# Patient Record
Sex: MALE | Race: WHITE | NOT HISPANIC OR LATINO | Employment: OTHER | URBAN - METROPOLITAN AREA
[De-identification: names, ages, dates, MRNs, and addresses within clinical notes are randomized per-mention and may not be internally consistent; named-entity substitution may affect disease eponyms.]

---

## 2018-10-23 ENCOUNTER — HISTORICAL (OUTPATIENT)
Dept: RESPIRATORY THERAPY | Facility: HOSPITAL | Age: 58
End: 2018-10-23

## 2018-12-18 ENCOUNTER — HISTORICAL (OUTPATIENT)
Dept: LAB | Facility: HOSPITAL | Age: 58
End: 2018-12-18

## 2019-12-10 ENCOUNTER — HISTORICAL (OUTPATIENT)
Dept: RADIOLOGY | Facility: HOSPITAL | Age: 59
End: 2019-12-10

## 2021-06-02 ENCOUNTER — HISTORICAL (OUTPATIENT)
Dept: RADIOLOGY | Facility: HOSPITAL | Age: 61
End: 2021-06-02

## 2022-04-09 ENCOUNTER — HISTORICAL (OUTPATIENT)
Dept: ADMINISTRATIVE | Facility: HOSPITAL | Age: 62
End: 2022-04-09
Payer: COMMERCIAL

## 2022-04-25 VITALS
OXYGEN SATURATION: 94 % | BODY MASS INDEX: 21.19 KG/M2 | SYSTOLIC BLOOD PRESSURE: 116 MMHG | WEIGHT: 148 LBS | HEIGHT: 70 IN | DIASTOLIC BLOOD PRESSURE: 70 MMHG

## 2022-06-13 DIAGNOSIS — Z87.891 PERSONAL HISTORY OF TOBACCO USE, PRESENTING HAZARDS TO HEALTH: Primary | ICD-10-CM

## 2022-07-01 ENCOUNTER — TELEPHONE (OUTPATIENT)
Dept: NEUROLOGY | Facility: CLINIC | Age: 62
End: 2022-07-01
Payer: COMMERCIAL

## 2022-07-01 NOTE — TELEPHONE ENCOUNTER
Last office visit was on 12/15/2020. Requesting refill for Lorazepam 1 mg bid prn. Does he have to come in first. If not I can propose medication to you. Please advise.

## 2022-07-05 NOTE — TELEPHONE ENCOUNTER
We can send in one-time Rx with no refills.  He has appt in a couple of weeks and we can provide refills at that time

## 2022-07-06 DIAGNOSIS — R25.1 TREMOR: Primary | ICD-10-CM

## 2022-07-06 RX ORDER — LORAZEPAM 1 MG/1
1 TABLET ORAL 2 TIMES DAILY
Refills: 0 | OUTPATIENT
Start: 2022-07-06

## 2022-07-06 RX ORDER — LORAZEPAM 1 MG/1
1 TABLET ORAL 2 TIMES DAILY
COMMUNITY
End: 2022-07-06

## 2022-07-06 RX ORDER — LORAZEPAM 1 MG/1
1 TABLET ORAL EVERY 12 HOURS PRN
Qty: 15 TABLET | Refills: 0 | Status: CANCELLED | OUTPATIENT
Start: 2022-07-06

## 2022-07-06 RX ORDER — LORAZEPAM 1 MG/1
1 TABLET ORAL EVERY 12 HOURS PRN
Qty: 15 TABLET | Refills: 0 | Status: SHIPPED | OUTPATIENT
Start: 2022-07-06 | End: 2022-07-27 | Stop reason: SDUPTHER

## 2022-07-06 RX ORDER — LORAZEPAM 1 MG/1
1 TABLET ORAL EVERY 12 HOURS PRN
Qty: 15 TABLET | Refills: 0 | Status: SHIPPED | OUTPATIENT
Start: 2022-07-06 | End: 2022-07-06

## 2022-07-26 ENCOUNTER — HOSPITAL ENCOUNTER (EMERGENCY)
Facility: HOSPITAL | Age: 62
Discharge: HOME | End: 2022-07-26
Attending: EMERGENCY MEDICINE
Payer: COMMERCIAL

## 2022-07-26 VITALS
HEART RATE: 75 BPM | DIASTOLIC BLOOD PRESSURE: 77 MMHG | SYSTOLIC BLOOD PRESSURE: 124 MMHG | BODY MASS INDEX: 19.6 KG/M2 | HEIGHT: 71 IN | TEMPERATURE: 98 F | WEIGHT: 140 LBS | RESPIRATION RATE: 18 BRPM | OXYGEN SATURATION: 91 %

## 2022-07-26 DIAGNOSIS — M94.0 COSTOCHONDRITIS, ACUTE: Primary | ICD-10-CM

## 2022-07-26 DIAGNOSIS — R52 PAIN: ICD-10-CM

## 2022-07-26 DIAGNOSIS — J44.1 COPD WITH ACUTE EXACERBATION: ICD-10-CM

## 2022-07-26 PROCEDURE — 99284 EMERGENCY DEPT VISIT MOD MDM: CPT | Mod: 25

## 2022-07-26 PROCEDURE — 96372 THER/PROPH/DIAG INJ SC/IM: CPT | Performed by: NURSE PRACTITIONER

## 2022-07-26 PROCEDURE — 63600175 PHARM REV CODE 636 W HCPCS: Performed by: NURSE PRACTITIONER

## 2022-07-26 RX ORDER — DOXYCYCLINE 100 MG/1
100 CAPSULE ORAL 2 TIMES DAILY
Qty: 20 CAPSULE | Refills: 0 | Status: SHIPPED | OUTPATIENT
Start: 2022-07-26 | End: 2022-08-05

## 2022-07-26 RX ORDER — KETOROLAC TROMETHAMINE 30 MG/ML
30 INJECTION, SOLUTION INTRAMUSCULAR; INTRAVENOUS
Status: COMPLETED | OUTPATIENT
Start: 2022-07-26 | End: 2022-07-26

## 2022-07-26 RX ORDER — DEXAMETHASONE SODIUM PHOSPHATE 4 MG/ML
8 INJECTION, SOLUTION INTRA-ARTICULAR; INTRALESIONAL; INTRAMUSCULAR; INTRAVENOUS; SOFT TISSUE
Status: COMPLETED | OUTPATIENT
Start: 2022-07-26 | End: 2022-07-26

## 2022-07-26 RX ORDER — CODEINE PHOSPHATE AND GUAIFENESIN 10; 100 MG/5ML; MG/5ML
10 SOLUTION ORAL EVERY 8 HOURS PRN
Qty: 180 ML | Refills: 0 | Status: SHIPPED | OUTPATIENT
Start: 2022-07-26 | End: 2022-08-05

## 2022-07-26 RX ADMIN — KETOROLAC TROMETHAMINE 30 MG: 30 INJECTION, SOLUTION INTRAMUSCULAR at 02:07

## 2022-07-26 RX ADMIN — DEXAMETHASONE SODIUM PHOSPHATE 8 MG: 4 INJECTION INTRA-ARTICULAR; INTRALESIONAL; INTRAMUSCULAR; INTRAVENOUS; SOFT TISSUE at 02:07

## 2022-07-26 NOTE — ED PROVIDER NOTES
Encounter Date: 7/26/2022       History     Chief Complaint   Patient presents with    Rib Injury     1 weeks ago, per patient, he bent over and felt pain in his left rib area     62-year-old male presents with left rib pain for about a week.  He states he bent over and felt like he tore something in his ribs.  He also reports having coughing congestion.  He does have a history of COPD.  He denies any fever, chills, nausea, vomiting or diarrhea.    The history is provided by the patient. No  was used.     Review of patient's allergies indicates:   Allergen Reactions    Penicillin Rash     Past Medical History:   Diagnosis Date    Collapse, lung     COPD with hypoxia     Degenerative cervical spinal stenosis     Depression     Disuse osteoporosis     Emphysema/COPD     Hepatitis C     Leukocytosis      Past Surgical History:   Procedure Laterality Date    CERVICAL LAMINECTOMY WITH SPINAL FUSION      CHOLECYSTECTOMY      COLONOSCOPY      ESOPHAGOGASTRODUODENOSCOPY      gastrointestinal biopsy      graft to nose      inguinal herniotomy      mandible operation      POLYPECTOMY       No family history on file.  Social History     Tobacco Use    Smoking status: Former Smoker    Smokeless tobacco: Never Used   Substance Use Topics    Alcohol use: Never    Drug use: Never     Review of Systems   Constitutional: Negative for chills and fever.   HENT: Negative for postnasal drip and rhinorrhea.    Respiratory: Positive for cough. Negative for shortness of breath.    Cardiovascular: Positive for chest pain.   Gastrointestinal: Negative for nausea and vomiting.   Musculoskeletal: Negative for back pain.   Neurological: Negative for dizziness and light-headedness.       Physical Exam     Initial Vitals [07/26/22 1251]   BP Pulse Resp Temp SpO2   124/77 75 18 97.5 °F (36.4 °C) (!) 91 %      MAP       --         Physical Exam    Constitutional: He appears well-developed and  well-nourished.   HENT:   Head: Normocephalic and atraumatic.   Eyes: EOM are normal.   Neck: Neck supple.   Cardiovascular: Normal rate, regular rhythm and normal heart sounds.   Pulmonary/Chest: He has rales in the right lower field and the left lower field. He exhibits tenderness. He exhibits no crepitus and no swelling.     Abdominal: Abdomen is soft. There is no abdominal tenderness.   Musculoskeletal:         General: Normal range of motion.      Cervical back: Neck supple.     Neurological: He is alert and oriented to person, place, and time.   Skin: Skin is warm and dry. No rash noted. No erythema.   Psychiatric: He has a normal mood and affect.         ED Course   Procedures  Labs Reviewed - No data to display       Imaging Results          X-Ray Ribs 2 View Left (Final result)  Result time 07/26/22 13:27:36    Final result by Brennan Ayala MD (07/26/22 13:27:36)                 Impression:        1. Chronic lung parenchymal changes, with no evidence of new or worsening intrathoracic abnormality.  2. No convincing displaced rib fracture.      Electronically signed by: Brennan Ayala  Date:    07/26/2022  Time:    13:27             Narrative:    EXAMINATION:  XR RIBS 2 VIEW LEFT    CLINICAL HISTORY:  Pain, unspecified;    TECHNIQUE:  Total of 4 images of the left ribs.    COMPARISON:  14 December 2021 chest radiograph.    FINDINGS:  The visualized cardiac silhouette, mediastinal structures, and lung fields are without evidence of acute or suspicious focal abnormality.  Chronic lung interstitial changes are similar to the prior, with redemonstrated and grossly stable appearance of irregular left apical nodular opacity.  There is no significant pleural fluid or appreciable pneumothorax.    No convincing displaced rib fracture is appreciated. The remaining osseous structures are grossly intact.                                   Medications   dexamethasone injection 8 mg (8 mg Intramuscular Given 7/26/22 1402)    ketorolac injection 30 mg (30 mg Intramuscular Given 7/26/22 1402)     Medical Decision Making:   Differential Diagnosis:   Rib fracture, COPD exacerbation, costochondritis, pneumonia, bronchitis  Clinical Tests:   Radiological Study: Ordered and Reviewed  ED Management:  X-rays negative for pneumonia or rib fracture.  Patient with mild crackles in bases.  Given his COPD history will cover him with antibiotics.  Patient given Decadron and Toradol in ER.  Will discharge home with cough medication and inhaler.                      Clinical Impression:   Final diagnoses:  [R52] Pain  [M94.0] Costochondritis, acute (Primary)  [J44.1] COPD with acute exacerbation          ED Disposition Condition    Discharge Stable        ED Prescriptions     Medication Sig Dispense Start Date End Date Auth. Provider    doxycycline (VIBRAMYCIN) 100 MG Cap Take 1 capsule (100 mg total) by mouth 2 (two) times daily. for 10 days 20 capsule 7/26/2022 8/5/2022 KARIN Durham    guaiFENesin-codeine 100-10 mg/5 ml (TUSSI-ORGANIDIN NR)  mg/5 mL syrup Take 10 mLs by mouth every 8 (eight) hours as needed for Cough. 180 mL 7/26/2022 8/5/2022 KARIN Durham        Follow-up Information    None          KARIN Durham  07/26/22 4194

## 2022-07-26 NOTE — DISCHARGE INSTRUCTIONS
Guaifenesin with codeine as needed for cough and pain, do not take and drive it, may cause drowsiness  Continue your nebulizer treatments as needed for wheezing  Doxycycline twice a day

## 2022-07-27 ENCOUNTER — OFFICE VISIT (OUTPATIENT)
Dept: NEUROLOGY | Facility: CLINIC | Age: 62
End: 2022-07-27
Payer: COMMERCIAL

## 2022-07-27 VITALS
DIASTOLIC BLOOD PRESSURE: 72 MMHG | HEIGHT: 71 IN | SYSTOLIC BLOOD PRESSURE: 130 MMHG | WEIGHT: 140 LBS | BODY MASS INDEX: 19.6 KG/M2

## 2022-07-27 DIAGNOSIS — G25.3 MYOCLONUS: Primary | ICD-10-CM

## 2022-07-27 PROCEDURE — 3078F DIAST BP <80 MM HG: CPT | Mod: CPTII,S$GLB,, | Performed by: PSYCHIATRY & NEUROLOGY

## 2022-07-27 PROCEDURE — 99213 PR OFFICE/OUTPT VISIT, EST, LEVL III, 20-29 MIN: ICD-10-PCS | Mod: S$GLB,,, | Performed by: PSYCHIATRY & NEUROLOGY

## 2022-07-27 PROCEDURE — 1160F PR REVIEW ALL MEDS BY PRESCRIBER/CLIN PHARMACIST DOCUMENTED: ICD-10-PCS | Mod: CPTII,S$GLB,, | Performed by: PSYCHIATRY & NEUROLOGY

## 2022-07-27 PROCEDURE — 99999 PR PBB SHADOW E&M-EST. PATIENT-LVL III: ICD-10-PCS | Mod: PBBFAC,,, | Performed by: PSYCHIATRY & NEUROLOGY

## 2022-07-27 PROCEDURE — 3008F PR BODY MASS INDEX (BMI) DOCUMENTED: ICD-10-PCS | Mod: CPTII,S$GLB,, | Performed by: PSYCHIATRY & NEUROLOGY

## 2022-07-27 PROCEDURE — 1159F MED LIST DOCD IN RCRD: CPT | Mod: CPTII,S$GLB,, | Performed by: PSYCHIATRY & NEUROLOGY

## 2022-07-27 PROCEDURE — 3008F BODY MASS INDEX DOCD: CPT | Mod: CPTII,S$GLB,, | Performed by: PSYCHIATRY & NEUROLOGY

## 2022-07-27 PROCEDURE — 3075F PR MOST RECENT SYSTOLIC BLOOD PRESS GE 130-139MM HG: ICD-10-PCS | Mod: CPTII,S$GLB,, | Performed by: PSYCHIATRY & NEUROLOGY

## 2022-07-27 PROCEDURE — 99213 OFFICE O/P EST LOW 20 MIN: CPT | Mod: S$GLB,,, | Performed by: PSYCHIATRY & NEUROLOGY

## 2022-07-27 PROCEDURE — 1159F PR MEDICATION LIST DOCUMENTED IN MEDICAL RECORD: ICD-10-PCS | Mod: CPTII,S$GLB,, | Performed by: PSYCHIATRY & NEUROLOGY

## 2022-07-27 PROCEDURE — 1160F RVW MEDS BY RX/DR IN RCRD: CPT | Mod: CPTII,S$GLB,, | Performed by: PSYCHIATRY & NEUROLOGY

## 2022-07-27 PROCEDURE — 3078F PR MOST RECENT DIASTOLIC BLOOD PRESSURE < 80 MM HG: ICD-10-PCS | Mod: CPTII,S$GLB,, | Performed by: PSYCHIATRY & NEUROLOGY

## 2022-07-27 PROCEDURE — 99999 PR PBB SHADOW E&M-EST. PATIENT-LVL III: CPT | Mod: PBBFAC,,, | Performed by: PSYCHIATRY & NEUROLOGY

## 2022-07-27 PROCEDURE — 3075F SYST BP GE 130 - 139MM HG: CPT | Mod: CPTII,S$GLB,, | Performed by: PSYCHIATRY & NEUROLOGY

## 2022-07-27 RX ORDER — LORAZEPAM 1 MG/1
1 TABLET ORAL EVERY 12 HOURS PRN
Qty: 15 TABLET | Refills: 5 | Status: ON HOLD | OUTPATIENT
Start: 2022-07-27 | End: 2023-01-12 | Stop reason: HOSPADM

## 2022-07-27 NOTE — PROGRESS NOTES
Chief Complaint   Patient presents with    myoclonus     F/u for myoclonus, anxiety; patient states tremors have improved with medication. Patient reports myoclonus is still the same well tolerated with medication. Patient does not have list of medication with him but reports meds have not changed since last office visit 12/2020        This is a 62 y.o. male here for follow up for F/u for myoclonus, anxiety; patient states tremors have improved with medication. Patient reports myoclonus is still the same well tolerated with medication. Patient does not have list of medication with him but reports meds have not changed since last office visit 12/2020. Takes ativan about once a week when myoclonus is worse. It works and it the only thing that has owrked    Medication List with Changes/Refills   Current Medications    ALBUTEROL-IPRATROPIUM (DUO-NEB) 2.5 MG-0.5 MG/3 ML NEBULIZER SOLUTION    Take by nebulization 4 (four) times daily.    BUPRENORPHINE (BUTRANS) 20 MCG/HOUR PTWK    Apply 1 patch topically every 7 days.    DOXYCYCLINE (VIBRAMYCIN) 100 MG CAP    Take 1 capsule (100 mg total) by mouth 2 (two) times daily. for 10 days    GABAPENTIN (NEURONTIN) 300 MG CAPSULE    Take 300 mg by mouth 3 (three) times daily as needed.    GUAIFENESIN-CODEINE 100-10 MG/5 ML (TUSSI-ORGANIDIN NR)  MG/5 ML SYRUP    Take 10 mLs by mouth every 8 (eight) hours as needed for Cough.    LORAZEPAM (ATIVAN) 1 MG TABLET    Take 1 tablet (1 mg total) by mouth every 12 (twelve) hours as needed for Anxiety (seizure activity).    SERTRALINE (ZOLOFT) 100 MG TABLET    Take 100 mg by mouth once daily.    TEMAZEPAM (RESTORIL) 30 MG CAPSULE    Take 30 mg by mouth nightly.        Vitals:    07/27/22 1504   BP: 130/72        General: alert and oriented, no acute distress, no audible wheezes, pulse intact, no edema    Cognition and Comprehension  Speech and language intact  Follows commands  Speech fluent  Attention intact  Memory for recent events  intact from history taking  Affect pleasant  Fund of knowledge adequate    Cranial nerves  II. Optic: Visual fields full to confrontation both eyes  III, IV, VI. Oculomotor: Intact, Pupils equal, round and reactive to light, no nystagmus  V. Trigeminal: sensation to light touch normal  VII. No facial asymmetry or facial weakness  VIII. Hearing intact to spoken voice  IX/X. Glossopharyngeal/Vagus: Voice normal, palate rises symmetrically  XI. Axillary: Shoulder shrug normal  XII. Hypoglossal: Intact    Muscle Strength and Tone  Normal upper extremity tone  Normal lower extremity tone  Normal upper extremity strength  Normal lower extremity strength    Sensation  Intact to light touch and temperature    Reflexes  Normal and symmetric    Coordination and Gait  Finger to nose normal  Gait normal     1. Myoclonus       Ativan should be used only when needed no more than once a week

## 2022-11-11 ENCOUNTER — HOSPITAL ENCOUNTER (EMERGENCY)
Facility: HOSPITAL | Age: 62
Discharge: LEFT BEFORE DISCHARGE | End: 2022-11-11
Attending: FAMILY MEDICINE
Payer: COMMERCIAL

## 2022-11-11 VITALS
TEMPERATURE: 99 F | OXYGEN SATURATION: 91 % | SYSTOLIC BLOOD PRESSURE: 106 MMHG | DIASTOLIC BLOOD PRESSURE: 64 MMHG | RESPIRATION RATE: 20 BRPM | HEART RATE: 101 BPM

## 2022-11-11 DIAGNOSIS — J18.9 PNEUMONIA OF LEFT UPPER LOBE DUE TO INFECTIOUS ORGANISM: Primary | ICD-10-CM

## 2022-11-11 DIAGNOSIS — R07.9 CHEST PAIN: ICD-10-CM

## 2022-11-11 LAB
ALBUMIN SERPL-MCNC: 2.7 GM/DL (ref 3.4–4.8)
ALBUMIN/GLOB SERPL: 0.7 RATIO (ref 1.1–2)
ALP SERPL-CCNC: 113 UNIT/L (ref 40–150)
ALT SERPL-CCNC: 7 UNIT/L (ref 0–55)
APPEARANCE UR: CLEAR
AST SERPL-CCNC: 10 UNIT/L (ref 5–34)
BACTERIA #/AREA URNS AUTO: NORMAL /HPF
BASOPHILS # BLD AUTO: 0.01 X10(3)/MCL (ref 0–0.2)
BASOPHILS NFR BLD AUTO: 0 %
BILIRUB UR QL STRIP.AUTO: ABNORMAL MG/DL
BILIRUBIN DIRECT+TOT PNL SERPL-MCNC: 1 MG/DL
BUN SERPL-MCNC: 18.7 MG/DL (ref 8.4–25.7)
CALCIUM SERPL-MCNC: 9.8 MG/DL (ref 8.8–10)
CHLORIDE SERPL-SCNC: 95 MMOL/L (ref 98–107)
CO2 SERPL-SCNC: 28 MMOL/L (ref 23–31)
COLOR UR AUTO: ABNORMAL
CREAT SERPL-MCNC: 0.72 MG/DL (ref 0.73–1.18)
D DIMER PPP IA.FEU-MCNC: 3.48 UG/ML FEU (ref 0–0.5)
EOSINOPHIL # BLD AUTO: 0 X10(3)/MCL (ref 0–0.9)
EOSINOPHIL NFR BLD AUTO: 0 %
ERYTHROCYTE [DISTWIDTH] IN BLOOD BY AUTOMATED COUNT: 13.2 % (ref 11.5–17)
FLUAV AG UPPER RESP QL IA.RAPID: NOT DETECTED
FLUBV AG UPPER RESP QL IA.RAPID: NOT DETECTED
GFR SERPLBLD CREATININE-BSD FMLA CKD-EPI: >60 MLS/MIN/1.73/M2
GLOBULIN SER-MCNC: 3.9 GM/DL (ref 2.4–3.5)
GLUCOSE SERPL-MCNC: 99 MG/DL (ref 82–115)
GLUCOSE UR QL STRIP.AUTO: NEGATIVE MG/DL
HCT VFR BLD AUTO: 42.1 % (ref 42–52)
HGB BLD-MCNC: 13.1 GM/DL (ref 14–18)
IMM GRANULOCYTES # BLD AUTO: 0.08 X10(3)/MCL (ref 0–0.04)
IMM GRANULOCYTES NFR BLD AUTO: 0.3 %
KETONES UR QL STRIP.AUTO: ABNORMAL MG/DL
LEUKOCYTE ESTERASE UR QL STRIP.AUTO: NEGATIVE UNIT/L
LYMPHOCYTES # BLD AUTO: 0.56 X10(3)/MCL (ref 0.6–4.6)
LYMPHOCYTES NFR BLD AUTO: 2.3 %
MCH RBC QN AUTO: 30.8 PG (ref 27–31)
MCHC RBC AUTO-ENTMCNC: 31.1 MG/DL (ref 33–36)
MCV RBC AUTO: 99.1 FL (ref 80–94)
MONOCYTES # BLD AUTO: 1.67 X10(3)/MCL (ref 0.1–1.3)
MONOCYTES NFR BLD AUTO: 6.8 %
NEUTROPHILS # BLD AUTO: 22.4 X10(3)/MCL (ref 2.1–9.2)
NEUTROPHILS NFR BLD AUTO: 90.6 %
NITRITE UR QL STRIP.AUTO: NEGATIVE
PH UR STRIP.AUTO: 6 [PH]
PLATELET # BLD AUTO: 205 X10(3)/MCL (ref 130–400)
PMV BLD AUTO: 10 FL (ref 7.4–10.4)
POTASSIUM SERPL-SCNC: 4.1 MMOL/L (ref 3.5–5.1)
PROT SERPL-MCNC: 6.6 GM/DL (ref 5.8–7.6)
PROT UR QL STRIP.AUTO: 30 MG/DL
RBC # BLD AUTO: 4.25 X10(6)/MCL (ref 4.7–6.1)
RBC #/AREA URNS AUTO: NORMAL /HPF
RBC UR QL AUTO: ABNORMAL UNIT/L
SARS-COV-2 RNA RESP QL NAA+PROBE: NOT DETECTED
SODIUM SERPL-SCNC: 135 MMOL/L (ref 136–145)
SP GR UR STRIP.AUTO: 1.01
SQUAMOUS #/AREA URNS AUTO: NORMAL /HPF
TROPONIN ISTAT: 0 NG/ML
UROBILINOGEN UR STRIP-ACNC: >=8 MG/DL
WBC # SPEC AUTO: 24.7 X10(3)/MCL (ref 4.5–11.5)
WBC #/AREA URNS AUTO: NORMAL /HPF

## 2022-11-11 PROCEDURE — 87040 BLOOD CULTURE FOR BACTERIA: CPT | Performed by: FAMILY MEDICINE

## 2022-11-11 PROCEDURE — 81001 URINALYSIS AUTO W/SCOPE: CPT | Performed by: FAMILY MEDICINE

## 2022-11-11 PROCEDURE — 80053 COMPREHEN METABOLIC PANEL: CPT | Performed by: FAMILY MEDICINE

## 2022-11-11 PROCEDURE — 93010 EKG 12-LEAD: ICD-10-PCS | Mod: ,,, | Performed by: INTERNAL MEDICINE

## 2022-11-11 PROCEDURE — 25500020 PHARM REV CODE 255: Performed by: FAMILY MEDICINE

## 2022-11-11 PROCEDURE — 96375 TX/PRO/DX INJ NEW DRUG ADDON: CPT | Mod: 59

## 2022-11-11 PROCEDURE — 85025 COMPLETE CBC W/AUTO DIFF WBC: CPT | Performed by: FAMILY MEDICINE

## 2022-11-11 PROCEDURE — 85379 FIBRIN DEGRADATION QUANT: CPT | Performed by: FAMILY MEDICINE

## 2022-11-11 PROCEDURE — 63600175 PHARM REV CODE 636 W HCPCS: Performed by: FAMILY MEDICINE

## 2022-11-11 PROCEDURE — 0240U COVID/FLU A&B PCR: CPT | Performed by: FAMILY MEDICINE

## 2022-11-11 PROCEDURE — 25000242 PHARM REV CODE 250 ALT 637 W/ HCPCS: Performed by: FAMILY MEDICINE

## 2022-11-11 PROCEDURE — 93010 ELECTROCARDIOGRAM REPORT: CPT | Mod: ,,, | Performed by: INTERNAL MEDICINE

## 2022-11-11 PROCEDURE — 96374 THER/PROPH/DIAG INJ IV PUSH: CPT | Mod: 59

## 2022-11-11 PROCEDURE — 99285 EMERGENCY DEPT VISIT HI MDM: CPT | Mod: 25

## 2022-11-11 PROCEDURE — 93005 ELECTROCARDIOGRAM TRACING: CPT

## 2022-11-11 RX ORDER — IPRATROPIUM BROMIDE AND ALBUTEROL SULFATE 2.5; .5 MG/3ML; MG/3ML
3 SOLUTION RESPIRATORY (INHALATION)
Status: COMPLETED | OUTPATIENT
Start: 2022-11-11 | End: 2022-11-11

## 2022-11-11 RX ORDER — LEVOFLOXACIN 500 MG/1
500 TABLET, FILM COATED ORAL DAILY
Qty: 7 TABLET | Refills: 0 | Status: SHIPPED | OUTPATIENT
Start: 2022-11-11 | End: 2022-11-18

## 2022-11-11 RX ORDER — FUROSEMIDE 10 MG/ML
20 INJECTION INTRAMUSCULAR; INTRAVENOUS
Status: COMPLETED | OUTPATIENT
Start: 2022-11-11 | End: 2022-11-11

## 2022-11-11 RX ORDER — METHYLPREDNISOLONE SOD SUCC 125 MG
125 VIAL (EA) INJECTION
Status: COMPLETED | OUTPATIENT
Start: 2022-11-11 | End: 2022-11-11

## 2022-11-11 RX ORDER — PREDNISONE 50 MG/1
50 TABLET ORAL DAILY
Qty: 5 TABLET | Refills: 0 | Status: ON HOLD | OUTPATIENT
Start: 2022-11-11 | End: 2023-01-12 | Stop reason: HOSPADM

## 2022-11-11 RX ADMIN — METHYLPREDNISOLONE SODIUM SUCCINATE 125 MG: 125 INJECTION, POWDER, FOR SOLUTION INTRAMUSCULAR; INTRAVENOUS at 03:11

## 2022-11-11 RX ADMIN — IOPAMIDOL 100 ML: 755 INJECTION, SOLUTION INTRAVENOUS at 04:11

## 2022-11-11 RX ADMIN — IPRATROPIUM BROMIDE AND ALBUTEROL SULFATE 3 ML: .5; 3 SOLUTION RESPIRATORY (INHALATION) at 04:11

## 2022-11-11 RX ADMIN — FUROSEMIDE 20 MG: 10 INJECTION, SOLUTION INTRAVENOUS at 04:11

## 2022-11-11 NOTE — ED NOTES
Pt verbally instructed per Dr. Terry and myself of need for admission for pneumonia/further treatment. Pt reports that he does not want to continue with treatment or admission even though advised of possible outcomes: worsening of condition or death. Pt verbalizes understanding of risks. Pt reports that he would still like to leave AMA.

## 2022-11-11 NOTE — ED PROVIDER NOTES
Encounter Date: 2022       History     Chief Complaint   Patient presents with    Shortness of Breath     Hx copd- more sob - 5L 02 nc at home/palpitations/bodyaches/cough     62-year-old male patient comes in by private motor vehicle with history of COPD requiring oxygen at home patient is here with oxygen has an on 5 L and patient is satting at 93 94% with that patient has an extensive history of atelectasis COPD with hypoxia and emphysema patient otherwise has O2 sat at home in the 40s according to a female friend otherwise patient states that he had increase in his respiratory rate increase in his cough for the last 2 weeks that has become more productive over the last 3 days.  Patient has no significant chest pain no neck pain or orthopnea no other complaints with this.      Review of patient's allergies indicates:   Allergen Reactions    Penicillin Rash     Past Medical History:   Diagnosis Date    Collapse, lung     COPD with hypoxia     Degenerative cervical spinal stenosis     Depression     Disuse osteoporosis     Emphysema/COPD     Hepatitis C     Leukocytosis      Past Surgical History:   Procedure Laterality Date    CERVICAL LAMINECTOMY WITH SPINAL FUSION      CHOLECYSTECTOMY      COLONOSCOPY      ESOPHAGOGASTRODUODENOSCOPY      gastrointestinal biopsy      graft to nose      inguinal herniotomy      mandible operation      POLYPECTOMY       No family history on file.  Social History     Tobacco Use    Smoking status: Former     Packs/day: 2.00     Years: 40.00     Pack years: 80.00     Types: Cigarettes     Quit date: 2017     Years since quittin.2    Smokeless tobacco: Never   Substance Use Topics    Alcohol use: Never    Drug use: Never     Review of Systems   Constitutional:  Negative for fever.   HENT:  Negative for sore throat.    Respiratory:  Positive for cough, shortness of breath and wheezing.    Cardiovascular:  Negative for chest pain.   Gastrointestinal:  Negative for nausea.    Genitourinary:  Negative for dysuria.   Musculoskeletal:  Negative for back pain.   Skin:  Negative for rash.   Neurological:  Negative for weakness.   Hematological:  Does not bruise/bleed easily.   All other systems reviewed and are negative.    Physical Exam     Initial Vitals [11/11/22 1456]   BP Pulse Resp Temp SpO2   132/89 (!) 115 (!) 28 98.5 °F (36.9 °C) (!) 94 %      MAP       --         Physical Exam    Nursing note and vitals reviewed.  Constitutional: He appears well-developed and well-nourished. He is not diaphoretic. No distress.   HENT:   Head: Normocephalic and atraumatic.   Right Ear: External ear normal.   Left Ear: External ear normal.   Nose: Nose normal.   Mouth/Throat: Oropharynx is clear and moist. No oropharyngeal exudate.   Eyes: Conjunctivae and EOM are normal. Pupils are equal, round, and reactive to light. Right eye exhibits no discharge. Left eye exhibits no discharge.   Neck: Neck supple. No thyromegaly present. No tracheal deviation present. No JVD present.   Normal range of motion.  Cardiovascular:  Normal rate, regular rhythm, normal heart sounds and intact distal pulses.     Exam reveals no gallop and no friction rub.       No murmur heard.  Pulmonary/Chest: No stridor. He is in respiratory distress. He has wheezes. He has no rhonchi. He has no rales. He exhibits no tenderness.   Abdominal: Abdomen is soft. Bowel sounds are normal. He exhibits no distension. There is no abdominal tenderness. There is no rebound and no guarding.   Musculoskeletal:         General: No tenderness or edema. Normal range of motion.      Cervical back: Normal range of motion and neck supple.     Lymphadenopathy:     He has no cervical adenopathy.   Neurological: He is alert and oriented to person, place, and time. He has normal strength and normal reflexes. No cranial nerve deficit or sensory deficit. GCS score is 15. GCS eye subscore is 4. GCS verbal subscore is 5. GCS motor subscore is 6.   Skin: Skin  is warm and dry. No rash and no abscess noted. No erythema.   Psychiatric: He has a normal mood and affect. His behavior is normal. Judgment and thought content normal.       ED Course   Procedures  Labs Reviewed   COMPREHENSIVE METABOLIC PANEL - Abnormal; Notable for the following components:       Result Value    Sodium Level 135 (*)     Chloride 95 (*)     Creatinine 0.72 (*)     Albumin Level 2.7 (*)     Globulin 3.9 (*)     Albumin/Globulin Ratio 0.7 (*)     All other components within normal limits   CBC WITH DIFFERENTIAL - Abnormal; Notable for the following components:    WBC 24.7 (*)     RBC 4.25 (*)     Hgb 13.1 (*)     MCV 99.1 (*)     MCHC 31.1 (*)     Lymph # 0.56 (*)     Neut # 22.4 (*)     Mono # 1.67 (*)     IG# 0.08 (*)     All other components within normal limits   COVID/FLU A&B PCR - Normal    Narrative:     The Xpert Xpress SARS-CoV-2/FLU/RSV plus is a rapid, multiplexed real-time PCR test intended for the simultaneous qualitative detection and differentiation of SARS-CoV-2, Influenza A, Influenza B, and respiratory syncytial virus (RSV) viral RNA in either nasopharyngeal swab or nasal swab specimens.         POCT TROPONIN - Normal   BLOOD CULTURE [facility]   BLOOD CULTURE [facility]   CBC W/ AUTO DIFFERENTIAL    Narrative:     The following orders were created for panel order CBC auto differential.  Procedure                               Abnormality         Status                     ---------                               -----------         ------                     CBC with Differential[[id number]]        Abnormal            Final result                 Please view results for these tests on the individual orders.   D DIMER, QUANTITATIVE   URINALYSIS, REFLEX TO URINE CULTURE     EKG Readings: (Independently Interpreted)   Initial Reading: No STEMI. Rhythm: Sinus Tachycardia. Heart Rate: 114. Ectopy: No Ectopy. Conduction: Normal. ST Segments: Normal ST Segments. T Waves: Normal. Axis: Normal.      Imaging Results              CT Chest With Contrast (Final result)  Result time 11/11/22 16:21:11      Final result by India Story MD (11/11/22 16:21:11)                   Impression:      Findings suggestive of a left upper lobe pneumonia.  Follow-up imaging can be obtained to ensure resolution.      Electronically signed by: India Story  Date:    11/11/2022  Time:    16:21               Narrative:    EXAMINATION:  CT CHEST WITH CONTRAST    CLINICAL HISTORY:  Abnormal xray - lung nodule, >= 1 cm;    TECHNIQUE:  Contiguous CT images of the chest after IV contrast administration. Axial, coronal and sagittal reformatted images are reviewed. Dose length product is 577 mGycm. Automatic exposure control was utilized to limit radiation dose.    COMPARISON:  X-ray dated 08/08/2021    FINDINGS:  Lungs and large airways: There is ground-glass opacity in the left upper lobe with a large consolidation and air bronchograms.  There are moderate emphysematous changes with scarring in the lung apices.    Pleura: There is no pleural effusion or visible pneumothorax.    Heart and vasculature: The heart is normal in size.  There is no pericardial effusion.    Mediastinum and ervin: No definite abnormally enlarged lymph nodes.    Chest wall/axilla and lower neck: No significant findings.    Upper abdomen: No significant findings.    Bones: No acute osseous findings.  There are degenerative changes of the spine.                                       X-Ray Chest AP Portable (Final result)  Result time 11/11/22 15:27:45      Final result by Vincent Yoder MD (11/11/22 15:27:45)                   Impression:      Large mass/area of consolidation in the left hilum with confluent opacities in the perihilar region and left upper lobe hard to ascertain whether these might represent a mass which changes of postobstructive pneumonitis CT scan examination might prove helpful for further assessment.    Increase interstitial  markings and some opacities in the left infrahilar region and left base this, however, similar to the exam of December 14, 2021      Electronically signed by: Vincent Yoder  Date:    11/11/2022  Time:    15:27               Narrative:    EXAMINATION:  XR CHEST AP PORTABLE    CLINICAL HISTORY:  Chest Pain;    TECHNIQUE:  Single frontal view of the chest was performed.    COMPARISON:  None    FINDINGS:  Examination reveals mediastinal silhouette to be within normal limits cardiac silhouette is not enlarged there is a large density in the left hilar region with evidence of increased airspace opacities in the perihilar region and upper lobe hard to ascertain whether these might represent a mass with postobstructive pneumonitis other imaging modalities might prove helpful for further assessment.    Some increased interstitial markings and confluent opacities also identified at the left base these, however, was also seen on a previous exam of December 14, 2021.    No other focal consolidative changes                                       Medications   albuterol-ipratropium 2.5 mg-0.5 mg/3 mL nebulizer solution 3 mL (has no administration in time range)   methylPREDNISolone sodium succinate injection 125 mg (125 mg Intravenous Given 11/11/22 1551)   furosemide injection 20 mg (20 mg Intravenous Given 11/11/22 1611)   iopamidoL (ISOVUE-370) injection 100 mL (100 mLs Intravenous Given 11/11/22 1600)     Medical Decision Making:   ED Management:  I discussed with the patient the extent of his pneumonia but patient is refusing to be admitted stating that he has a son at home that will take care of him and even though we went over the risk of him leaving including death patient refused to be admitted                        Clinical Impression:   Final diagnoses:  [R07.9] Chest pain  [J18.9] Pneumonia of left upper lobe due to infectious organism (Primary)        ED Disposition Condition    AMA Gian MORFIN  MD Mara  11/11/22 5432

## 2022-11-14 LAB
POC CARDIAC TROPONIN I: 0 NG/ML (ref 0–0.08)
SAMPLE: NORMAL

## 2022-11-17 LAB
BACTERIA BLD CULT: NORMAL
BACTERIA BLD CULT: NORMAL

## 2022-11-23 ENCOUNTER — LAB VISIT (OUTPATIENT)
Dept: LAB | Facility: HOSPITAL | Age: 62
End: 2022-11-23
Attending: FAMILY MEDICINE
Payer: COMMERCIAL

## 2022-11-23 DIAGNOSIS — J18.9 PNEUMONIA: Primary | ICD-10-CM

## 2022-11-23 LAB
ALBUMIN SERPL-MCNC: 2.7 GM/DL (ref 3.4–4.8)
ALBUMIN/GLOB SERPL: 0.6 RATIO (ref 1.1–2)
ALP SERPL-CCNC: 102 UNIT/L (ref 40–150)
ALT SERPL-CCNC: 6 UNIT/L (ref 0–55)
AST SERPL-CCNC: 11 UNIT/L (ref 5–34)
BASOPHILS # BLD AUTO: 0.03 X10(3)/MCL (ref 0–0.2)
BASOPHILS NFR BLD AUTO: 0.2 %
BILIRUBIN DIRECT+TOT PNL SERPL-MCNC: 0.3 MG/DL
BUN SERPL-MCNC: 16.8 MG/DL (ref 8.4–25.7)
CALCIUM SERPL-MCNC: 9.3 MG/DL (ref 8.8–10)
CHLORIDE SERPL-SCNC: 102 MMOL/L (ref 98–107)
CO2 SERPL-SCNC: 25 MMOL/L (ref 23–31)
CREAT SERPL-MCNC: 0.82 MG/DL (ref 0.73–1.18)
EOSINOPHIL # BLD AUTO: 0.15 X10(3)/MCL (ref 0–0.9)
EOSINOPHIL NFR BLD AUTO: 0.8 %
ERYTHROCYTE [DISTWIDTH] IN BLOOD BY AUTOMATED COUNT: 13.9 % (ref 11.5–17)
GFR SERPLBLD CREATININE-BSD FMLA CKD-EPI: >60 MLS/MIN/1.73/M2
GLOBULIN SER-MCNC: 4.8 GM/DL (ref 2.4–3.5)
GLUCOSE SERPL-MCNC: 125 MG/DL (ref 82–115)
HCT VFR BLD AUTO: 44 % (ref 42–52)
HGB BLD-MCNC: 13.8 GM/DL (ref 14–18)
IMM GRANULOCYTES # BLD AUTO: 0.06 X10(3)/MCL (ref 0–0.04)
IMM GRANULOCYTES NFR BLD AUTO: 0.3 %
LYMPHOCYTES # BLD AUTO: 2.23 X10(3)/MCL (ref 0.6–4.6)
LYMPHOCYTES NFR BLD AUTO: 11.7 %
MCH RBC QN AUTO: 30.3 PG (ref 27–31)
MCHC RBC AUTO-ENTMCNC: 31.4 MG/DL (ref 33–36)
MCV RBC AUTO: 96.7 FL (ref 80–94)
MONOCYTES # BLD AUTO: 1.35 X10(3)/MCL (ref 0.1–1.3)
MONOCYTES NFR BLD AUTO: 7.1 %
NEUTROPHILS # BLD AUTO: 15.2 X10(3)/MCL (ref 2.1–9.2)
NEUTROPHILS NFR BLD AUTO: 79.9 %
PLATELET # BLD AUTO: 691 X10(3)/MCL (ref 130–400)
PMV BLD AUTO: 8.6 FL (ref 7.4–10.4)
POTASSIUM SERPL-SCNC: 4.5 MMOL/L (ref 3.5–5.1)
PROT SERPL-MCNC: 7.5 GM/DL (ref 5.8–7.6)
RBC # BLD AUTO: 4.55 X10(6)/MCL (ref 4.7–6.1)
SODIUM SERPL-SCNC: 137 MMOL/L (ref 136–145)
WBC # SPEC AUTO: 19.1 X10(3)/MCL (ref 4.5–11.5)

## 2022-11-23 PROCEDURE — 80053 COMPREHEN METABOLIC PANEL: CPT

## 2022-11-23 PROCEDURE — 36415 COLL VENOUS BLD VENIPUNCTURE: CPT

## 2022-11-23 PROCEDURE — 85025 COMPLETE CBC W/AUTO DIFF WBC: CPT

## 2023-01-01 ENCOUNTER — HOSPITAL ENCOUNTER (INPATIENT)
Facility: HOSPITAL | Age: 63
LOS: 4 days | Discharge: NURSING, REHAB OR LONG-TERM CARE | DRG: 177 | End: 2023-01-07
Attending: SPECIALIST | Admitting: STUDENT IN AN ORGANIZED HEALTH CARE EDUCATION/TRAINING PROGRAM
Payer: COMMERCIAL

## 2023-01-01 DIAGNOSIS — J44.1 COPD EXACERBATION: Primary | ICD-10-CM

## 2023-01-01 DIAGNOSIS — R78.81 BACTEREMIA: ICD-10-CM

## 2023-01-01 DIAGNOSIS — J18.9 COMMUNITY ACQUIRED BILATERAL LOWER LOBE PNEUMONIA: ICD-10-CM

## 2023-01-01 DIAGNOSIS — E87.6 LOW SERUM POTASSIUM: ICD-10-CM

## 2023-01-01 DIAGNOSIS — I42.9 CARDIOMYOPATHY: ICD-10-CM

## 2023-01-01 DIAGNOSIS — R07.9 CHEST PAIN: ICD-10-CM

## 2023-01-01 DIAGNOSIS — J96.02 ACUTE HYPERCAPNIC RESPIRATORY FAILURE: ICD-10-CM

## 2023-01-01 DIAGNOSIS — J18.9 PNEUMONIA OF LEFT UPPER LOBE DUE TO INFECTIOUS ORGANISM: ICD-10-CM

## 2023-01-01 PROBLEM — R64 PULMONARY CACHEXIA DUE TO COPD: Status: ACTIVE | Noted: 2023-01-01

## 2023-01-01 PROBLEM — J69.0 ASPIRATION PNEUMONIA: Status: ACTIVE | Noted: 2023-01-01

## 2023-01-01 PROBLEM — J44.9 PULMONARY CACHEXIA DUE TO COPD: Status: ACTIVE | Noted: 2023-01-01

## 2023-01-01 LAB
ALBUMIN SERPL-MCNC: 2.8 G/DL (ref 3.4–4.8)
ALBUMIN/GLOB SERPL: 0.6 RATIO (ref 1.1–2)
ALP SERPL-CCNC: 123 UNIT/L (ref 40–150)
ALT SERPL-CCNC: 7 UNIT/L (ref 0–55)
APPEARANCE UR: ABNORMAL
AST SERPL-CCNC: 12 UNIT/L (ref 5–34)
BACTERIA #/AREA URNS AUTO: ABNORMAL /HPF
BASOPHILS # BLD AUTO: 0.03 X10(3)/MCL (ref 0–0.2)
BASOPHILS NFR BLD AUTO: 0.2 %
BILIRUB UR QL STRIP.AUTO: ABNORMAL MG/DL
BILIRUBIN DIRECT+TOT PNL SERPL-MCNC: 0.5 MG/DL
BNP BLD-MCNC: 145.6 PG/ML
BUN SERPL-MCNC: 9.5 MG/DL (ref 8.4–25.7)
CALCIUM SERPL-MCNC: 9.5 MG/DL (ref 8.8–10)
CHLORIDE SERPL-SCNC: 95 MMOL/L (ref 98–107)
CO2 SERPL-SCNC: 32 MMOL/L (ref 23–31)
COLOR UR AUTO: ABNORMAL
CREAT SERPL-MCNC: 0.64 MG/DL (ref 0.73–1.18)
D DIMER PPP IA.FEU-MCNC: 3.64 UG/ML FEU (ref 0–0.5)
EOSINOPHIL # BLD AUTO: 0.01 X10(3)/MCL (ref 0–0.9)
EOSINOPHIL NFR BLD AUTO: 0.1 %
ERYTHROCYTE [DISTWIDTH] IN BLOOD BY AUTOMATED COUNT: 13.4 % (ref 11.6–14.4)
FLUAV AG UPPER RESP QL IA.RAPID: NOT DETECTED
FLUBV AG UPPER RESP QL IA.RAPID: NOT DETECTED
GFR SERPLBLD CREATININE-BSD FMLA CKD-EPI: >60 MLS/MIN/1.73/M2
GLOBULIN SER-MCNC: 4.4 GM/DL (ref 2.4–3.5)
GLUCOSE SERPL-MCNC: 145 MG/DL (ref 82–115)
GLUCOSE UR QL STRIP.AUTO: NEGATIVE MG/DL
HCT VFR BLD AUTO: 42.5 % (ref 42–52)
HGB BLD-MCNC: 13.1 GM/DL (ref 14–18)
IMM GRANULOCYTES # BLD AUTO: 0.06 X10(3)/MCL (ref 0–0.04)
IMM GRANULOCYTES NFR BLD AUTO: 0.3 %
KETONES UR QL STRIP.AUTO: ABNORMAL MG/DL
LACTATE SERPL-SCNC: 1.1 MMOL/L (ref 0.5–2.2)
LACTATE SERPL-SCNC: 3.7 MMOL/L (ref 0.5–2.2)
LEUKOCYTE ESTERASE UR QL STRIP.AUTO: NEGATIVE UNIT/L
LYMPHOCYTES # BLD AUTO: 0.42 X10(3)/MCL (ref 0.6–4.6)
LYMPHOCYTES NFR BLD AUTO: 2.1 %
MAGNESIUM SERPL-MCNC: 2 MG/DL (ref 1.6–2.6)
MCH RBC QN AUTO: 30.3 PG
MCHC RBC AUTO-ENTMCNC: 30.8 MG/DL (ref 33–36)
MCV RBC AUTO: 98.2 FL (ref 80–94)
MONOCYTES # BLD AUTO: 1.54 X10(3)/MCL (ref 0.1–1.3)
MONOCYTES NFR BLD AUTO: 7.8 %
MUCOUS THREADS URNS QL MICRO: ABNORMAL /LPF
NEUTROPHILS # BLD AUTO: 17.79 X10(3)/MCL (ref 2.1–9.2)
NEUTROPHILS NFR BLD AUTO: 89.5 %
NITRITE UR QL STRIP.AUTO: NEGATIVE
PH UR STRIP.AUTO: 6 [PH]
PLATELET # BLD AUTO: 314 X10(3)/MCL (ref 140–371)
PMV BLD AUTO: 10.1 FL (ref 9.4–12.4)
POTASSIUM SERPL-SCNC: 3.6 MMOL/L (ref 3.5–5.1)
PROT SERPL-MCNC: 7.2 GM/DL (ref 5.8–7.6)
PROT UR QL STRIP.AUTO: 30 MG/DL
RBC # BLD AUTO: 4.33 X10(6)/MCL (ref 4.7–6.1)
RBC #/AREA URNS AUTO: ABNORMAL /HPF
RBC UR QL AUTO: ABNORMAL UNIT/L
SARS-COV-2 RNA RESP QL NAA+PROBE: NOT DETECTED
SODIUM SERPL-SCNC: 138 MMOL/L (ref 136–145)
SP GR UR STRIP.AUTO: 1.02
SQUAMOUS #/AREA URNS AUTO: ABNORMAL /HPF
UROBILINOGEN UR STRIP-ACNC: 1 MG/DL
WBC # SPEC AUTO: 19.9 X10(3)/MCL (ref 4.5–11.5)
WBC #/AREA URNS AUTO: ABNORMAL /HPF

## 2023-01-01 PROCEDURE — 96375 TX/PRO/DX INJ NEW DRUG ADDON: CPT | Mod: 59

## 2023-01-01 PROCEDURE — 99900031 HC PATIENT EDUCATION (STAT)

## 2023-01-01 PROCEDURE — 80053 COMPREHEN METABOLIC PANEL: CPT | Performed by: SPECIALIST

## 2023-01-01 PROCEDURE — 25000003 PHARM REV CODE 250: Performed by: STUDENT IN AN ORGANIZED HEALTH CARE EDUCATION/TRAINING PROGRAM

## 2023-01-01 PROCEDURE — 99900035 HC TECH TIME PER 15 MIN (STAT)

## 2023-01-01 PROCEDURE — 25000003 PHARM REV CODE 250: Performed by: SPECIALIST

## 2023-01-01 PROCEDURE — 94640 AIRWAY INHALATION TREATMENT: CPT | Mod: XB

## 2023-01-01 PROCEDURE — 25500020 PHARM REV CODE 255: Performed by: SPECIALIST

## 2023-01-01 PROCEDURE — 83735 ASSAY OF MAGNESIUM: CPT | Performed by: SPECIALIST

## 2023-01-01 PROCEDURE — 96361 HYDRATE IV INFUSION ADD-ON: CPT

## 2023-01-01 PROCEDURE — 85379 FIBRIN DEGRADATION QUANT: CPT | Performed by: SPECIALIST

## 2023-01-01 PROCEDURE — 36600 WITHDRAWAL OF ARTERIAL BLOOD: CPT

## 2023-01-01 PROCEDURE — 25000242 PHARM REV CODE 250 ALT 637 W/ HCPCS: Performed by: STUDENT IN AN ORGANIZED HEALTH CARE EDUCATION/TRAINING PROGRAM

## 2023-01-01 PROCEDURE — G0378 HOSPITAL OBSERVATION PER HR: HCPCS

## 2023-01-01 PROCEDURE — 85025 COMPLETE CBC W/AUTO DIFF WBC: CPT | Performed by: SPECIALIST

## 2023-01-01 PROCEDURE — 94640 AIRWAY INHALATION TREATMENT: CPT

## 2023-01-01 PROCEDURE — 99292 CRITICAL CARE ADDL 30 MIN: CPT

## 2023-01-01 PROCEDURE — 83605 ASSAY OF LACTIC ACID: CPT | Performed by: SPECIALIST

## 2023-01-01 PROCEDURE — 96365 THER/PROPH/DIAG IV INF INIT: CPT | Mod: 59

## 2023-01-01 PROCEDURE — 94760 N-INVAS EAR/PLS OXIMETRY 1: CPT

## 2023-01-01 PROCEDURE — 63600175 PHARM REV CODE 636 W HCPCS: Performed by: STUDENT IN AN ORGANIZED HEALTH CARE EDUCATION/TRAINING PROGRAM

## 2023-01-01 PROCEDURE — 51798 US URINE CAPACITY MEASURE: CPT

## 2023-01-01 PROCEDURE — 96372 THER/PROPH/DIAG INJ SC/IM: CPT | Performed by: STUDENT IN AN ORGANIZED HEALTH CARE EDUCATION/TRAINING PROGRAM

## 2023-01-01 PROCEDURE — 25000242 PHARM REV CODE 250 ALT 637 W/ HCPCS: Performed by: SPECIALIST

## 2023-01-01 PROCEDURE — 99291 CRITICAL CARE FIRST HOUR: CPT | Mod: 25

## 2023-01-01 PROCEDURE — 83880 ASSAY OF NATRIURETIC PEPTIDE: CPT | Performed by: SPECIALIST

## 2023-01-01 PROCEDURE — 82803 BLOOD GASES ANY COMBINATION: CPT

## 2023-01-01 PROCEDURE — 0240U COVID/FLU A&B PCR: CPT | Performed by: SPECIALIST

## 2023-01-01 PROCEDURE — 87077 CULTURE AEROBIC IDENTIFY: CPT | Performed by: SPECIALIST

## 2023-01-01 PROCEDURE — 81003 URINALYSIS AUTO W/O SCOPE: CPT | Performed by: SPECIALIST

## 2023-01-01 PROCEDURE — 27000221 HC OXYGEN, UP TO 24 HOURS

## 2023-01-01 PROCEDURE — 27000190 HC CPAP FULL FACE MASK W/VALVE

## 2023-01-01 PROCEDURE — 0202U NFCT DS 22 TRGT SARS-COV-2: CPT | Performed by: STUDENT IN AN ORGANIZED HEALTH CARE EDUCATION/TRAINING PROGRAM

## 2023-01-01 RX ORDER — SODIUM CHLORIDE 9 MG/ML
1000 INJECTION, SOLUTION INTRAVENOUS
Status: COMPLETED | OUTPATIENT
Start: 2023-01-01 | End: 2023-01-01

## 2023-01-01 RX ORDER — NALOXONE HCL 0.4 MG/ML
0.02 VIAL (ML) INJECTION
Status: DISCONTINUED | OUTPATIENT
Start: 2023-01-01 | End: 2023-01-07 | Stop reason: HOSPADM

## 2023-01-01 RX ORDER — IBUPROFEN 200 MG
16 TABLET ORAL
Status: DISCONTINUED | OUTPATIENT
Start: 2023-01-01 | End: 2023-01-01

## 2023-01-01 RX ORDER — TALC
9 POWDER (GRAM) TOPICAL NIGHTLY PRN
Status: DISCONTINUED | OUTPATIENT
Start: 2023-01-01 | End: 2023-01-07 | Stop reason: HOSPADM

## 2023-01-01 RX ORDER — GLUCAGON 1 MG
1 KIT INJECTION
Status: DISCONTINUED | OUTPATIENT
Start: 2023-01-01 | End: 2023-01-01

## 2023-01-01 RX ORDER — SIMETHICONE 80 MG
1 TABLET,CHEWABLE ORAL 4 TIMES DAILY PRN
Status: DISCONTINUED | OUTPATIENT
Start: 2023-01-01 | End: 2023-01-07 | Stop reason: HOSPADM

## 2023-01-01 RX ORDER — ONDANSETRON 2 MG/ML
4 INJECTION INTRAMUSCULAR; INTRAVENOUS EVERY 8 HOURS PRN
Status: DISCONTINUED | OUTPATIENT
Start: 2023-01-01 | End: 2023-01-07 | Stop reason: HOSPADM

## 2023-01-01 RX ORDER — BISACODYL 10 MG
10 SUPPOSITORY, RECTAL RECTAL DAILY PRN
Status: DISCONTINUED | OUTPATIENT
Start: 2023-01-01 | End: 2023-01-07 | Stop reason: HOSPADM

## 2023-01-01 RX ORDER — NALOXONE HCL 0.4 MG/ML
1 VIAL (ML) INJECTION
Status: COMPLETED | OUTPATIENT
Start: 2023-01-01 | End: 2023-01-01

## 2023-01-01 RX ORDER — METHYLPREDNISOLONE SOD SUCC 125 MG
125 VIAL (EA) INJECTION
Status: DISCONTINUED | OUTPATIENT
Start: 2023-01-01 | End: 2023-01-01

## 2023-01-01 RX ORDER — ENOXAPARIN SODIUM 100 MG/ML
40 INJECTION SUBCUTANEOUS EVERY 24 HOURS
Status: DISCONTINUED | OUTPATIENT
Start: 2023-01-01 | End: 2023-01-01

## 2023-01-01 RX ORDER — POLYETHYLENE GLYCOL 3350 17 G/17G
17 POWDER, FOR SOLUTION ORAL 3 TIMES DAILY PRN
Status: DISCONTINUED | OUTPATIENT
Start: 2023-01-01 | End: 2023-01-07 | Stop reason: HOSPADM

## 2023-01-01 RX ORDER — IPRATROPIUM BROMIDE AND ALBUTEROL SULFATE 2.5; .5 MG/3ML; MG/3ML
3 SOLUTION RESPIRATORY (INHALATION)
Status: DISCONTINUED | OUTPATIENT
Start: 2023-01-01 | End: 2023-01-01

## 2023-01-01 RX ORDER — IPRATROPIUM BROMIDE AND ALBUTEROL SULFATE 2.5; .5 MG/3ML; MG/3ML
3 SOLUTION RESPIRATORY (INHALATION)
Status: COMPLETED | OUTPATIENT
Start: 2023-01-01 | End: 2023-01-01

## 2023-01-01 RX ORDER — GLUCAGON 1 MG
1 KIT INJECTION
Status: DISCONTINUED | OUTPATIENT
Start: 2023-01-01 | End: 2023-01-07 | Stop reason: HOSPADM

## 2023-01-01 RX ORDER — IBUPROFEN 200 MG
16 TABLET ORAL
Status: DISCONTINUED | OUTPATIENT
Start: 2023-01-01 | End: 2023-01-07 | Stop reason: HOSPADM

## 2023-01-01 RX ORDER — METHYLPREDNISOLONE SOD SUCC 125 MG
125 VIAL (EA) INJECTION
Status: COMPLETED | OUTPATIENT
Start: 2023-01-01 | End: 2023-01-01

## 2023-01-01 RX ORDER — OXYCODONE HYDROCHLORIDE 30 MG/1
30 TABLET ORAL EVERY 6 HOURS PRN
Status: ON HOLD | COMMUNITY
Start: 2022-12-28 | End: 2023-01-12 | Stop reason: SDUPTHER

## 2023-01-01 RX ORDER — ENOXAPARIN SODIUM 100 MG/ML
40 INJECTION SUBCUTANEOUS EVERY 24 HOURS
Status: DISCONTINUED | OUTPATIENT
Start: 2023-01-01 | End: 2023-01-07 | Stop reason: HOSPADM

## 2023-01-01 RX ORDER — SODIUM CHLORIDE 0.9 % (FLUSH) 0.9 %
10 SYRINGE (ML) INJECTION
Status: DISCONTINUED | OUTPATIENT
Start: 2023-01-01 | End: 2023-01-01

## 2023-01-01 RX ORDER — NALOXONE HCL 0.4 MG/ML
0.4 VIAL (ML) INJECTION
Status: DISCONTINUED | OUTPATIENT
Start: 2023-01-01 | End: 2023-01-01

## 2023-01-01 RX ORDER — ONDANSETRON 4 MG/1
8 TABLET, ORALLY DISINTEGRATING ORAL EVERY 8 HOURS PRN
Status: DISCONTINUED | OUTPATIENT
Start: 2023-01-01 | End: 2023-01-07 | Stop reason: HOSPADM

## 2023-01-01 RX ORDER — MAG HYDROX/ALUMINUM HYD/SIMETH 200-200-20
30 SUSPENSION, ORAL (FINAL DOSE FORM) ORAL 4 TIMES DAILY PRN
Status: DISCONTINUED | OUTPATIENT
Start: 2023-01-01 | End: 2023-01-07 | Stop reason: HOSPADM

## 2023-01-01 RX ORDER — IBUPROFEN 200 MG
24 TABLET ORAL
Status: DISCONTINUED | OUTPATIENT
Start: 2023-01-01 | End: 2023-01-07 | Stop reason: HOSPADM

## 2023-01-01 RX ORDER — CIPROFLOXACIN 2 MG/ML
400 INJECTION, SOLUTION INTRAVENOUS
Status: DISCONTINUED | OUTPATIENT
Start: 2023-01-01 | End: 2023-01-07 | Stop reason: HOSPADM

## 2023-01-01 RX ORDER — IBUPROFEN 200 MG
24 TABLET ORAL
Status: DISCONTINUED | OUTPATIENT
Start: 2023-01-01 | End: 2023-01-01

## 2023-01-01 RX ORDER — SODIUM CHLORIDE 0.9 % (FLUSH) 0.9 %
10 SYRINGE (ML) INJECTION EVERY 12 HOURS PRN
Status: DISCONTINUED | OUTPATIENT
Start: 2023-01-01 | End: 2023-01-07 | Stop reason: HOSPADM

## 2023-01-01 RX ORDER — IPRATROPIUM BROMIDE AND ALBUTEROL SULFATE 2.5; .5 MG/3ML; MG/3ML
3 SOLUTION RESPIRATORY (INHALATION) EVERY 6 HOURS PRN
Status: DISCONTINUED | OUTPATIENT
Start: 2023-01-01 | End: 2023-01-02

## 2023-01-01 RX ORDER — ACETAMINOPHEN 325 MG/1
650 TABLET ORAL EVERY 4 HOURS PRN
Status: DISCONTINUED | OUTPATIENT
Start: 2023-01-01 | End: 2023-01-07 | Stop reason: HOSPADM

## 2023-01-01 RX ORDER — BUDESONIDE 0.5 MG/2ML
0.5 INHALANT ORAL ONCE
Status: COMPLETED | OUTPATIENT
Start: 2023-01-01 | End: 2023-01-01

## 2023-01-01 RX ADMIN — CEFEPIME 1 G: 1 INJECTION, POWDER, FOR SOLUTION INTRAMUSCULAR; INTRAVENOUS at 09:01

## 2023-01-01 RX ADMIN — SODIUM CHLORIDE 1000 ML: 9 INJECTION, SOLUTION INTRAVENOUS at 04:01

## 2023-01-01 RX ADMIN — IPRATROPIUM BROMIDE AND ALBUTEROL SULFATE 3 ML: .5; 3 SOLUTION RESPIRATORY (INHALATION) at 08:01

## 2023-01-01 RX ADMIN — ENOXAPARIN SODIUM 40 MG: 40 INJECTION SUBCUTANEOUS at 05:01

## 2023-01-01 RX ADMIN — BUDESONIDE INHALATION 0.5 MG: 0.5 SUSPENSION RESPIRATORY (INHALATION) at 09:01

## 2023-01-01 RX ADMIN — SODIUM CHLORIDE 1000 ML: 9 INJECTION, SOLUTION INTRAVENOUS at 03:01

## 2023-01-01 RX ADMIN — IOPAMIDOL 100 ML: 755 INJECTION, SOLUTION INTRAVENOUS at 06:01

## 2023-01-01 RX ADMIN — CEFEPIME 2 G: 2 INJECTION, POWDER, FOR SOLUTION INTRAVENOUS at 05:01

## 2023-01-01 RX ADMIN — SODIUM CHLORIDE 1000 ML: 9 INJECTION, SOLUTION INTRAVENOUS at 10:01

## 2023-01-01 RX ADMIN — SODIUM CHLORIDE 1000 ML: 9 INJECTION, SOLUTION INTRAVENOUS at 06:01

## 2023-01-01 RX ADMIN — NALOXONE HYDROCHLORIDE 1 MG: 0.4 INJECTION, SOLUTION INTRAMUSCULAR; INTRAVENOUS; SUBCUTANEOUS at 12:01

## 2023-01-01 RX ADMIN — METHYLPREDNISOLONE SODIUM SUCCINATE 125 MG: 125 INJECTION, POWDER, FOR SOLUTION INTRAMUSCULAR; INTRAVENOUS at 07:01

## 2023-01-01 RX ADMIN — SODIUM CHLORIDE 1000 ML: 9 INJECTION, SOLUTION INTRAVENOUS at 07:01

## 2023-01-01 RX ADMIN — CIPROFLOXACIN 400 MG: 2 INJECTION, SOLUTION INTRAVENOUS at 05:01

## 2023-01-01 RX ADMIN — IPRATROPIUM BROMIDE AND ALBUTEROL SULFATE 3 ML: .5; 3 SOLUTION RESPIRATORY (INHALATION) at 03:01

## 2023-01-01 NOTE — HPI
61 yo male with PMH of emphysema/COPD (on 5L home O2 and also with at home NIPPV), anxiety, chronic opioid dependence, medical marijuana use who presents with complaint of hypoxemia at home. Pt somnolent, poorly arousable during evaluation thus history obtained from wife at bedside. She reports pt has been ill since Thanksgiving when he was treated for PNA and subsequent thrush. She affirms pt has had a broken NIPPV at home and has had difficulty obtaining a new one secondary to insurance issues. She also says pt has not used supplemental O2 for 2 weeks and reportedly was saturating >90% without home O2. Pt with increasing lethargy at home however and found by wife with an O2 sat of 44% thus she brought him in. She reports she administers his pain medications (oxycodone 30mg qid +buprenorphine patch) and there is no way he could have overdosed.     In the ED pt with acute on chronic hypoxemic hypercapnic respiratory failure. Serial ABGs as follows: (7.19/85.4/97 on 12/6, rr 16, 40%; 7.29/69.6/65 on bipap at 20/6, rr16, 36%; 7.32/59/82 on 20/6, rr23, 35%). Pt also received narcan and reportedly had marked improvement in mental status. Wife at bedside reports DNR/DNI status.    At baseline, pt lives with his wife and is essential bed bound 2/2 pain and deconditioning. PCP Dr. Emery; Pulmonologist Dr. Vela.

## 2023-01-01 NOTE — SUBJECTIVE & OBJECTIVE
Past Medical History:   Diagnosis Date    Collapse, lung     COPD with hypoxia     Degenerative cervical spinal stenosis     Depression     Disuse osteoporosis     Emphysema/COPD     Hepatitis C     Leukocytosis        Past Surgical History:   Procedure Laterality Date    CERVICAL LAMINECTOMY WITH SPINAL FUSION      CHOLECYSTECTOMY      COLONOSCOPY      ESOPHAGOGASTRODUODENOSCOPY      gastrointestinal biopsy      graft to nose      inguinal herniotomy      mandible operation      POLYPECTOMY         Review of patient's allergies indicates:   Allergen Reactions    Penicillin Rash       No current facility-administered medications on file prior to encounter.     Current Outpatient Medications on File Prior to Encounter   Medication Sig    albuterol-ipratropium (DUO-NEB) 2.5 mg-0.5 mg/3 mL nebulizer solution Take by nebulization 4 (four) times daily.    buprenorphine (BUTRANS) 20 mcg/hour PTWK Apply 1 patch topically every 7 days.    fluticasone-umeclidin-vilanter (TRELEGY ELLIPTA) 100-62.5-25 mcg DsDv Inhale 1 puff into the lungs once daily.    gabapentin (NEURONTIN) 300 MG capsule Take 300 mg by mouth 3 (three) times daily as needed.    LORazepam (ATIVAN) 1 MG tablet Take 1 tablet (1 mg total) by mouth every 12 (twelve) hours as needed for Anxiety (seizure activity).    oxyCODONE (ROXICODONE) 30 MG Tab Take 30 mg by mouth every 6 (six) hours as needed.    sertraline (ZOLOFT) 100 MG tablet Take 100 mg by mouth once daily.    temazepam (RESTORIL) 30 mg capsule Take 30 mg by mouth nightly.    predniSONE (DELTASONE) 50 MG Tab Take 1 tablet (50 mg total) by mouth once daily.     Family History    None       Tobacco Use    Smoking status: Former     Packs/day: 2.00     Years: 40.00     Pack years: 80.00     Types: Cigarettes     Quit date: 2017     Years since quittin.4    Smokeless tobacco: Never   Substance and Sexual Activity    Alcohol use: Never    Drug use: Never    Sexual activity: Not on file     Review of  Systems   Reason unable to perform ROS: pt leethargic.   Objective:     Vital Signs (Most Recent):  Temp: 98.2 °F (36.8 °C) (01/01/23 1622)  Pulse: 96 (01/01/23 1650)  Resp: (!) 21 (01/01/23 1650)  BP: (!) 137/37 (01/01/23 1622)  SpO2: 96 % (01/01/23 1650)   Vital Signs (24h Range):  Temp:  [98.2 °F (36.8 °C)-99.2 °F (37.3 °C)] 98.2 °F (36.8 °C)  Pulse:  [] 96  Resp:  [18-34] 21  SpO2:  [78 %-98 %] 96 %  BP: ()/(37-78) 137/37     Weight: 60.3 kg (132 lb 15 oz)  Body mass index is 19.07 kg/m².    Physical Exam  Constitutional:       General: He is not in acute distress.     Appearance: Normal appearance. He is cachectic.   HENT:      Mouth/Throat:      Mouth: Mucous membranes are moist.      Pharynx: Oropharynx is clear. No oropharyngeal exudate or posterior oropharyngeal erythema.   Eyes:      Extraocular Movements: Extraocular movements intact.      Conjunctiva/sclera: Conjunctivae normal.      Pupils: Pupils are equal, round, and reactive to light.   Neck:      Thyroid: No thyromegaly.   Cardiovascular:      Rate and Rhythm: Normal rate and regular rhythm.      Heart sounds: No murmur heard.    No friction rub. No gallop.   Pulmonary:      Breath sounds: No wheezing.      Comments: Minimal air movement auscultated  Abdominal:      General: Bowel sounds are normal. There is no distension.      Palpations: Abdomen is soft.      Tenderness: There is no abdominal tenderness.   Lymphadenopathy:      Cervical: No cervical adenopathy.   Skin:     General: Skin is warm.      Findings: No rash.   Neurological:      General: No focal deficit present.      Mental Status: He is oriented to person, place, and time.      Cranial Nerves: No cranial nerve deficit.   Psychiatric:         Mood and Affect: Mood is not anxious or depressed. Affect is not inappropriate.         CRANIAL NERVES     CN III, IV, VI   Pupils are equal, round, and reactive to light.     Significant Labs: All pertinent labs within the past 24  hours have been reviewed.    Significant Imaging: I have reviewed all pertinent imaging results/findings within the past 24 hours.

## 2023-01-01 NOTE — ASSESSMENT & PLAN NOTE
-hypoxemic, hypercapnic  -2/2 suspected aspiration pna and concurrent opiate+benzo use   -continuous bipap  -ABGs show improved respiratory status

## 2023-01-01 NOTE — ASSESSMENT & PLAN NOTE
-narcan administered in the ED, improved mental status  -pt follows with pain management, would strongly benefit from reduction of pain medication  -lorazepam recently prescribed for myoclonus which places pt at high risk with concurrent opiate use

## 2023-01-01 NOTE — ASSESSMENT & PLAN NOTE
-low suspicion for COPD exacerbation, no wheezing  -more likely respiratory depression 2/2 opiates and benzos

## 2023-01-01 NOTE — ASSESSMENT & PLAN NOTE
-reportedly pt has been experiencing difficulty swallowing since being diagnosed with thrush around [facility]

## 2023-01-01 NOTE — ED PROVIDER NOTES
Encounter Date: 2023       History     Chief Complaint   Patient presents with    Shortness of Breath     Arrived on home O2 Sat 78% on 5 L wife states Sat 44% at home for days      Patient is a 62-year-old male with COPD and home oxygen presents with low oxygen saturation levels for several days per his wife, no fever; patient presents very short of breath with oxygen saturation 78%; patient has had a cough with thick sputum; was diagnosed with a left upper lobe pneumonia on 2022 and refused admission to the hospital and left AMA; patient also had low oxygen saturation levels at that time; chronic tobacco abuse but this has decreased; patient has also had weight loss    The history is provided by the patient.   Review of patient's allergies indicates:   Allergen Reactions    Penicillin Rash     Past Medical History:   Diagnosis Date    Collapse, lung     COPD with hypoxia     Degenerative cervical spinal stenosis     Depression     Disuse osteoporosis     Emphysema/COPD     Hepatitis C     Leukocytosis      Past Surgical History:   Procedure Laterality Date    CERVICAL LAMINECTOMY WITH SPINAL FUSION      CHOLECYSTECTOMY      COLONOSCOPY      ESOPHAGOGASTRODUODENOSCOPY      gastrointestinal biopsy      graft to nose      inguinal herniotomy      mandible operation      POLYPECTOMY       No family history on file.  Social History     Tobacco Use    Smoking status: Former     Packs/day: 2.00     Years: 40.00     Pack years: 80.00     Types: Cigarettes     Quit date: 2017     Years since quittin.4    Smokeless tobacco: Never   Substance Use Topics    Alcohol use: Never    Drug use: Never     Review of Systems   Constitutional:  Positive for unexpected weight change.   HENT: Negative.     Respiratory:  Positive for cough, shortness of breath and wheezing.    Cardiovascular: Negative.    Gastrointestinal: Negative.    Genitourinary: Negative.    Musculoskeletal: Negative.    Neurological: Negative.       Physical Exam     Initial Vitals   BP Pulse Resp Temp SpO2   01/01/23 0341 01/01/23 0341 01/01/23 0354 01/01/23 0406 01/01/23 0341   112/68 104 18 98.3 °F (36.8 °C) (!) 78 %      MAP       --                Physical Exam    Nursing note and vitals reviewed.  Constitutional: He appears well-developed and well-nourished.   HENT:   Head: Normocephalic and atraumatic.   Thick yellow sputum in mouth, dry mucous membranes   Eyes: EOM are normal. Pupils are equal, round, and reactive to light.   Neck: Neck supple. No JVD present.   Normal range of motion.  Cardiovascular:  Regular rhythm and normal heart sounds.   Tachycardia present.         Pulmonary/Chest: He has wheezes.   Increased wob, Diminished breath sounds bilaterally; barrel-chested   Abdominal: Abdomen is soft. There is no abdominal tenderness.   Musculoskeletal:         General: Normal range of motion.      Cervical back: Normal range of motion and neck supple.     Neurological: He is alert and oriented to person, place, and time.   Skin: Skin is warm and dry.       ED Course   Critical Care    Date/Time: 1/1/2023 8:26 AM  Performed by: Natasha Ding MD  Authorized by: Natasha Ding MD   Direct patient critical care time: 30 minutes  Additional history critical care time: 15 minutes  Ordering / reviewing critical care time: 25 minutes  Documentation critical care time: 15 minutes  Consulting other physicians critical care time: 15 minutes  Consult with family critical care time: 15 minutes  Total critical care time (exclusive of procedural time) : 115 minutes  Critical care time was exclusive of separately billable procedures and treating other patients and teaching time.  Critical care was necessary to treat or prevent imminent or life-threatening deterioration of the following conditions: circulatory failure, sepsis, endocrine crisis, CNS failure or compromise, respiratory failure, metabolic crisis, dehydration, shock and cardiac  failure.  Critical care was time spent personally by me on the following activities: development of treatment plan with patient or surrogate, discussions with consultants, interpretation of cardiac output measurements, evaluation of patient's response to treatment, examination of patient, obtaining history from patient or surrogate, ordering and performing treatments and interventions, ordering and review of laboratory studies, ordering and review of radiographic studies, pulse oximetry, re-evaluation of patient's condition and review of old charts.      Labs Reviewed   B-TYPE NATRIURETIC PEPTIDE - Abnormal; Notable for the following components:       Result Value    Natriuretic Peptide 145.6 (*)     All other components within normal limits   COMPREHENSIVE METABOLIC PANEL - Abnormal; Notable for the following components:    Chloride 95 (*)     Carbon Dioxide 32 (*)     Glucose Level 145 (*)     Creatinine 0.64 (*)     Albumin Level 2.8 (*)     Globulin 4.4 (*)     Albumin/Globulin Ratio 0.6 (*)     All other components within normal limits   LACTIC ACID, PLASMA - Abnormal; Notable for the following components:    Lactic Acid Level 3.7 (*)     All other components within normal limits   URINALYSIS, REFLEX TO URINE CULTURE - Abnormal; Notable for the following components:    Appearance, UA Slightly Cloudy (*)     Protein, UA 30 (*)     Ketones, UA Trace (*)     Blood, UA Small (*)     Bilirubin, UA Small (*)     All other components within normal limits   D DIMER, QUANTITATIVE - Abnormal; Notable for the following components:    D-Dimer 3.64 (*)     All other components within normal limits   CBC WITH DIFFERENTIAL - Abnormal; Notable for the following components:    WBC 19.9 (*)     RBC 4.33 (*)     Hgb 13.1 (*)     MCV 98.2 (*)     MCHC 30.8 (*)     Lymph # 0.42 (*)     Neut # 17.79 (*)     Mono # 1.54 (*)     IG# 0.06 (*)     All other components within normal limits   URINALYSIS, MICROSCOPIC - Abnormal; Notable  for the following components:    Mucous, UA Trace (*)     All other components within normal limits   MAGNESIUM - Normal   COVID/FLU A&B PCR - Normal    Narrative:     The Xpert Xpress SARS-CoV-2/FLU/RSV plus is a rapid, multiplexed real-time PCR test intended for the simultaneous qualitative detection and differentiation of SARS-CoV-2, Influenza A, Influenza B, and respiratory syncytial virus (RSV) viral RNA in either nasopharyngeal swab or nasal swab specimens.         LACTIC ACID, PLASMA - Normal   BLOOD CULTURE [facility]   BLOOD CULTURE [facility]   CBC W/ AUTO DIFFERENTIAL    Narrative:     The following orders were created for panel order CBC auto differential.  Procedure                               Abnormality         Status                     ---------                               -----------         ------                     CBC with Differential[[id number]]        Abnormal            Final result                 Please view results for these tests on the individual orders.          Imaging Results              CTA Chest Non-Coronary (PE Studies) (Final result)  Result time 01/01/23 07:48:00      Final result by Raf Robertson MD (01/01/23 07:48:00)                   Impression:    Impression:    1. Multiple similar enlarged mediastinal lymph nodes are seen in the APwindow/subaortic, paratracheal and left hilar spaces    2. No filling defects are seen in the pulmonary arteries to suggest pulmonary embolus to the sensitivity of the study.    3. A small area of consolidation is seen in the lingula (image 34 series 4).  This shows interval considerable improvement.  Minimal reticular densities are also seen in the superior segments of both lower lobes. These may also be part of an infectious process. Centrilobular and paraseptal emphysematous changes are seen in both lungs, predominantly in both upper lobes. Extensive fibrotic densities are also seen in both upper lobes. Follow-up as clinically indicated.    4.  Details and other findings as discussed above.    No significant discrepancy with overnight report.      Electronically signed by: Raf Robertson  Date:    01/01/2023  Time:    07:48               Narrative:      Technique:CT Scan of the chest was performed with intravenous contrast with direct axial images as well as sagittal and coronal reconstruction images pulmonary embolus protocol.    Dosage Information:Automated Exposure Control was utilized.      Comparison: November 11, 2022.    Clinical History:Arrived on home O2 Sat 78% on 5 L wife states Sat 44% at home for days ) Elevated d dimer.    Findings:    Contrast:Imaging was performed somewhat late with respect to the contrast bolus which decreases sensitivity and specificity for small distal peripheral pulmonary emboli.    Soft Tissues:Unremarkable.    Lines and Tubes:None.    Neck:The visualized soft tissues of the neck appear unremarkable.    Mediastinum:Multiple similar enlarged mediastinal lymph nodes are seen in the APwindow/subaortic, paratracheal and left hilar spaces . The largest is in left hilar space and measures 11.3 mm in least dimension. This suggests reactive lymphadenopathy.    Pulmonary Arteries:No filling defects are seen in the pulmonary arteries to suggest pulmonary embolus to the sensitivity of the study.    Lungs:A small area of consolidation is seen in the lingula (image 34 series 4). This shows interval considerable improvement. Minimal reticular densities are also seen in the superior segments of both lower lobes. These may also be part of an infectious process. Centrilobular and paraseptal emphysematous changes are seen in both lungs, predominantly in both upper lobes. Extensive fibrotic densities are also seen in both upper lobes.    Pleura:There is a small left sided pleural effusion.    Bony Structures:    Spine:Severe spondylolytic changes are seen in the thoracic spine.    Ribs:No rib fractures are identified.    Abdomen:A  small hepatic cyst is noted in the left hepatic lobe measuring 4.1 mm (image 140 series 3).                        Preliminary result by Raf Robertson MD (01/01/23 07:09:21)                   Narrative:    START OF REPORT:  Technique: CT Scan of the chest was performed with intravenous contrast with direct axial images as well as sagittal and coronal reconstruction images pulmonary embolus protocol.    Dosage Information: Automated Exposure Control was utilized.    Comparison: None.    Clinical History: Arrived on home O2 Sat 78% on 5 L wife states Sat 44% at home for days ) Elevated d dimer.    Findings:  Contrast: Imaging was performed somewhat late with respect to the contrast bolus which decreases sensitivity and specificity for small distal peripheral pulmonary emboli.  Soft Tissues: Unremarkable.  Lines and Tubes: None.  Neck: The visualized soft tissues of the neck appear unremarkable.  Mediastinum: Multiple enlarged mediastinal lymph nodes are seen in the APwindow/subaortic, paratracheal and left hilar spaces . The largest is in left hilar space and measures â11.3 mmâ in least dimension. This suggests reactive lymphadenopathy.  Heart: The heart appears unremarkable.  Aorta: Unremarkable appearing aorta.  Pulmonary Arteries: No filling defects are seen in the pulmonary arteries to suggest pulmonary embolus to the sensitivity of the study.  Lungs: A small area of consolidation is seen in the lingula (image 34 series 4). This probably represents an infectious process. Minimal reticular densities are also seen in the superior segments of both lower lobes. These may also be part of an infectious process. Centrilobular and paraseptal emphysematous changes are seen in both lungs, predominantly in both upper lobes. Extensive fibrotic densities are also seen in both upper lobes.  Pleura: There is a small left sided pleural effusion.  Bony Structures:  Spine: Severe spondylolytic changes are seen in the thoracic  spine.  Ribs: No rib fractures are identified.  Abdomen: A small hepatic cyst is noted in the left hepatic lobe measuring 4.1 mm (image 140 series 3). The rest of the visualized upper abdomen appear unremarkable.      Impression:  1. Multiple enlarged mediastinal lymph nodes are seen in the APwindow/subaortic, paratracheal and left hilar spaces . The largest is in left hilar space and measures â11.3 mmâ in least dimension. This suggests reactive lymphadenopathy. Correlate with clinical and laboratory findings as regards further evaluation and follow up.  2. No filling defects are seen in the pulmonary arteries to suggest pulmonary embolus to the sensitivity of the study.  3. A small area of consolidation is seen in the lingula (image 34 series 4). This probably represents an infectious process. Correlation with clinical and laboratory findings is suggested for further evaluation. Minimal reticular densities are also seen in the superior segments of both lower lobes. These may also be part of an infectious process. Centrilobular and paraseptal emphysematous changes are seen in both lungs, predominantly in both upper lobes. Extensive fibrotic densities are also seen in both upper lobes. Follow-up as clinically indicated.  4. Details and other findings as discussed above.                          Preliminary result by Rigo Dickey MD (01/01/23 07:09:21)                   Narrative:    START OF REPORT:  Technique: CT Scan of the chest was performed with intravenous contrast with direct axial images as well as sagittal and coronal reconstruction images pulmonary embolus protocol.    Dosage Information: Automated Exposure Control was utilized.    Comparison: None.    Clinical History: Arrived on home O2 Sat 78% on 5 L wife states Sat 44% at home for days ) Elevated d dimer.    Findings:  Contrast: Imaging was performed somewhat late with respect to the contrast bolus which decreases sensitivity and specificity for  small distal peripheral pulmonary emboli.  Soft Tissues: Unremarkable.  Lines and Tubes: None.  Neck: The visualized soft tissues of the neck appear unremarkable.  Mediastinum: Multiple enlarged mediastinal lymph nodes are seen in the APwindow/subaortic, paratracheal and left hilar spaces . The largest is in left hilar space and measures â11.3 mmâ in least dimension. This suggests reactive lymphadenopathy.  Heart: The heart appears unremarkable.  Aorta: Unremarkable appearing aorta.  Pulmonary Arteries: No filling defects are seen in the pulmonary arteries to suggest pulmonary embolus to the sensitivity of the study.  Lungs: A small area of consolidation is seen in the lingula (image 34 series 4). This probably represents an infectious process. Minimal reticular densities are also seen in the superior segments of both lower lobes. These may also be part of an infectious process. Centrilobular and paraseptal emphysematous changes are seen in both lungs, predominantly in both upper lobes. Extensive fibrotic densities are also seen in both upper lobes.  Pleura: There is a small left sided pleural effusion.  Bony Structures:  Spine: Severe spondylolytic changes are seen in the thoracic spine.  Ribs: No rib fractures are identified.  Abdomen: A small hepatic cyst is noted in the left hepatic lobe measuring 4.1 mm (image 140 series 3). The rest of the visualized upper abdomen appear unremarkable.      Impression:  1. Multiple enlarged mediastinal lymph nodes are seen in the APwindow/subaortic, paratracheal and left hilar spaces . The largest is in left hilar space and measures â11.3 mmâ in least dimension. This suggests reactive lymphadenopathy. Correlate with clinical and laboratory findings as regards further evaluation and follow up.  2. No filling defects are seen in the pulmonary arteries to suggest pulmonary embolus to the sensitivity of the study.  3. A small area of consolidation is seen in the lingula  (image 34 series 4). This probably represents an infectious process. Correlation with clinical and laboratory findings is suggested for further evaluation. Minimal reticular densities are also seen in the superior segments of both lower lobes. These may also be part of an infectious process. Centrilobular and paraseptal emphysematous changes are seen in both lungs, predominantly in both upper lobes. Extensive fibrotic densities are also seen in both upper lobes. Follow-up as clinically indicated.  4. Details and other findings as discussed above.                                         X-Ray Chest AP Portable (Final result)  Result time 01/01/23 09:32:24      Final result by Vincent Yoder MD (01/01/23 09:32:24)                   Impression:      Significant improvement of a masslike density in confluent opacities identified in the left upper lobe in the examination of November 11 2022.    Increase interstitial markings throughout    No focal consolidative changes      Electronically signed by: Vincent Yoder  Date:    01/01/2023  Time:    09:32               Narrative:    EXAMINATION:  XR CHEST AP PORTABLE    CLINICAL HISTORY:  Shortness of breath;    TECHNIQUE:  Single frontal view of the chest was performed.    COMPARISON:  November 11, 2022    FINDINGS:  Examination reveals mediastinal silhouette to be within normal limits there is significant decrease in size of a mass density in the left suprahilar region and left upper lobe that was associated with some confluent airspace opacities and seen on the examination of November 11, 2022.    There is some prominence of the left hilum.    Increase interstitial markings are identified throughout with no definite focal consolidative changes                        ED Interpretation by Yehuda Robledo MD (01/01/23 04:30:12, [facility] - [facility] Dept, Emergency Medicine)    Left lobe with mass versus infiltrate                                      Medications   ceFEPIme (MAXIPIME) 1 g in dextrose 5 % in water (D5W) 5 % 50 mL IVPB (MB+) (0 g Intravenous Stopped 1/1/23 0937)   0.9%  NaCl infusion (has no administration in time range)   albuterol-ipratropium 2.5 mg-0.5 mg/3 mL nebulizer solution 3 mL (3 mLs Nebulization Given 1/1/23 0354)   sodium chloride 0.9% bolus 1,000 mL 1,000 mL (0 mLs Intravenous Stopped 1/1/23 0536)   sodium chloride 0.9% bolus 1,000 mL 1,000 mL (0 mLs Intravenous Stopped 1/1/23 0733)   iopamidoL (ISOVUE-370) injection 100 mL (100 mLs Intravenous Given 1/1/23 0615)   sodium chloride 0.9% bolus 1,000 mL 1,000 mL (0 mLs Intravenous Stopped 1/1/23 0837)   methylPREDNISolone sodium succinate injection 125 mg (125 mg Intravenous Given 1/1/23 0737)   albuterol-ipratropium 2.5 mg-0.5 mg/3 mL nebulizer solution 3 mL (3 mLs Nebulization Given 1/1/23 0832)   budesonide nebulizer solution 0.5 mg (0.5 mg Nebulization Given 1/1/23 0907)   0.9%  NaCl infusion (1,000 mLs Intravenous New Bag 1/1/23 1019)   naloxone 0.4 mg/mL injection 1 mg (1 mg Intravenous Given 1/1/23 1230)     Medical Decision Making:   Differential Diagnosis:   COPD exacerbation, pneumonia, lung mass/cancer, tobacco abuse, hypoxemic respiratory failure  Clinical Tests:   Lab Tests: Ordered and Reviewed  The following lab test(s) were unremarkable: CBC, CMP, D-Dimer, Lactate and Urinalysis  Radiological Study: Ordered and Reviewed  Medical Tests: Ordered and Reviewed  ED Management:  Patient placed on oxygen upon arrival with a good oxygen saturation at 5 liters/minute OxyMask, given a DuoNeb, normal saline 1 L started for low blood pressure; patient has a elevated white count, elevated D-dimer which was elevated 2 months ago but he has not had CTA of chest    Assumed care of pt at 0600- Dr Ding  Repeat lactate 1.1, initial lactate 3.7  Pt has L received total of 2 L normal saline bolus ((30mg/kg = approximately 1800L)will give additional 1 L due to hypotension, will add IV  solumedrol and abx  CT prelim read with LLL PNA  O2 lowered to 2 L, satting 91%    0825 pt became acutely SOB, decreasing Sats, O2 increased to 10 L, sats improved to 90%  Giving additional duoneb    Pt with increased wob, AMS, started on bipap  0930 ABG obtained : 7.190/85.4/97 Pco2 95    1159 repeat ABG improved -ph 7.296/69.6/65 Po2 89%, will recheck in 1 hour    1215- pt evaluated by hospital ist at bedside  Additional history obtained including that Pt also with buprenorphine patch and on chronic po opioids for chronic back pain, removed patch to right upper chest, will give narcan 1 mg to see if improved mental status although pt reportedly walked into ED and di not having any altered mental status at home    1300 pt with significantly improved mental status, requesting to remove bipap, sats >90%, repeat ABG ordered to assess for improved CO2, placed on venti mask at 10L until ABG results received    ABG improved :ph 7.327/59/82 95% so2    Pt being admitted to floor- Dr Reyes notified and updated on pt status           ED Course as of 01/01/23 1528   Sun Jan 01, 2023   0633 Assumed care of pt from Dr Robledo  at 0600 [MG]   0727 Repeat lactate 1.1 [MG]      ED Course User Index  [MG] Natasha Ding MD                 Clinical Impression:   Final diagnoses:  [J44.1] COPD exacerbation (Primary)  [J18.9] Pneumonia of left upper lobe due to infectious organism  [J96.02] Acute hypercapnic respiratory failure  [J18.9] Community acquired bilateral lower lobe pneumonia        ED Disposition Condition    Observation Stable                Natasha Ding MD  01/01/23 1529       Natasha Ding MD  01/01/23 9113

## 2023-01-01 NOTE — H&P
[facility] Medicine  History & Physical    Patient Name: Balbir Rogers  MRN: [id number]  Patient Class: OP- Observation  Admission Date: 1/1/2023  Attending Physician: Thairy G Reyes, DO   Primary Care Provider: Jose Johnson Jr, MD         Patient information was obtained from patient and ER records.     Subjective:     Principal Problem:Acute hypercapnic respiratory failure    Chief Complaint:   Chief Complaint   Patient presents with    Shortness of Breath     Arrived on home O2 Sat 78% on 5 L wife states Sat 44% at home for days         HPI: 63 yo male with PMH of emphysema/COPD (on 5L home O2 and also with at home NIPPV), anxiety, chronic opioid dependence, medical marijuana use who presents with complaint of hypoxemia at home. Pt somnolent, poorly arousable during evaluation thus history obtained from wife at bedside. She reports pt has been ill since Thanksgiving when he was treated for PNA and subsequent thrush. She affirms pt has had a broken NIPPV at home and has had difficulty obtaining a new one secondary to insurance issues. She also says pt has not used supplemental O2 for 2 weeks and reportedly was saturating >90% without home O2. Pt with increasing lethargy at home however and found by wife with an O2 sat of 44% thus she brought him in. She reports she administers his pain medications (oxycodone 30mg qid +buprenorphine patch) and there is no way he could have overdosed.     In the ED pt with acute on chronic hypoxemic hypercapnic respiratory failure. Serial ABGs as follows: (7.19/85.4/97 on 12/6, rr 16, 40%; 7.29/69.6/65 on bipap at 20/6, rr16, 36%; 7.32/59/82 on 20/6, rr23, 35%). Pt also received narcan and reportedly had marked improvement in mental status. Wife at bedside reports DNR/DNI status.    At baseline, pt lives with his wife and is essential bed bound 2/2 pain and deconditioning. PCP Dr. Emery; Pulmonologist Dr. Vela.      Past Medical  History:   Diagnosis Date    Collapse, lung     COPD with hypoxia     Degenerative cervical spinal stenosis     Depression     Disuse osteoporosis     Emphysema/COPD     Hepatitis C     Leukocytosis        Past Surgical History:   Procedure Laterality Date    CERVICAL LAMINECTOMY WITH SPINAL FUSION      CHOLECYSTECTOMY      COLONOSCOPY      ESOPHAGOGASTRODUODENOSCOPY      gastrointestinal biopsy      graft to nose      inguinal herniotomy      mandible operation      POLYPECTOMY         Review of patient's allergies indicates:   Allergen Reactions    Penicillin Rash       No current facility-administered medications on file prior to encounter.     Current Outpatient Medications on File Prior to Encounter   Medication Sig    albuterol-ipratropium (DUO-NEB) 2.5 mg-0.5 mg/3 mL nebulizer solution Take by nebulization 4 (four) times daily.    buprenorphine (BUTRANS) 20 mcg/hour PTWK Apply 1 patch topically every 7 days.    fluticasone-umeclidin-vilanter (TRELEGY ELLIPTA) 100-62.5-25 mcg DsDv Inhale 1 puff into the lungs once daily.    gabapentin (NEURONTIN) 300 MG capsule Take 300 mg by mouth 3 (three) times daily as needed.    LORazepam (ATIVAN) 1 MG tablet Take 1 tablet (1 mg total) by mouth every 12 (twelve) hours as needed for Anxiety (seizure activity).    oxyCODONE (ROXICODONE) 30 MG Tab Take 30 mg by mouth every 6 (six) hours as needed.    sertraline (ZOLOFT) 100 MG tablet Take 100 mg by mouth once daily.    temazepam (RESTORIL) 30 mg capsule Take 30 mg by mouth nightly.    predniSONE (DELTASONE) 50 MG Tab Take 1 tablet (50 mg total) by mouth once daily.     Family History    None       Tobacco Use    Smoking status: Former     Packs/day: 2.00     Years: 40.00     Pack years: 80.00     Types: Cigarettes     Quit date: 2017     Years since quittin.4    Smokeless tobacco: Never   Substance and Sexual Activity    Alcohol use: Never    Drug use: Never    Sexual activity: Not on  file     Review of Systems   Reason unable to perform ROS: pt leethargic.   Objective:     Vital Signs (Most Recent):  Temp: 98.2 °F (36.8 °C) (01/01/23 1622)  Pulse: 96 (01/01/23 1650)  Resp: (!) 21 (01/01/23 1650)  BP: (!) 137/37 (01/01/23 1622)  SpO2: 96 % (01/01/23 1650)   Vital Signs (24h Range):  Temp:  [98.2 °F (36.8 °C)-99.2 °F (37.3 °C)] 98.2 °F (36.8 °C)  Pulse:  [] 96  Resp:  [18-34] 21  SpO2:  [78 %-98 %] 96 %  BP: ()/(37-78) 137/37     Weight: 60.3 kg (132 lb 15 oz)  Body mass index is 19.07 kg/m².    Physical Exam  Constitutional:       General: He is not in acute distress.     Appearance: Normal appearance. He is cachectic.   HENT:      Mouth/Throat:      Mouth: Mucous membranes are moist.      Pharynx: Oropharynx is clear. No oropharyngeal exudate or posterior oropharyngeal erythema.   Eyes:      Extraocular Movements: Extraocular movements intact.      Conjunctiva/sclera: Conjunctivae normal.      Pupils: Pupils are equal, round, and reactive to light.   Neck:      Thyroid: No thyromegaly.   Cardiovascular:      Rate and Rhythm: Normal rate and regular rhythm.      Heart sounds: No murmur heard.    No friction rub. No gallop.   Pulmonary:      Breath sounds: No wheezing.      Comments: Minimal air movement auscultated  Abdominal:      General: Bowel sounds are normal. There is no distension.      Palpations: Abdomen is soft.      Tenderness: There is no abdominal tenderness.   Lymphadenopathy:      Cervical: No cervical adenopathy.   Skin:     General: Skin is warm.      Findings: No rash.   Neurological:      General: No focal deficit present.      Mental Status: He is oriented to person, place, and time.      Cranial Nerves: No cranial nerve deficit.   Psychiatric:         Mood and Affect: Mood is not anxious or depressed. Affect is not inappropriate.         CRANIAL NERVES     CN III, IV, VI   Pupils are equal, round, and reactive to light.     Significant Labs: All pertinent labs  "within the past 24 hours have been reviewed.    Significant Imaging: I have reviewed all pertinent imaging results/findings within the past 24 hours.    Assessment/Plan:     * Acute hypercapnic respiratory failure  -hypoxemic, hypercapnic  -2/2 suspected aspiration pna and concurrent opiate+benzo use   -continuous bipap  -ABGs show improved respiratory status     Pneumonia of left upper lobe due to infectious organism  -wife affirms brown sputum production--> pseudomonal infection?  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  -wife affirms significantly weight loss lately  -smita count     Aspiration pneumonia  -reportedly pt has been experiencing difficulty swallowing since being diagnosed with thrush around [facility]  -ST      Myoclonus  -wife reports pt saw neurology 2/2 "tremor"  -was prescribed lorazepam for this       Admit to observation status under my care  Critical care time 50 min  VTE Risk Mitigation (From admission, onward)         Ordered     IP VTE HIGH RISK PATIENT  Once         01/01/23 1640     Place sequential compression device  Until discontinued         01/01/23 1640                   Thairy G Reyes, DO  Department of Hospital Medicine   [facility]  "

## 2023-01-02 LAB
ALBUMIN SERPL-MCNC: 2.6 G/DL (ref 3.4–4.8)
ALBUMIN/GLOB SERPL: 0.8 RATIO (ref 1.1–2)
ALP SERPL-CCNC: 143 UNIT/L (ref 40–150)
ALT SERPL-CCNC: 15 UNIT/L (ref 0–55)
AST SERPL-CCNC: 14 UNIT/L (ref 5–34)
B PERT.PT PRMT NPH QL NAA+NON-PROBE: NOT DETECTED
BASOPHILS # BLD AUTO: 0.01 X10(3)/MCL (ref 0–0.2)
BASOPHILS NFR BLD AUTO: 0.1 %
BILIRUBIN DIRECT+TOT PNL SERPL-MCNC: 0.6 MG/DL
BUN SERPL-MCNC: 13.6 MG/DL (ref 8.4–25.7)
C PNEUM DNA NPH QL NAA+NON-PROBE: NOT DETECTED
CALCIUM SERPL-MCNC: 9.1 MG/DL (ref 8.8–10)
CHLORIDE SERPL-SCNC: 101 MMOL/L (ref 98–107)
CO2 SERPL-SCNC: 27 MMOL/L (ref 23–31)
CREAT SERPL-MCNC: 0.52 MG/DL (ref 0.73–1.18)
EOSINOPHIL # BLD AUTO: 0 X10(3)/MCL (ref 0–0.9)
EOSINOPHIL NFR BLD AUTO: 0 %
ERYTHROCYTE [DISTWIDTH] IN BLOOD BY AUTOMATED COUNT: 13.3 % (ref 11.6–14.4)
FIO2: 35
FLUAV AG UPPER RESP QL IA.RAPID: NOT DETECTED
FLUBV AG UPPER RESP QL IA.RAPID: NOT DETECTED
GFR SERPLBLD CREATININE-BSD FMLA CKD-EPI: >60 MLS/MIN/1.73/M2
GLOBULIN SER-MCNC: 3.2 GM/DL (ref 2.4–3.5)
GLUCOSE SERPL-MCNC: 108 MG/DL (ref 82–115)
HADV DNA NPH QL NAA+NON-PROBE: NOT DETECTED
HCO3 UR-SCNC: 28.9 MMOL/L (ref 24–28)
HCOV 229E RNA NPH QL NAA+NON-PROBE: NOT DETECTED
HCOV HKU1 RNA NPH QL NAA+NON-PROBE: NOT DETECTED
HCOV NL63 RNA NPH QL NAA+NON-PROBE: NOT DETECTED
HCOV OC43 RNA NPH QL NAA+NON-PROBE: NOT DETECTED
HCT VFR BLD AUTO: 36.1 % (ref 42–52)
HGB BLD-MCNC: 11.5 GM/DL (ref 14–18)
HMPV RNA NPH QL NAA+NON-PROBE: NOT DETECTED
HPIV1 RNA NPH QL NAA+NON-PROBE: NOT DETECTED
HPIV2 RNA NPH QL NAA+NON-PROBE: NOT DETECTED
HPIV3 RNA NPH QL NAA+NON-PROBE: NOT DETECTED
HPIV4 RNA NPH QL NAA+NON-PROBE: NOT DETECTED
IMM GRANULOCYTES # BLD AUTO: 0.04 X10(3)/MCL (ref 0–0.04)
IMM GRANULOCYTES NFR BLD AUTO: 0.2 %
LYMPHOCYTES # BLD AUTO: 0.68 X10(3)/MCL (ref 0.6–4.6)
LYMPHOCYTES NFR BLD AUTO: 4.1 %
M PNEUMO DNA NPH QL NAA+NON-PROBE: NOT DETECTED
MAGNESIUM SERPL-MCNC: 2 MG/DL (ref 1.6–2.6)
MCH RBC QN AUTO: 31.2 PG
MCHC RBC AUTO-ENTMCNC: 31.9 MG/DL (ref 33–36)
MCV RBC AUTO: 97.8 FL (ref 80–94)
MONOCYTES # BLD AUTO: 1.1 X10(3)/MCL (ref 0.1–1.3)
MONOCYTES NFR BLD AUTO: 6.7 %
NEUTROPHILS # BLD AUTO: 14.61 X10(3)/MCL (ref 2.1–9.2)
NEUTROPHILS NFR BLD AUTO: 88.9 %
PCO2 BLDA: 43.6 MMHG (ref 35–45)
PH SMN: 7.43 [PH] (ref 7.35–7.45)
PLATELET # BLD AUTO: 272 X10(3)/MCL (ref 140–371)
PMV BLD AUTO: 10.3 FL (ref 9.4–12.4)
PO2 BLDA: 76 MMHG (ref 80–100)
POC BE: 5 MMOL/L
POC PCO2 TEMP: 42.9 MMHG
POC PH TEMP: 7.43
POC PO2 TEMP: 74 MMHG
POC SATURATED O2: 95 % (ref 95–100)
POC TCO2: 30 MMOL/L (ref 23–27)
POC TEMPERATURE: ABNORMAL
POTASSIUM SERPL-SCNC: 3.4 MMOL/L (ref 3.5–5.1)
PROT SERPL-MCNC: 5.8 GM/DL (ref 5.8–7.6)
RBC # BLD AUTO: 3.69 X10(6)/MCL (ref 4.7–6.1)
RSV RNA NPH QL NAA+NON-PROBE: NOT DETECTED
RV+EV RNA NPH QL NAA+NON-PROBE: NOT DETECTED
SAMPLE: ABNORMAL
SARS-COV-2 RNA RESP QL NAA+PROBE: NOT DETECTED
SODIUM SERPL-SCNC: 141 MMOL/L (ref 136–145)
WBC # SPEC AUTO: 16.4 X10(3)/MCL (ref 4.5–11.5)

## 2023-01-02 PROCEDURE — 99900035 HC TECH TIME PER 15 MIN (STAT)

## 2023-01-02 PROCEDURE — 0240U COVID/FLU A&B PCR: CPT | Performed by: STUDENT IN AN ORGANIZED HEALTH CARE EDUCATION/TRAINING PROGRAM

## 2023-01-02 PROCEDURE — 96372 THER/PROPH/DIAG INJ SC/IM: CPT | Performed by: STUDENT IN AN ORGANIZED HEALTH CARE EDUCATION/TRAINING PROGRAM

## 2023-01-02 PROCEDURE — 85025 COMPLETE CBC W/AUTO DIFF WBC: CPT | Performed by: STUDENT IN AN ORGANIZED HEALTH CARE EDUCATION/TRAINING PROGRAM

## 2023-01-02 PROCEDURE — 51701 INSERT BLADDER CATHETER: CPT

## 2023-01-02 PROCEDURE — 36415 COLL VENOUS BLD VENIPUNCTURE: CPT | Performed by: STUDENT IN AN ORGANIZED HEALTH CARE EDUCATION/TRAINING PROGRAM

## 2023-01-02 PROCEDURE — G0378 HOSPITAL OBSERVATION PER HR: HCPCS

## 2023-01-02 PROCEDURE — 94660 CPAP INITIATION&MGMT: CPT

## 2023-01-02 PROCEDURE — 63600175 PHARM REV CODE 636 W HCPCS: Performed by: STUDENT IN AN ORGANIZED HEALTH CARE EDUCATION/TRAINING PROGRAM

## 2023-01-02 PROCEDURE — 51798 US URINE CAPACITY MEASURE: CPT

## 2023-01-02 PROCEDURE — 94760 N-INVAS EAR/PLS OXIMETRY 1: CPT

## 2023-01-02 PROCEDURE — 82803 BLOOD GASES ANY COMBINATION: CPT

## 2023-01-02 PROCEDURE — 80053 COMPREHEN METABOLIC PANEL: CPT | Performed by: STUDENT IN AN ORGANIZED HEALTH CARE EDUCATION/TRAINING PROGRAM

## 2023-01-02 PROCEDURE — 83735 ASSAY OF MAGNESIUM: CPT | Performed by: STUDENT IN AN ORGANIZED HEALTH CARE EDUCATION/TRAINING PROGRAM

## 2023-01-02 PROCEDURE — 25000242 PHARM REV CODE 250 ALT 637 W/ HCPCS: Performed by: STUDENT IN AN ORGANIZED HEALTH CARE EDUCATION/TRAINING PROGRAM

## 2023-01-02 PROCEDURE — 94640 AIRWAY INHALATION TREATMENT: CPT

## 2023-01-02 PROCEDURE — 25000003 PHARM REV CODE 250: Performed by: STUDENT IN AN ORGANIZED HEALTH CARE EDUCATION/TRAINING PROGRAM

## 2023-01-02 PROCEDURE — 36600 WITHDRAWAL OF ARTERIAL BLOOD: CPT

## 2023-01-02 RX ORDER — SERTRALINE HYDROCHLORIDE 50 MG/1
50 TABLET, FILM COATED ORAL DAILY
Status: DISCONTINUED | OUTPATIENT
Start: 2023-01-02 | End: 2023-01-07 | Stop reason: HOSPADM

## 2023-01-02 RX ORDER — POTASSIUM CHLORIDE 20 MEQ/1
20 TABLET, EXTENDED RELEASE ORAL 2 TIMES DAILY
Status: COMPLETED | OUTPATIENT
Start: 2023-01-02 | End: 2023-01-02

## 2023-01-02 RX ORDER — IPRATROPIUM BROMIDE AND ALBUTEROL SULFATE 2.5; .5 MG/3ML; MG/3ML
3 SOLUTION RESPIRATORY (INHALATION)
Status: DISCONTINUED | OUTPATIENT
Start: 2023-01-02 | End: 2023-01-07 | Stop reason: HOSPADM

## 2023-01-02 RX ORDER — OXYCODONE HYDROCHLORIDE 5 MG/1
30 TABLET ORAL EVERY 6 HOURS PRN
Status: DISCONTINUED | OUTPATIENT
Start: 2023-01-02 | End: 2023-01-02

## 2023-01-02 RX ORDER — OXYCODONE HYDROCHLORIDE 5 MG/1
5 TABLET ORAL EVERY 6 HOURS PRN
Status: DISCONTINUED | OUTPATIENT
Start: 2023-01-02 | End: 2023-01-03

## 2023-01-02 RX ORDER — SODIUM CHLORIDE, SODIUM LACTATE, POTASSIUM CHLORIDE, CALCIUM CHLORIDE 600; 310; 30; 20 MG/100ML; MG/100ML; MG/100ML; MG/100ML
INJECTION, SOLUTION INTRAVENOUS CONTINUOUS
Status: DISCONTINUED | OUTPATIENT
Start: 2023-01-02 | End: 2023-01-04

## 2023-01-02 RX ADMIN — IPRATROPIUM BROMIDE AND ALBUTEROL SULFATE 3 ML: .5; 3 SOLUTION RESPIRATORY (INHALATION) at 07:01

## 2023-01-02 RX ADMIN — CEFEPIME 2 G: 2 INJECTION, POWDER, FOR SOLUTION INTRAVENOUS at 01:01

## 2023-01-02 RX ADMIN — OXYCODONE HYDROCHLORIDE 5 MG: 5 TABLET ORAL at 10:01

## 2023-01-02 RX ADMIN — CIPROFLOXACIN 400 MG: 2 INJECTION, SOLUTION INTRAVENOUS at 06:01

## 2023-01-02 RX ADMIN — OXYCODONE HYDROCHLORIDE 5 MG: 5 TABLET ORAL at 04:01

## 2023-01-02 RX ADMIN — CEFEPIME 2 G: 2 INJECTION, POWDER, FOR SOLUTION INTRAVENOUS at 10:01

## 2023-01-02 RX ADMIN — MELATONIN TAB 3 MG 9 MG: 3 TAB at 08:01

## 2023-01-02 RX ADMIN — CEFEPIME 2 G: 2 INJECTION, POWDER, FOR SOLUTION INTRAVENOUS at 05:01

## 2023-01-02 RX ADMIN — ENOXAPARIN SODIUM 40 MG: 40 INJECTION SUBCUTANEOUS at 04:01

## 2023-01-02 RX ADMIN — FLUTICASONE FUROATE 1 PUFF: 100 POWDER RESPIRATORY (INHALATION) at 10:01

## 2023-01-02 RX ADMIN — POTASSIUM CHLORIDE 20 MEQ: 1500 TABLET, EXTENDED RELEASE ORAL at 08:01

## 2023-01-02 RX ADMIN — CIPROFLOXACIN 400 MG: 2 INJECTION, SOLUTION INTRAVENOUS at 10:01

## 2023-01-02 RX ADMIN — SODIUM CHLORIDE, POTASSIUM CHLORIDE, SODIUM LACTATE AND CALCIUM CHLORIDE: 600; 310; 30; 20 INJECTION, SOLUTION INTRAVENOUS at 06:01

## 2023-01-02 RX ADMIN — SODIUM CHLORIDE, POTASSIUM CHLORIDE, SODIUM LACTATE AND CALCIUM CHLORIDE 1000 ML: 600; 310; 30; 20 INJECTION, SOLUTION INTRAVENOUS at 04:01

## 2023-01-02 RX ADMIN — OXYCODONE HYDROCHLORIDE 30 MG: 5 TABLET ORAL at 10:01

## 2023-01-02 RX ADMIN — VANCOMYCIN HYDROCHLORIDE 1250 MG: 1.25 INJECTION, POWDER, LYOPHILIZED, FOR SOLUTION INTRAVENOUS at 10:01

## 2023-01-02 RX ADMIN — CIPROFLOXACIN 400 MG: 2 INJECTION, SOLUTION INTRAVENOUS at 01:01

## 2023-01-02 RX ADMIN — SERTRALINE HYDROCHLORIDE 50 MG: 50 TABLET, FILM COATED ORAL at 12:01

## 2023-01-02 RX ADMIN — POTASSIUM CHLORIDE 20 MEQ: 1500 TABLET, EXTENDED RELEASE ORAL at 10:01

## 2023-01-02 RX ADMIN — IPRATROPIUM BROMIDE AND ALBUTEROL SULFATE 3 ML: .5; 3 SOLUTION RESPIRATORY (INHALATION) at 01:01

## 2023-01-02 NOTE — PLAN OF CARE
Problem: Adult Inpatient Plan of Care  Goal: Plan of Care Review  Outcome: Ongoing, Progressing  Flowsheets (Taken 1/1/2023 1829)  Plan of Care Reviewed With:   patient   spouse   daughter  Goal: Absence of Hospital-Acquired Illness or Injury  Outcome: Ongoing, Progressing  Intervention: Identify and Manage Fall Risk  Flowsheets (Taken 1/1/2023 1829)  Safety Promotion/Fall Prevention:   assistive device/personal item within reach   bed alarm set   Fall Risk signage in place   Fall Risk reviewed with patient/family   medications reviewed  Intervention: Prevent Skin Injury  Flowsheets (Taken 1/1/2023 1829)  Body Position: position changed independently  Skin Protection: adhesive use limited  Intervention: Prevent and Manage VTE (Venous Thromboembolism) Risk  Flowsheets (Taken 1/1/2023 1829)  VTE Prevention/Management:   bleeding risk assessed   ROM (active) performed  Range of Motion: ROM (range of motion) performed  Intervention: Prevent Infection  Flowsheets (Taken 1/1/2023 1829)  Infection Prevention:   environmental surveillance performed   equipment surfaces disinfected   hand hygiene promoted   personal protective equipment utilized   rest/sleep promoted   single patient room provided  Goal: Optimal Comfort and Wellbeing  Outcome: Ongoing, Progressing  Intervention: Monitor Pain and Promote Comfort  Flowsheets (Taken 1/1/2023 1829)  Pain Management Interventions: care clustered  Intervention: Provide Person-Centered Care  Flowsheets (Taken 1/1/2023 1829)  Trust Relationship/Rapport:   care explained   choices provided   emotional support provided   empathic listening provided   thoughts/feelings acknowledged   reassurance provided   questions encouraged   questions answered  Goal: Readiness for Transition of Care  Outcome: Ongoing, Progressing     Problem: Respiratory Compromise (Pneumonia)  Goal: Effective Oxygenation and Ventilation  Outcome: Ongoing, Progressing  Intervention: Promote Airway Secretion  Clearance  Flowsheets (Taken 1/1/2023 1829)  Cough And Deep Breathing: done independently per patient  Intervention: Optimize Oxygenation and Ventilation  Flowsheets (Taken 1/1/2023 1829)  Airway/Ventilation Management:   airway patency maintained   calming measures promoted  Head of Bed (HOB) Positioning: HOB at 30 degrees     Problem: Infection  Goal: Absence of Infection Signs and Symptoms  Outcome: Ongoing, Progressing  Intervention: Prevent or Manage Infection  Flowsheets (Taken 1/1/2023 1829)  Infection Management: cultures obtained and sent to lab  Isolation Precautions: protective

## 2023-01-02 NOTE — ASSESSMENT & PLAN NOTE
-reportedly pt has been experiencing difficulty swallowing since being diagnosed with thrush around Thanksgiving  -no evidence of thrush on exam 1/2  -ST

## 2023-01-02 NOTE — ASSESSMENT & PLAN NOTE
-hypoxemic, hypercapnic  -2/2 suspected aspiration pna and concurrent opiate+benzo use   -qhs bipap and supplemental O2 as required

## 2023-01-02 NOTE — HOSPITAL COURSE
Patient admitted for acute on chronic hypoxemic, hypercapnic respiratory failure secondary to concurrent opiate, benzo use and aspiration pneumonia. Evaluated by ST on 1/3, ok to resume soft/bite sized diet. Patient's pain currently being addressed with oxycontin 10mg BID, dose reduced from his baseline 2/2 hypotension. Cannot discontinue as pain becomes so poorly controlled pt becomes tachypneic. Hold buprenorphine and benzos at this time. ABG showed significant improvement after BiPAP was in place for 24 hours. Pt strongly advised to utilized qhs bipap, he has refused it here. Maintain on supplemental O2. On 1/2 blood cultures resulted Staph, vancomycin was started 1/2, suspect this is reason for continued elevated WBC. Pt also remains on cefepime and cipro for CAP. TTE to be done today, if negative pt would benefit from discussion regarding transfer for [facility], his poor respiratory status may prevent eligibility for [facility].

## 2023-01-02 NOTE — PLAN OF CARE
Problem: Fluid Imbalance (Pneumonia)  Goal: Fluid Balance  Outcome: Ongoing, Progressing  Intervention: Monitor and Manage Fluid Balance  Flowsheets (Taken 1/2/2023 0647)  Fluid/Electrolyte Management: fluids provided     Problem: Infection (Pneumonia)  Goal: Resolution of Infection Signs and Symptoms  Outcome: Ongoing, Progressing  Intervention: Prevent Infection Progression  Flowsheets (Taken 1/2/2023 0647)  Isolation Precautions: precautions initiated     Problem: Infection  Goal: Absence of Infection Signs and Symptoms  Outcome: Ongoing, Progressing  Intervention: Prevent or Manage Infection  Flowsheets (Taken 1/2/2023 0647)  Isolation Precautions: precautions initiated

## 2023-01-02 NOTE — PLAN OF CARE
Problem: Adult Inpatient Plan of Care  Goal: Plan of Care Review  Outcome: Ongoing, Progressing  Flowsheets (Taken 1/2/2023 1334)  Plan of Care Reviewed With:   patient   spouse  Goal: Patient-Specific Goal (Individualized)  Outcome: Ongoing, Progressing  Flowsheets (Taken 1/2/2023 1334)  Anxieties, Fears or Concerns: not being able to breathe  Individualized Care Needs: improve gas exchange  Patient-Specific Goals (Include Timeframe): patient will remain free of falls on this admission  Goal: Absence of Hospital-Acquired Illness or Injury  Outcome: Ongoing, Progressing  Intervention: Identify and Manage Fall Risk  Flowsheets (Taken 1/2/2023 1334)  Safety Promotion/Fall Prevention:   assistive device/personal item within reach   bed alarm set   nonskid shoes/socks when out of bed   family to remain at bedside   instructed to call staff for mobility  Intervention: Prevent Skin Injury  Flowsheets (Taken 1/2/2023 1334)  Body Position: position changed independently  Skin Protection:   adhesive use limited   incontinence pads utilized   transparent dressing maintained  Intervention: Prevent and Manage VTE (Venous Thromboembolism) Risk  Flowsheets (Taken 1/2/2023 1334)  Activity Management: Rolling - L1  VTE Prevention/Management:   bleeding risk assessed   bleeding precations maintained  Range of Motion: active ROM (range of motion) encouraged  Intervention: Prevent Infection  Flowsheets (Taken 1/2/2023 1334)  Infection Prevention:   hand hygiene promoted   equipment surfaces disinfected   personal protective equipment utilized  Goal: Optimal Comfort and Wellbeing  Outcome: Ongoing, Progressing  Intervention: Monitor Pain and Promote Comfort  Flowsheets (Taken 1/2/2023 1334)  Pain Management Interventions:   quiet environment facilitated   care clustered  Intervention: Provide Person-Centered Care  Flowsheets (Taken 1/2/2023 1334)  Trust Relationship/Rapport:   care explained   choices provided   questions answered    thoughts/feelings acknowledged  Goal: Readiness for Transition of Care  Outcome: Ongoing, Progressing  Intervention: Mutually Develop Transition Plan  Flowsheets (Taken 1/2/2023 1334)  Communicated JACY with patient/caregiver: Date not available/Unable to determine  Do you expect to return to your current living situation?: Yes  Do you have help at home or someone to help you manage your care at home?: Yes  Readmission within 30 days?: No  Do you currently have service(s) that help you manage your care at home?: No     Problem: Fluid Imbalance (Pneumonia)  Goal: Fluid Balance  Outcome: Ongoing, Progressing  Intervention: Monitor and Manage Fluid Balance  Flowsheets (Taken 1/2/2023 1334)  Fluid/Electrolyte Management: fluids restricted     Problem: Infection (Pneumonia)  Goal: Resolution of Infection Signs and Symptoms  Outcome: Ongoing, Progressing  Intervention: Prevent Infection Progression  Flowsheets (Taken 1/2/2023 1334)  Fever Reduction/Comfort Measures:   lightweight bedding   lightweight clothing  Infection Management: aseptic technique maintained  Isolation Precautions:   precautions initiated   airborne   contact   droplet     Problem: Respiratory Compromise (Pneumonia)  Goal: Effective Oxygenation and Ventilation  Outcome: Ongoing, Progressing  Intervention: Promote Airway Secretion Clearance  Flowsheets (Taken 1/2/2023 1334)  Cough And Deep Breathing: done with encouragement  Intervention: Optimize Oxygenation and Ventilation  Flowsheets (Taken 1/2/2023 1334)  Airway/Ventilation Management:   airway patency maintained   calming measures promoted  Head of Bed (HOB) Positioning: HOB at 30 degrees     Problem: Infection  Goal: Absence of Infection Signs and Symptoms  Outcome: Ongoing, Progressing  Intervention: Prevent or Manage Infection  Flowsheets (Taken 1/2/2023 1334)  Fever Reduction/Comfort Measures:   lightweight bedding   lightweight clothing  Infection Management: aseptic technique  maintained  Isolation Precautions:   precautions initiated   airborne   contact   droplet

## 2023-01-02 NOTE — SUBJECTIVE & OBJECTIVE
Interval History: No acute events overnight. Improved mental status     Review of Systems   Constitutional:  Negative for activity change, appetite change, fatigue and fever.   HENT:  Negative for congestion, sore throat and trouble swallowing.    Eyes:  Negative for pain and redness.   Respiratory:  Positive for shortness of breath. Negative for cough, chest tightness and wheezing.    Cardiovascular:  Negative for chest pain and leg swelling.   Gastrointestinal:  Negative for abdominal distention, abdominal pain, diarrhea, nausea and vomiting.   Genitourinary:  Negative for dysuria and frequency.   Musculoskeletal:  Positive for arthralgias and myalgias. Negative for back pain and neck pain.   Neurological:  Negative for weakness and headaches.   Psychiatric/Behavioral:  Positive for agitation. Negative for confusion.    Objective:     Vital Signs (Most Recent):  Temp: 98.2 °F (36.8 °C) (01/02/23 0700)  Pulse: 110 (01/02/23 1009)  Resp: (!) 24 (01/02/23 1023)  BP: 110/75 (01/02/23 0700)  SpO2: (!) 91 % (01/02/23 0700)   Vital Signs (24h Range):  Temp:  [97.3 °F (36.3 °C)-98.2 °F (36.8 °C)] 98.2 °F (36.8 °C)  Pulse:  [] 110  Resp:  [21-34] 24  SpO2:  [91 %-98 %] 91 %  BP: (100-155)/(37-99) 110/75     Weight: 60.3 kg (132 lb 15 oz)  Body mass index is 19.07 kg/m².    Intake/Output Summary (Last 24 hours) at 1/2/2023 1133  Last data filed at 1/2/2023 0535  Gross per 24 hour   Intake 2674.17 ml   Output 1200 ml   Net 1474.17 ml      Physical Exam  Constitutional:       General: He is not in acute distress.     Appearance: Normal appearance. He is cachectic.   HENT:      Head: Normocephalic and atraumatic.      Nose: No congestion or rhinorrhea.      Mouth/Throat:      Mouth: Mucous membranes are moist.   Eyes:      Conjunctiva/sclera: Conjunctivae normal.      Pupils: Pupils are equal, round, and reactive to light.   Cardiovascular:      Rate and Rhythm: Regular rhythm. Tachycardia present.      Heart sounds: No  murmur heard.  Pulmonary:      Effort: Pulmonary effort is normal.      Breath sounds: Normal breath sounds. No wheezing.   Abdominal:      General: Bowel sounds are normal. There is no distension.      Palpations: Abdomen is soft.      Tenderness: There is no abdominal tenderness.   Musculoskeletal:         General: No swelling. Normal range of motion.      Cervical back: Normal range of motion and neck supple.      Right lower leg: No edema.      Left lower leg: No edema.   Skin:     General: Skin is warm and dry.      Findings: No rash.   Neurological:      General: No focal deficit present.      Mental Status: He is alert and oriented to person, place, and time.   Psychiatric:         Mood and Affect: Mood normal.         Behavior: Behavior normal.       Significant Labs: All pertinent labs within the past 24 hours have been reviewed.    Significant Imaging: I have reviewed all pertinent imaging results/findings within the past 24 hours.

## 2023-01-02 NOTE — PROGRESS NOTES
[facility] Medicine  Progress Note    Patient Name: Balbir Rogers  MRN: [id number]  Patient Class: OP- Observation   Admission Date: 1/1/2023  Length of Stay: 0 days  Attending Physician: Thairy G Reyes, DO  Primary Care Provider: Jose Johnson Jr, MD        Subjective:     Principal Problem:Acute hypercapnic respiratory failure        HPI:  63 yo male with PMH of emphysema/COPD (on 5L home O2 and also with at home NIPPV), anxiety, chronic opioid dependence, medical marijuana use who presents with complaint of hypoxemia at home. Pt somnolent, poorly arousable during evaluation thus history obtained from wife at bedside. She reports pt has been ill since Thanksgiving when he was treated for PNA and subsequent thrush. She affirms pt has had a broken NIPPV at home and has had difficulty obtaining a new one secondary to insurance issues. She also says pt has not used supplemental O2 for 2 weeks and reportedly was saturating >90% without home O2. Pt with increasing lethargy at home however and found by wife with an O2 sat of 44% thus she brought him in. She reports she administers his pain medications (oxycodone 30mg qid +buprenorphine patch) and there is no way he could have overdosed.     In the ED pt with acute on chronic hypoxemic hypercapnic respiratory failure. Serial ABGs as follows: (7.19/85.4/97 on 12/6, rr 16, 40%; 7.29/69.6/65 on bipap at 20/6, rr16, 36%; 7.32/59/82 on 20/6, rr23, 35%). Pt also received narcan and reportedly had marked improvement in mental status. Wife at bedside reports DNR/DNI status.    At baseline, pt lives with his wife and is essential bed bound 2/2 pain and deconditioning. PCP Dr. Emery; Pulmonologist Dr. Vela.      Overview/Hospital Course:  Patient admitted for acute on chronic hypoxemic, hypercapnic respiratory failure secondary to concurrent opiate, benzo use and aspiration pneumonia.  Patient to remain NPO except for medications until  able to be evaluated by speech therapy tomorrow. Patient's pain poorly controlled this morning causing tachypnea therefore have resumed his oxycodone, will hold buprenorphine and benzos at this time. ABG showed significant improvement after BiPAP all day yesterday. Maintain on supplemental O2 and qhs bipap.      Interval History: No acute events overnight. Improved mental status     Review of Systems   Constitutional:  Negative for activity change, appetite change, fatigue and fever.   HENT:  Negative for congestion, sore throat and trouble swallowing.    Eyes:  Negative for pain and redness.   Respiratory:  Positive for shortness of breath. Negative for cough, chest tightness and wheezing.    Cardiovascular:  Negative for chest pain and leg swelling.   Gastrointestinal:  Negative for abdominal distention, abdominal pain, diarrhea, nausea and vomiting.   Genitourinary:  Negative for dysuria and frequency.   Musculoskeletal:  Positive for arthralgias and myalgias. Negative for back pain and neck pain.   Neurological:  Negative for weakness and headaches.   Psychiatric/Behavioral:  Positive for agitation. Negative for confusion.    Objective:     Vital Signs (Most Recent):  Temp: 98.2 °F (36.8 °C) (01/02/23 0700)  Pulse: 110 (01/02/23 1009)  Resp: (!) 24 (01/02/23 1023)  BP: 110/75 (01/02/23 0700)  SpO2: (!) 91 % (01/02/23 0700)   Vital Signs (24h Range):  Temp:  [97.3 °F (36.3 °C)-98.2 °F (36.8 °C)] 98.2 °F (36.8 °C)  Pulse:  [] 110  Resp:  [21-34] 24  SpO2:  [91 %-98 %] 91 %  BP: (100-155)/(37-99) 110/75     Weight: 60.3 kg (132 lb 15 oz)  Body mass index is 19.07 kg/m².    Intake/Output Summary (Last 24 hours) at 1/2/2023 1133  Last data filed at 1/2/2023 0535  Gross per 24 hour   Intake 2674.17 ml   Output 1200 ml   Net 1474.17 ml      Physical Exam  Constitutional:       General: He is not in acute distress.     Appearance: Normal appearance. He is cachectic.   HENT:      Head: Normocephalic and atraumatic.       Nose: No congestion or rhinorrhea.      Mouth/Throat:      Mouth: Mucous membranes are moist.   Eyes:      Conjunctiva/sclera: Conjunctivae normal.      Pupils: Pupils are equal, round, and reactive to light.   Cardiovascular:      Rate and Rhythm: Regular rhythm. Tachycardia present.      Heart sounds: No murmur heard.  Pulmonary:      Effort: Pulmonary effort is normal.      Breath sounds: Normal breath sounds. No wheezing.   Abdominal:      General: Bowel sounds are normal. There is no distension.      Palpations: Abdomen is soft.      Tenderness: There is no abdominal tenderness.   Musculoskeletal:         General: No swelling. Normal range of motion.      Cervical back: Normal range of motion and neck supple.      Right lower leg: No edema.      Left lower leg: No edema.   Skin:     General: Skin is warm and dry.      Findings: No rash.   Neurological:      General: No focal deficit present.      Mental Status: He is alert and oriented to person, place, and time.   Psychiatric:         Mood and Affect: Mood normal.         Behavior: Behavior normal.       Significant Labs: All pertinent labs within the past 24 hours have been reviewed.    Significant Imaging: I have reviewed all pertinent imaging results/findings within the past 24 hours.      Assessment/Plan:      * Acute hypercapnic respiratory failure  -hypoxemic, hypercapnic  -2/2 suspected aspiration pna and concurrent opiate+benzo use   -qhs bipap and supplemental O2 as required       Pneumonia of left upper lobe due to infectious organism  -wife affirms brown sputum production--> pseudomonal infection?  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  -wife affirms significantly weight loss lately  -smita count     Aspiration pneumonia  -reportedly pt has been experiencing difficulty swallowing since being diagnosed with thrush around Thanksgiving  -no evidence of thrush on exam 1/2  -ST      Myoclonus  -wife reports pt saw neurology 2/2 "tremor"  -was prescribed lorazepam for this         VTE Risk Mitigation (From admission, onward)         Ordered     enoxaparin injection 40 mg  Daily         01/01/23 1721     IP VTE HIGH RISK PATIENT  Once         01/01/23 1640     Place sequential compression device  Until discontinued         01/01/23 1640                Discharge Planning   JACY:      Code Status: DNR   Is the patient medically ready for discharge?:     Reason for patient still in hospital (select all that apply): Treatment  Discharge Plan A: Home with family, Home Health                  Thairy G Reyes, DO  Department of Hospital Medicine   [facility]    "

## 2023-01-02 NOTE — PROGRESS NOTES
"   01/02/23 1242   Missed Time Reason   OT Attempted Eval Date 01/02/23   OT Attempted Eval Time 1200   Missed Treatment Reason Increased agitation;Patient fatigue;Therapist assessment;Patient unwilling to participate     OT attempted eval at 8:47, at this time pt in distress, with rapid, labored breathing and significant c/o pain, pt's wife reported "they took all this pain meds away, can we please have something for pain and for his anxiety?", nurse aware, O2 sat 94% on 4L oxymask.  At second attempt at 12:00, pt resting more comfortably, however O2 sats at 88% on 4L oxymask and pt's wife reported, "he just fell asleep, he was up all night, can we please let him rest"", pt arousable but very fatigued and stated try eval again tmrw.  Nurse notified.  "

## 2023-01-02 NOTE — PLAN OF CARE
[facility]  Initial Discharge Assessment       Primary Care Provider: Jose Johnson Jr, MD    Admission Diagnosis: COPD exacerbation [J44.1]  Pneumonia of left upper lobe due to infectious organism [J18.9]  Acute hypercapnic respiratory failure [J96.02]  Community acquired bilateral lower lobe pneumonia [J18.9]    Admission Date: 1/1/2023  Expected Discharge Date:     Discharge Barriers Identified: None    Payor: BLUE CROSS BLUE SHIELD / Plan: BCBS ALL OUT OF STATE / Product Type: PPO /     Extended Emergency Contact Information  Primary Emergency Contact: lupe webb  Mobile Phone: [phone]  Relation: Spouse  Preferred language: English   needed? No    Discharge Plan A: Home with family, Home Health         [facility] - [place], [place] - [address]  [address] [facility] [address]  Phone: [phone] Fax: [phone]    [facility] - [place], [place] - [address] [facility]  [address] [facility] [address]  Phone: [phone] Fax: [phone]      Initial Assessment (most recent)       Adult Discharge Assessment - 01/02/23 0836          Discharge Assessment    Assessment Type Discharge Planning Assessment     Confirmed/corrected address, phone number and insurance Yes     Confirmed Demographics Correct on Facesheet     Source of Information patient     If unable to respond/provide information was family/caregiver contacted? Yes     Contact Name/Number Lupe ramos [phone]     Does patient/caregiver understand observation status Yes     Communicated JACY with patient/caregiver Date not available/Unable to determine     Reason For Admission COPD     People in Home spouse     Facility Arrived From: home     Do you expect to return to your current living situation? Yes     Do you have help at home or someone to help you manage your care at home? Yes     Who are your caregiver(s) and their phone number(s)? Lupe spouse  [phone]     Prior to hospitilization cognitive status: Alert/Oriented     Current cognitive status: Alert/Oriented     Walking or Climbing Stairs ambulation difficulty, requires equipment     Mobility Management walker     Home Accessibility wheelchair accessible     Equipment Currently Used at Home walker, standard     Readmission within 30 days? No     Patient currently being followed by outpatient case management? No     Do you currently have service(s) that help you manage your care at home? No     Do you take prescription medications? Yes     Do you have prescription coverage? Yes     Coverage BCBS     Do you have any problems affording any of your prescribed medications? TBD     Is the patient taking medications as prescribed? yes     Who is going to help you get home at discharge? Lupe spouse [phone]     How do you get to doctors appointments? family or friend will provide     Are you on dialysis? No     Do you take coumadin? No     Discharge Plan A Home with family;Home Health     DME Needed Upon Discharge  bedside commode     Discharge Plan discussed with: Spouse/sig other     Name(s) and Number(s) Lupe spouse [phone]     Discharge Barriers Identified None        Physical Activity    On average, how many days per week do you engage in moderate to strenuous exercise (like a brisk walk)? 0 days     On average, how many minutes do you engage in exercise at this level? 0 min        Financial Resource Strain    How hard is it for you to pay for the very basics like food, housing, medical care, and heating? Not hard at all        Housing Stability    In the last 12 months, was there a time when you were not able to pay the mortgage or rent on time? No     In the last 12 months, how many places have you lived? 1     In the last 12 months, was there a time when you did not have a steady place to sleep or slept in a shelter (including now)? No        Transportation Needs    In the past 12 months, has  lack of transportation kept you from medical appointments or from getting medications? No     In the past 12 months, has lack of transportation kept you from meetings, work, or from getting things needed for daily living? No        Food Insecurity    Within the past 12 months, you worried that your food would run out before you got the money to buy more. Never true     Within the past 12 months, the food you bought just didn't last and you didn't have money to get more. Never true        Stress    Do you feel stress - tense, restless, nervous, or anxious, or unable to sleep at night because your mind is troubled all the time - these days? Only a little        Social Connections    In a typical week, how many times do you talk on the phone with family, friends, or neighbors? More than three times a week     How often do you get together with friends or relatives? More than three times a week     How often do you attend Catholic or Gnosticism services? More than 4 times per year     Do you belong to any clubs or organizations such as Catholic groups, unions, fraternal or athletic groups, or school groups? No     How often do you attend meetings of the clubs or organizations you belong to? 1 to 4 times per year     Are you , , , , never , or living with a partner?         Alcohol Use    Q1: How often do you have a drink containing alcohol? Never     Q2: How many drinks containing alcohol do you have on a typical day when you are drinking? Patient does not drink     Q3: How often do you have six or more drinks on one occasion? Never        OTHER    Name(s) of People in Home Lupe ramos [phone]

## 2023-01-03 PROBLEM — R78.81 BACTEREMIA: Status: ACTIVE | Noted: 2023-01-03

## 2023-01-03 LAB
ANION GAP SERPL CALC-SCNC: 12 MEQ/L
BASOPHILS # BLD AUTO: 0 X10(3)/MCL (ref 0–0.2)
BASOPHILS NFR BLD AUTO: 0 %
BSA FOR ECHO PROCEDURE: 1.73 M2
BUN SERPL-MCNC: 24.2 MG/DL (ref 8.4–25.7)
CALCIUM SERPL-MCNC: 8.9 MG/DL (ref 8.8–10)
CHLORIDE SERPL-SCNC: 100 MMOL/L (ref 98–107)
CO2 SERPL-SCNC: 25 MMOL/L (ref 23–31)
CREAT SERPL-MCNC: 0.55 MG/DL (ref 0.73–1.18)
CREAT/UREA NIT SERPL: 44
EJECTION FRACTION: 30 %
EOSINOPHIL # BLD AUTO: 0 X10(3)/MCL (ref 0–0.9)
EOSINOPHIL NFR BLD AUTO: 0 %
ERYTHROCYTE [DISTWIDTH] IN BLOOD BY AUTOMATED COUNT: 13.8 % (ref 11.6–14.4)
GFR SERPLBLD CREATININE-BSD FMLA CKD-EPI: >90 MLS/MIN/1.73/M2
GLUCOSE SERPL-MCNC: 101 MG/DL (ref 82–115)
HCO3 UR-SCNC: 30.9 MMOL/L (ref 24–28)
HCO3 UR-SCNC: 32.6 MMOL/L (ref 24–28)
HCO3 UR-SCNC: 33.9 MMOL/L (ref 24–28)
HCT VFR BLD AUTO: 34.2 % (ref 42–52)
HGB BLD-MCNC: 10.7 GM/DL (ref 14–18)
IMM GRANULOCYTES # BLD AUTO: 0.08 X10(3)/MCL (ref 0–0.04)
IMM GRANULOCYTES NFR BLD AUTO: 0.4 %
LYMPHOCYTES # BLD AUTO: 1.44 X10(3)/MCL (ref 0.6–4.6)
LYMPHOCYTES NFR BLD AUTO: 7.4 %
MAGNESIUM SERPL-MCNC: 1.8 MG/DL (ref 1.6–2.6)
MCH RBC QN AUTO: 30.2 PG
MCHC RBC AUTO-ENTMCNC: 31.3 MG/DL (ref 33–36)
MCV RBC AUTO: 96.6 FL (ref 80–94)
MONOCYTES # BLD AUTO: 1.52 X10(3)/MCL (ref 0.1–1.3)
MONOCYTES NFR BLD AUTO: 7.8 %
NEUTROPHILS # BLD AUTO: 16.44 X10(3)/MCL (ref 2.1–9.2)
NEUTROPHILS NFR BLD AUTO: 84.4 %
PCO2 BLDA: 59 MMHG (ref 35–45)
PCO2 BLDA: 69.6 MMHG (ref 35–45)
PCO2 BLDA: 85.4 MMHG (ref 35–45)
PH SMN: 7.19 [PH] (ref 7.35–7.45)
PH SMN: 7.3 [PH] (ref 7.35–7.45)
PH SMN: 7.33 [PH] (ref 7.35–7.45)
PLATELET # BLD AUTO: 295 X10(3)/MCL (ref 140–371)
PMV BLD AUTO: 10.4 FL (ref 9.4–12.4)
PO2 BLDA: 65 MMHG (ref 80–100)
PO2 BLDA: 82 MMHG (ref 80–100)
PO2 BLDA: 97 MMHG (ref 80–100)
POC BE: 4 MMOL/L
POC BE: 5 MMOL/L
POC BE: 7 MMOL/L
POC SATURATED O2: 89 % (ref 95–100)
POC SATURATED O2: 95 % (ref 95–100)
POC SATURATED O2: 95 % (ref 95–100)
POC TCO2: 33 MMOL/L (ref 23–27)
POC TCO2: 35 MMOL/L (ref 23–27)
POC TCO2: 36 MMOL/L (ref 23–27)
POCT GLUCOSE: 148 MG/DL (ref 70–110)
POTASSIUM SERPL-SCNC: 3.7 MMOL/L (ref 3.5–5.1)
RA PRESSURE: 8 MMHG
RBC # BLD AUTO: 3.54 X10(6)/MCL (ref 4.7–6.1)
SAMPLE: ABNORMAL
SODIUM SERPL-SCNC: 137 MMOL/L (ref 136–145)
VANCOMYCIN TROUGH SERPL-MCNC: 7.7 UG/ML (ref 15–20)
WBC # SPEC AUTO: 19.5 X10(3)/MCL (ref 4.5–11.5)

## 2023-01-03 PROCEDURE — 80048 BASIC METABOLIC PNL TOTAL CA: CPT | Performed by: STUDENT IN AN ORGANIZED HEALTH CARE EDUCATION/TRAINING PROGRAM

## 2023-01-03 PROCEDURE — 83735 ASSAY OF MAGNESIUM: CPT | Performed by: STUDENT IN AN ORGANIZED HEALTH CARE EDUCATION/TRAINING PROGRAM

## 2023-01-03 PROCEDURE — 94640 AIRWAY INHALATION TREATMENT: CPT

## 2023-01-03 PROCEDURE — 92610 EVALUATE SWALLOWING FUNCTION: CPT

## 2023-01-03 PROCEDURE — 25000003 PHARM REV CODE 250: Performed by: STUDENT IN AN ORGANIZED HEALTH CARE EDUCATION/TRAINING PROGRAM

## 2023-01-03 PROCEDURE — 11000001 HC ACUTE MED/SURG PRIVATE ROOM

## 2023-01-03 PROCEDURE — 87040 BLOOD CULTURE FOR BACTERIA: CPT | Performed by: STUDENT IN AN ORGANIZED HEALTH CARE EDUCATION/TRAINING PROGRAM

## 2023-01-03 PROCEDURE — 80202 ASSAY OF VANCOMYCIN: CPT | Performed by: STUDENT IN AN ORGANIZED HEALTH CARE EDUCATION/TRAINING PROGRAM

## 2023-01-03 PROCEDURE — 97161 PT EVAL LOW COMPLEX 20 MIN: CPT

## 2023-01-03 PROCEDURE — 97165 OT EVAL LOW COMPLEX 30 MIN: CPT

## 2023-01-03 PROCEDURE — 85025 COMPLETE CBC W/AUTO DIFF WBC: CPT | Performed by: STUDENT IN AN ORGANIZED HEALTH CARE EDUCATION/TRAINING PROGRAM

## 2023-01-03 PROCEDURE — 94760 N-INVAS EAR/PLS OXIMETRY 1: CPT

## 2023-01-03 PROCEDURE — 27000221 HC OXYGEN, UP TO 24 HOURS

## 2023-01-03 PROCEDURE — 36415 COLL VENOUS BLD VENIPUNCTURE: CPT | Performed by: STUDENT IN AN ORGANIZED HEALTH CARE EDUCATION/TRAINING PROGRAM

## 2023-01-03 PROCEDURE — 63600175 PHARM REV CODE 636 W HCPCS: Performed by: STUDENT IN AN ORGANIZED HEALTH CARE EDUCATION/TRAINING PROGRAM

## 2023-01-03 PROCEDURE — 25000242 PHARM REV CODE 250 ALT 637 W/ HCPCS: Performed by: STUDENT IN AN ORGANIZED HEALTH CARE EDUCATION/TRAINING PROGRAM

## 2023-01-03 RX ORDER — MAGNESIUM SULFATE HEPTAHYDRATE 40 MG/ML
2 INJECTION, SOLUTION INTRAVENOUS ONCE
Status: COMPLETED | OUTPATIENT
Start: 2023-01-03 | End: 2023-01-03

## 2023-01-03 RX ORDER — OXYCODONE HCL 10 MG/1
10 TABLET, FILM COATED, EXTENDED RELEASE ORAL EVERY 12 HOURS
Status: DISCONTINUED | OUTPATIENT
Start: 2023-01-03 | End: 2023-01-05

## 2023-01-03 RX ADMIN — MELATONIN TAB 3 MG 9 MG: 3 TAB at 11:01

## 2023-01-03 RX ADMIN — OXYCODONE HYDROCHLORIDE 5 MG: 5 TABLET ORAL at 04:01

## 2023-01-03 RX ADMIN — SERTRALINE HYDROCHLORIDE 50 MG: 50 TABLET, FILM COATED ORAL at 09:01

## 2023-01-03 RX ADMIN — OXYCODONE HYDROCHLORIDE 10 MG: 10 TABLET, FILM COATED, EXTENDED RELEASE ORAL at 08:01

## 2023-01-03 RX ADMIN — CEFEPIME 2 G: 2 INJECTION, POWDER, FOR SOLUTION INTRAVENOUS at 12:01

## 2023-01-03 RX ADMIN — FLUTICASONE FUROATE 1 PUFF: 100 POWDER RESPIRATORY (INHALATION) at 09:01

## 2023-01-03 RX ADMIN — SODIUM CHLORIDE, POTASSIUM CHLORIDE, SODIUM LACTATE AND CALCIUM CHLORIDE: 600; 310; 30; 20 INJECTION, SOLUTION INTRAVENOUS at 04:01

## 2023-01-03 RX ADMIN — CIPROFLOXACIN 400 MG: 2 INJECTION, SOLUTION INTRAVENOUS at 01:01

## 2023-01-03 RX ADMIN — VANCOMYCIN HYDROCHLORIDE 1250 MG: 1.25 INJECTION, POWDER, LYOPHILIZED, FOR SOLUTION INTRAVENOUS at 10:01

## 2023-01-03 RX ADMIN — IPRATROPIUM BROMIDE AND ALBUTEROL SULFATE 3 ML: .5; 3 SOLUTION RESPIRATORY (INHALATION) at 07:01

## 2023-01-03 RX ADMIN — CEFEPIME 2 G: 2 INJECTION, POWDER, FOR SOLUTION INTRAVENOUS at 06:01

## 2023-01-03 RX ADMIN — IPRATROPIUM BROMIDE AND ALBUTEROL SULFATE 3 ML: .5; 3 SOLUTION RESPIRATORY (INHALATION) at 01:01

## 2023-01-03 RX ADMIN — CIPROFLOXACIN 400 MG: 2 INJECTION, SOLUTION INTRAVENOUS at 05:01

## 2023-01-03 RX ADMIN — ENOXAPARIN SODIUM 40 MG: 40 INJECTION SUBCUTANEOUS at 05:01

## 2023-01-03 RX ADMIN — VANCOMYCIN HYDROCHLORIDE 1250 MG: 1.25 INJECTION, POWDER, LYOPHILIZED, FOR SOLUTION INTRAVENOUS at 11:01

## 2023-01-03 RX ADMIN — MAGNESIUM SULFATE HEPTAHYDRATE 2 G: 40 INJECTION, SOLUTION INTRAVENOUS at 12:01

## 2023-01-03 RX ADMIN — OXYCODONE HYDROCHLORIDE 5 MG: 5 TABLET ORAL at 10:01

## 2023-01-03 RX ADMIN — OXYCODONE HYDROCHLORIDE 10 MG: 10 TABLET, FILM COATED, EXTENDED RELEASE ORAL at 12:01

## 2023-01-03 RX ADMIN — CIPROFLOXACIN 400 MG: 2 INJECTION, SOLUTION INTRAVENOUS at 09:01

## 2023-01-03 RX ADMIN — CEFEPIME 2 G: 2 INJECTION, POWDER, FOR SOLUTION INTRAVENOUS at 09:01

## 2023-01-03 NOTE — PROGRESS NOTES
[facility] Medicine  Progress Note    Patient Name: Balbir Rogers  MRN: [id number]  Patient Class: OP- Observation   Admission Date: 1/1/2023  Length of Stay: 0 days  Attending Physician: Thairy G Reyes, DO  Primary Care Provider: Jose Johnson Jr, MD        Subjective:     Principal Problem:Acute hypercapnic respiratory failure        HPI:  61 yo male with PMH of emphysema/COPD (on 5L home O2 and also with at home NIPPV), anxiety, chronic opioid dependence, medical marijuana use who presents with complaint of hypoxemia at home. Pt somnolent, poorly arousable during evaluation thus history obtained from wife at bedside. She reports pt has been ill since Thanksgiving when he was treated for PNA and subsequent thrush. She affirms pt has had a broken NIPPV at home and has had difficulty obtaining a new one secondary to insurance issues. She also says pt has not used supplemental O2 for 2 weeks and reportedly was saturating >90% without home O2. Pt with increasing lethargy at home however and found by wife with an O2 sat of 44% thus she brought him in. She reports she administers his pain medications (oxycodone 30mg qid +buprenorphine patch) and there is no way he could have overdosed.     In the ED pt with acute on chronic hypoxemic hypercapnic respiratory failure. Serial ABGs as follows: (7.19/85.4/97 on 12/6, rr 16, 40%; 7.29/69.6/65 on bipap at 20/6, rr16, 36%; 7.32/59/82 on 20/6, rr23, 35%). Pt also received narcan and reportedly had marked improvement in mental status. Wife at bedside reports DNR/DNI status.    At baseline, pt lives with his wife and is essential bed bound 2/2 pain and deconditioning. PCP Dr. Emery; Pulmonologist Dr. Vela.      Overview/Hospital Course:  Patient admitted for acute on chronic hypoxemic, hypercapnic respiratory failure secondary to concurrent opiate, benzo use and aspiration pneumonia. Evaluated by ST on 1/3, ok to resume soft/bite sized  diet. Patient's pain currently being addressed with oxycontin 10mg BID, dose reduced from his baseline 2/2 hypotension. Cannot discontinue as pain becomes so poorly controlled pt becomes tachypneic. Hold buprenorphine and benzos at this time. ABG showed significant improvement after BiPAP was in place for 24 hours. Pt strongly advised to utilized qhs bipap, he has refused it here. Maintain on supplemental O2. On 1/2 blood cultures resulted Staph, vancomycin was started 1/2, suspect this is reason for continued elevated WBC. Pt also remains on cefepime and cipro for CAP. TTE to be done today, if negative pt would benefit from discussion regarding transfer for [facility], his poor respiratory status may prevent eligibility for [facility].       Interval History:  Essentially bed-bound at baseline.  Physical therapy was unable to evaluate him given desaturation to 88 percent on 4 liters OxyMask.    Review of Systems   Constitutional:  Negative for activity change, appetite change, fatigue and fever.   HENT:  Negative for congestion, sore throat and trouble swallowing.    Eyes:  Negative for pain and redness.   Respiratory:  Negative for cough, chest tightness, shortness of breath and wheezing.    Cardiovascular:  Negative for chest pain and leg swelling.   Gastrointestinal:  Negative for abdominal distention, abdominal pain, diarrhea, nausea and vomiting.   Genitourinary:  Negative for dysuria and frequency.   Musculoskeletal:  Positive for arthralgias and myalgias. Negative for back pain and neck pain.   Neurological:  Negative for weakness and headaches.   Psychiatric/Behavioral:  Negative for agitation and confusion.    Objective:     Vital Signs (Most Recent):  Temp: 97.9 °F (36.6 °C) (01/03/23 1105)  Pulse: 101 (01/03/23 1105)  Resp: 18 (01/03/23 1023)  BP: 98/62 (01/03/23 1105)  SpO2: 98 % (01/03/23 1105)   Vital Signs (24h Range):  Temp:  [97.4 °F (36.3 °C)-98.4 °F (36.9 °C)] 97.9 °F (36.6 °C)  Pulse:  [] 101  Resp:   [18-22] 18  SpO2:  [90 %-98 %] 98 %  BP: ()/(58-81) 98/62     Weight: 60.3 kg (132 lb 15 oz)  Body mass index is 19.07 kg/m².  No intake or output data in the 24 hours ending 01/03/23 1150     Physical Exam  Constitutional:       General: He is not in acute distress.     Appearance: Normal appearance. He is cachectic.   HENT:      Head: Normocephalic and atraumatic.      Nose: No congestion or rhinorrhea.      Mouth/Throat:      Mouth: Mucous membranes are moist.   Eyes:      Conjunctiva/sclera: Conjunctivae normal.      Pupils: Pupils are equal, round, and reactive to light.   Cardiovascular:      Rate and Rhythm: Regular rhythm. Tachycardia present.      Heart sounds: No murmur heard.  Pulmonary:      Effort: Pulmonary effort is normal.      Breath sounds: Normal breath sounds. No wheezing.   Abdominal:      General: Bowel sounds are normal. There is no distension.      Palpations: Abdomen is soft.      Tenderness: There is no abdominal tenderness.   Musculoskeletal:         General: No swelling. Normal range of motion.      Cervical back: Normal range of motion and neck supple.      Right lower leg: No edema.      Left lower leg: No edema.   Skin:     General: Skin is warm and dry.      Findings: No rash.   Neurological:      General: No focal deficit present.      Mental Status: He is alert and oriented to person, place, and time.   Psychiatric:         Mood and Affect: Mood normal.         Behavior: Behavior normal.       Significant Labs: All pertinent labs within the past 24 hours have been reviewed.    Significant Imaging: I have reviewed all pertinent imaging results/findings within the past 24 hours.      Assessment/Plan:      * Acute hypercapnic respiratory failure  -hypoxemic, hypercapnic  -2/2 suspected aspiration pna and concurrent opiate+benzo use   -qhs bipap refused by patient  -supplemental O2 as required   -pt at baseline O2 requirement at this time  -repeat chest x-ray from 01/03 showed no  "significant change      Bacteremia  -vancomycin started 1/2  -repeat blood cultures pending  -TTE pending   -[facility]?      Pneumonia of left upper lobe due to infectious organism  -wife affirms brown sputum production--> pseudomonal infection?  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  -wife affirms significant weight loss lately  -smita count      Aspiration pneumonia  -reportedly pt has been experiencing difficulty swallowing since being diagnosed with thrush around Thanksgiving  -no evidence of thrush on exam 1/2  -ST cleared for diet       Myoclonus  -wife reports pt saw neurology 2/2 "tremor"  -was prescribed lorazepam for this       Critical care time 35 min  VTE Risk Mitigation (From admission, onward)         Ordered     enoxaparin injection 40 mg  Daily         01/01/23 1721     IP VTE HIGH RISK PATIENT  Once         01/01/23 1640     Place sequential compression device  Until discontinued         01/01/23 1640                Discharge Planning   JACY:      Code Status: DNR   Is the patient medically ready for discharge?:     Reason for patient still in hospital (select all that apply): Patient unstable, Laboratory test, Treatment and PT / OT recommendations  Discharge Plan A: Home with family, Home Health                  Thairy G Reyes, DO  Department of Hospital Medicine   [facility]    "

## 2023-01-03 NOTE — CONSULTS
Inpatient Nutrition Assessment    Admit Date: 1/1/2023   Total duration of encounter: 2 days     Nutrition Recommendation/Prescription     Continue soft and bite sized, per SLP diet modifications. 2. Add boost plus BID or per patient's preference. 3. Continue calorie count as ordered.     Communication of Recommendations: reviewed with physician and family    Nutrition Assessment     Malnutrition Assessment/Nutrition-Focused Physical Exam    Malnutrition in the context of chronic illness  Degree of Malnutrition: non-severe (moderate) malnutrition  Energy Intake: decrease intake progressively over 1 yr.   Interpretation of Weight Loss: 14% in 1 yr  Body Fat: severe depletion  Area of Body Fat Loss: upper arm region - triceps / biceps  Muscle Mass Loss: severe depletion  Area of Muscle Mass Loss: clavicle and acromion bone region - deltoid muscle  Fluid Accumulation: unable to obtain  Edema: unable to obtain  Reduced  Strength: unable to obtain  A minimum of two characteristics is recommended for diagnosis of either severe or non-severe malnutrition.    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      COPD exacerbation 1/1/2023      Pneumonia of left upper lobe due to infectious organism 1/1/2023      Degenerative cervical spinal stenosis Unknown      Chronic, continuous use of opioids Unknown      Pulmonary cachexia due to COPD 1/1/2023      Aspiration pneumonia 1/1/2023      Bacteremia          Relevant Medical History: Collapse, lung  Date Unknown  COPD with hypoxia  Date Unknown  Degenerative cervical spinal stenosis  Date Unknown  Depression  Date Unknown  Disuse osteoporosis  Date Unknown  Emphysema/COPD  Date  "Unknown  Hepatitis C  Date Unknown  Leukocytosis    Nutrition-Related Medications: cefepime,ciprofloxacin, magnesium sulfate, vancomycin,  Calorie Containing IV Medications: no significant kcals from medications at this time    Nutrition-Related Labs:   Latest Reference Range & Units 01/03/23 04:49   Sodium 136 - 145 mmol/L 137   Potassium 3.5 - 5.1 mmol/L 3.7   Chloride 98 - 107 mmol/L 100   CO2 23 - 31 mmol/L 25   Anion Gap mEq/L 12.0   BUN 8.4 - 25.7 mg/dL 24.2   Creatinine 0.73 - 1.18 mg/dL 0.55 (L)   BUN/CREAT RATIO  44   eGFR mls/min/1.73/m2 >90   Glucose 82 - 115 mg/dL 101   Calcium 8.8 - 10.0 mg/dL 8.9   Magnesium 1.60 - 2.60 mg/dL 1.80   (L): Data is abnormally low      Diet/PN Order: Diet Soft & Bite Sized  Oral Supplement Order: none  Tube Feeding Order: none  Appetite/Oral Intake: poor/0-25% of meals  Factors Affecting Nutritional Intake: chewing difficulty, decreased appetite, and unintentional wt loss.   Food/Judaism/Cultural Preferences:  drinks boost/ensure at home, family reports last month.  Food Allergies: none reported       Wound(s):       Comments    Pt has decrease intake progressively over 1 yr with unintentional wt loss. Discussed food preferences. Marked menus. Pt drinks nutritional supplements at home.     Anthropometrics    Height: 5' 10" (177.8 cm) Height Method: Stated  Last Weight: 60.3 kg (132 lb 15 oz) (01/02/23 0500) Weight Method: Bed Scale  BMI (Calculated): 19.1  BMI Classification: normal (BMI 18.5-24.9)        Ideal Body Weight (IBW), Male: 166 lb     % Ideal Body Weight, Male (lb): 80.08 %      UBW: 70 kg                    Usual Weight Provided By: patient and family/caregiver    Wt Readings from Last 5 Encounters:   01/02/23 60.3 kg (132 lb 15 oz)   08/04/22 61.7 kg (136 lb)   07/27/22 63.5 kg (140 lb)   07/26/22 63.5 kg (140 lb)   07/20/21 67.1 kg (147 lb 15.9 oz)     Weight Change(s) Since Admission:  Admit Weight: 57.2 kg (126 lb 1.7 oz) (01/01/23 1910)      Estimated " Needs     Kcal Needs: 1809 kcal (30 kcal/kg/CBW)  Protein Needs: 72 gm (1.2 gm/kg/CBW)  Fluid Needs: 1809 mL (1 mL/kcal)                 Temp: 97.9 °F (36.6 °C)       Enteral Nutrition    Patient not receiving enteral nutrition at this time.    Parenteral Nutrition    Patient not receiving parenteral nutrition support at this time.    Evaluation of Received Nutrient Intake    Calories: not meeting estimated needs  Protein: not meeting estimated needs    Patient Education    Not applicable.    Nutrition Diagnosis     PES: Inadequate oral intake related to appetite loss as evidenced by 14% wt  change in 1 yr. Pt's and family reports 22# (10kg). (new)    Interventions/Goals     Intervention(s): general/healthful diet, commercial beverage, and collaboration with other providers  Goal: Consume % of meals/snacks by follow-up. (new)    Monitoring & Evaluation     Dietitian will monitor food and beverage intake, weight, weight change, glucose/endocrine profile, and gastrointestinal profile.  Nutrition Risk/Follow-Up: low (follow-up in 5-7 days)   Please consult if re-assessment needed sooner.

## 2023-01-03 NOTE — ASSESSMENT & PLAN NOTE
-reportedly pt has been experiencing difficulty swallowing since being diagnosed with thrush around Thanksgiving  -no evidence of thrush on exam 1/2  -ST cleared for diet      no

## 2023-01-03 NOTE — PLAN OF CARE
Problem: Adult Inpatient Plan of Care  Goal: Plan of Care Review  Outcome: Ongoing, Progressing  Flowsheets (Taken 1/3/2023 1456)  Plan of Care Reviewed With:   patient   spouse  Goal: Patient-Specific Goal (Individualized)  Outcome: Ongoing, Progressing  Flowsheets (Taken 1/3/2023 1456)  Anxieties, Fears or Concerns: not being able to breathe  Individualized Care Needs: improve gas exchange  Goal: Absence of Hospital-Acquired Illness or Injury  Outcome: Ongoing, Progressing  Intervention: Identify and Manage Fall Risk  Flowsheets (Taken 1/3/2023 1456)  Safety Promotion/Fall Prevention:   assistive device/personal item within reach   bed alarm set   family to remain at bedside   nonskid shoes/socks when out of bed   side rails raised x 3   instructed to call staff for mobility  Intervention: Prevent Skin Injury  Flowsheets (Taken 1/3/2023 1456)  Body Position: position changed independently  Skin Protection:   adhesive use limited   incontinence pads utilized   transparent dressing maintained  Intervention: Prevent and Manage VTE (Venous Thromboembolism) Risk  Flowsheets (Taken 1/3/2023 1456)  Activity Management: Rolling - L1  VTE Prevention/Management:   bleeding risk assessed   bleeding precations maintained   fluids promoted   intravenous hydration  Range of Motion: active ROM (range of motion) encouraged  Intervention: Prevent Infection  Flowsheets (Taken 1/3/2023 1456)  Infection Prevention:   hand hygiene promoted   equipment surfaces disinfected  Goal: Optimal Comfort and Wellbeing  Outcome: Ongoing, Progressing  Intervention: Monitor Pain and Promote Comfort  Flowsheets (Taken 1/3/2023 1456)  Pain Management Interventions:   care clustered   pillow support provided   position adjusted  Intervention: Provide Person-Centered Care  Flowsheets (Taken 1/3/2023 1456)  Trust Relationship/Rapport:   care explained   choices provided   questions encouraged   questions answered  Goal: Readiness for Transition of  Care  Outcome: Ongoing, Progressing  Intervention: Mutually Develop Transition Plan  Flowsheets (Taken 1/3/2023 1456)  Communicated JACY with patient/caregiver: Date not available/Unable to determine  Do you expect to return to your current living situation?: Yes  Do you have help at home or someone to help you manage your care at home?: Yes  Readmission within 30 days?: No  Do you currently have service(s) that help you manage your care at home?: No     Problem: Fluid Imbalance (Pneumonia)  Goal: Fluid Balance  Outcome: Ongoing, Progressing  Intervention: Monitor and Manage Fluid Balance  Flowsheets (Taken 1/3/2023 1456)  Fluid/Electrolyte Management: fluids provided     Problem: Infection (Pneumonia)  Goal: Resolution of Infection Signs and Symptoms  Outcome: Ongoing, Progressing  Intervention: Prevent Infection Progression  Flowsheets (Taken 1/3/2023 1456)  Fever Reduction/Comfort Measures:   lightweight bedding   lightweight clothing  Infection Management: aseptic technique maintained  Isolation Precautions: precautions discontinued     Problem: Respiratory Compromise (Pneumonia)  Goal: Effective Oxygenation and Ventilation  Outcome: Ongoing, Progressing  Intervention: Promote Airway Secretion Clearance  Flowsheets (Taken 1/3/2023 1456)  Cough And Deep Breathing: done with encouragement  Intervention: Optimize Oxygenation and Ventilation  Flowsheets (Taken 1/3/2023 1456)  Airway/Ventilation Management:   airway patency maintained   calming measures promoted  Head of Bed (HOB) Positioning: HOB at 30-45 degrees     Problem: Infection  Goal: Absence of Infection Signs and Symptoms  Outcome: Ongoing, Progressing  Intervention: Prevent or Manage Infection  Flowsheets (Taken 1/3/2023 1456)  Fever Reduction/Comfort Measures:   lightweight bedding   lightweight clothing  Infection Management: aseptic technique maintained  Isolation Precautions: precautions discontinued

## 2023-01-03 NOTE — PLAN OF CARE
Problem: Occupational Therapy  Goal: Occupational Therapy Goal  Description: Goals to be met by: 1/10/23     Patient will increase functional independence with ADLs by performing:    UE Dressing with Modified [place].  LE Dressing with Supervision.  Toileting from toilet with Supervision for hygiene and clothing management.   Toilet transfer to toilet with Stand-by Assistance.    Outcome: Ongoing, Progressing

## 2023-01-03 NOTE — SUBJECTIVE & OBJECTIVE
Interval History:  Essentially bed-bound at baseline.  Physical therapy was unable to evaluate him given desaturation to 88 percent on 4 liters OxyMask.    Review of Systems   Constitutional:  Negative for activity change, appetite change, fatigue and fever.   HENT:  Negative for congestion, sore throat and trouble swallowing.    Eyes:  Negative for pain and redness.   Respiratory:  Negative for cough, chest tightness, shortness of breath and wheezing.    Cardiovascular:  Negative for chest pain and leg swelling.   Gastrointestinal:  Negative for abdominal distention, abdominal pain, diarrhea, nausea and vomiting.   Genitourinary:  Negative for dysuria and frequency.   Musculoskeletal:  Positive for arthralgias and myalgias. Negative for back pain and neck pain.   Neurological:  Negative for weakness and headaches.   Psychiatric/Behavioral:  Negative for agitation and confusion.    Objective:     Vital Signs (Most Recent):  Temp: 97.9 °F (36.6 °C) (01/03/23 1105)  Pulse: 101 (01/03/23 1105)  Resp: 18 (01/03/23 1023)  BP: 98/62 (01/03/23 1105)  SpO2: 98 % (01/03/23 1105)   Vital Signs (24h Range):  Temp:  [97.4 °F (36.3 °C)-98.4 °F (36.9 °C)] 97.9 °F (36.6 °C)  Pulse:  [] 101  Resp:  [18-22] 18  SpO2:  [90 %-98 %] 98 %  BP: ()/(58-81) 98/62     Weight: 60.3 kg (132 lb 15 oz)  Body mass index is 19.07 kg/m².  No intake or output data in the 24 hours ending 01/03/23 1150     Physical Exam  Constitutional:       General: He is not in acute distress.     Appearance: Normal appearance. He is cachectic.   HENT:      Head: Normocephalic and atraumatic.      Nose: No congestion or rhinorrhea.      Mouth/Throat:      Mouth: Mucous membranes are moist.   Eyes:      Conjunctiva/sclera: Conjunctivae normal.      Pupils: Pupils are equal, round, and reactive to light.   Cardiovascular:      Rate and Rhythm: Regular rhythm. Tachycardia present.      Heart sounds: No murmur heard.  Pulmonary:      Effort: Pulmonary  effort is normal.      Breath sounds: Normal breath sounds. No wheezing.   Abdominal:      General: Bowel sounds are normal. There is no distension.      Palpations: Abdomen is soft.      Tenderness: There is no abdominal tenderness.   Musculoskeletal:         General: No swelling. Normal range of motion.      Cervical back: Normal range of motion and neck supple.      Right lower leg: No edema.      Left lower leg: No edema.   Skin:     General: Skin is warm and dry.      Findings: No rash.   Neurological:      General: No focal deficit present.      Mental Status: He is alert and oriented to person, place, and time.   Psychiatric:         Mood and Affect: Mood normal.         Behavior: Behavior normal.       Significant Labs: All pertinent labs within the past 24 hours have been reviewed.    Significant Imaging: I have reviewed all pertinent imaging results/findings within the past 24 hours.

## 2023-01-03 NOTE — PT/OT/SLP EVAL
Physical Therapy Acute Care Evaluation    Patient Name:  Balbir Rogers   MRN:  [id number]    History:   Pt is a 63 y/o M who reports with acute respiratory failure and chronic pain.   Past Medical History:   Diagnosis Date    Collapse, lung     COPD with hypoxia     Degenerative cervical spinal stenosis     Depression     Disuse osteoporosis     Emphysema/COPD     Hepatitis C     Leukocytosis        Past Surgical History:   Procedure Laterality Date    CERVICAL LAMINECTOMY WITH SPINAL FUSION      CHOLECYSTECTOMY      COLONOSCOPY      ESOPHAGOGASTRODUODENOSCOPY      gastrointestinal biopsy      graft to nose      inguinal herniotomy      mandible operation      POLYPECTOMY           Subjective     Chief Complaint: Pain impacting function.   Patient/Family Comments/goals: Improve function and return to PLOF  Pain/Comfort:  Pain Rating 1: 10/10  Location - Side 1: Bilateral  Location - Orientation 1: lower  Location 1: back  Pain Addressed 1: Pre-medicate for activity, Nurse notified, Cessation of Activity  Pain Rating Post-Intervention 1: 10/10      Living Environment:  Lives with: Home with wife.   [facility] Environment: [facility]  Previous level of function: MOD I with all functional mobility. Would use RW as needed, but did not use often.   Equipment used at home: None .  DME owned (not currently used): rolling walker.        Objective:     Communicated with RN, Wife and OT prior to session.  Patient found HOB elevated with oxygen, peripheral IV  upon PT entry to room.    General Precautions: Standard,     Orthopedic Precautions:    Braces:    Respiratory Status: Nasal cannula, flow 5.5 L/min     Vitals Value    Room air      Oxygen (L)     Blood pressure     Heart rate         Exams:  Cognitive Exam:  Patient is oriented to Person, Place, Time, and Situation  RLE ROM: WNL  RLE Strength: Deficits: 3+/5 at hips  LLE ROM: WNL  LLE Strength: Deficits: 3+/5 at hip    Functional Mobility:  Bed Mobility:     Rolling Left:  stand by  assistance  Rolling Right: stand by assistance  Supine to Sit: stand by assistance  Sit to Supine: stand by assistance  Transfers:     Sit to Stand:  contact guard assistance with no AD  Bed to Chair: contact guard assistance with  hand-held assist  using  Stand Pivot  Gait: x 5 feet forward and backward with B HHA  Balance: Normal sitting EOB, Fair+ in standing.       Additional information: Wife in room about to assist in bed bath    Patient left HOB elevated with all lines intact, call button in reach, and wife present.      Assessment:     Balbir Rogers is a 62 y.o. male admitted with a medical diagnosis of Acute hypercapnic respiratory failure.  He presents with the following impairments/functional limitations:  weakness, impaired endurance, impaired functional mobility warranting skilled PT to return to [facility].    Rehab Prognosis: Good; patient would benefit from acute skilled PT services to address these deficits and reach maximum level of function.    Recent Surgery: * No surgery found *        Recommendations:     Discharge Recommendations:  home, home health PT, home health OT   Discharge Equipment Recommendations: none       Plan:     During this hospitalization, patient to be seen 6 x/week to address the identified rehab impairments via gait training, therapeutic activities, therapeutic exercises and progress toward the following goals:    GOALS:   Multidisciplinary Problems       Physical Therapy Goals          Problem: Physical Therapy    Goal Priority Disciplines Outcome Goal Variances Interventions   Physical Therapy Goal     PT, PT/OT Ongoing, Progressing     Description: Goals to be met by: Discharge     Patient will increase functional independence with mobility by performin. Sit to stand transfer with Modified [place]  2. Bed to chair transfer with Modified [place] using No Assistive Device  3. Gait  x 325 feet with Supervision using Rolling Walker.   4. Increased functional  strength to 5/5 for B Hips. .                         Time Tracking:       PT Start Time: 0952     PT Stop Time: 1004  PT Total Time (min): 12 min     Billable Minutes: Evaluation Low complexity       01/03/2023

## 2023-01-03 NOTE — PT/OT/SLP EVAL
Speech Language Pathology Evaluation  Bedside Swallow    Patient Name:  Balbir Rogers   MRN:  [id number]  Admitting Diagnosis: Acute hypercapnic respiratory failure    Recommendations:                 General Recommendations:  Dysphagia therapy  Diet recommendations:  Soft & Bite Sized Diet - IDDSI Level 6, Thin liquids - IDDSI Level 0   Aspiration Precautions: Standard aspiration precautions and Wear oxygen during intake   General Precautions: Standard, aspiration  Communication strategies:  none    History:     Past Medical History:   Diagnosis Date    Collapse, lung     COPD with hypoxia     Degenerative cervical spinal stenosis     Depression     Disuse osteoporosis     Emphysema/COPD     Hepatitis C     Leukocytosis        Past Surgical History:   Procedure Laterality Date    CERVICAL LAMINECTOMY WITH SPINAL FUSION      CHOLECYSTECTOMY      COLONOSCOPY      ESOPHAGOGASTRODUODENOSCOPY      gastrointestinal biopsy      graft to nose      inguinal herniotomy      mandible operation      POLYPECTOMY         Social History: Patient lives with wife.    Prior Intubation HX:      Modified Barium Swallow:     Chest X-Rays:     Prior diet: NPO    Occupation/hobbies/homemaking:     Subjective     Pt found supine in bed watching TV upon SLP arrival. Pt's wife present across eval.    Patient goals:      Pain/Comfort:       Respiratory Status:  5 1/2 L O2 via oxymask    Objective:     Oral Musculature Evaluation  Oral Musculature: WNL  Dentition: edentulous (Pt reports having dentures at home though rarely wears.)  Voice Prior to PO Intake: WNL    Bedside Swallow Eval:   Consistencies Assessed:  Thin liquids water via cup  Puree pudding  Soft solids via soft lemon cookie    Ice chips    Pt tolerated 3oz thin liquid via sequential sips with no overt signs or symptoms of penetration/aspiration or changes in vocal quality. This constitutes a PASS of the [place] Swallow Protocol.      Pt refusing to trial gill carter at this time  due to being full.    Oral Phase:   WNL    Pharyngeal Phase:   no overt clinical signs/symptoms of pharyngeal dysphagia    Compensatory Strategies  None    Treatment: f/u recommended to assess diet tolerance and determine when/if regular consistency would be an appropriate upgrade.     Assessment:     Balbir Rogers is a 62 y.o. male with an SLP diagnosis of oral phase Dysphagia.  He presents with edentulous status which hinders ability to masticate solids properly. Pt's wife reports pt occ has difficulty w/ solids and whole pills at home, though she was never worried to do infrequent occurrences. Pt reports neck sx on L side-unable to provide full details or year.    Goals:   Multidisciplinary Problems       SLP Goals          Problem: SLP    Goal Priority Disciplines Outcome   SLP Goal     SLP Ongoing, Progressing   Description: LTG: Pt will tolerate LRD w/o overt s/s of aspiration.    STG:  Pt will tolerate soft and bite sized diet w/ good oral clearance and w/o overt s/s of aspiration.  Pt will participate in trials of regular consistency w/ good oral clearance and w/o overt s/s of aspiration.                       Plan:     Patient to be seen:   (PRN)   Plan of Care expires:  01/13/23  Plan of Care reviewed with:  patient, spouse   SLP Follow-Up:  Yes       Discharge recommendations:  home   Barriers to Discharge:  Level of Skilled Assistance Needed see PT/OT notes    Time Tracking:     SLP Treatment Date:      Speech Start Time:  0855  Speech Stop Time:  0930     Speech Total Time (min):  35 min    Billable Minutes: Eval Swallow and Oral Function 35    Basilia Connolly M.S., CCC-SLP   01/03/2023

## 2023-01-03 NOTE — PLAN OF CARE
Problem: SLP  Goal: SLP Goal  Description: LTG: Pt will tolerate LRD w/o overt s/s of aspiration.    STG:  Pt will tolerate soft and bite sized diet w/ good oral clearance and w/o overt s/s of aspiration.  Pt will participate in trials of regular consistency w/ good oral clearance and w/o overt s/s of aspiration.  1/3/2023 1207 by Basilia Connolly CCC-SLP  Outcome: Ongoing, Progressing

## 2023-01-03 NOTE — PROGRESS NOTES
Pharmacokinetic Initial Assessment: IV Vancomycin    Assessment/Plan:    Initiate intravenous vancomycin with loading dose of 1250 mg once followed by a maintenance dose of vancomycin 1250mg IV every 12 hours  Desired empiric serum trough concentration is 15 to 20 mcg/mL  Draw vancomycin trough level 60 min prior to fourth dose on 01/04 at approximately 1000  Pharmacy will continue to follow and monitor vancomycin.      Please contact pharmacy at extension [phone] with any questions regarding this assessment.     Thank you for the consult,   Alex Loredolly       Patient brief summary:  Balbir Rogers is a 62 y.o. male initiated on antimicrobial therapy with IV Vancomycin for treatment of suspected bacteremia    Drug Allergies:   Review of patient's allergies indicates:   Allergen Reactions    Penicillin Rash       Actual Body Weight:   60.3kg    Renal Function:   Estimated Creatinine Clearance: 122.4 mL/min (A) (based on SCr of 0.52 mg/dL (L)).,     Dialysis Method (if applicable):  N/A    CBC (last 72 hours):  Recent Labs   Lab Result Units 01/01/23  0359 01/02/23  0548   WBC x10(3)/mcL 19.9* 16.4*   Hgb gm/dL 13.1* 11.5*   Hct % 42.5 36.1*   Platelet x10(3)/mcL 314 272   Mono % % 7.8 6.7   Eos % % 0.1 0.0   Basophil % % 0.2 0.1       Metabolic Panel (last 72 hours):  Recent Labs   Lab Result Units 01/01/23  0359 01/01/23  0624 01/02/23  0547   Sodium Level mmol/L 138  --  141   Potassium Level mmol/L 3.6  --  3.4*   Chloride mmol/L 95*  --  101   Carbon Dioxide mmol/L 32*  --  27   Glucose Level mg/dL 145*  --  108   Glucose, UA mg/dL  --  Negative  --    Blood Urea Nitrogen mg/dL 9.5  --  13.6   Creatinine mg/dL 0.64*  --  0.52*   Albumin Level g/dL 2.8*  --  2.6*   Bilirubin Total mg/dL 0.5  --  0.6   Alkaline Phosphatase unit/L 123  --  143   Aspartate Aminotransferase unit/L 12  --  14   Alanine Aminotransferase unit/L 7  --  15   Magnesium Level mg/dL 2.00  --  2.00       Drug levels (last 3 results):  No results  for input(s): VANCOMYCINRA, VANCORANDOM, VANCOMYCINPE, VANCOPEAK, VANCOMYCINTR, VANCOTROUGH in the last 72 hours.    Microbiologic Results:  Microbiology Results (last 7 days)       Procedure Component Value Units Date/Time    Blood culture #1 **CANNOT BE ORDERED STAT** [[id number]]  (Abnormal) Collected: 01/01/23 0439    Order Status: Completed Specimen: Blood Updated: 01/02/23 2147     CULTURE, BLOOD ([facility]) No Growth At 24 Hours     GRAM STAIN Gram Positive Cocci, probable Staphylococcus      Seen in gram stain of broth only      1 of 2 Aerobic bottles positive    Blood culture #2 **CANNOT BE ORDERED STAT** [[id number]]  (Normal) Collected: 01/01/23 0439    Order Status: Completed Specimen: Blood Updated: 01/02/23 1501     CULTURE, BLOOD ([facility]) No Growth At 24 Hours    Respiratory Culture [[id number]]     Order Status: Sent Specimen: Sputum

## 2023-01-03 NOTE — ASSESSMENT & PLAN NOTE
-hypoxemic, hypercapnic  -2/2 suspected aspiration pna and concurrent opiate+benzo use   -qhs bipap refused by patient  -supplemental O2 as required   -pt at baseline O2 requirement at this time  -repeat chest x-ray from 01/03 showed no significant change

## 2023-01-03 NOTE — PT/OT/SLP EVAL
Occupational Therapy   Acute Care Evaluation    Name: Balbir Rogers  MRN: [id number]  Admitting Diagnosis:  Acute hypercapnic respiratory failure      History:   Balbir Rogers is a 62 y.o. male with a medical diagnosis of Acute hypercapnic respiratory failure.    Past Medical History:   Diagnosis Date    Collapse, lung     COPD with hypoxia     Degenerative cervical spinal stenosis     Depression     Disuse osteoporosis     Emphysema/COPD     Hepatitis C     Leukocytosis          Past Surgical History:   Procedure Laterality Date    CERVICAL LAMINECTOMY WITH SPINAL FUSION      CHOLECYSTECTOMY      COLONOSCOPY      ESOPHAGOGASTRODUODENOSCOPY      gastrointestinal biopsy      graft to nose      inguinal herniotomy      mandible operation      POLYPECTOMY         Subjective     Chief Complaint: Pain in back/neck; reports they are not giving him enough pain meds  Patient/Family Comments/goals: Return home    Occupational Profile:  Lives with: wife  Home Environment: SS home   Previous level of function: Independent in ADLs/IADLs; still driving  Equipment Used at Home:  none      Pain/Comfort:  Pain Rating 1: 10/10  Location 1: back  Pain Addressed 1: Pre-medicate for activity  Pain Rating 2: 10/10  Location 2: neck  Pain Addressed 2: Pre-medicate for activity    Blood Pressure:     Objective:     Communicated with: RN prior to session.  Patient found supine with oxygen upon OT entry to room.    General Precautions: Standard, fall   Orthopedic Precautions:N/A   Braces: N/A  Respiratory Status: Nasal cannula, flow 5 L/min; wife reports he is on O2 at home as needed ranging between 3-5L    Occupational Performance:    Mobility  Assist level Comments    Bed mobility supervision    Transfer contact guard    Functional mobility contact guard    Sit to stand transitions supervision    Other:          Activities of Daily Living Assist level Comments    Feeding independent    Grooming/hygiene independent    Bathing contact guard     Upper body dressing independent    Lower body dressing contact guard    Toileting contact guard    Toilet transfer contact guard    Adaptive equipment training     Other:       Physical Exam:  Upper Extremity Range of Motion:     -       Right Upper Extremity: WFL  -       Left Upper Extremity: WFL  Upper Extremity Strength:    -       Right Upper Extremity: WFL  -       Left Upper Extremity: WFL    Cognitive/Visual Perceptual:  Cognitive/Psychosocial Skills:     -       Oriented to: Person, Place, Time, and Situation       Treatment & Education:  Educated to call staff before getting up for bathroom    Patient left supine with call button in reach, bed alarm on, and wife present    Assessment:     He presents with decreased strength and endurance affecting his ADL performance. Chronic pain in neck/back also affect his functional mobility and performance. Performance deficits affecting function: weakness, impaired endurance, decreased upper extremity function, decreased lower extremity function, gait instability, impaired balance, pain.      Rehab Prognosis: Good; patient would benefit from acute skilled OT services to address these deficits and reach maximum level of function.       Plan:     Patient to be seen 5 x/week, 6 x/week to address the above listed problems via self-care/home management, therapeutic activities, therapeutic exercises  Plan of Care Reviewed with: patient, spouse      GOALS:   Multidisciplinary Problems       Occupational Therapy Goals          Problem: Occupational Therapy    Goal Priority Disciplines Outcome Interventions   Occupational Therapy Goal     OT, PT/OT Ongoing, Progressing    Description: Goals to be met by: 1/10/23     Patient will increase functional independence with ADLs by performing:    UE Dressing with Modified [place].  LE Dressing with Supervision.  Toileting from toilet with Supervision for hygiene and clothing management.   Toilet transfer to toilet with  Stand-by Assistance.                           Recommendations:     Discharge Recommendations: home with home health  Discharge Equipment Recommendations:  none      Time Tracking:     OT Date of Treatment: 01/03/23  OT Start Time: 0945  OT Stop Time: 1004  OT Total Time (min): 19 min    Billable Minutes:Evaluation 19 mion    1/3/2023

## 2023-01-04 LAB
ALBUMIN SERPL-MCNC: 2.5 G/DL (ref 3.4–4.8)
ALBUMIN/GLOB SERPL: 0.8 RATIO (ref 1.1–2)
ALP SERPL-CCNC: 162 UNIT/L (ref 40–150)
ALT SERPL-CCNC: 134 UNIT/L (ref 0–55)
AST SERPL-CCNC: 104 UNIT/L (ref 5–34)
BACTERIA BLD CULT: ABNORMAL
BASOPHILS # BLD AUTO: 0.01 X10(3)/MCL (ref 0–0.2)
BASOPHILS NFR BLD AUTO: 0.1 %
BILIRUBIN DIRECT+TOT PNL SERPL-MCNC: 0.7 MG/DL
BUN SERPL-MCNC: 14.2 MG/DL (ref 8.4–25.7)
CALCIUM SERPL-MCNC: 9.2 MG/DL (ref 8.8–10)
CHLORIDE SERPL-SCNC: 97 MMOL/L (ref 98–107)
CO2 SERPL-SCNC: 29 MMOL/L (ref 23–31)
CREAT SERPL-MCNC: 0.64 MG/DL (ref 0.73–1.18)
EOSINOPHIL # BLD AUTO: 0.01 X10(3)/MCL (ref 0–0.9)
EOSINOPHIL NFR BLD AUTO: 0.1 %
ERYTHROCYTE [DISTWIDTH] IN BLOOD BY AUTOMATED COUNT: 14 % (ref 11.6–14.4)
GFR SERPLBLD CREATININE-BSD FMLA CKD-EPI: >90 MLS/MIN/1.73/M2
GLOBULIN SER-MCNC: 3.2 GM/DL (ref 2.4–3.5)
GLUCOSE SERPL-MCNC: 133 MG/DL (ref 82–115)
GRAM STN SPEC: ABNORMAL
HCT VFR BLD AUTO: 37.6 % (ref 42–52)
HGB BLD-MCNC: 11.9 GM/DL (ref 14–18)
LYMPHOCYTES # BLD AUTO: 1.87 X10(3)/MCL (ref 0.6–4.6)
LYMPHOCYTES NFR BLD AUTO: 11.3 %
MAGNESIUM SERPL-MCNC: 2.3 MG/DL (ref 1.6–2.6)
MCH RBC QN AUTO: 31.2 PG
MCHC RBC AUTO-ENTMCNC: 31.6 MG/DL (ref 33–36)
MCV RBC AUTO: 98.4 FL (ref 80–94)
MONOCYTES # BLD AUTO: 1.44 X10(3)/MCL (ref 0.1–1.3)
MONOCYTES NFR BLD AUTO: 8.7 %
NEUTROPHILS # BLD AUTO: 13.12 X10(3)/MCL (ref 2.1–9.2)
NEUTROPHILS NFR BLD AUTO: 78.8 %
PLATELET # BLD AUTO: 337 X10(3)/MCL (ref 140–371)
PMV BLD AUTO: 10.3 FL (ref 9.4–12.4)
POTASSIUM SERPL-SCNC: 4.3 MMOL/L (ref 3.5–5.1)
PROT SERPL-MCNC: 5.7 GM/DL (ref 5.8–7.6)
RBC # BLD AUTO: 3.82 X10(6)/MCL (ref 4.7–6.1)
SODIUM SERPL-SCNC: 136 MMOL/L (ref 136–145)
VANCOMYCIN TROUGH SERPL-MCNC: 13 UG/ML (ref 15–20)
WBC # SPEC AUTO: 16.6 X10(3)/MCL (ref 4.5–11.5)

## 2023-01-04 PROCEDURE — 63600175 PHARM REV CODE 636 W HCPCS: Performed by: STUDENT IN AN ORGANIZED HEALTH CARE EDUCATION/TRAINING PROGRAM

## 2023-01-04 PROCEDURE — 80202 ASSAY OF VANCOMYCIN: CPT | Performed by: FAMILY MEDICINE

## 2023-01-04 PROCEDURE — 25000003 PHARM REV CODE 250

## 2023-01-04 PROCEDURE — C1751 CATH, INF, PER/CENT/MIDLINE: HCPCS

## 2023-01-04 PROCEDURE — 83735 ASSAY OF MAGNESIUM: CPT | Performed by: STUDENT IN AN ORGANIZED HEALTH CARE EDUCATION/TRAINING PROGRAM

## 2023-01-04 PROCEDURE — 36415 COLL VENOUS BLD VENIPUNCTURE: CPT | Performed by: FAMILY MEDICINE

## 2023-01-04 PROCEDURE — 97535 SELF CARE MNGMENT TRAINING: CPT

## 2023-01-04 PROCEDURE — 27000221 HC OXYGEN, UP TO 24 HOURS

## 2023-01-04 PROCEDURE — 94760 N-INVAS EAR/PLS OXIMETRY 1: CPT

## 2023-01-04 PROCEDURE — 94640 AIRWAY INHALATION TREATMENT: CPT

## 2023-01-04 PROCEDURE — 80053 COMPREHEN METABOLIC PANEL: CPT | Performed by: STUDENT IN AN ORGANIZED HEALTH CARE EDUCATION/TRAINING PROGRAM

## 2023-01-04 PROCEDURE — A4216 STERILE WATER/SALINE, 10 ML: HCPCS

## 2023-01-04 PROCEDURE — 85025 COMPLETE CBC W/AUTO DIFF WBC: CPT | Performed by: STUDENT IN AN ORGANIZED HEALTH CARE EDUCATION/TRAINING PROGRAM

## 2023-01-04 PROCEDURE — 25000003 PHARM REV CODE 250: Performed by: STUDENT IN AN ORGANIZED HEALTH CARE EDUCATION/TRAINING PROGRAM

## 2023-01-04 PROCEDURE — 25000003 PHARM REV CODE 250: Performed by: FAMILY MEDICINE

## 2023-01-04 PROCEDURE — 11000001 HC ACUTE MED/SURG PRIVATE ROOM

## 2023-01-04 PROCEDURE — 36415 COLL VENOUS BLD VENIPUNCTURE: CPT | Performed by: STUDENT IN AN ORGANIZED HEALTH CARE EDUCATION/TRAINING PROGRAM

## 2023-01-04 PROCEDURE — 25000242 PHARM REV CODE 250 ALT 637 W/ HCPCS: Performed by: STUDENT IN AN ORGANIZED HEALTH CARE EDUCATION/TRAINING PROGRAM

## 2023-01-04 PROCEDURE — 36410 VNPNXR 3YR/> PHY/QHP DX/THER: CPT

## 2023-01-04 PROCEDURE — 27000190 HC CPAP FULL FACE MASK W/VALVE

## 2023-01-04 PROCEDURE — 94660 CPAP INITIATION&MGMT: CPT

## 2023-01-04 PROCEDURE — 97110 THERAPEUTIC EXERCISES: CPT

## 2023-01-04 RX ORDER — ALPRAZOLAM 0.25 MG/1
0.25 TABLET ORAL ONCE
Status: COMPLETED | OUTPATIENT
Start: 2023-01-04 | End: 2023-01-04

## 2023-01-04 RX ORDER — SODIUM CHLORIDE 0.9 % (FLUSH) 0.9 %
10 SYRINGE (ML) INJECTION EVERY 6 HOURS
Status: DISCONTINUED | OUTPATIENT
Start: 2023-01-04 | End: 2023-01-07 | Stop reason: HOSPADM

## 2023-01-04 RX ORDER — ALPRAZOLAM 0.25 MG/1
0.25 TABLET ORAL 3 TIMES DAILY PRN
Status: DISCONTINUED | OUTPATIENT
Start: 2023-01-04 | End: 2023-01-07 | Stop reason: HOSPADM

## 2023-01-04 RX ORDER — SODIUM CHLORIDE 0.9 % (FLUSH) 0.9 %
10 SYRINGE (ML) INJECTION
Status: DISCONTINUED | OUTPATIENT
Start: 2023-01-04 | End: 2023-01-07 | Stop reason: HOSPADM

## 2023-01-04 RX ADMIN — IPRATROPIUM BROMIDE AND ALBUTEROL SULFATE 3 ML: .5; 3 SOLUTION RESPIRATORY (INHALATION) at 01:01

## 2023-01-04 RX ADMIN — CEFEPIME 2 G: 2 INJECTION, POWDER, FOR SOLUTION INTRAVENOUS at 01:01

## 2023-01-04 RX ADMIN — FLUTICASONE FUROATE 1 PUFF: 100 POWDER RESPIRATORY (INHALATION) at 08:01

## 2023-01-04 RX ADMIN — CEFEPIME 2 G: 2 INJECTION, POWDER, FOR SOLUTION INTRAVENOUS at 08:01

## 2023-01-04 RX ADMIN — VANCOMYCIN HYDROCHLORIDE 1250 MG: 1.25 INJECTION, POWDER, LYOPHILIZED, FOR SOLUTION INTRAVENOUS at 05:01

## 2023-01-04 RX ADMIN — SODIUM CHLORIDE, PRESERVATIVE FREE 10 ML: 5 INJECTION INTRAVENOUS at 05:01

## 2023-01-04 RX ADMIN — CIPROFLOXACIN 400 MG: 2 INJECTION, SOLUTION INTRAVENOUS at 01:01

## 2023-01-04 RX ADMIN — IPRATROPIUM BROMIDE AND ALBUTEROL SULFATE 3 ML: .5; 3 SOLUTION RESPIRATORY (INHALATION) at 08:01

## 2023-01-04 RX ADMIN — ENOXAPARIN SODIUM 40 MG: 40 INJECTION SUBCUTANEOUS at 04:01

## 2023-01-04 RX ADMIN — OXYCODONE HYDROCHLORIDE 10 MG: 10 TABLET, FILM COATED, EXTENDED RELEASE ORAL at 08:01

## 2023-01-04 RX ADMIN — CEFEPIME 2 G: 2 INJECTION, POWDER, FOR SOLUTION INTRAVENOUS at 04:01

## 2023-01-04 RX ADMIN — SERTRALINE HYDROCHLORIDE 50 MG: 50 TABLET, FILM COATED ORAL at 08:01

## 2023-01-04 RX ADMIN — CIPROFLOXACIN 400 MG: 2 INJECTION, SOLUTION INTRAVENOUS at 09:01

## 2023-01-04 RX ADMIN — MELATONIN TAB 3 MG 9 MG: 3 TAB at 10:01

## 2023-01-04 RX ADMIN — ALPRAZOLAM 0.25 MG: 0.25 TABLET ORAL at 10:01

## 2023-01-04 RX ADMIN — CIPROFLOXACIN 400 MG: 2 INJECTION, SOLUTION INTRAVENOUS at 06:01

## 2023-01-04 RX ADMIN — ALPRAZOLAM 0.25 MG: 0.25 TABLET ORAL at 03:01

## 2023-01-04 RX ADMIN — IPRATROPIUM BROMIDE AND ALBUTEROL SULFATE 3 ML: .5; 3 SOLUTION RESPIRATORY (INHALATION) at 07:01

## 2023-01-04 NOTE — PLAN OF CARE
Problem: Occupational Therapy  Goal: Occupational Therapy Goal  Description: Goals to be met by: 1/10/23     Patient will increase functional independence with ADLs by performing:    UE Dressing with Modified [place].  LE Dressing with Supervision.  Toileting from toilet with Supervision for hygiene and clothing management.   Toilet transfer to toilet with Stand-by Assistance.    Outcome: Ongoing, Progressing - Pt will be seen QD starting 1/5/23 per Pt request.

## 2023-01-04 NOTE — PLAN OF CARE
Problem: Adult Inpatient Plan of Care  Goal: Plan of Care Review  Outcome: Ongoing, Progressing  Flowsheets (Taken 1/4/2023 1336)  Plan of Care Reviewed With:   patient   spouse  Goal: Patient-Specific Goal (Individualized)  Outcome: Ongoing, Progressing  Flowsheets (Taken 1/4/2023 1336)  Anxieties, Fears or Concerns: not being able to breathe  Individualized Care Needs: improve gas exchange  Goal: Absence of Hospital-Acquired Illness or Injury  Outcome: Ongoing, Progressing  Intervention: Identify and Manage Fall Risk  Flowsheets (Taken 1/4/2023 1336)  Safety Promotion/Fall Prevention:   assistive device/personal item within reach   bed alarm set   family to remain at bedside   nonskid shoes/socks when out of bed   instructed to call staff for mobility  Intervention: Prevent Skin Injury  Flowsheets (Taken 1/4/2023 1336)  Body Position:   sitting up in bed   position changed independently  Skin Protection:   adhesive use limited   incontinence pads utilized   transparent dressing maintained  Intervention: Prevent and Manage VTE (Venous Thromboembolism) Risk  Flowsheets (Taken 1/4/2023 1336)  Activity Management: Rolling - L1  VTE Prevention/Management:   bleeding risk assessed   bleeding precations maintained   ambulation promoted   fluids promoted  Range of Motion: active ROM (range of motion) encouraged  Intervention: Prevent Infection  Flowsheets (Taken 1/4/2023 1336)  Infection Prevention:   equipment surfaces disinfected   hand hygiene promoted  Goal: Optimal Comfort and Wellbeing  Outcome: Ongoing, Progressing  Intervention: Monitor Pain and Promote Comfort  Flowsheets (Taken 1/4/2023 1336)  Pain Management Interventions: care clustered  Intervention: Provide Person-Centered Care  Flowsheets (Taken 1/4/2023 1336)  Trust Relationship/Rapport:   care explained   choices provided   reassurance provided   questions answered  Goal: Readiness for Transition of Care  Outcome: Ongoing, Progressing  Intervention:  Mutually Develop Transition Plan  Flowsheets (Taken 1/4/2023 1336)  Communicated JACY with patient/caregiver: Date not available/Unable to determine  Do you expect to return to your current living situation?: Yes  Do you have help at home or someone to help you manage your care at home?: Yes  Readmission within 30 days?: No  Do you currently have service(s) that help you manage your care at home?: No     Problem: Fluid Imbalance (Pneumonia)  Goal: Fluid Balance  Outcome: Ongoing, Progressing  Intervention: Monitor and Manage Fluid Balance  Flowsheets (Taken 1/4/2023 1336)  Fluid/Electrolyte Management: fluids provided     Problem: Infection (Pneumonia)  Goal: Resolution of Infection Signs and Symptoms  Outcome: Ongoing, Progressing  Intervention: Prevent Infection Progression  Flowsheets (Taken 1/4/2023 1336)  Fever Reduction/Comfort Measures:   lightweight bedding   lightweight clothing  Infection Management: aseptic technique maintained     Problem: Respiratory Compromise (Pneumonia)  Goal: Effective Oxygenation and Ventilation  Outcome: Ongoing, Progressing  Intervention: Promote Airway Secretion Clearance  Flowsheets (Taken 1/4/2023 1336)  Cough And Deep Breathing: done with encouragement  Intervention: Optimize Oxygenation and Ventilation  Flowsheets (Taken 1/4/2023 1336)  Airway/Ventilation Management:   calming measures promoted   airway patency maintained  Head of Bed (HOB) Positioning: HOB at 30-45 degrees     Problem: Infection  Goal: Absence of Infection Signs and Symptoms  Outcome: Ongoing, Progressing  Intervention: Prevent or Manage Infection  Flowsheets (Taken 1/4/2023 1336)  Fever Reduction/Comfort Measures:   lightweight bedding   lightweight clothing  Infection Management: aseptic technique maintained

## 2023-01-04 NOTE — PROGRESS NOTES
Hospital Medicine  Progress Note    Patient Name: Balbir Rogers  MRN: [id number]  Status: IP- Inpatient   Admission Date: 1/1/2023  Length of Stay: 1      CC: hospital follow-up for        SUBJECTIVE   63 yo male with PMH of emphysema/COPD (on 5L home O2 and also with at home NIPPV), anxiety, chronic opioid dependence, medical marijuana use who presents with complaint of hypoxemia at home. Pt somnolent, poorly arousable during evaluation thus history obtained from wife at bedside. She reports pt has been ill since Thanksgiving when he was treated for PNA and subsequent thrush. She affirms pt has had a broken NIPPV at home and has had difficulty obtaining a new one secondary to insurance issues. She also says pt has not used supplemental O2 for 2 weeks and reportedly was saturating >90% without home O2. Pt with increasing lethargy at home however and found by wife with an O2 sat of 44% thus she brought him in. She reports she administers his pain medications (oxycodone 30mg qid +buprenorphine patch) and there is no way he could have overdosed.      In the ED pt with acute on chronic hypoxemic hypercapnic respiratory failure. Serial ABGs as follows: (7.19/85.4/97 on 12/6, rr 16, 40%; 7.29/69.6/65 on bipap at 20/6, rr16, 36%; 7.32/59/82 on 20/6, rr23, 35%). Pt also received narcan and reportedly had marked improvement in mental status. Wife at bedside reports DNR/DNI status.     At baseline, pt lives with his wife and is essential bed bound 2/2 pain and deconditioning. PCP Dr. Emery; Pulmonologist Dr. Vela.        Overview/Hospital Course:  Patient admitted for acute on chronic hypoxemic, hypercapnic respiratory failure secondary to concurrent opiate, benzo use and aspiration pneumonia. Evaluated by ST on 1/3, ok to resume soft/bite sized diet. Patient's pain currently being addressed with oxycontin 10mg BID, dose reduced from his baseline 2/2 hypotension. Cannot discontinue as pain becomes so poorly controlled pt  becomes tachypneic. Hold buprenorphine and benzos at this time. ABG showed significant improvement after BiPAP was in place for 24 hours. Pt strongly advised to utilized qhs bipap, he has refused it here. Maintain on supplemental O2. On 1/2 blood cultures resulted Staph, vancomycin was started 1/2, suspect this is reason for continued elevated WBC. Pt also remains on cefepime and cipro for CAP. TTE to be done today, if negative pt would benefit from discussion regarding transfer for FR[facility], his poor respiratory status may prevent eligibility for FR[facility]CO.         Interval History:  Essentially bed-bound at baseline.  Physical therapy was unable to evaluate him given desaturation to 88 percent on 4 liters OxyMask.    01/04/2023 feels a little better today, PT able to get into chair  Got some ativan around 2-3 AM and slept better since then and had panic episode this AM    Echo EF 30%  O2 stable on 4 L today    Today: Patient seen and examined at bedside, and chart reviewed.       MEDICATIONS   Scheduled   albuterol-ipratropium  3 mL Nebulization Q6H WAKE    ceFEPime (MAXIPIME) IVPB  2 g Intravenous Q8H    ciprofloxacin  400 mg Intravenous Q8H    enoxaparin  40 mg Subcutaneous Daily    fluticasone furoate  1 puff Inhalation Daily    oxyCODONE  10 mg Oral Q12H    sertraline  50 mg Oral Daily    umeclidinium-vilanteroL  1 puff Inhalation Daily    vancomycin (VANCOCIN) IVPB  1,250 mg Intravenous Q12H     Continuous Infusions   lactated ringers 100 mL/hr at 01/03/23 0424         PHYSICAL EXAM   VITALS: T 97.7 °F (36.5 °C)   /71   P 91   RR 18   O2 96 %    GENERAL: Awake and in NAD  LUNGS: CTA B/L  CVS: Normal rate  GI/: Soft, NT, bowel sounds positive.  EXTREMITIES: No peripheral edema  NEURO: AAOx3  PSYCH: Cooperative      LABS   CBC  Recent Labs     01/04/23  0330   WBC 16.6*   HGB 11.9*   HCT 37.6*         CHEM  Recent Labs     01/04/23  0330      K 4.3   MG 2.30   CO2 29   BUN 14.2   CREATININE 0.64*    CALCIUM 9.2   BILITOT 0.7   *   *   ALKPHOS 162*   ALBUMIN 2.5*          MICROBIOLOGY     Microbiology Results (last 7 days)       Procedure Component Value Units Date/Time    Blood culture #1 **CANNOT BE ORDERED STAT** [[id number]]  (Abnormal) Collected: 01/01/23 0439    Order Status: Completed Specimen: Blood Updated: 01/04/23 0646     CULTURE, BLOOD ([facility]) MICROCOCCUS SPECIES     Comment: This organism is commonly considered a contaminant.  No further processing will be done.        GRAM STAIN Gram Positive Cocci, probable Staphylococcus      Seen in gram stain of broth only      1 of 2 Aerobic bottles positive    Blood culture #2 **CANNOT BE ORDERED STAT** [[id number]]  (Normal) Collected: 01/01/23 0439    Order Status: Completed Specimen: Blood Updated: 01/03/23 1500     CULTURE, BLOOD ([facility]) No Growth At 48 Hours    Blood Culture [[id number]] Collected: 01/03/23 1100    Order Status: Resulted Specimen: Blood Updated: 01/03/23 1106    Blood Culture [[id number]] Collected: 01/03/23 1100    Order Status: Resulted Specimen: Blood Updated: 01/03/23 1106    Respiratory Culture [[id number]]     Order Status: Sent Specimen: Sputum               DIAGNOSTICS   Echo  · Technically difficult study, No Definity contrast is used in this study.  · The left ventricle is moderately enlarged with severely decreased   systolic function.  · Indeterminate left ventricular diastolic function.  · The estimated ejection fraction is 30%.  · Mild right ventricular enlargement with mildly to moderately reduced   right ventricular systolic function.  · Mild mitral regurgitation.  · Trivial pericardial effusion.  · Mild tricuspid regurgitation.  · Mild left atrial enlargement.     X-Ray Chest 1 View  Narrative: EXAMINATION:  XR CHEST 1 VIEW    CPT 40788    CLINICAL HISTORY:  copd;    COMPARISON:  January 1, 2023    FINDINGS:  Examination reveals cardiomediastinal silhouette and pleuroparenchymal changes to be essentially  "unchanged as compared with the previous exam.    There is increased interstitial markings throughout with no definite focal consolidative changes.    Prominent left hilum with some fibrotic streaks in the left perihilar region.    Hyperinflated lungs with blunting of the costophrenic angles small effusions cannot be completely excluded  Impression: No significant change as compared with the previous exam    Electronically signed by: Vincent Yoder  Date:    01/03/2023  Time:    08:57        ASSESSMENT   Acute hypercapnic respiratory failure  -hypoxemic, hypercapnic  -2/2 suspected aspiration pna and concurrent opiate+benzo use   -qhs bipap refused by patient  -supplemental O2 as required   -pt at baseline O2 requirement at this time  -repeat chest x-ray from 01/03 showed no significant change        Bacteremia  -vancomycin started 1/2  -repeat blood cultures pending  -TTE pending   -FRANCISCO?        Pneumonia of left upper lobe due to infectious organism  -wife affirms brown sputum production--> pseudomonal infection?  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  -wife affirms significant weight loss lately  -smita count        Aspiration pneumonia  -reportedly pt has been experiencing difficulty swallowing since being diagnosed with thrush around Thanksgiving  -no evidence of thrush on exam 1/2  -ST cleared for diet         Myoclonus  -wife reports pt saw neurology 2/2 "tremor"  -was prescribed " lorazepam for this        CHF systolic dysfunction  EF 30%    Elevated LFT    PLAN   Consult cardiology  Cont IV Abx  Reorder vancomycin trough today wbc trending down  Add sleep aids  Get morning labs  F/u cardio rec's    Prophylaxis: lovenox         Jay Schneider MD  [facility]

## 2023-01-04 NOTE — PT/OT/SLP PROGRESS
Name: Balbir Rogers    : 1960 (62 y.o.)  MRN: [id number]                                                                                      SLP Diet Follow-Up    Diet follow-up completed this date following upgrade to soft and bite sized/thin liquids. Pt and nursing staff report good toleration and no s/s of aspiration observed. Of note, pt w/ poor intake and stating various foods and chocolate Boost taste different. Continued SLP services not warranted as pt is tolerating LRD without overt s/s of aspiration. Please re-consult SLP if status changes.

## 2023-01-04 NOTE — PLAN OF CARE
Problem: Adult Inpatient Plan of Care  Goal: Plan of Care Review  Outcome: Ongoing, Progressing  Flowsheets (Taken 1/3/2023   Plan of Care Reviewed With:   patient   spouse  Goal: Patient-Specific Goal (Individualized)  Outcome: Ongoing, Progressing  Flowsheets (Taken 1/3/2023   Anxieties, Fears or Concerns: not being able to breathe  Individualized Care Needs: improve gas exchange  Goal: Absence of Hospital-Acquired Illness or Injury  Outcome: Ongoing, Progressing  Intervention: Identify and Manage Fall Risk  Flowsheets (Taken 1/3/2023   Safety Promotion/Fall Prevention:   assistive device/personal item within reach   bed alarm set   family to remain at bedside   nonskid shoes/socks when out of bed   side rails raised x 3   instructed to call staff for mobility  Intervention: Prevent Skin Injury  Flowsheets (Taken 1/3/2023   Body Position: position changed independently  Skin Protection:   adhesive use limited   incontinence pads utilized   transparent dressing maintained  Intervention: Prevent and Manage VTE (Venous Thromboembolism) Risk  Flowsheets (Taken 1/3/2023   Activity Management: Rolling - L1  VTE Prevention/Management:   bleeding risk assessed   bleeding precations maintained   fluids promoted   intravenous hydration  Range of Motion: active ROM (range of motion) encouraged  Intervention: Prevent Infection  Flowsheets (Taken 1/3/2023   Infection Prevention:   hand hygiene promoted   equipment surfaces disinfected  Goal: Optimal Comfort and Wellbeing  Outcome: Ongoing, Progressing  Intervention: Monitor Pain and Promote Comfort  Flowsheets (Taken 1/3/2023   Pain Management Interventions:   care clustered   pillow support provided   position adjusted  Intervention: Provide Person-Centered Care  Flowsheets (Taken 1/3/2023   Trust Relationship/Rapport:   care explained   choices provided   questions encouraged   questions answered  Goal: Readiness for Transition of Care  Outcome: Ongoing,  Progressing  Intervention: Mutually Develop Transition Plan  Flowsheets (Taken 1/3/2023   Communicated JACY with patient/caregiver: Date not available/Unable to determine  Do you expect to return to your current living situation?: Yes  Do you have help at home or someone to help you manage your care at home?: Yes  Readmission within 30 days?: No  Do you currently have service(s) that help you manage your care at home?: No     Problem: Fluid Imbalance (Pneumonia)  Goal: Fluid Balance  Outcome: Ongoing, Progressing  Intervention: Monitor and Manage Fluid Balance  Flowsheets (Taken 1/3/2023   Fluid/Electrolyte Management: fluids provided     Problem: Infection (Pneumonia)  Goal: Resolution of Infection Signs and Symptoms  Outcome: Ongoing, Progressing  Intervention: Prevent Infection Progression  Flowsheets (Taken 1/3/2023   Fever Reduction/Comfort Measures:   lightweight bedding   lightweight clothing  Infection Management: aseptic technique maintained  Isolation Precautions: precautions discontinued     Problem: Respiratory Compromise (Pneumonia)  Goal: Effective Oxygenation and Ventilation  Outcome: Ongoing, Progressing  Intervention: Promote Airway Secretion Clearance  Flowsheets (Taken 1/3/2023   Cough And Deep Breathing: done with encouragement  Intervention: Optimize Oxygenation and Ventilation  Flowsheets (Taken 1/3/2023   Airway/Ventilation Management:   airway patency maintained   calming measures promoted  Head of Bed (HOB) Positioning: HOB at 30-45 degrees     Problem: Infection  Goal: Absence of Infection Signs and Symptoms  Outcome: Ongoing, Progressing  Intervention: Prevent or Manage Infection  Flowsheets (Taken 1/3/2023   Fever Reduction/Comfort Measures:   lightweight bedding   lightweight clothing  Infection Management: aseptic technique maintained  Isolation Precautions: precautions discontinued

## 2023-01-04 NOTE — PROGRESS NOTES
Name: Balbir Rogers    : 1960 (62 y.o.)  MRN: [id number]           Patient Unavailable      Patient unable to be seen at this time secondary to: Pt wants to rest for remainder of the day following PT session. OT/PT discussed dropping frequency to 1x/day and Pt agreed. Will change POC to QD.

## 2023-01-04 NOTE — PROGRESS NOTES
Inpatient Nutrition Assessment    Admit Date: 1/1/2023   Total duration of encounter: 3 days     Nutrition Recommendation/Prescription     Continue soft and bite sized. 2. Continue boost plus BID or per patient's preference. 3. Continue calorie count as ordered. 4. Discussed appetite stimulant and nutrition support (PPN) with MD.     Communication of Recommendations: reviewed with physician    Nutrition Assessment     Malnutrition Assessment/Nutrition-Focused Physical Exam    Malnutrition in the context of chronic illness  Degree of Malnutrition: severe malnutrition  Energy Intake: </= 75% of estimated energy requirement for >/= 1 month  Interpretation of Weight Loss: 14% in 1 yr  Body Fat: severe depletion  Area of Body Fat Loss: upper arm region - triceps / biceps  Muscle Mass Loss: severe depletion  Area of Muscle Mass Loss: clavicle and acromion bone region - deltoid muscle  Fluid Accumulation: severe  Edema: unable to obtain  Reduced  Strength: unable to obtain  A minimum of two characteristics is recommended for diagnosis of either severe or non-severe malnutrition.    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      COPD exacerbation 1/1/2023      Pneumonia of left upper lobe due to infectious organism 1/1/2023      Degenerative cervical spinal stenosis Unknown      Chronic, continuous use of opioids Unknown      Pulmonary cachexia due to COPD 1/1/2023      Aspiration pneumonia 1/1/2023      Bacteremia      Relevant Medical History: Collapse, lung  Date Unknown  COPD with hypoxia  Date Unknown  Degenerative cervical spinal stenosis  Date Unknown  Depression  Date Unknown  Disuse  "osteoporosis  Date Unknown  Emphysema/COPD  Date Unknown  Hepatitis C  Date Unknown  Leukocytosis    Nutrition-Related Medications: cefepime,ciprofloxacin, magnesium sulfate, vancomycin  Calorie Containing IV Medications: no significant kcals from medications at this time    Nutrition-Related Labs:   Latest Reference Range & Units 01/04/23 03:30   Sodium 136 - 145 mmol/L 136   Potassium 3.5 - 5.1 mmol/L 4.3   Chloride 98 - 107 mmol/L 97 (L)   CO2 23 - 31 mmol/L 29   BUN 8.4 - 25.7 mg/dL 14.2   Creatinine 0.73 - 1.18 mg/dL 0.64 (L)   eGFR mls/min/1.73/m2 >90   Glucose 82 - 115 mg/dL 133 (H)   Calcium 8.8 - 10.0 mg/dL 9.2   Magnesium 1.60 - 2.60 mg/dL 2.30   Alkaline Phosphatase 40 - 150 unit/L 162 (H)   PROTEIN TOTAL 5.8 - 7.6 gm/dL 5.7 (L)   Albumin 3.4 - 4.8 g/dL 2.5 (L)   Albumin/Globulin Ratio 1.1 - 2.0 ratio 0.8 (L)   BILIRUBIN TOTAL <=1.5 mg/dL 0.7   AST 5 - 34 unit/L 104 (H)   ALT 0 - 55 unit/L 134 (H)   Globulin, Total 2.4 - 3.5 gm/dL 3.2   (L): Data is abnormally low  (H): Data is abnormally high      Diet/PN Order: Diet Soft & Bite Sized  Oral Supplement Order: Boost Plus  Tube Feeding Order: none  Appetite/Oral Intake: fair  Factors Affecting Nutritional Intake: decreased appetite  Food/Advent/Cultural Preferences:  like chocolate boost  Food Allergies: none reported       Wound(s):       Comments    Pt tolerating soft and bite sized diet. Pt getting majority of nutrition from boost, reviewed calorie count on 1/3/23. Calorie count on 1/3/23 provides 863.5 kcal and 38 gm of protein, meeting 47% kcal needs and 53% protein needs. Reviewed with MD. MD may start patient on appetite stimulant and nutrition support (PPN).  Answer questions with family.     Anthropometrics    Height: 5' 10" (177.8 cm) Height Method: Stated  Last Weight: 60.3 kg (132 lb 15 oz) (01/02/23 0500) Weight Method: Bed Scale  BMI (Calculated): 19.1  BMI Classification: normal (BMI 18.5-24.9)        Ideal Body Weight (IBW), Male: 166 " lb     % Ideal Body Weight, Male (lb): 80.08 %      UBW: 70 kg.      Usual Weight Provided By: patient and family/caregiver    Wt Readings from Last 5 Encounters:   01/02/23 60.3 kg (132 lb 15 oz)   08/04/22 61.7 kg (136 lb)   07/27/22 63.5 kg (140 lb)   07/26/22 63.5 kg (140 lb)   07/20/21 67.1 kg (147 lb 15.9 oz)     Weight Change(s) Since Admission:  Admit Weight: 57.2 kg (126 lb 1.7 oz) (01/01/23 0426)      Estimated Needs        Kcal Needs: 1809 kcal (30 kcal/kg/CBW)  Protein Needs: 72 gm (1.2 gm/kg/CBW)  Fluid Needs: 1809 mL (1 mL/kcal)              Temp: 97.7 °F (36.5 °C)       Enteral Nutrition    Patient not receiving enteral nutrition at this time.    Parenteral Nutrition    Patient not receiving parenteral nutrition support at this time.    Evaluation of Received Nutrient Intake    Calories: not meeting estimated needs  Protein: not meeting estimated needs    Patient Education    Not applicable.    Nutrition Diagnosis     PES: Inadequate oral intake related to appetite as evidenced by 14% wt change in 1 yr. Pt and family reports 22# (10kg) wt loss. . (continues)    Interventions/Goals     Intervention(s): general/healthful diet, modified composition of meals/snacks, commercial food, and collaboration with other providers  Goal: Consume % of meals/snacks by follow-up. (goal progressing)    Monitoring & Evaluation     Dietitian will monitor food and beverage intake, glucose/endocrine profile, and gastrointestinal profile.  Nutrition Risk/Follow-Up: low (follow-up in 5-7 days)   Please consult if re-assessment needed sooner.

## 2023-01-04 NOTE — PROCEDURES
"Balbir Rogers is a 62 y.o. male patient.    Temp: 98.2 °F (36.8 °C) (01/04/23 1100)  Pulse: 95 (01/04/23 1346)  Resp: 20 (01/04/23 1346)  BP: 116/71 (01/04/23 1100)  SpO2: (!) 94 % (01/04/23 1346)  Weight: 60.3 kg (132 lb 15 oz) (01/02/23 0500)  Height: 5' 10" (177.8 cm) (01/01/23 1650)    PICC  Date/Time: 1/4/2023 3:46 PM  Performed by: Elisabeth Read RN  Consent Done: Yes  Time out: Immediately prior to procedure a time out was called to verify the correct patient, procedure, equipment, support staff and site/side marked as required  Indications: med administration  Anesthesia: local infiltration  Local anesthetic: lidocaine 1% without epinephrine  Anesthetic Total (mL): 2  Preparation: skin prepped with ChloraPrep  Skin prep agent dried: skin prep agent completely dried prior to procedure  Sterile barriers: all five maximum sterile barriers used - cap, mask, sterile gown, sterile gloves, and large sterile sheet  Hand hygiene: hand hygiene performed prior to central venous catheter insertion  Location details: left basilic  Catheter type: single lumen  Catheter size: 4 Fr  Catheter Length: 17cm    Ultrasound guidance: yes  Vessel Caliber: medium and patent, compressibility normal  Needle advanced into vessel with real time Ultrasound guidance.  Guidewire confirmed in vessel.  Sterile sheath used.  Number of attempts: 1  Post-procedure: blood return through all ports, chlorhexidine patch and sterile dressing applied    Assessment: successful placement        Name Elisabeth Read RN  1/4/2023    "

## 2023-01-04 NOTE — PT/OT/SLP PROGRESS
Occupational Therapy  Treatment    Name: Balbir Rogers    : 1960 (62 y.o.)  MRN: [id number]           TREATMENT SUMMARY AND RECOMMENDATIONS:      OT Date of Treatment: 23  OT Start Time: 845  OT Stop Time: 911  OT Total Time (min): 26 min      Subjective Assessment:   No complaints  Lethargic    Awake, alert, cooperative  Impulsive    Uncooperative   Flat affect    Agitated x c/o pain    Appropriate x c/o fatigue    Confused x Treated at bedside     Emotionally labile  Treated in gym/dept.      Other:        Therapy Precautions:    Cognitive deficits  Spinal precautions    Collar - hard  Sternal precautions    Collar - soft   TLSO   x Fall risk  LSO    Hip precautions - posterior  Knee immobilizer    Hip precautions - anterior  WBAT    Impaired communication  Partial weightbearing   x Oxygen  TTWB    PEG tube  NWB    Visual deficits      Hearing deficits   Other:        Treatment Objectives:     Mobility Training:    Mobility task Assist level Comments    Bed mobility SBA D/t multiple lines   Transfer CGA EOB taking 4 steps to recliner chair handheld   Sit to stands transitions SBA    Functional mobility     Sitting balance     Standing balance  CGA While standing to urinate at urinal   Other:        ADL Training:    ADL Assist level Comments    Feeding     Grooming/hygiene     Bathing     Upper body dressing     Lower body dressing SBA Seated EOB to don socks and pants   Toileting CGA Standing balance EOB with urinal   Toilet transfer     Adaptive equipment training     Other:           Therapeutic Exercise:   Exercise Sets Reps Comments   O/h presses AROM 2 10 Tactile cues to achieve full available shoulder flxn   Alternating UE punches 1 10 Seated at recliner                     Assessment: Patient tolerated session fair, O2 monitored throughout (96% initially in supine with HOB elevated, 94% after tf to chair (BP assessed seated at recliner at 113/69), -105 throughout.  Pt required  encouragement to get OOB and to participate in limited exercises once seated at recliner, also required encouragement to complete ADLs independently d/t requesting assist from wife or therapist but with cues to attempt unassisted, pt demonstrated ability to use urinal in standing and to don socks and pants with SBA/CGA.    OT Plan: Cont OT per POC  Revisions made to plan of care: No    GOALS:   Multidisciplinary Problems       Occupational Therapy Goals          Problem: Occupational Therapy    Goal Priority Disciplines Outcome Interventions   Occupational Therapy Goal     OT, PT/OT Ongoing, Progressing    Description: Goals to be met by: 1/10/23     Patient will increase functional independence with ADLs by performing:    UE Dressing with Modified [place].  LE Dressing with Supervision.  Toileting from toilet with Supervision for hygiene and clothing management.   Toilet transfer to toilet with Stand-by Assistance.                         Skilled OT Minutes Provided: 26  Communication with Treatment Team:     Equipment recommendations:       At end of treatment, patient remained:  x Up in chair     Up in wheelchair in room    In bed    With alarm activated    Bed rails up   x Call bell in reach    x Family/friends present    Restraints secured properly    In bathroom with CNA/RN notified    In gym with PT/PTA/tech    Nurse aware   x Other: pt and family educated to call for assist back to bed

## 2023-01-04 NOTE — PROGRESS NOTES
Pharmacokinetic Assessment Follow Up: IV Vancomycin    Vancomycin serum concentration assessment(s):    The trough level was drawn correctly and can be used to guide therapy at this time. The measurement is within the desired definitive target range of 10 to 20 mcg/mL.    Vancomycin Regimen Plan:    Continue regimen to Vancomycin 1,250 mg IV every 12 hours with next serum trough concentration measured at 60 minutes prior to fifth dose on 1/6    Drug levels (last 3 results):  Recent Labs   Lab Result Units 01/03/23  1100 01/04/23  1137   Vancomycin Trough ug/ml 7.7* 13.0*       Pharmacy will continue to follow and monitor vancomycin.    Please contact pharmacy at extension [phone] for questions regarding this assessment.    Thank you for the consult,   Ramy Romo       Patient brief summary:  Balbir Rogers is a 62 y.o. male initiated on antimicrobial therapy with IV Vancomycin for treatment of bacteremia    The patient's current regimen is vancomycin 1,250 mg q12h    Drug Allergies:   Review of patient's allergies indicates:   Allergen Reactions    Penicillin Rash       Actual Body Weight:   60.3 kg    Renal Function:   Estimated Creatinine Clearance: 99.5 mL/min (A) (based on SCr of 0.64 mg/dL (L)).,     Dialysis Method (if applicable):  N/A    CBC (last 72 hours):  Recent Labs   Lab Result Units 01/02/23  0548 01/03/23  0449 01/04/23  0330   WBC x10(3)/mcL 16.4* 19.5* 16.6*   Hgb gm/dL 11.5* 10.7* 11.9*   Hct % 36.1* 34.2* 37.6*   Platelet x10(3)/mcL 272 295 337   Mono % % 6.7 7.8 8.7   Eos % % 0.0 0.0 0.1   Basophil % % 0.1 0.0 0.1       Metabolic Panel (last 72 hours):  Recent Labs   Lab Result Units 01/02/23  0547 01/03/23  0449 01/04/23  0330   Sodium Level mmol/L 141 137 136   Potassium Level mmol/L 3.4* 3.7 4.3   Chloride mmol/L 101 100 97*   Carbon Dioxide mmol/L 27 25 29   Glucose Level mg/dL 108 101 133*   Blood Urea Nitrogen mg/dL 13.6 24.2 14.2   Creatinine mg/dL 0.52* 0.55* 0.64*   Albumin Level g/dL  2.6*  --  2.5*   Bilirubin Total mg/dL 0.6  --  0.7   Alkaline Phosphatase unit/L 143  --  162*   Aspartate Aminotransferase unit/L 14  --  104*   Alanine Aminotransferase unit/L 15  --  134*   Magnesium Level mg/dL 2.00 1.80 2.30       Vancomycin Administrations:  vancomycin given in the last 96 hours                     vancomycin 1,250 mg in dextrose 5 % 250 mL IVPB (mg) 1,250 mg New Bag 01/03/23 2259     1,250 mg New Bag  1135     1,250 mg New Bag 01/02/23 2250                    Microbiologic Results:  Microbiology Results (last 7 days)       Procedure Component Value Units Date/Time    Blood culture #1 **CANNOT BE ORDERED STAT** [[id number]]  (Abnormal) Collected: 01/01/23 0439    Order Status: Completed Specimen: Blood Updated: 01/04/23 0646     CULTURE, BLOOD ([facility]) MICROCOCCUS SPECIES     Comment: This organism is commonly considered a contaminant.  No further processing will be done.        GRAM STAIN Gram Positive Cocci, probable Staphylococcus      Seen in gram stain of broth only      1 of 2 Aerobic bottles positive    Blood culture #2 **CANNOT BE ORDERED STAT** [[id number]]  (Normal) Collected: 01/01/23 0439    Order Status: Completed Specimen: Blood Updated: 01/03/23 1500     CULTURE, BLOOD ([facility]) No Growth At 48 Hours    Blood Culture [[id number]] Collected: 01/03/23 1100    Order Status: Resulted Specimen: Blood Updated: 01/03/23 1106    Blood Culture [[id number]] Collected: 01/03/23 1100    Order Status: Resulted Specimen: Blood Updated: 01/03/23 1106    Respiratory Culture [[id number]]     Order Status: Sent Specimen: Sputum

## 2023-01-05 LAB
ALBUMIN SERPL-MCNC: 2.4 G/DL (ref 3.4–4.8)
ALBUMIN/GLOB SERPL: 0.9 RATIO (ref 1.1–2)
ALP SERPL-CCNC: 132 UNIT/L (ref 40–150)
ALT SERPL-CCNC: 113 UNIT/L (ref 0–55)
AST SERPL-CCNC: 66 UNIT/L (ref 5–34)
BASOPHILS # BLD AUTO: 0.01 X10(3)/MCL (ref 0–0.2)
BASOPHILS NFR BLD AUTO: 0.1 %
BILIRUBIN DIRECT+TOT PNL SERPL-MCNC: 0.8 MG/DL
BUN SERPL-MCNC: 9.8 MG/DL (ref 8.4–25.7)
CALCIUM SERPL-MCNC: 8.4 MG/DL (ref 8.8–10)
CHLORIDE SERPL-SCNC: 96 MMOL/L (ref 98–107)
CO2 SERPL-SCNC: 30 MMOL/L (ref 23–31)
CREAT SERPL-MCNC: 0.53 MG/DL (ref 0.73–1.18)
EOSINOPHIL # BLD AUTO: 0.03 X10(3)/MCL (ref 0–0.9)
EOSINOPHIL NFR BLD AUTO: 0.2 %
ERYTHROCYTE [DISTWIDTH] IN BLOOD BY AUTOMATED COUNT: 13.7 % (ref 11.6–14.4)
GFR SERPLBLD CREATININE-BSD FMLA CKD-EPI: >90 MLS/MIN/1.73/M2
GLOBULIN SER-MCNC: 2.8 GM/DL (ref 2.4–3.5)
GLUCOSE SERPL-MCNC: 103 MG/DL (ref 82–115)
HCT VFR BLD AUTO: 35.5 % (ref 42–52)
HGB BLD-MCNC: 11.3 GM/DL (ref 14–18)
IMM GRANULOCYTES # BLD AUTO: 0.17 X10(3)/MCL (ref 0–0.04)
IMM GRANULOCYTES NFR BLD AUTO: 1.1 %
LYMPHOCYTES # BLD AUTO: 1.34 X10(3)/MCL (ref 0.6–4.6)
LYMPHOCYTES NFR BLD AUTO: 9 %
MCH RBC QN AUTO: 30.7 PG
MCHC RBC AUTO-ENTMCNC: 31.8 MG/DL (ref 33–36)
MCV RBC AUTO: 96.5 FL (ref 80–94)
MONOCYTES # BLD AUTO: 1.07 X10(3)/MCL (ref 0.1–1.3)
MONOCYTES NFR BLD AUTO: 7.2 %
NEUTROPHILS # BLD AUTO: 12.33 X10(3)/MCL (ref 2.1–9.2)
NEUTROPHILS NFR BLD AUTO: 82.4 %
PLATELET # BLD AUTO: 268 X10(3)/MCL (ref 140–371)
PMV BLD AUTO: 10.1 FL (ref 9.4–12.4)
POTASSIUM SERPL-SCNC: 4.1 MMOL/L (ref 3.5–5.1)
PROT SERPL-MCNC: 5.2 GM/DL (ref 5.8–7.6)
RBC # BLD AUTO: 3.68 X10(6)/MCL (ref 4.7–6.1)
SODIUM SERPL-SCNC: 137 MMOL/L (ref 136–145)
WBC # SPEC AUTO: 15 X10(3)/MCL (ref 4.5–11.5)

## 2023-01-05 PROCEDURE — 21400001 HC TELEMETRY ROOM

## 2023-01-05 PROCEDURE — 27000221 HC OXYGEN, UP TO 24 HOURS

## 2023-01-05 PROCEDURE — 25000003 PHARM REV CODE 250: Performed by: STUDENT IN AN ORGANIZED HEALTH CARE EDUCATION/TRAINING PROGRAM

## 2023-01-05 PROCEDURE — 63600175 PHARM REV CODE 636 W HCPCS: Performed by: STUDENT IN AN ORGANIZED HEALTH CARE EDUCATION/TRAINING PROGRAM

## 2023-01-05 PROCEDURE — 94640 AIRWAY INHALATION TREATMENT: CPT

## 2023-01-05 PROCEDURE — 36415 COLL VENOUS BLD VENIPUNCTURE: CPT | Performed by: FAMILY MEDICINE

## 2023-01-05 PROCEDURE — 25000003 PHARM REV CODE 250

## 2023-01-05 PROCEDURE — 97110 THERAPEUTIC EXERCISES: CPT

## 2023-01-05 PROCEDURE — 63600175 PHARM REV CODE 636 W HCPCS: Performed by: NURSE PRACTITIONER

## 2023-01-05 PROCEDURE — 25000003 PHARM REV CODE 250: Performed by: FAMILY MEDICINE

## 2023-01-05 PROCEDURE — 94660 CPAP INITIATION&MGMT: CPT

## 2023-01-05 PROCEDURE — 94799 UNLISTED PULMONARY SVC/PX: CPT

## 2023-01-05 PROCEDURE — 25000242 PHARM REV CODE 250 ALT 637 W/ HCPCS: Performed by: STUDENT IN AN ORGANIZED HEALTH CARE EDUCATION/TRAINING PROGRAM

## 2023-01-05 PROCEDURE — 25000003 PHARM REV CODE 250: Performed by: NURSE PRACTITIONER

## 2023-01-05 PROCEDURE — 94760 N-INVAS EAR/PLS OXIMETRY 1: CPT

## 2023-01-05 PROCEDURE — A4216 STERILE WATER/SALINE, 10 ML: HCPCS

## 2023-01-05 PROCEDURE — 99900035 HC TECH TIME PER 15 MIN (STAT)

## 2023-01-05 PROCEDURE — 85025 COMPLETE CBC W/AUTO DIFF WBC: CPT | Performed by: FAMILY MEDICINE

## 2023-01-05 PROCEDURE — 97530 THERAPEUTIC ACTIVITIES: CPT

## 2023-01-05 PROCEDURE — 80053 COMPREHEN METABOLIC PANEL: CPT | Performed by: FAMILY MEDICINE

## 2023-01-05 PROCEDURE — 11000001 HC ACUTE MED/SURG PRIVATE ROOM

## 2023-01-05 RX ORDER — OXYCODONE HYDROCHLORIDE 5 MG/1
20 TABLET ORAL EVERY 6 HOURS PRN
Status: DISCONTINUED | OUTPATIENT
Start: 2023-01-05 | End: 2023-01-07

## 2023-01-05 RX ORDER — OXYCODONE HCL 10 MG/1
20 TABLET, FILM COATED, EXTENDED RELEASE ORAL EVERY 6 HOURS PRN
Status: DISCONTINUED | OUTPATIENT
Start: 2023-01-05 | End: 2023-01-05

## 2023-01-05 RX ORDER — IPRATROPIUM BROMIDE AND ALBUTEROL SULFATE 2.5; .5 MG/3ML; MG/3ML
3 SOLUTION RESPIRATORY (INHALATION) EVERY 6 HOURS PRN
Status: DISCONTINUED | OUTPATIENT
Start: 2023-01-05 | End: 2023-01-07 | Stop reason: HOSPADM

## 2023-01-05 RX ORDER — FUROSEMIDE 10 MG/ML
20 INJECTION INTRAMUSCULAR; INTRAVENOUS ONCE
Status: COMPLETED | OUTPATIENT
Start: 2023-01-05 | End: 2023-01-05

## 2023-01-05 RX ADMIN — ALPRAZOLAM 0.25 MG: 0.25 TABLET ORAL at 09:01

## 2023-01-05 RX ADMIN — ALPRAZOLAM 0.25 MG: 0.25 TABLET ORAL at 03:01

## 2023-01-05 RX ADMIN — FLUTICASONE FUROATE 1 PUFF: 100 POWDER RESPIRATORY (INHALATION) at 08:01

## 2023-01-05 RX ADMIN — IPRATROPIUM BROMIDE AND ALBUTEROL SULFATE 3 ML: .5; 3 SOLUTION RESPIRATORY (INHALATION) at 09:01

## 2023-01-05 RX ADMIN — OXYCODONE HYDROCHLORIDE 20 MG: 5 TABLET ORAL at 10:01

## 2023-01-05 RX ADMIN — IPRATROPIUM BROMIDE AND ALBUTEROL SULFATE 3 ML: .5; 3 SOLUTION RESPIRATORY (INHALATION) at 12:01

## 2023-01-05 RX ADMIN — METOPROLOL SUCCINATE 12.5 MG: 25 TABLET, EXTENDED RELEASE ORAL at 10:01

## 2023-01-05 RX ADMIN — CEFEPIME 2 G: 2 INJECTION, POWDER, FOR SOLUTION INTRAVENOUS at 08:01

## 2023-01-05 RX ADMIN — SODIUM CHLORIDE, PRESERVATIVE FREE 10 ML: 5 INJECTION INTRAVENOUS at 12:01

## 2023-01-05 RX ADMIN — CIPROFLOXACIN 400 MG: 2 INJECTION, SOLUTION INTRAVENOUS at 05:01

## 2023-01-05 RX ADMIN — SODIUM CHLORIDE, PRESERVATIVE FREE 10 ML: 5 INJECTION INTRAVENOUS at 06:01

## 2023-01-05 RX ADMIN — SERTRALINE HYDROCHLORIDE 50 MG: 50 TABLET, FILM COATED ORAL at 08:01

## 2023-01-05 RX ADMIN — CIPROFLOXACIN 400 MG: 2 INJECTION, SOLUTION INTRAVENOUS at 01:01

## 2023-01-05 RX ADMIN — VANCOMYCIN HYDROCHLORIDE 1250 MG: 1.25 INJECTION, POWDER, LYOPHILIZED, FOR SOLUTION INTRAVENOUS at 04:01

## 2023-01-05 RX ADMIN — UMECLIDINIUM BROMIDE AND VILANTEROL TRIFENATATE 1 PUFF: 62.5; 25 POWDER RESPIRATORY (INHALATION) at 09:01

## 2023-01-05 RX ADMIN — CEFEPIME 2 G: 2 INJECTION, POWDER, FOR SOLUTION INTRAVENOUS at 04:01

## 2023-01-05 RX ADMIN — VANCOMYCIN HYDROCHLORIDE 1250 MG: 1.25 INJECTION, POWDER, LYOPHILIZED, FOR SOLUTION INTRAVENOUS at 05:01

## 2023-01-05 RX ADMIN — IPRATROPIUM BROMIDE AND ALBUTEROL SULFATE 3 ML: .5; 3 SOLUTION RESPIRATORY (INHALATION) at 08:01

## 2023-01-05 RX ADMIN — FUROSEMIDE 20 MG: 10 INJECTION, SOLUTION INTRAMUSCULAR; INTRAVENOUS at 10:01

## 2023-01-05 RX ADMIN — CEFEPIME 2 G: 2 INJECTION, POWDER, FOR SOLUTION INTRAVENOUS at 12:01

## 2023-01-05 RX ADMIN — CIPROFLOXACIN 400 MG: 2 INJECTION, SOLUTION INTRAVENOUS at 10:01

## 2023-01-05 RX ADMIN — ENOXAPARIN SODIUM 40 MG: 40 INJECTION SUBCUTANEOUS at 04:01

## 2023-01-05 RX ADMIN — OXYCODONE HYDROCHLORIDE 10 MG: 10 TABLET, FILM COATED, EXTENDED RELEASE ORAL at 08:01

## 2023-01-05 RX ADMIN — IPRATROPIUM BROMIDE AND ALBUTEROL SULFATE 3 ML: .5; 3 SOLUTION RESPIRATORY (INHALATION) at 01:01

## 2023-01-05 NOTE — CONSULTS
Inpatient consult to Cardiology  Consult performed by: Thairy G Reyes, DO  Consult ordered by: Jay Schneider MD  Reason for consult: CHF    [facility] Surgical Unit  Cardiology  Consult Note    Patient Name: Balbir Rogers  MRN: [id number]  Admission Date: 1/1/2023  Hospital Length of Stay: 2 days  Code Status: DNR   Attending Provider: Thairy G Reyes, DO   Consulting Provider: Caryl Iniguez RN  Primary Care Physician: Jose Johnson Jr, MD  Principal Problem:Acute hypercapnic respiratory failure    Patient information was obtained from patient, spouse/SO, ER records, and primary team.     Subjective:   Reason for consult: CHF    Chief Complaint: SOB; Home O2 sat 78% on 5 L.    HPI: Mr. Rogers is a 62 year old male who is unknown to [facility]. The patient has history of Emphysema/COPD and is on Home O2 and NIPPV.  He presented to the ER on 1.1.23 with somnolence and home O2 Saturation of 78%.  He was recently Diagnosed with PNA around Thanksgiving and was treated with Oral Antibiotics.  Upon arrival by EMS he was found to be Somnolent and hypoxic. It is important to note that the patient sees Pain Management and is prescribed Oxycodone and Buprenorphine. Secondary to his Somnolence and Hypoxemia he was given Narcan with noted improvement in his Mental Status. His initial workup in the ER Revealed WBCs, 19.9, .6 and a CTA of Chest (1.1.23) which revealed Consolidative Changes and Emphysematic Changes. He was placed on BiPAP and IV Abx and subsequently admitted to Hospital Medicine. He had an ECHO which revealed an EF of 30% and for this [facility] was consulted.     PMH: COPD with hypoxia, degenerative cervical spinal stenosis, depression, disuse osteoporosis, emphysema/COPD, Hepatitis C, Leukocytosis, collapsed lung.  PSH: cervical laminectomy with spinal fusion, cholecystectomy, colonoscopy, EGD, GI biopsy, graft to nose, inguinal herniotomy, manible operation, polypectomy  Family History: No  significant history  Social History: Former smoker, User of prescription opioids and benzo., denies ETOH use.    Previous Cardiac Diagnostics:   Echo (2009): EF 50%    Review of patient's allergies indicates:   Allergen Reactions    Penicillin Rash     No current facility-administered medications on file prior to encounter.     Current Outpatient Medications on File Prior to Encounter   Medication Sig    albuterol-ipratropium (DUO-NEB) 2.5 mg-0.5 mg/3 mL nebulizer solution Take by nebulization 4 (four) times daily.    buprenorphine (BUTRANS) 20 mcg/hour PTWK Apply 1 patch topically every 7 days.    fluticasone-umeclidin-vilanter (TRELEGY ELLIPTA) 100-62.5-25 mcg DsDv Inhale 1 puff into the lungs once daily.    gabapentin (NEURONTIN) 300 MG capsule Take 300 mg by mouth 3 (three) times daily as needed.    LORazepam (ATIVAN) 1 MG tablet Take 1 tablet (1 mg total) by mouth every 12 (twelve) hours as needed for Anxiety (seizure activity).    oxyCODONE (ROXICODONE) 30 MG Tab Take 30 mg by mouth every 6 (six) hours as needed.    sertraline (ZOLOFT) 100 MG tablet Take 100 mg by mouth once daily.    temazepam (RESTORIL) 30 mg capsule Take 30 mg by mouth nightly.    predniSONE (DELTASONE) 50 MG Tab Take 1 tablet (50 mg total) by mouth once daily.     Review of Systems   Constitutional:  Positive for fatigue.   Respiratory:  Positive for shortness of breath.    Cardiovascular:  Negative for chest pain, palpitations and leg swelling.   All other systems reviewed and are negative.    Objective:     Vital Signs (Most Recent):  Temp: 97.9 °F (36.6 °C) (01/05/23 0721)  Pulse: 95 (01/05/23 0805)  Resp: 20 (01/05/23 0805)  BP: 102/69 (01/05/23 0721)  SpO2: (!) 94 % (01/05/23 0805)   Vital Signs (24h Range):  Temp:  [97.9 °F (36.6 °C)-98.6 °F (37 °C)] 97.9 °F (36.6 °C)  Pulse:  [] 95  Resp:  [18-22] 20  SpO2:  [91 %-97 %] 94 %  BP: ()/(63-71) 102/69     Weight: 60.3 kg (132 lb 15 oz)  Body mass index is 19.07  kg/m².    SpO2: (!) 94 %         Intake/Output Summary (Last 24 hours) at 1/5/2023 0821  Last data filed at 1/4/2023 1600  Gross per 24 hour   Intake 954 ml   Output --   Net 954 ml     Lines/Drains/Airways       Peripheral Intravenous Line  Duration                  Midline Catheter Insertion/Assessment  - Single Lumen 01/04/23 1548 Left basilic vein (medial side of arm) other (see comments) <1 day                  Significant Labs:  Recent Results (from the past 72 hour(s))   COVID/FLU A&B PCR    Collection Time: 01/02/23  1:07 PM   Result Value Ref Range    Influenza A PCR Not Detected Not Detected    Influenza B PCR Not Detected Not Detected    SARS-CoV-2 PCR Not Detected Not Detected   POCT glucose    Collection Time: 01/03/23  2:00 AM   Result Value Ref Range    POCT Glucose 148 (H) 70 - 110 mg/dL   Basic Metabolic Panel    Collection Time: 01/03/23  4:49 AM   Result Value Ref Range    Sodium Level 137 136 - 145 mmol/L    Potassium Level 3.7 3.5 - 5.1 mmol/L    Chloride 100 98 - 107 mmol/L    Carbon Dioxide 25 23 - 31 mmol/L    Glucose Level 101 82 - 115 mg/dL    Blood Urea Nitrogen 24.2 8.4 - 25.7 mg/dL    Creatinine 0.55 (L) 0.73 - 1.18 mg/dL    BUN/Creatinine Ratio 44     Calcium Level Total 8.9 8.8 - 10.0 mg/dL    Anion Gap 12.0 mEq/L    eGFR >90 mls/min/1.73/m2   Magnesium    Collection Time: 01/03/23  4:49 AM   Result Value Ref Range    Magnesium Level 1.80 1.60 - 2.60 mg/dL   CBC with Differential    Collection Time: 01/03/23  4:49 AM   Result Value Ref Range    WBC 19.5 (H) 4.5 - 11.5 x10(3)/mcL    RBC 3.54 (L) 4.70 - 6.10 x10(6)/mcL    Hgb 10.7 (L) 14.0 - 18.0 gm/dL    Hct 34.2 (L) 42.0 - 52.0 %    MCV 96.6 (H) 80.0 - 94.0 fL    MCH 30.2 pg    MCHC 31.3 (L) 33.0 - 36.0 mg/dL    RDW 13.8 11.6 - 14.4 %    Platelet 295 140 - 371 x10(3)/mcL    MPV 10.4 9.4 - 12.4 fL    Neut % 84.4 %    Lymph % 7.4 %    Mono % 7.8 %    Eos % 0.0 %    Basophil % 0.0 %    Lymph # 1.44 0.6 - 4.6 x10(3)/mcL    Neut # 16.44 (H)  2.1 - 9.2 x10(3)/mcL    Mono # 1.52 (H) 0.1 - 1.3 x10(3)/mcL    Eos # 0.00 0 - 0.9 x10(3)/mcL    Baso # 0.00 0 - 0.2 x10(3)/mcL    IG# 0.08 (H) 0 - 0.04 x10(3)/mcL    IG% 0.4 %   Blood Culture    Collection Time: 01/03/23 11:00 AM    Specimen: Blood   Result Value Ref Range    CULTURE, BLOOD ([facility]) No Growth At 24 Hours    Blood Culture    Collection Time: 01/03/23 11:00 AM    Specimen: Blood   Result Value Ref Range    CULTURE, BLOOD ([facility]) No Growth At 24 Hours    VANCOMYCIN, TROUGH    Collection Time: 01/03/23 11:00 AM   Result Value Ref Range    Vancomycin Trough 7.7 (L) 15.0 - 20.0 ug/ml   Echo    Collection Time: 01/03/23  4:52 PM   Result Value Ref Range    BSA 1.73 m2    Right Atrial Pressure (from IVC) 8 mmHg    EF 30 %   Comprehensive Metabolic Panel    Collection Time: 01/04/23  3:30 AM   Result Value Ref Range    Sodium Level 136 136 - 145 mmol/L    Potassium Level 4.3 3.5 - 5.1 mmol/L    Chloride 97 (L) 98 - 107 mmol/L    Carbon Dioxide 29 23 - 31 mmol/L    Glucose Level 133 (H) 82 - 115 mg/dL    Blood Urea Nitrogen 14.2 8.4 - 25.7 mg/dL    Creatinine 0.64 (L) 0.73 - 1.18 mg/dL    Calcium Level Total 9.2 8.8 - 10.0 mg/dL    Protein Total 5.7 (L) 5.8 - 7.6 gm/dL    Albumin Level 2.5 (L) 3.4 - 4.8 g/dL    Globulin 3.2 2.4 - 3.5 gm/dL    Albumin/Globulin Ratio 0.8 (L) 1.1 - 2.0 ratio    Bilirubin Total 0.7 <=1.5 mg/dL    Alkaline Phosphatase 162 (H) 40 - 150 unit/L    Alanine Aminotransferase 134 (H) 0 - 55 unit/L    Aspartate Aminotransferase 104 (H) 5 - 34 unit/L    eGFR >90 mls/min/1.73/m2   Magnesium    Collection Time: 01/04/23  3:30 AM   Result Value Ref Range    Magnesium Level 2.30 1.60 - 2.60 mg/dL   CBC with Differential    Collection Time: 01/04/23  3:30 AM   Result Value Ref Range    WBC 16.6 (H) 4.5 - 11.5 x10(3)/mcL    RBC 3.82 (L) 4.70 - 6.10 x10(6)/mcL    Hgb 11.9 (L) 14.0 - 18.0 gm/dL    Hct 37.6 (L) 42.0 - 52.0 %    MCV 98.4 (H) 80.0 - 94.0 fL    MCH 31.2 pg    MCHC 31.6 (L) 33.0 - 36.0  mg/dL    RDW 14.0 11.6 - 14.4 %    Platelet 337 140 - 371 x10(3)/mcL    MPV 10.3 9.4 - 12.4 fL    Neut % 78.8 %    Lymph % 11.3 %    Mono % 8.7 %    Eos % 0.1 %    Basophil % 0.1 %    Lymph # 1.87 0.6 - 4.6 x10(3)/mcL    Neut # 13.12 (H) 2.1 - 9.2 x10(3)/mcL    Mono # 1.44 (H) 0.1 - 1.3 x10(3)/mcL    Eos # 0.01 0 - 0.9 x10(3)/mcL    Baso # 0.01 0 - 0.2 x10(3)/mcL   VANCOMYCIN, TROUGH    Collection Time: 01/04/23 11:37 AM   Result Value Ref Range    Vancomycin Trough 13.0 (L) 15.0 - 20.0 ug/ml   Comprehensive Metabolic Panel    Collection Time: 01/05/23  3:50 AM   Result Value Ref Range    Sodium Level 137 136 - 145 mmol/L    Potassium Level 4.1 3.5 - 5.1 mmol/L    Chloride 96 (L) 98 - 107 mmol/L    Carbon Dioxide 30 23 - 31 mmol/L    Glucose Level 103 82 - 115 mg/dL    Blood Urea Nitrogen 9.8 8.4 - 25.7 mg/dL    Creatinine 0.53 (L) 0.73 - 1.18 mg/dL    Calcium Level Total 8.4 (L) 8.8 - 10.0 mg/dL    Protein Total 5.2 (L) 5.8 - 7.6 gm/dL    Albumin Level 2.4 (L) 3.4 - 4.8 g/dL    Globulin 2.8 2.4 - 3.5 gm/dL    Albumin/Globulin Ratio 0.9 (L) 1.1 - 2.0 ratio    Bilirubin Total 0.8 <=1.5 mg/dL    Alkaline Phosphatase 132 40 - 150 unit/L    Alanine Aminotransferase 113 (H) 0 - 55 unit/L    Aspartate Aminotransferase 66 (H) 5 - 34 unit/L    eGFR >90 mls/min/1.73/m2   CBC with Differential    Collection Time: 01/05/23  3:50 AM   Result Value Ref Range    WBC 15.0 (H) 4.5 - 11.5 x10(3)/mcL    RBC 3.68 (L) 4.70 - 6.10 x10(6)/mcL    Hgb 11.3 (L) 14.0 - 18.0 gm/dL    Hct 35.5 (L) 42.0 - 52.0 %    MCV 96.5 (H) 80.0 - 94.0 fL    MCH 30.7 pg    MCHC 31.8 (L) 33.0 - 36.0 mg/dL    RDW 13.7 11.6 - 14.4 %    Platelet 268 140 - 371 x10(3)/mcL    MPV 10.1 9.4 - 12.4 fL    Neut % 82.4 %    Lymph % 9.0 %    Mono % 7.2 %    Eos % 0.2 %    Basophil % 0.1 %    Lymph # 1.34 0.6 - 4.6 x10(3)/mcL    Neut # 12.33 (H) 2.1 - 9.2 x10(3)/mcL    Mono # 1.07 0.1 - 1.3 x10(3)/mcL    Eos # 0.03 0 - 0.9 x10(3)/mcL    Baso # 0.01 0 - 0.2 x10(3)/mcL     IG# 0.17 (H) 0 - 0.04 x10(3)/mcL    IG% 1.1 %     Significant Imaging:  Imaging Results              CTA Chest Non-Coronary (PE Studies) (Final result)  Result time 01/01/23 07:48:00      Final result by Raf Robertson MD (01/01/23 07:48:00)                   Impression:    Impression:    1. Multiple similar enlarged mediastinal lymph nodes are seen in the APwindow/subaortic, paratracheal and left hilar spaces    2. No filling defects are seen in the pulmonary arteries to suggest pulmonary embolus to the sensitivity of the study.    3. A small area of consolidation is seen in the lingula (image 34 series 4).  This shows interval considerable improvement.  Minimal reticular densities are also seen in the superior segments of both lower lobes. These may also be part of an infectious process. Centrilobular and paraseptal emphysematous changes are seen in both lungs, predominantly in both upper lobes. Extensive fibrotic densities are also seen in both upper lobes. Follow-up as clinically indicated.    4. Details and other findings as discussed above.    No significant discrepancy with overnight report.      Electronically signed by: Raf Robertson  Date:    01/01/2023  Time:    07:48               Narrative:      Technique:CT Scan of the chest was performed with intravenous contrast with direct axial images as well as sagittal and coronal reconstruction images pulmonary embolus protocol.    Dosage Information:Automated Exposure Control was utilized.      Comparison: November 11, 2022.    Clinical History:Arrived on home O2 Sat 78% on 5 L wife states Sat 44% at home for days ) Elevated d dimer.    Findings:    Contrast:Imaging was performed somewhat late with respect to the contrast bolus which decreases sensitivity and specificity for small distal peripheral pulmonary emboli.    Soft Tissues:Unremarkable.    Lines and Tubes:None.    Neck:The visualized soft tissues of the neck appear  unremarkable.    Mediastinum:Multiple similar enlarged mediastinal lymph nodes are seen in the APwindow/subaortic, paratracheal and left hilar spaces . The largest is in left hilar space and measures 11.3 mm in least dimension. This suggests reactive lymphadenopathy.    Pulmonary Arteries:No filling defects are seen in the pulmonary arteries to suggest pulmonary embolus to the sensitivity of the study.    Lungs:A small area of consolidation is seen in the lingula (image 34 series 4). This shows interval considerable improvement. Minimal reticular densities are also seen in the superior segments of both lower lobes. These may also be part of an infectious process. Centrilobular and paraseptal emphysematous changes are seen in both lungs, predominantly in both upper lobes. Extensive fibrotic densities are also seen in both upper lobes.    Pleura:There is a small left sided pleural effusion.    Bony Structures:    Spine:Severe spondylolytic changes are seen in the thoracic spine.    Ribs:No rib fractures are identified.    Abdomen:A small hepatic cyst is noted in the left hepatic lobe measuring 4.1 mm (image 140 series 3).                        Preliminary result by Raf Robertson MD (01/01/23 07:09:21)                   Narrative:    START OF REPORT:  Technique: CT Scan of the chest was performed with intravenous contrast with direct axial images as well as sagittal and coronal reconstruction images pulmonary embolus protocol.    Dosage Information: Automated Exposure Control was utilized.    Comparison: None.    Clinical History: Arrived on home O2 Sat 78% on 5 L wife states Sat 44% at home for days ) Elevated d dimer.    Findings:  Contrast: Imaging was performed somewhat late with respect to the contrast bolus which decreases sensitivity and specificity for small distal peripheral pulmonary emboli.  Soft Tissues: Unremarkable.  Lines and Tubes: None.  Neck: The visualized soft tissues of the neck appear  unremarkable.  Mediastinum: Multiple enlarged mediastinal lymph nodes are seen in the APwindow/subaortic, paratracheal and left hilar spaces . The largest is in left hilar space and measures â11.3 mmâ in least dimension. This suggests reactive lymphadenopathy.  Heart: The heart appears unremarkable.  Aorta: Unremarkable appearing aorta.  Pulmonary Arteries: No filling defects are seen in the pulmonary arteries to suggest pulmonary embolus to the sensitivity of the study.  Lungs: A small area of consolidation is seen in the lingula (image 34 series 4). This probably represents an infectious process. Minimal reticular densities are also seen in the superior segments of both lower lobes. These may also be part of an infectious process. Centrilobular and paraseptal emphysematous changes are seen in both lungs, predominantly in both upper lobes. Extensive fibrotic densities are also seen in both upper lobes.  Pleura: There is a small left sided pleural effusion.  Bony Structures:  Spine: Severe spondylolytic changes are seen in the thoracic spine.  Ribs: No rib fractures are identified.  Abdomen: A small hepatic cyst is noted in the left hepatic lobe measuring 4.1 mm (image 140 series 3). The rest of the visualized upper abdomen appear unremarkable.      Impression:  1. Multiple enlarged mediastinal lymph nodes are seen in the APwindow/subaortic, paratracheal and left hilar spaces . The largest is in left hilar space and measures â11.3 mmâ in least dimension. This suggests reactive lymphadenopathy. Correlate with clinical and laboratory findings as regards further evaluation and follow up.  2. No filling defects are seen in the pulmonary arteries to suggest pulmonary embolus to the sensitivity of the study.  3. A small area of consolidation is seen in the lingula (image 34 series 4). This probably represents an infectious process. Correlation with clinical and laboratory findings is suggested for further  evaluation. Minimal reticular densities are also seen in the superior segments of both lower lobes. These may also be part of an infectious process. Centrilobular and paraseptal emphysematous changes are seen in both lungs, predominantly in both upper lobes. Extensive fibrotic densities are also seen in both upper lobes. Follow-up as clinically indicated.  4. Details and other findings as discussed above.                          Preliminary result by Rigo Dickey MD (01/01/23 07:09:21)                   Narrative:    START OF REPORT:  Technique: CT Scan of the chest was performed with intravenous contrast with direct axial images as well as sagittal and coronal reconstruction images pulmonary embolus protocol.    Dosage Information: Automated Exposure Control was utilized.    Comparison: None.    Clinical History: Arrived on home O2 Sat 78% on 5 L wife states Sat 44% at home for days ) Elevated d dimer.    Findings:  Contrast: Imaging was performed somewhat late with respect to the contrast bolus which decreases sensitivity and specificity for small distal peripheral pulmonary emboli.  Soft Tissues: Unremarkable.  Lines and Tubes: None.  Neck: The visualized soft tissues of the neck appear unremarkable.  Mediastinum: Multiple enlarged mediastinal lymph nodes are seen in the APwindow/subaortic, paratracheal and left hilar spaces . The largest is in left hilar space and measures â11.3 mmâ in least dimension. This suggests reactive lymphadenopathy.  Heart: The heart appears unremarkable.  Aorta: Unremarkable appearing aorta.  Pulmonary Arteries: No filling defects are seen in the pulmonary arteries to suggest pulmonary embolus to the sensitivity of the study.  Lungs: A small area of consolidation is seen in the lingula (image 34 series 4). This probably represents an infectious process. Minimal reticular densities are also seen in the superior segments of both lower lobes. These may also be part of an  infectious process. Centrilobular and paraseptal emphysematous changes are seen in both lungs, predominantly in both upper lobes. Extensive fibrotic densities are also seen in both upper lobes.  Pleura: There is a small left sided pleural effusion.  Bony Structures:  Spine: Severe spondylolytic changes are seen in the thoracic spine.  Ribs: No rib fractures are identified.  Abdomen: A small hepatic cyst is noted in the left hepatic lobe measuring 4.1 mm (image 140 series 3). The rest of the visualized upper abdomen appear unremarkable.      Impression:  1. Multiple enlarged mediastinal lymph nodes are seen in the APwindow/subaortic, paratracheal and left hilar spaces . The largest is in left hilar space and measures â11.3 mmâ in least dimension. This suggests reactive lymphadenopathy. Correlate with clinical and laboratory findings as regards further evaluation and follow up.  2. No filling defects are seen in the pulmonary arteries to suggest pulmonary embolus to the sensitivity of the study.  3. A small area of consolidation is seen in the lingula (image 34 series 4). This probably represents an infectious process. Correlation with clinical and laboratory findings is suggested for further evaluation. Minimal reticular densities are also seen in the superior segments of both lower lobes. These may also be part of an infectious process. Centrilobular and paraseptal emphysematous changes are seen in both lungs, predominantly in both upper lobes. Extensive fibrotic densities are also seen in both upper lobes. Follow-up as clinically indicated.  4. Details and other findings as discussed above.                                         X-Ray Chest AP Portable (Final result)  Result time 01/01/23 09:32:24      Final result by Vincent Yoder MD (01/01/23 09:32:24)                   Impression:      Significant improvement of a masslike density in confluent opacities identified in the left upper lobe in the  examination of November 11 2022.    Increase interstitial markings throughout    No focal consolidative changes      Electronically signed by: Vincent Beba  Date:    01/01/2023  Time:    09:32               Narrative:    EXAMINATION:  XR CHEST AP PORTABLE    CLINICAL HISTORY:  Shortness of breath;    TECHNIQUE:  Single frontal view of the chest was performed.    COMPARISON:  November 11, 2022    FINDINGS:  Examination reveals mediastinal silhouette to be within normal limits there is significant decrease in size of a mass density in the left suprahilar region and left upper lobe that was associated with some confluent airspace opacities and seen on the examination of November 11, 2022.    There is some prominence of the left hilum.    Increase interstitial markings are identified throughout with no definite focal consolidative changes                        ED Interpretation by Yehuda Robledo MD (01/01/23 04:30:12, [facility] - Emergency Dept, Emergency Medicine)    Left lobe with mass versus infiltrate                                  EKG:  None Available for Review    Telemetry:  Not on Telemetry    Physical Exam  Vitals reviewed.   Constitutional:       Appearance: He is ill-appearing.   HENT:      Head: Normocephalic.      Mouth/Throat:      Mouth: Mucous membranes are moist.   Eyes:      Extraocular Movements: Extraocular movements intact.   Cardiovascular:      Rate and Rhythm: Normal rate and regular rhythm.      Pulses: Normal pulses.   Pulmonary:      Effort: Tachypnea, accessory muscle usage and respiratory distress present.      Breath sounds: Examination of the right-upper field reveals rales. Examination of the left-upper field reveals rales. Examination of the right-lower field reveals rales. Examination of the left-lower field reveals rales. Rales present.      Comments: Simple Face Mask O2; Mild Accessory Muscle Use/Retracting  Chest:      Chest wall: Deformity present.      Comments:  Barrel Chest  Abdominal:      Palpations: Abdomen is soft.   Musculoskeletal:         General: No swelling. Normal range of motion.      Right lower leg: No edema.      Left lower leg: No edema.   Skin:     General: Skin is warm and dry.   Neurological:      General: No focal deficit present.      Mental Status: He is alert and oriented to person, place, and time.   Psychiatric:         Mood and Affect: Mood normal.         Behavior: Behavior normal.     Home Medications:   No current facility-administered medications on file prior to encounter.     Current Outpatient Medications on File Prior to Encounter   Medication Sig Dispense Refill    albuterol-ipratropium (DUO-NEB) 2.5 mg-0.5 mg/3 mL nebulizer solution Take by nebulization 4 (four) times daily.      buprenorphine (BUTRANS) 20 mcg/hour PTWK Apply 1 patch topically every 7 days.      fluticasone-umeclidin-vilanter (TRELEGY ELLIPTA) 100-62.5-25 mcg DsDv Inhale 1 puff into the lungs once daily.      gabapentin (NEURONTIN) 300 MG capsule Take 300 mg by mouth 3 (three) times daily as needed.      LORazepam (ATIVAN) 1 MG tablet Take 1 tablet (1 mg total) by mouth every 12 (twelve) hours as needed for Anxiety (seizure activity). 15 tablet 5    oxyCODONE (ROXICODONE) 30 MG Tab Take 30 mg by mouth every 6 (six) hours as needed.      sertraline (ZOLOFT) 100 MG tablet Take 100 mg by mouth once daily.      temazepam (RESTORIL) 30 mg capsule Take 30 mg by mouth nightly.      predniSONE (DELTASONE) 50 MG Tab Take 1 tablet (50 mg total) by mouth once daily. 5 tablet 0     Current Inpatient Medications:    Current Facility-Administered Medications:     acetaminophen tablet 650 mg, 650 mg, Oral, Q4H PRN, Thairy G Reyes, DO    albuterol-ipratropium 2.5 mg-0.5 mg/3 mL nebulizer solution 3 mL, 3 mL, Nebulization, Q6H WAKE, Thairy G Reyes, DO, 3 mL at 01/05/23 0805    albuterol-ipratropium 2.5 mg-0.5 mg/3 mL nebulizer solution 3 mL, 3 mL, Nebulization, Q6H PRN, Thairy G Reyes,  DO    ALPRAZolam tablet 0.25 mg, 0.25 mg, Oral, TID PRN, Jay Schneider MD, 0.25 mg at 01/05/23 0352    aluminum-magnesium hydroxide-simethicone 200-200-20 mg/5 mL suspension 30 mL, 30 mL, Oral, QID PRN, Thairy G Reyes, DO    bisacodyL suppository 10 mg, 10 mg, Rectal, Daily PRN, Thairy G Reyes, DO    ceFEPIme (MAXIPIME) 2 g in dextrose 5 % in water (D5W) 5 % 50 mL IVPB (MB+), 2 g, Intravenous, Q8H, Thairy G Reyes, DO, Stopped at 01/05/23 0107    ciprofloxacin (CIPRO)400mg/200ml D5W IVPB 400 mg, 400 mg, Intravenous, Q8H, Thairy G Reyes, DO, Stopped at 01/05/23 0212    dextrose 50% injection 12.5 g, 12.5 g, Intravenous, PRN, Natasha Ding MD    dextrose 50% injection 25 g, 25 g, Intravenous, PRN, Thairy G Reyes, DO    enoxaparin injection 40 mg, 40 mg, Subcutaneous, Daily, Thairy G Reyes, DO, 40 mg at 01/04/23 1630    fluticasone furoate 100 mcg/actuation inhaler 1 puff, 1 puff, Inhalation, Daily, Thairy G Reyes, DO, 1 puff at 01/04/23 0817    glucagon (human recombinant) injection 1 mg, 1 mg, Intramuscular, PRN, Natasha Ding MD    glucose chewable tablet 16 g, 16 g, Oral, PRN, Natasha Ding MD    glucose chewable tablet 24 g, 24 g, Oral, PRN, Thairy G Reyes, DO    melatonin tablet 9 mg, 9 mg, Oral, Nightly PRN, Thairy G Reyes, DO, 9 mg at 01/04/23 2224    naloxone 0.4 mg/mL injection 0.02 mg, 0.02 mg, Intravenous, PRN, Natasha Ding MD    ondansetron disintegrating tablet 8 mg, 8 mg, Oral, Q8H PRN, Thairy G Reyes, DO    ondansetron injection 4 mg, 4 mg, Intravenous, Q8H PRN, Thairy G Reyes, DO    oxyCODONE 12 hr tablet 10 mg, 10 mg, Oral, Q12H, Thairy G Reyes, DO, 10 mg at 01/04/23 2020    polyethylene glycol packet 17 g, 17 g, Oral, TID PRN, Thairy G Reyes, DO    sertraline tablet 50 mg, 50 mg, Oral, Daily, Thairy G Reyes, DO, 50 mg at 01/04/23 0817    simethicone chewable tablet 80 mg, 1 tablet, Oral, QID PRN, Thairy G Reyes, DO    sodium chloride 0.9% flush 10 mL, 10 mL, Intravenous, Q12H PRN,  Natasha Ding MD    Flushing PICC Protocol, , , Until Discontinued **AND** sodium chloride 0.9% flush 10 mL, 10 mL, Intravenous, Q6H, 10 mL at 01/05/23 0600 **AND** sodium chloride 0.9% flush 10 mL, 10 mL, Intravenous, PRN, Jay Drapekin    umeclidinium-vilanteroL 62.5-25 mcg/actuation DsDv 1 puff, 1 puff, Inhalation, Daily, Thairy G Reyes, DO    Pharmacy to dose Vancomycin consult, , , Once **AND** vancomycin - pharmacy to dose, , Intravenous, pharmacy to manage frequency, Thairy G Reyes, DO    vancomycin 1,250 mg in dextrose 5 % 250 mL IVPB, 1,250 mg, Intravenous, Q12H, Thairy G Reyes, DO, Last Rate: 166.7 mL/hr at 01/05/23 0516, 1,250 mg at 01/05/23 0516    VTE Risk Mitigation (From admission, onward)           Ordered     enoxaparin injection 40 mg  Daily         01/01/23 1721     IP VTE HIGH RISK PATIENT  Once         01/01/23 1640     Place sequential compression device  Until discontinued         01/01/23 1640                  Assessment:   Newly Diagnosed/Acute Systolic HF/EF 30%    - No Hx of Ischemic Evaluation/Workup  COPD/Emphysema with Exacerbation  Aspiratory Pneumonitis  Possible Bacteremia     - BC x 2 - 1/2 Bottles with GPC/Probably Staph (Micrococcus Species)  Acute Hypoxemic Respiratory Failure requiring BiPAP - Now on Simple Face Mask    - Secondary to Suspected Aspiration Pneumonia, Polysusbstance Abuse, Bacteremia, HF  Chronic Pain with Opioid Use   Degenerative C-Spine Stenosis  Former Smoker    Plan:   Place on Telemetry Monitoring  EKG Now  Lasix 20 mg IVP x 1  Accurate I&O with Daily Weights  Start Toprol XL 12.5mg Qday with Hold Parameters (Monitor for Wheezing)  Unable to start ACEi/ARB/ARNI Secondary to Low Baseline BP  Patient needs Outpatient Ischemic Workup  Keep K > 4.0 and Mg > 2.0  Labs in AM: CBC, CMP and Mg    Thank you for your consult.     Caryl Iniguez RN  Cardiology  [facility]  01/05/2023 8:21 AM

## 2023-01-05 NOTE — PROGRESS NOTES
Name: Balbir Rogers    : 1960 (62 y.o.)  MRN: [id number]           Patient Unavailable      Patient unable to be seen at this time secondary to: Pt in bed appearing and c/o feeling SOB - pt now on oxymask and saturating at rest 90% SpO2. He is also reporting having frequent diarrhea. Patient's wife this AM, reports pt became really dyspneic after walking to the restroom this morning and has been SOB since. Pt requesting to wait and try again later.Will f/u later this PM, schedule permitting

## 2023-01-05 NOTE — PLAN OF CARE
Problem: SLP  Goal: SLP Goal  Description: LTG: Pt will tolerate LRD w/o overt s/s of aspiration. Goal met    STG:  Pt will tolerate soft and bite sized diet w/ good oral clearance and w/o overt s/s of aspiration. Goal met  Pt will participate in trials of regular consistency w/ good oral clearance and w/o overt s/s of aspiration. Discontinue   Outcome: Met

## 2023-01-05 NOTE — PT/OT/SLP PROGRESS
Occupational Therapy  Treatment    Name: Balbir Rogers    : 1960 (62 y.o.)  MRN: [id number]           TREATMENT SUMMARY AND RECOMMENDATIONS:      OT Date of Treatment: 23  OT Start Time: 1300  OT Stop Time: 1325  OT Total Time (min): 25 min      Subjective Assessment:   No complaints  Lethargic   x Awake, alert, cooperative  Impulsive    Uncooperative   Flat affect    Agitated  c/o pain    Appropriate  c/o fatigue    Confused x Treated at bedside     Emotionally labile  Treated in gym/dept.     x Other: Pt reports not up to walk right now d/t O2 and fatigue. Was up all night with Bms.       Therapy Precautions:    Cognitive deficits  Spinal precautions    Collar - hard  Sternal precautions    Collar - soft   TLSO   x Fall risk  LSO    Hip precautions - posterior  Knee immobilizer    Hip precautions - anterior  WBAT    Impaired communication  Partial weightbearing    Oxygen  TTWB    PEG tube  NWB    Visual deficits      Hearing deficits   Other:        Treatment Objectives:     Mobility Training:    Mobility task Assist level Comments    Bed mobility SBA Supine>EOB   Transfer CGA Stand/pivot t/f without device bed>chair   Sit to stands transitions     Functional mobility     Sitting balance     Standing balance      Other:          Therapeutic Exercise:   Exercise Sets Reps Comments   Towel dowel 1 10 Straight arm raises, bicep curls, shoulder press   BLE knee flex/ext 1 10                    Additional Comments:      Assessment: Patient tolerated session fair. Poor endurance and low activity tolerance noted. Pt requesting to rest following minimal ax. O2 remained 89-91% throughout.    OT Plan: Continue POC; QD  Revisions made to plan of care: No    GOALS:   Multidisciplinary Problems       Occupational Therapy Goals          Problem: Occupational Therapy    Goal Priority Disciplines Outcome Interventions   Occupational Therapy Goal     OT, PT/OT Ongoing, Progressing    Description: Goals to be met by:  1/10/23     Patient will increase functional independence with ADLs by performing:    UE Dressing with Modified Lewis.  LE Dressing with Supervision.  Toileting from toilet with Supervision for hygiene and clothing management.   Toilet transfer to toilet with Stand-by Assistance.                         Skilled OT Minutes Provided: 25  Communication with Treatment Team:     Equipment recommendations:       At end of treatment, patient remained:  x Up in chair     Up in wheelchair in room    In bed   x With alarm activated    Bed rails up   x Call bell in reach    x Family/friends present    Restraints secured properly    In bathroom with CNA/RN notified    In gym with PT/PTA/tech    Nurse aware    Other:

## 2023-01-05 NOTE — PROGRESS NOTES
Inpatient Nutrition Assessment    Admit Date: 1/1/2023   Total duration of encounter: 4 days     Nutrition Recommendation/Prescription     Continue soft and bite sized. 2. Continue boost plus BID or per patient's preference. 3. Continue calorie count as ordered. 4. Discussed appetite stimulant     Communication of Recommendations: reviewed with physician    Nutrition Assessment     Malnutrition Assessment/Nutrition-Focused Physical Exam    Malnutrition in the context of chronic illness  Degree of Malnutrition: severe malnutrition  Energy Intake: unable to obtain  Interpretation of Weight Loss: 14% wt change in 1 yr  Body Fat: severe depletion  Area of Body Fat Loss: upper arm region - triceps / biceps  Muscle Mass Loss: severe depletion  Area of Muscle Mass Loss: clavicle and acromion bone region - deltoid muscle  Fluid Accumulation: severe  Edema: unable to obtain  Reduced  Strength: unable to obtain  A minimum of two characteristics is recommended for diagnosis of either severe or non-severe malnutrition.    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       COPD exacerbation 1/1/2023       Pneumonia of left upper lobe due to infectious organism 1/1/2023       Degenerative cervical spinal stenosis Unknown       Chronic, continuous use of opioids Unknown       Pulmonary cachexia due to COPD 1/1/2023       Aspiration pneumonia        Relevant Medical History: Relevant Medical History: Collapse, lung  Date Unknown  COPD with hypoxia  Date Unknown  Degenerative cervical spinal stenosis  Date Unknown  Depression  Date Unknown  Disuse osteoporosis  Date Unknown  Emphysema/COPD  Date Unknown  Hepatitis C  Date  "Unknown  Leukocytosis    Nutrition-Related Medications: cefepime,ciprofloxacin, magnesium sulfate, vancomycin  Calorie Containing IV Medications: no significant kcals from medications at this time    Nutrition-Related Labs:   Latest Reference Range & Units 01/05/23 03:50   Sodium 136 - 145 mmol/L 137   Potassium 3.5 - 5.1 mmol/L 4.1   Chloride 98 - 107 mmol/L 96 (L)   CO2 23 - 31 mmol/L 30   BUN 8.4 - 25.7 mg/dL 9.8   Creatinine 0.73 - 1.18 mg/dL 0.53 (L)   eGFR mls/min/1.73/m2 >90   Glucose 82 - 115 mg/dL 103   Calcium 8.8 - 10.0 mg/dL 8.4 (L)   Alkaline Phosphatase 40 - 150 unit/L 132   PROTEIN TOTAL 5.8 - 7.6 gm/dL 5.2 (L)   Albumin 3.4 - 4.8 g/dL 2.4 (L)   Albumin/Globulin Ratio 1.1 - 2.0 ratio 0.9 (L)   BILIRUBIN TOTAL <=1.5 mg/dL 0.8   AST 5 - 34 unit/L 66 (H)   ALT 0 - 55 unit/L 113 (H)   Globulin, Total 2.4 - 3.5 gm/dL 2.8   (L): Data is abnormally low  (H): Data is abnormally high      Diet/PN Order: Diet Soft & Bite Sized  Oral Supplement Order: Boost Plus  Tube Feeding Order: none  Appetite/Oral Intake: fair/not applicable  Factors Affecting Nutritional Intake: decreased appetite  Food/Adventism/Cultural Preferences: none reported  Food Allergies: none reported    Skin Integrity: intact  Wound(s):       Comments    Pt and family present. Pt intake still poor, eating about 2 bites of meal. Majority nutrition are from boost. Pt likes boost strawberry. Calorie count on 1/4/23 provides 1,561 kcal and 60 gm of protein, meeting 86% kcal needs and 83% protein needs. Notified MD. MENDENHALL to start patient on appetite stimulant.     Anthropometrics    Height: 5' 10" (177.8 cm) Height Method: Stated  Last Weight: 60.3 kg (132 lb 15 oz) (01/02/23 0500) Weight Method: Bed Scale  BMI (Calculated): 19.1  BMI Classification: normal (BMI 18.5-24.9)        Ideal Body Weight (IBW), Male: 166 lb     % Ideal Body Weight, Male (lb): 80.08 %      UBW: 70 kg                      Usual Weight Provided By: patient    Wt Readings from " Last 5 Encounters:   01/02/23 60.3 kg (132 lb 15 oz)   08/04/22 61.7 kg (136 lb)   07/27/22 63.5 kg (140 lb)   07/26/22 63.5 kg (140 lb)   07/20/21 67.1 kg (147 lb 15.9 oz)     Weight Change(s) Since Admission:  Admit Weight: 57.2 kg (126 lb 1.7 oz) (01/01/23 0426)      Estimated Needs        Kcal Needs: 1809 kcal (30 kcal/kg/CBW)  Protein Needs: 72 gm (1.2 gm/kg/CBW)  Fluid Needs: 1809 mL (1 mL/kcal)              Temp: 98 °F (36.7 °C)       Enteral Nutrition    Patient not receiving enteral nutrition at this time.    Parenteral Nutrition    Patient not receiving parenteral nutrition support at this time.    Evaluation of Received Nutrient Intake    Calories: not meeting estimated needs  Protein: not meeting estimated needs    Patient Education    Not applicable.    Nutrition Diagnosis     PES: Inadequate oral intake related to appetite loss as evidenced by 14% wt change in 1 yr. Pt and family reports 22# (10kg) wt loss. . (continues)    Interventions/Goals     Intervention(s): general/healthful diet, modified composition of meals/snacks, and commercial beverage  Goal: Consume % of meals/snacks by follow-up. (goal progressing)    Monitoring & Evaluation     Dietitian will monitor food and beverage intake, weight, and weight change.  Nutrition Risk/Follow-Up: low (follow-up in 5-7 days)   Please consult if re-assessment needed sooner.

## 2023-01-05 NOTE — PLAN OF CARE
[facility]  Discharge Reassessment    Primary Care Provider: Jose Johnson Jr, MD    Expected Discharge Date:     Reassessment (most recent)       Discharge Reassessment - 01/05/23 1640          Discharge Reassessment    Assessment Type Discharge Planning Reassessment     Did the patient's condition or plan change since previous assessment? No     Discharge Plan discussed with: Spouse/sig other     Name(s) and Number(s) Lupe spouse      Communicated JACY with patient/caregiver Date not available/Unable to determine     Discharge Plan A Home with family;Home Health     DME Needed Upon Discharge  none     Discharge Barriers Identified None     Why the patient remains in the hospital Requires continued medical care        Post-Acute Status    Post-Acute Authorization Home Health     Home Health Status Pending medical clearance/testing     Hospital Resources/Appts/Education Provided Provided patient/caregiver with written discharge plan information     Discharge Delays None known at this time

## 2023-01-05 NOTE — PLAN OF CARE
Problem: Adult Inpatient Plan of Care  Goal: Plan of Care Review  Outcome: Ongoing, Progressing  Flowsheets (Taken 1/5/2023 1259)  Plan of Care Reviewed With:   patient   spouse   daughter  Goal: Patient-Specific Goal (Individualized)  Outcome: Ongoing, Progressing  Goal: Absence of Hospital-Acquired Illness or Injury  Outcome: Ongoing, Progressing  Intervention: Identify and Manage Fall Risk  Flowsheets (Taken 1/5/2023 1259)  Safety Promotion/Fall Prevention:   assistive device/personal item within reach   bed alarm set   Fall Risk reviewed with patient/family   in recliner, wheels locked   lighting adjusted   medications reviewed   nonskid shoes/socks when out of bed   side rails raised x 3   instructed to call staff for mobility  Intervention: Prevent Skin Injury  Flowsheets (Taken 1/5/2023 1259)  Skin Protection:   adhesive use limited   incontinence pads utilized   transparent dressing maintained   tubing/devices free from skin contact  Intervention: Prevent and Manage VTE (Venous Thromboembolism) Risk  Flowsheets (Taken 1/5/2023 1259)  Activity Management:   Ambulated to bathroom - L4   Rolling - L1   Sitting at edge of bed - L2   Standing - L3  VTE Prevention/Management:   ambulation promoted   bleeding precations maintained  Range of Motion: active ROM (range of motion) encouraged  Intervention: Prevent Infection  Flowsheets (Taken 1/5/2023 1259)  Infection Prevention:   cohorting utilized   environmental surveillance performed   equipment surfaces disinfected   hand hygiene promoted   single patient room provided   rest/sleep promoted  Goal: Optimal Comfort and Wellbeing  Outcome: Ongoing, Progressing  Intervention: Monitor Pain and Promote Comfort  Flowsheets (Taken 1/5/2023 1259)  Pain Management Interventions:   care clustered   medication offered   pillow support provided   position adjusted  Intervention: Provide Person-Centered Care  Flowsheets (Taken 1/5/2023 1259)  Trust Relationship/Rapport:    care explained   questions answered   questions encouraged   choices provided  Goal: Readiness for Transition of Care  Outcome: Ongoing, Progressing     Problem: Fluid Imbalance (Pneumonia)  Goal: Fluid Balance  Outcome: Ongoing, Progressing     Problem: Infection (Pneumonia)  Goal: Resolution of Infection Signs and Symptoms  Outcome: Ongoing, Progressing  Intervention: Prevent Infection Progression  Flowsheets (Taken 1/5/2023 1252)  Infection Management: aseptic technique maintained     Problem: Respiratory Compromise (Pneumonia)  Goal: Effective Oxygenation and Ventilation  Outcome: Ongoing, Progressing  Intervention: Promote Airway Secretion Clearance  Flowsheets (Taken 1/5/2023 1252)  Cough And Deep Breathing: done independently per patient  Intervention: Optimize Oxygenation and Ventilation  Flowsheets (Taken 1/5/2023 1250)  Airway/Ventilation Management: airway patency maintained  Head of Bed (HOB) Positioning: HOB at 30-45 degrees     Problem: Infection  Goal: Absence of Infection Signs and Symptoms  Outcome: Ongoing, Progressing  Intervention: Prevent or Manage Infection  Flowsheets (Taken 1/5/2023 1258)  Infection Management: aseptic technique maintained

## 2023-01-06 LAB
ALBUMIN SERPL-MCNC: 1.9 G/DL (ref 3.4–4.8)
ALBUMIN/GLOB SERPL: 0.6 RATIO (ref 1.1–2)
ALP SERPL-CCNC: 109 UNIT/L (ref 40–150)
ALT SERPL-CCNC: 84 UNIT/L (ref 0–55)
AST SERPL-CCNC: 46 UNIT/L (ref 5–34)
BACTERIA BLD CULT: NORMAL
BASOPHILS # BLD AUTO: 0.01 X10(3)/MCL (ref 0–0.2)
BASOPHILS NFR BLD AUTO: 0.1 %
BILIRUBIN DIRECT+TOT PNL SERPL-MCNC: 0.5 MG/DL
BUN SERPL-MCNC: 8.3 MG/DL (ref 8.4–25.7)
CALCIUM SERPL-MCNC: 8.2 MG/DL (ref 8.8–10)
CHLORIDE SERPL-SCNC: 91 MMOL/L (ref 98–107)
CO2 SERPL-SCNC: 35 MMOL/L (ref 23–31)
CREAT SERPL-MCNC: 0.57 MG/DL (ref 0.73–1.18)
EOSINOPHIL # BLD AUTO: 0.12 X10(3)/MCL (ref 0–0.9)
EOSINOPHIL NFR BLD AUTO: 0.8 %
ERYTHROCYTE [DISTWIDTH] IN BLOOD BY AUTOMATED COUNT: 14.3 % (ref 11.6–14.4)
GFR SERPLBLD CREATININE-BSD FMLA CKD-EPI: >90 MLS/MIN/1.73/M2
GLOBULIN SER-MCNC: 3 GM/DL (ref 2.4–3.5)
GLUCOSE SERPL-MCNC: 84 MG/DL (ref 82–115)
HCT VFR BLD AUTO: 35.2 % (ref 42–52)
HGB BLD-MCNC: 11.1 GM/DL (ref 14–18)
IMM GRANULOCYTES # BLD AUTO: 0.11 X10(3)/MCL (ref 0–0.04)
IMM GRANULOCYTES NFR BLD AUTO: 0.8 %
LYMPHOCYTES # BLD AUTO: 0.85 X10(3)/MCL (ref 0.6–4.6)
LYMPHOCYTES NFR BLD AUTO: 6 %
MAGNESIUM SERPL-MCNC: 2.1 MG/DL (ref 1.6–2.6)
MCH RBC QN AUTO: 30.6 PG
MCHC RBC AUTO-ENTMCNC: 31.5 MG/DL (ref 33–36)
MCV RBC AUTO: 97 FL (ref 80–94)
MONOCYTES # BLD AUTO: 0.87 X10(3)/MCL (ref 0.1–1.3)
MONOCYTES NFR BLD AUTO: 6.1 %
NEUTROPHILS # BLD AUTO: 12.28 X10(3)/MCL (ref 2.1–9.2)
NEUTROPHILS NFR BLD AUTO: 86.2 %
PLATELET # BLD AUTO: 234 X10(3)/MCL (ref 140–371)
PMV BLD AUTO: 10.3 FL (ref 9.4–12.4)
POTASSIUM SERPL-SCNC: 3.7 MMOL/L (ref 3.5–5.1)
PROT SERPL-MCNC: 4.9 GM/DL (ref 5.8–7.6)
RBC # BLD AUTO: 3.63 X10(6)/MCL (ref 4.7–6.1)
SODIUM SERPL-SCNC: 133 MMOL/L (ref 136–145)
VANCOMYCIN TROUGH SERPL-MCNC: 19.1 UG/ML (ref 15–20)
WBC # SPEC AUTO: 14.2 X10(3)/MCL (ref 4.5–11.5)

## 2023-01-06 PROCEDURE — 25000003 PHARM REV CODE 250: Performed by: FAMILY MEDICINE

## 2023-01-06 PROCEDURE — 83735 ASSAY OF MAGNESIUM: CPT | Performed by: NURSE PRACTITIONER

## 2023-01-06 PROCEDURE — 93010 EKG 12-LEAD: ICD-10-PCS | Mod: ,,, | Performed by: STUDENT IN AN ORGANIZED HEALTH CARE EDUCATION/TRAINING PROGRAM

## 2023-01-06 PROCEDURE — 63600175 PHARM REV CODE 636 W HCPCS: Performed by: STUDENT IN AN ORGANIZED HEALTH CARE EDUCATION/TRAINING PROGRAM

## 2023-01-06 PROCEDURE — 94660 CPAP INITIATION&MGMT: CPT

## 2023-01-06 PROCEDURE — 94640 AIRWAY INHALATION TREATMENT: CPT

## 2023-01-06 PROCEDURE — 94799 UNLISTED PULMONARY SVC/PX: CPT

## 2023-01-06 PROCEDURE — 93010 ELECTROCARDIOGRAM REPORT: CPT | Mod: ,,, | Performed by: STUDENT IN AN ORGANIZED HEALTH CARE EDUCATION/TRAINING PROGRAM

## 2023-01-06 PROCEDURE — 21400001 HC TELEMETRY ROOM

## 2023-01-06 PROCEDURE — 36415 COLL VENOUS BLD VENIPUNCTURE: CPT | Performed by: NURSE PRACTITIONER

## 2023-01-06 PROCEDURE — 93005 ELECTROCARDIOGRAM TRACING: CPT

## 2023-01-06 PROCEDURE — 94760 N-INVAS EAR/PLS OXIMETRY 1: CPT

## 2023-01-06 PROCEDURE — 93041 RHYTHM ECG TRACING: CPT

## 2023-01-06 PROCEDURE — 85025 COMPLETE CBC W/AUTO DIFF WBC: CPT | Performed by: NURSE PRACTITIONER

## 2023-01-06 PROCEDURE — 25000003 PHARM REV CODE 250

## 2023-01-06 PROCEDURE — 25000003 PHARM REV CODE 250: Performed by: STUDENT IN AN ORGANIZED HEALTH CARE EDUCATION/TRAINING PROGRAM

## 2023-01-06 PROCEDURE — A4216 STERILE WATER/SALINE, 10 ML: HCPCS

## 2023-01-06 PROCEDURE — 27000221 HC OXYGEN, UP TO 24 HOURS

## 2023-01-06 PROCEDURE — 25000242 PHARM REV CODE 250 ALT 637 W/ HCPCS: Performed by: STUDENT IN AN ORGANIZED HEALTH CARE EDUCATION/TRAINING PROGRAM

## 2023-01-06 PROCEDURE — 99900035 HC TECH TIME PER 15 MIN (STAT)

## 2023-01-06 PROCEDURE — 80202 ASSAY OF VANCOMYCIN: CPT | Performed by: STUDENT IN AN ORGANIZED HEALTH CARE EDUCATION/TRAINING PROGRAM

## 2023-01-06 PROCEDURE — 36415 COLL VENOUS BLD VENIPUNCTURE: CPT | Performed by: STUDENT IN AN ORGANIZED HEALTH CARE EDUCATION/TRAINING PROGRAM

## 2023-01-06 PROCEDURE — 25000003 PHARM REV CODE 250: Performed by: NURSE PRACTITIONER

## 2023-01-06 PROCEDURE — 80053 COMPREHEN METABOLIC PANEL: CPT | Performed by: NURSE PRACTITIONER

## 2023-01-06 RX ORDER — POTASSIUM CHLORIDE 20 MEQ/1
40 TABLET, EXTENDED RELEASE ORAL ONCE
Status: COMPLETED | OUTPATIENT
Start: 2023-01-06 | End: 2023-01-06

## 2023-01-06 RX ADMIN — CIPROFLOXACIN 400 MG: 2 INJECTION, SOLUTION INTRAVENOUS at 06:01

## 2023-01-06 RX ADMIN — OXYCODONE HYDROCHLORIDE 20 MG: 5 TABLET ORAL at 11:01

## 2023-01-06 RX ADMIN — SODIUM CHLORIDE, PRESERVATIVE FREE 10 ML: 5 INJECTION INTRAVENOUS at 06:01

## 2023-01-06 RX ADMIN — VANCOMYCIN HYDROCHLORIDE 1250 MG: 1.25 INJECTION, POWDER, LYOPHILIZED, FOR SOLUTION INTRAVENOUS at 05:01

## 2023-01-06 RX ADMIN — SODIUM CHLORIDE, PRESERVATIVE FREE 10 ML: 5 INJECTION INTRAVENOUS at 05:01

## 2023-01-06 RX ADMIN — MELATONIN TAB 3 MG 9 MG: 3 TAB at 09:01

## 2023-01-06 RX ADMIN — SODIUM CHLORIDE, PRESERVATIVE FREE 10 ML: 5 INJECTION INTRAVENOUS at 01:01

## 2023-01-06 RX ADMIN — CEFEPIME 2 G: 2 INJECTION, POWDER, FOR SOLUTION INTRAVENOUS at 02:01

## 2023-01-06 RX ADMIN — SODIUM CHLORIDE, PRESERVATIVE FREE 10 ML: 5 INJECTION INTRAVENOUS at 12:01

## 2023-01-06 RX ADMIN — IPRATROPIUM BROMIDE AND ALBUTEROL SULFATE 3 ML: .5; 3 SOLUTION RESPIRATORY (INHALATION) at 07:01

## 2023-01-06 RX ADMIN — CIPROFLOXACIN 400 MG: 2 INJECTION, SOLUTION INTRAVENOUS at 10:01

## 2023-01-06 RX ADMIN — VANCOMYCIN HYDROCHLORIDE 1250 MG: 1.25 INJECTION, POWDER, LYOPHILIZED, FOR SOLUTION INTRAVENOUS at 06:01

## 2023-01-06 RX ADMIN — POTASSIUM CHLORIDE 40 MEQ: 1500 TABLET, EXTENDED RELEASE ORAL at 10:01

## 2023-01-06 RX ADMIN — ENOXAPARIN SODIUM 40 MG: 40 INJECTION SUBCUTANEOUS at 05:01

## 2023-01-06 RX ADMIN — OXYCODONE HYDROCHLORIDE 20 MG: 5 TABLET ORAL at 06:01

## 2023-01-06 RX ADMIN — MELATONIN TAB 3 MG 9 MG: 3 TAB at 02:01

## 2023-01-06 RX ADMIN — CIPROFLOXACIN 400 MG: 2 INJECTION, SOLUTION INTRAVENOUS at 01:01

## 2023-01-06 RX ADMIN — OXYCODONE HYDROCHLORIDE 20 MG: 5 TABLET ORAL at 05:01

## 2023-01-06 RX ADMIN — CEFEPIME 2 G: 2 INJECTION, POWDER, FOR SOLUTION INTRAVENOUS at 09:01

## 2023-01-06 RX ADMIN — IPRATROPIUM BROMIDE AND ALBUTEROL SULFATE 3 ML: .5; 3 SOLUTION RESPIRATORY (INHALATION) at 02:01

## 2023-01-06 RX ADMIN — Medication 10 ML: at 09:01

## 2023-01-06 RX ADMIN — CEFEPIME 2 G: 2 INJECTION, POWDER, FOR SOLUTION INTRAVENOUS at 05:01

## 2023-01-06 RX ADMIN — SERTRALINE HYDROCHLORIDE 50 MG: 50 TABLET, FILM COATED ORAL at 09:01

## 2023-01-06 RX ADMIN — ALPRAZOLAM 0.25 MG: 0.25 TABLET ORAL at 09:01

## 2023-01-06 RX ADMIN — FLUTICASONE FUROATE 1 PUFF: 100 POWDER RESPIRATORY (INHALATION) at 09:01

## 2023-01-06 NOTE — PROGRESS NOTES
Pharmacokinetic Assessment Follow Up: IV Vancomycin    Vancomycin serum concentration assessment(s):    The trough level was drawn correctly and can be used to guide therapy at this time. The measurement is within the desired definitive target range of 10 to 20 mcg/mL.    Vancomycin Regimen Plan:    Continue vancomycin 1250 mg q12h and check trough at 0400 on 1/9/23.    Drug levels (last 3 results):  Recent Labs   Lab Result Units 01/04/23  1137 01/06/23  1600   Vancomycin Trough ug/ml 13.0* 19.1       Pharmacy will continue to follow and monitor vancomycin.    Thank you for the consult,   Doris Dalton       Patient brief summary:  Balbir Rogers is a 62 y.o. male initiated on antimicrobial therapy with IV Vancomycin for treatment of bacteremia      Drug Allergies:   Review of patient's allergies indicates:   Allergen Reactions    Penicillin Rash       Actual Body Weight:   60.3 kg    Renal Function:   Estimated Creatinine Clearance: 111.7 mL/min (A) (based on SCr of 0.57 mg/dL (L)).,     Dialysis Method (if applicable):  N/A    CBC (last 72 hours):  Recent Labs   Lab Result Units 01/04/23  0330 01/05/23  0350 01/06/23  0325   WBC x10(3)/mcL 16.6* 15.0* 14.2*   Hgb gm/dL 11.9* 11.3* 11.1*   Hct % 37.6* 35.5* 35.2*   Platelet x10(3)/mcL 337 268 234   Mono % % 8.7 7.2 6.1   Eos % % 0.1 0.2 0.8   Basophil % % 0.1 0.1 0.1       Metabolic Panel (last 72 hours):  Recent Labs   Lab Result Units 01/04/23  0330 01/05/23  0350 01/06/23  0325   Sodium Level mmol/L 136 137 133*   Potassium Level mmol/L 4.3 4.1 3.7   Chloride mmol/L 97* 96* 91*   Carbon Dioxide mmol/L 29 30 35*   Glucose Level mg/dL 133* 103 84   Blood Urea Nitrogen mg/dL 14.2 9.8 8.3*   Creatinine mg/dL 0.64* 0.53* 0.57*   Albumin Level g/dL 2.5* 2.4* 1.9*   Bilirubin Total mg/dL 0.7 0.8 0.5   Alkaline Phosphatase unit/L 162* 132 109   Aspartate Aminotransferase unit/L 104* 66* 46*   Alanine Aminotransferase unit/L 134* 113* 84*   Magnesium Level mg/dL 2.30  --   2.10       Vancomycin Administrations:  vancomycin given in the last 96 hours                     vancomycin 1,250 mg in dextrose 5 % 250 mL IVPB (mg) 1,250 mg New Bag 01/06/23 0514     1,250 mg New Bag 01/05/23 1643     1,250 mg New Bag  0516     1,250 mg New Bag 01/04/23 1713    vancomycin 1,250 mg in dextrose 5 % 250 mL IVPB (mg) 1,250 mg New Bag 01/03/23 2259     1,250 mg New Bag  1135     1,250 mg New Bag 01/02/23 2250                    Microbiologic Results:  Microbiology Results (last 7 days)       Procedure Component Value Units Date/Time    Blood culture #2 **CANNOT BE ORDERED STAT** [[id number]]  (Normal) Collected: 01/01/23 0439    Order Status: Completed Specimen: Blood Updated: 01/06/23 1500     CULTURE, BLOOD ([facility]) No Growth at 5 days    Blood Culture [[id number]]  (Normal) Collected: 01/03/23 1100    Order Status: Completed Specimen: Blood Updated: 01/05/23 1900     CULTURE, BLOOD ([facility]) No Growth At 48 Hours    Blood Culture [[id number]]  (Normal) Collected: 01/03/23 1100    Order Status: Completed Specimen: Blood Updated: 01/05/23 1900     CULTURE, BLOOD ([facility]) No Growth At 48 Hours    Blood culture #1 **CANNOT BE ORDERED STAT** [[id number]]  (Abnormal) Collected: 01/01/23 0439    Order Status: Completed Specimen: Blood Updated: 01/04/23 0646     CULTURE, BLOOD ([facility]) MICROCOCCUS SPECIES     Comment: This organism is commonly considered a contaminant.  No further processing will be done.        GRAM STAIN Gram Positive Cocci, probable Staphylococcus      Seen in gram stain of broth only      1 of 2 Aerobic bottles positive    Respiratory Culture [[id number]]     Order Status: Sent Specimen: Sputum

## 2023-01-06 NOTE — PROGRESS NOTES
Name: Balbir Rogers    : 1960 (62 y.o.)  MRN: [id number]                                                                    Patient Unavailable        Patient unable to be seen at this time secondary to: patient on medical hold all day per [facility]

## 2023-01-06 NOTE — PLAN OF CARE
Problem: Adult Inpatient Plan of Care  Goal: Plan of Care Review  Outcome: Ongoing, Progressing  Flowsheets (Taken 1/5/2023 2148)  Plan of Care Reviewed With:   patient   spouse   daughter  Goal: Absence of Hospital-Acquired Illness or Injury  Outcome: Ongoing, Progressing  Intervention: Identify and Manage Fall Risk  Flowsheets (Taken 1/5/2023 2148)  Safety Promotion/Fall Prevention:   assistive device/personal item within reach   bed alarm set   high risk medications identified   nonskid shoes/socks when out of bed   instructed to call staff for mobility  Intervention: Prevent Skin Injury  Flowsheets (Taken 1/5/2023 2148)  Body Position: position changed independently  Skin Protection:   adhesive use limited   transparent dressing maintained   tubing/devices free from skin contact  Intervention: Prevent and Manage VTE (Venous Thromboembolism) Risk  Flowsheets (Taken 1/5/2023 2148)  Activity Management:   Ambulated -L4   Ambulated to bathroom - L4  VTE Prevention/Management: fluids promoted  Range of Motion: active ROM (range of motion) encouraged  Intervention: Prevent Infection  Flowsheets (Taken 1/5/2023 2148)  Infection Prevention:   hand hygiene promoted   equipment surfaces disinfected  Goal: Optimal Comfort and Wellbeing  Outcome: Ongoing, Progressing  Intervention: Monitor Pain and Promote Comfort  Flowsheets (Taken 1/5/2023 2148)  Pain Management Interventions:   care clustered   medication offered   pain management plan reviewed with patient/caregiver   warm blanket provided   quiet environment facilitated   relaxation techniques promoted  Intervention: Provide Person-Centered Care  Flowsheets (Taken 1/5/2023 2148)  Trust Relationship/Rapport:   care explained   choices provided   questions encouraged   reassurance provided   emotional support provided   thoughts/feelings acknowledged   empathic listening provided   questions answered     Problem: Fluid Imbalance (Pneumonia)  Goal: Fluid  Balance  Outcome: Ongoing, Progressing  Intervention: Monitor and Manage Fluid Balance  Flowsheets (Taken 1/5/2023 2148)  Fluid/Electrolyte Management: fluids provided     Problem: Infection (Pneumonia)  Goal: Resolution of Infection Signs and Symptoms  Outcome: Ongoing, Progressing     Problem: Infection  Goal: Absence of Infection Signs and Symptoms  Outcome: Ongoing, Progressing  Intervention: Prevent or Manage Infection  Flowsheets (Taken 1/5/2023 2148)  Isolation Precautions: protective

## 2023-01-06 NOTE — PROGRESS NOTES
Inpatient Nutrition Assessment    Admit Date: 1/1/2023   Total duration of encounter: 5 days     Nutrition Recommendation/Prescription     Continue soft and bite sized. 2. Continue boost plus with meals. 3. MD provide verbal ordered to discontinue calorie count.     Communication of Recommendations: reviewed with physician    Nutrition Assessment     Malnutrition Assessment/Nutrition-Focused Physical Exam    Malnutrition in the context of chronic illness  Degree of Malnutrition: severe malnutrition  Energy Intake: unable to obtain  Interpretation of Weight Loss: 14% wt change in 1 yr  Body Fat: severe depletion  Area of Body Fat Loss: upper arm region - triceps / biceps  Muscle Mass Loss: severe depletion  Area of Muscle Mass Loss: clavicle and acromion bone region - deltoid muscle  Fluid Accumulation: severe  Edema: unable to obtain  Reduced  Strength: unable to obtain  A minimum of two characteristics is recommended for diagnosis of either severe or non-severe malnutrition.    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       COPD exacerbation 1/1/2023       Pneumonia of left upper lobe due to infectious organism 1/1/2023       Degenerative cervical spinal stenosis Unknown       Chronic, continuous use of opioids Unknown       Pulmonary cachexia due to COPD 1/1/2023       Aspiration pneumonia        Relevant Medical History: Collapse, lung  Date Unknown  COPD with hypoxia  Date Unknown  Degenerative cervical spinal stenosis  Date Unknown  Depression  Date Unknown  Disuse osteoporosis  Date Unknown  Emphysema/COPD  Date Unknown  Hepatitis C  Date  "Unknown  Leukocytosis       Nutrition-Related Medications: cefepime,ciprofloxacin,vancomycin  Calorie Containing IV Medications: no significant kcals from medications at this time    Nutrition-Related Labs:   Latest Reference Range & Units 01/06/23 03:25   Sodium 136 - 145 mmol/L 133 (L)   Potassium 3.5 - 5.1 mmol/L 3.7   Chloride 98 - 107 mmol/L 91 (L)   CO2 23 - 31 mmol/L 35 (H)   BUN 8.4 - 25.7 mg/dL 8.3 (L)   Creatinine 0.73 - 1.18 mg/dL 0.57 (L)   eGFR mls/min/1.73/m2 >90   Glucose 82 - 115 mg/dL 84   Calcium 8.8 - 10.0 mg/dL 8.2 (L)   Magnesium 1.60 - 2.60 mg/dL 2.10   Alkaline Phosphatase 40 - 150 unit/L 109   PROTEIN TOTAL 5.8 - 7.6 gm/dL 4.9 (L)   Albumin 3.4 - 4.8 g/dL 1.9 (L)   Albumin/Globulin Ratio 1.1 - 2.0 ratio 0.6 (L)   BILIRUBIN TOTAL <=1.5 mg/dL 0.5   AST 5 - 34 unit/L 46 (H)   ALT 0 - 55 unit/L 84 (H)   Globulin, Total 2.4 - 3.5 gm/dL 3.0   (L): Data is abnormally low  (H): Data is abnormally high      Diet/PN Order: Diet Soft & Bite Sized  Oral Supplement Order: Boost Plus  Tube Feeding Order: none  Appetite/Oral Intake: poor  Factors Affecting Nutritional Intake: depression  Food/[employer]/Cultural Preferences: none reported  Food Allergies: none reported    Skin Integrity: intact  Wound(s):       Comments    Pt intake still decrease. Pt meeting about 50% of nutrition needs. Most of his nutrition is coming from boost. Pt's family also brining out side food. SLP ok-pt to have soft bread/moist products, to help with intake. Calorie count on 1/5/23 provides 1,071 kcal and 38.5 gm of protein, meeting 59% kcal needs and 53% protein needs. Notified MD-provide verbal order to discontinue calorie count. Discussed possible hospice eval with family, per MD notes.   Anthropometrics       Height: 5' 10" (177.8 cm) Height Method: Stated  Last Weight: 60.3 kg (132 lb 15 oz) (01/02/23 0500) Weight Method: Bed Scale  BMI (Calculated): 19.1  BMI Classification: normal (BMI 18.5-24.9)  Ideal Body Weight (IBW), " Male: 166 lb  % Ideal Body Weight, Male (lb): 80.08 %   UBW: 70 kg                    Usual Weight Provided By: patient and family/caregiver    Wt Readings from Last 5 Encounters:   01/02/23 60.3 kg (132 lb 15 oz)   08/04/22 61.7 kg (136 lb)   07/27/22 63.5 kg (140 lb)   07/26/22 63.5 kg (140 lb)   07/20/21 67.1 kg (147 lb 15.9 oz)     Weight Change(s) Since Admission:  Admit Weight: 57.2 kg (126 lb 1.7 oz) (01/01/23 0426)      Estimated Needs      Kcal Needs: 1809 kcal (30 kcal/kg/CBW)  Protein Needs: 72 gm (1.2 gm/kg/CBW)  Fluid Needs: 1809 mL (1 mL/kcal)                 Temp: 97.8 °F (36.6 °C)       Enteral Nutrition    Patient not receiving enteral nutrition at this time.    Parenteral Nutrition    Patient not receiving parenteral nutrition support at this time.    Evaluation of Received Nutrient Intake    Calories: not meeting estimated needs  Protein: not meeting estimated needs    Patient Education    Not applicable.    Nutrition Diagnosis     PES: Inadequate oral intake related to appetite loss as evidenced by 14% wt change in 1 yr. Pt and family reports 22# (10kg) wt loss. . (continues)    Interventions/Goals     Intervention(s): modified composition of meals/snacks, commercial beverage, and collaboration with other providers  Goal: Meet greater than 75% of nutritional needs by follow-up. (goal progressing)    Monitoring & Evaluation     Dietitian will monitor food and beverage intake, glucose/endocrine profile, and gastrointestinal profile.  Nutrition Risk/Follow-Up: low (follow-up in 5-7 days)   Please consult if re-assessment needed sooner.

## 2023-01-06 NOTE — PROGRESS NOTES
Hospital Medicine  Progress Note    Patient Name: Balbir Rogers  MRN: [id number]  Status: IP- Inpatient   Admission Date: 1/1/2023  Length of Stay: 3      CC: hospital follow-up for        SUBJECTIVE   61 yo male with PMH of emphysema/COPD (on 5L home O2 and also with at home NIPPV), anxiety, chronic opioid dependence, medical marijuana use who presents with complaint of hypoxemia at home. Pt somnolent, poorly arousable during evaluation thus history obtained from wife at bedside. She reports pt has been ill since Thanksgiving when he was treated for PNA and subsequent thrush. She affirms pt has had a broken NIPPV at home and has had difficulty obtaining a new one secondary to insurance issues. She also says pt has not used supplemental O2 for 2 weeks and reportedly was saturating >90% without home O2. Pt with increasing lethargy at home however and found by wife with an O2 sat of 44% thus she brought him in. She reports she administers his pain medications (oxycodone 30mg qid +buprenorphine patch) and there is no way he could have overdosed.      In the ED pt with acute on chronic hypoxemic hypercapnic respiratory failure. Serial ABGs as follows: (7.19/85.4/97 on 12/6, rr 16, 40%; 7.29/69.6/65 on bipap at 20/6, rr16, 36%; 7.32/59/82 on 20/6, rr23, 35%). Pt also received narcan and reportedly had marked improvement in mental status. Wife at bedside reports DNR/DNI status.     At baseline, pt lives with his wife and is essential bed bound 2/2 pain and deconditioning. PCP Dr. Emery; Pulmonologist Dr. Vela.        Overview/Hospital Course:  Patient admitted for acute on chronic hypoxemic, hypercapnic respiratory failure secondary to concurrent opiate, benzo use and aspiration pneumonia. Evaluated by ST on 1/3, ok to resume soft/bite sized diet. Patient's pain currently being addressed with oxycontin 10mg BID, dose reduced from his baseline 2/2 hypotension. Cannot discontinue as pain becomes so poorly controlled pt  becomes tachypneic. Hold buprenorphine and benzos at this time. ABG showed significant improvement after BiPAP was in place for 24 hours. Pt strongly advised to utilized qhs bipap, he has refused it here. Maintain on supplemental O2. On 1/2 blood cultures resulted Staph, vancomycin was started 1/2, suspect this is reason for continued elevated WBC. Pt also remains on cefepime and cipro for CAP. TTE to be done today, if negative pt would benefit from discussion regarding transfer for [facility], his poor respiratory status may prevent eligibility for [facility].         Interval History:  Essentially bed-bound at baseline.  Physical therapy was unable to evaluate him given desaturation to 88 percent on 4 liters OxyMask.     01/04/2023 feels a little better today, PT able to get into chair  Got some ativan around 2-3 AM and slept better since then and had panic episode this AM     Echo EF 30%  O2 stable on 4 L today     01/05 wbc cont to improve  C/o of not well controlled pain today  Decrease from home pain med  Eval by cardio this AM  Rec BB, iv lasix  Cont tele    01/06 pain not well controlled overnight and this AM pain med held 2/2 low BP  Rested some   Little output with lasix overnight  Cardio following  Discussed possible hospice eval with family and patient today  Wbc trending down    Today: Patient seen and examined at bedside, and chart reviewed.       MEDICATIONS   Scheduled   albuterol-ipratropium  3 mL Nebulization Q6H WAKE    ceFEPime (MAXIPIME) IVPB  2 g Intravenous Q8H    ciprofloxacin  400 mg Intravenous Q8H    enoxaparin  40 mg Subcutaneous Daily    fluticasone furoate  1 puff Inhalation Daily    sertraline  50 mg Oral Daily    sodium chloride 0.9%  10 mL Intravenous Q6H    umeclidinium-vilanteroL  1 puff Inhalation Daily    vancomycin (VANCOCIN) IVPB  1,250 mg Intravenous Q12H     Continuous Infusions        PHYSICAL EXAM   VITALS: T 97.8 °F (36.6 °C)   BP 98/65   P 94   RR 20   O2 (!) 92 %    GENERAL: Awake and  in NAD  LUNGS: diminished bilaterally tachypnic  CVS: Normal rate  GI/: Soft, NT, bowel sounds positive.  EXTREMITIES: No peripheral edema  NEURO: AAOx3  PSYCH: Cooperative      LABS   CBC  Recent Labs     01/06/23  0325   WBC 14.2*   HGB 11.1*   HCT 35.2*         CHEM  Recent Labs     01/06/23  0325   *   K 3.7   MG 2.10   CO2 35*   BUN 8.3*   CREATININE 0.57*   CALCIUM 8.2*   BILITOT 0.5   AST 46*   ALT 84*   ALKPHOS 109   ALBUMIN 1.9*          MICROBIOLOGY     Microbiology Results (last 7 days)       Procedure Component Value Units Date/Time    Blood Culture [[id number]]  (Normal) Collected: 01/03/23 1100    Order Status: Completed Specimen: Blood Updated: 01/05/23 1900     CULTURE, BLOOD ([facility]) No Growth At 48 Hours    Blood Culture [[id number]]  (Normal) Collected: 01/03/23 1100    Order Status: Completed Specimen: Blood Updated: 01/05/23 1900     CULTURE, BLOOD ([facility]) No Growth At 48 Hours    Blood culture #2 **CANNOT BE ORDERED STAT** [[id number]]  (Normal) Collected: 01/01/23 0439    Order Status: Completed Specimen: Blood Updated: 01/05/23 1501     CULTURE, BLOOD ([facility]) No Growth At 96 Hours    Blood culture #1 **CANNOT BE ORDERED STAT** [[id number]]  (Abnormal) Collected: 01/01/23 0439    Order Status: Completed Specimen: Blood Updated: 01/04/23 0646     CULTURE, BLOOD ([facility]) MICROCOCCUS SPECIES     Comment: This organism is commonly considered a contaminant.  No further processing will be done.        GRAM STAIN Gram Positive Cocci, probable Staphylococcus      Seen in gram stain of broth only      1 of 2 Aerobic bottles positive    Respiratory Culture [[id number]]     Order Status: Sent Specimen: Sputum               DIAGNOSTICS   X-Ray Chest 1 View  Narrative: EXAMINATION:  XR CHEST 1 VIEW    CLINICAL HISTORY:  pneumonia;    TECHNIQUE:  Single view of the chest    COMPARISON:  01/05/2023    FINDINGS:  Markedly prominent interstitial markings with no focal opacification.  Small bilateral pleural  "effusions.  Impression: As above.    Electronically signed by: Pio Espinoza  Date:    01/06/2023  Time:    06:30        ASSESSMENT   Acute hypercapnic respiratory failure  -hypoxemic, hypercapnic  -2/2 suspected aspiration pna and concurrent opiate+benzo use   -qhs bipap refused by patient  -supplemental O2 as required   -pt at baseline O2 requirement at this time  -repeat chest x-ray from 01/03 showed no significant change        Bacteremia  -vancomycin started 1/2  -repeat blood cultures pending  -TTE pending   -[facility]?        Pneumonia of left upper lobe due to infectious organism  -wife affirms brown sputum production--> pseudomonal infection?  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  -wife affirms significant weight loss lately  -smita count        Aspiration pneumonia  -reportedly pt has been experiencing difficulty swallowing since being diagnosed with thrush around Thanksgiving  -no evidence of thrush on exam 1/2  -ST cleared for diet         Myoclonus  -wife reports pt saw neurology 2/2 "tremor"  -was prescribed lorazepam for this         CHF systolic dysfunction  EF 30%     Elevated LFT  Trending down    PLAN   Resume pain meds  Discussed with family concern over low BP  Patient if comes to it no escalation in care and would want comfort measures  F/u cardio rec's  Hold lasix  BB based on parameters and BP  Iv vanc  Restart home pain meds  Get morning labs  Case mgmt- hospice " eval      Prophylaxis: pino Schneider MD  [facility]

## 2023-01-06 NOTE — PROGRESS NOTES
Name: Balbir Rogers    : 1960 (62 y.o.)  MRN: [id number]           Patient Unavailable      Patient unable to be seen at this time secondary to: patient on medical hold all day per [facility]

## 2023-01-06 NOTE — PROGRESS NOTES
"[facility]  Cardiology  Progress Note    Patient Name: Balbir Rogers  MRN: [id number]  Admission Date: 1/1/2023  Hospital Length of Stay: 3 days  Code Status: DNR   Attending Physician: Thairy G Reyes, DO   Primary Care Physician: Jose Johnson Jr, MD  Expected Discharge Date:   Principal Problem:Acute hypercapnic respiratory failure    Subjective:   Reason for consult:  CHF    Brief HPI: Mr. Rogers is a 62 year old male who is unknown to [facility]. The patient has history of Emphysema/COPD and is on Home O2 and NIPPV.  He presented to the ER on 1.1.23 with somnolence and home O2 Saturation of 78%.  He was recently Diagnosed with PNA around Thanksgiving and was treated with Oral Antibiotics.  Upon arrival by EMS he was found to be Somnolent and hypoxic. It is important to note that the patient sees Pain Management and is prescribed Oxycodone and Buprenorphine. Secondary to his Somnolence and Hypoxemia he was given Narcan with noted improvement in his Mental Status. His initial workup in the ER Revealed WBCs, 19.9, .6 and a CTA of Chest (1.1.23) which revealed Consolidative Changes and Emphysematic Changes. He was placed on BiPAP and IV Abx and subsequently admitted to Hospital Medicine. He had an ECHO which revealed an EF of 30% and for this [facility] was consulted.     Hospital Course:   1.6.23: Patient hypotensive (94/59) this am.  No diuresis noted with one time dose of Lasix 20 mg IVP; however I&O not accurate.  Patient and family reports increase in urination.  "My breathing is better today."    PMH: COPD with hypoxia, degenerative cervical spinal stenosis, depression, disuse osteoporosis, emphysema/COPD, Hepatitis C, Leukocytosis, collapsed lung.  PSH: cervical laminectomy with spinal fusion, cholecystectomy, colonoscopy, EGD, GI biopsy, graft to nose, inguinal herniotomy, manible operation, polypectomy  Family History: No significant history  Social History: Former smoker, User of " prescription opioids and benzo., denies ETOH use.     Previous Cardiac Diagnostics:   Echo (2009): EF 50%    Review of Systems   Constitutional: Positive for malaise/fatigue. Negative for fever.   Cardiovascular:  Positive for dyspnea on exertion. Negative for chest pain.   Respiratory:  Positive for shortness of breath and wheezing.    All other systems reviewed and are negative.    Objective:     Vital Signs (Most Recent):  Temp: 98.2 °F (36.8 °C) (01/06/23 0731)  Pulse: 80 (01/06/23 0731)  Resp: 20 (01/06/23 0703)  BP: (!) 94/59 (01/06/23 0731)  SpO2: (!) 88 % (01/06/23 0731)   Vital Signs (24h Range):  Temp:  [97.5 °F (36.4 °C)-98.7 °F (37.1 °C)] 98.2 °F (36.8 °C)  Pulse:  [80-99] 80  Resp:  [18-22] 20  SpO2:  [88 %-92 %] 88 %  BP: ()/(51-69) 94/59     Weight: 60.3 kg (132 lb 15 oz)  Body mass index is 19.07 kg/m².    SpO2: (!) 88 %         Intake/Output Summary (Last 24 hours) at 1/6/2023 0813  Last data filed at 1/5/2023 1800  Gross per 24 hour   Intake 237 ml   Output --   Net 237 ml     Lines/Drains/Airways       Peripheral Intravenous Line  Duration                  Midline Catheter Insertion/Assessment  - Single Lumen 01/04/23 1548 Left basilic vein (medial side of arm) other (see comments) 1 day                  Significant Labs:   Recent Results (from the past 72 hour(s))   Blood Culture    Collection Time: 01/03/23 11:00 AM    Specimen: Blood   Result Value Ref Range    CULTURE, BLOOD ([facility]) No Growth At 48 Hours    Blood Culture    Collection Time: 01/03/23 11:00 AM    Specimen: Blood   Result Value Ref Range    CULTURE, BLOOD ([facility]) No Growth At 48 Hours    VANCOMYCIN, TROUGH    Collection Time: 01/03/23 11:00 AM   Result Value Ref Range    Vancomycin Trough 7.7 (L) 15.0 - 20.0 ug/ml   Echo    Collection Time: 01/03/23  4:52 PM   Result Value Ref Range    BSA 1.73 m2    Right Atrial Pressure (from IVC) 8 mmHg    EF 30 %   Comprehensive Metabolic Panel    Collection Time: 01/04/23  3:30 AM   Result  Value Ref Range    Sodium Level 136 136 - 145 mmol/L    Potassium Level 4.3 3.5 - 5.1 mmol/L    Chloride 97 (L) 98 - 107 mmol/L    Carbon Dioxide 29 23 - 31 mmol/L    Glucose Level 133 (H) 82 - 115 mg/dL    Blood Urea Nitrogen 14.2 8.4 - 25.7 mg/dL    Creatinine 0.64 (L) 0.73 - 1.18 mg/dL    Calcium Level Total 9.2 8.8 - 10.0 mg/dL    Protein Total 5.7 (L) 5.8 - 7.6 gm/dL    Albumin Level 2.5 (L) 3.4 - 4.8 g/dL    Globulin 3.2 2.4 - 3.5 gm/dL    Albumin/Globulin Ratio 0.8 (L) 1.1 - 2.0 ratio    Bilirubin Total 0.7 <=1.5 mg/dL    Alkaline Phosphatase 162 (H) 40 - 150 unit/L    Alanine Aminotransferase 134 (H) 0 - 55 unit/L    Aspartate Aminotransferase 104 (H) 5 - 34 unit/L    eGFR >90 mls/min/1.73/m2   Magnesium    Collection Time: 01/04/23  3:30 AM   Result Value Ref Range    Magnesium Level 2.30 1.60 - 2.60 mg/dL   CBC with Differential    Collection Time: 01/04/23  3:30 AM   Result Value Ref Range    WBC 16.6 (H) 4.5 - 11.5 x10(3)/mcL    RBC 3.82 (L) 4.70 - 6.10 x10(6)/mcL    Hgb 11.9 (L) 14.0 - 18.0 gm/dL    Hct 37.6 (L) 42.0 - 52.0 %    MCV 98.4 (H) 80.0 - 94.0 fL    MCH 31.2 pg    MCHC 31.6 (L) 33.0 - 36.0 mg/dL    RDW 14.0 11.6 - 14.4 %    Platelet 337 140 - 371 x10(3)/mcL    MPV 10.3 9.4 - 12.4 fL    Neut % 78.8 %    Lymph % 11.3 %    Mono % 8.7 %    Eos % 0.1 %    Basophil % 0.1 %    Lymph # 1.87 0.6 - 4.6 x10(3)/mcL    Neut # 13.12 (H) 2.1 - 9.2 x10(3)/mcL    Mono # 1.44 (H) 0.1 - 1.3 x10(3)/mcL    Eos # 0.01 0 - 0.9 x10(3)/mcL    Baso # 0.01 0 - 0.2 x10(3)/mcL   VANCOMYCIN, TROUGH    Collection Time: 01/04/23 11:37 AM   Result Value Ref Range    Vancomycin Trough 13.0 (L) 15.0 - 20.0 ug/ml   Comprehensive Metabolic Panel    Collection Time: 01/05/23  3:50 AM   Result Value Ref Range    Sodium Level 137 136 - 145 mmol/L    Potassium Level 4.1 3.5 - 5.1 mmol/L    Chloride 96 (L) 98 - 107 mmol/L    Carbon Dioxide 30 23 - 31 mmol/L    Glucose Level 103 82 - 115 mg/dL    Blood Urea Nitrogen 9.8 8.4 - 25.7  mg/dL    Creatinine 0.53 (L) 0.73 - 1.18 mg/dL    Calcium Level Total 8.4 (L) 8.8 - 10.0 mg/dL    Protein Total 5.2 (L) 5.8 - 7.6 gm/dL    Albumin Level 2.4 (L) 3.4 - 4.8 g/dL    Globulin 2.8 2.4 - 3.5 gm/dL    Albumin/Globulin Ratio 0.9 (L) 1.1 - 2.0 ratio    Bilirubin Total 0.8 <=1.5 mg/dL    Alkaline Phosphatase 132 40 - 150 unit/L    Alanine Aminotransferase 113 (H) 0 - 55 unit/L    Aspartate Aminotransferase 66 (H) 5 - 34 unit/L    eGFR >90 mls/min/1.73/m2   CBC with Differential    Collection Time: 01/05/23  3:50 AM   Result Value Ref Range    WBC 15.0 (H) 4.5 - 11.5 x10(3)/mcL    RBC 3.68 (L) 4.70 - 6.10 x10(6)/mcL    Hgb 11.3 (L) 14.0 - 18.0 gm/dL    Hct 35.5 (L) 42.0 - 52.0 %    MCV 96.5 (H) 80.0 - 94.0 fL    MCH 30.7 pg    MCHC 31.8 (L) 33.0 - 36.0 mg/dL    RDW 13.7 11.6 - 14.4 %    Platelet 268 140 - 371 x10(3)/mcL    MPV 10.1 9.4 - 12.4 fL    Neut % 82.4 %    Lymph % 9.0 %    Mono % 7.2 %    Eos % 0.2 %    Basophil % 0.1 %    Lymph # 1.34 0.6 - 4.6 x10(3)/mcL    Neut # 12.33 (H) 2.1 - 9.2 x10(3)/mcL    Mono # 1.07 0.1 - 1.3 x10(3)/mcL    Eos # 0.03 0 - 0.9 x10(3)/mcL    Baso # 0.01 0 - 0.2 x10(3)/mcL    IG# 0.17 (H) 0 - 0.04 x10(3)/mcL    IG% 1.1 %   Comprehensive Metabolic Panel    Collection Time: 01/06/23  3:25 AM   Result Value Ref Range    Sodium Level 133 (L) 136 - 145 mmol/L    Potassium Level 3.7 3.5 - 5.1 mmol/L    Chloride 91 (L) 98 - 107 mmol/L    Carbon Dioxide 35 (H) 23 - 31 mmol/L    Glucose Level 84 82 - 115 mg/dL    Blood Urea Nitrogen 8.3 (L) 8.4 - 25.7 mg/dL    Creatinine 0.57 (L) 0.73 - 1.18 mg/dL    Calcium Level Total 8.2 (L) 8.8 - 10.0 mg/dL    Protein Total 4.9 (L) 5.8 - 7.6 gm/dL    Albumin Level 1.9 (L) 3.4 - 4.8 g/dL    Globulin 3.0 2.4 - 3.5 gm/dL    Albumin/Globulin Ratio 0.6 (L) 1.1 - 2.0 ratio    Bilirubin Total 0.5 <=1.5 mg/dL    Alkaline Phosphatase 109 40 - 150 unit/L    Alanine Aminotransferase 84 (H) 0 - 55 unit/L    Aspartate Aminotransferase 46 (H) 5 - 34 unit/L     eGFR >90 mls/min/1.73/m2   Magnesium    Collection Time: 01/06/23  3:25 AM   Result Value Ref Range    Magnesium Level 2.10 1.60 - 2.60 mg/dL   CBC with Differential    Collection Time: 01/06/23  3:25 AM   Result Value Ref Range    WBC 14.2 (H) 4.5 - 11.5 x10(3)/mcL    RBC 3.63 (L) 4.70 - 6.10 x10(6)/mcL    Hgb 11.1 (L) 14.0 - 18.0 gm/dL    Hct 35.2 (L) 42.0 - 52.0 %    MCV 97.0 (H) 80.0 - 94.0 fL    MCH 30.6 pg    MCHC 31.5 (L) 33.0 - 36.0 mg/dL    RDW 14.3 11.6 - 14.4 %    Platelet 234 140 - 371 x10(3)/mcL    MPV 10.3 9.4 - 12.4 fL    Neut % 86.2 %    Lymph % 6.0 %    Mono % 6.1 %    Eos % 0.8 %    Basophil % 0.1 %    Lymph # 0.85 0.6 - 4.6 x10(3)/mcL    Neut # 12.28 (H) 2.1 - 9.2 x10(3)/mcL    Mono # 0.87 0.1 - 1.3 x10(3)/mcL    Eos # 0.12 0 - 0.9 x10(3)/mcL    Baso # 0.01 0 - 0.2 x10(3)/mcL    IG# 0.11 (H) 0 - 0.04 x10(3)/mcL    IG% 0.8 %     Telemetry:  SR-ST Rate 90s-100s    Physical Exam  Constitutional:       General: He is in acute distress.      Appearance: He is ill-appearing.   HENT:      Head: Normocephalic.      Mouth/Throat:      Mouth: Mucous membranes are dry.   Eyes:      Extraocular Movements: Extraocular movements intact.   Cardiovascular:      Rate and Rhythm: Normal rate and regular rhythm.      Pulses: Normal pulses.   Pulmonary:      Effort: Tachypnea and respiratory distress present. No accessory muscle usage.      Breath sounds: Wheezing and rhonchi present.      Comments: Simple Face Mask; Less Accessory Muscle Use as Prior Exam; Barrel Chest  Abdominal:      Palpations: Abdomen is soft.   Skin:     General: Skin is warm and dry.      Capillary Refill: Capillary refill takes 2 to 3 seconds.   Neurological:      General: No focal deficit present.      Mental Status: He is alert and oriented to person, place, and time.   Psychiatric:         Mood and Affect: Mood normal.         Behavior: Behavior normal.     Current Inpatient Medications:    Current Facility-Administered Medications:      acetaminophen tablet 650 mg, 650 mg, Oral, Q4H PRN, Thairy G Reyes, DO    albuterol-ipratropium 2.5 mg-0.5 mg/3 mL nebulizer solution 3 mL, 3 mL, Nebulization, Q6H WAKE, Constantinegeraldo HDEZ Reyes, DO, 3 mL at 01/06/23 0703    albuterol-ipratropium 2.5 mg-0.5 mg/3 mL nebulizer solution 3 mL, 3 mL, Nebulization, Q6H PRN, Thairy G Reyes DO    ALPRAZolam tablet 0.25 mg, 0.25 mg, Oral, TID PRN, Jay Schneider MD, 0.25 mg at 01/05/23 2127    aluminum-magnesium hydroxide-simethicone 200-200-20 mg/5 mL suspension 30 mL, 30 mL, Oral, QID PRN, Thairy G Reyes, DO    bisacodyL suppository 10 mg, 10 mg, Rectal, Daily PRN, Thairy G ReyesDO    ceFEPIme (MAXIPIME) 2 g in dextrose 5 % in water (D5W) 5 % 50 mL IVPB (MB+), 2 g, Intravenous, Q8H, Thairy G Reyes, DO, Stopped at 01/06/23 0231    ciprofloxacin (CIPRO)400mg/200ml D5W IVPB 400 mg, 400 mg, Intravenous, Q8H, Thairy G Reyes, DO, Stopped at 01/06/23 0201    dextrose 50% injection 12.5 g, 12.5 g, Intravenous, PRN, Natasha Ding MD    dextrose 50% injection 25 g, 25 g, Intravenous, PRN, Thairy G Reyes DO    enoxaparin injection 40 mg, 40 mg, Subcutaneous, Daily, Thairy G Reyes, DO, 40 mg at 01/05/23 1642    fluticasone furoate 100 mcg/actuation inhaler 1 puff, 1 puff, Inhalation, Daily, Thairy G Reyes, DO, 1 puff at 01/05/23 0830    glucagon (human recombinant) injection 1 mg, 1 mg, Intramuscular, PRN, Natasha Ding MD    glucose chewable tablet 16 g, 16 g, Oral, PRN, Natasha Ding MD    glucose chewable tablet 24 g, 24 g, Oral, PRN, Thairy G Reyes, DO    melatonin tablet 9 mg, 9 mg, Oral, Nightly PRN, Thairy G Reyes, DO, 9 mg at 01/06/23 0209    metoprolol succinate (TOPROL-XL) 24 hr split tablet 12.5 mg, 12.5 mg, Oral, Daily, KESHIA Allred, 12.5 mg at 01/05/23 1030    naloxone 0.4 mg/mL injection 0.02 mg, 0.02 mg, Intravenous, PRN, Natasha Ding MD    ondansetron disintegrating tablet 8 mg, 8 mg, Oral, Q8H PRN, Thairy G Reyes, DO    ondansetron injection 4 mg,  4 mg, Intravenous, Q8H PRN, Thairy G Reyes, DO    oxyCODONE immediate release tablet 20 mg, 20 mg, Oral, Q6H PRN, Jay Schneider MD, 20 mg at 01/06/23 0513    polyethylene glycol packet 17 g, 17 g, Oral, TID PRN, Thairy G Reyes, DO    sertraline tablet 50 mg, 50 mg, Oral, Daily, Thairy G Reyes DO, 50 mg at 01/05/23 0827    simethicone chewable tablet 80 mg, 1 tablet, Oral, QID PRN, Thairy G Reyes, DO    sodium chloride 0.9% flush 10 mL, 10 mL, Intravenous, Q12H PRN, Natasha Ding MD    Flushing PICC Protocol, , , Until Discontinued **AND** sodium chloride 0.9% flush 10 mL, 10 mL, Intravenous, Q6H, 10 mL at 01/06/23 0514 **AND** sodium chloride 0.9% flush 10 mL, 10 mL, Intravenous, PRN, Jay Schneider    umeclidinium-vilanteroL 62.5-25 mcg/actuation DsDv 1 puff, 1 puff, Inhalation, Daily, Thairy G Reyes, DO, 1 puff at 01/05/23 0900    Pharmacy to dose Vancomycin consult, , , Once **AND** vancomycin - pharmacy to dose, , Intravenous, pharmacy to manage frequency, Thairy G Reyes, DO    vancomycin 1,250 mg in dextrose 5 % 250 mL IVPB, 1,250 mg, Intravenous, Q12H, Thairy G Reyes, DO, Last Rate: 166.7 mL/hr at 01/06/23 0514, 1,250 mg at 01/06/23 0514    VTE Risk Mitigation (From admission, onward)           Ordered     enoxaparin injection 40 mg  Daily         01/01/23 1721     IP VTE HIGH RISK PATIENT  Once         01/01/23 1640     Place sequential compression device  Until discontinued         01/01/23 1640                    Assessment:   Newly Diagnosed/Acute Systolic HF/EF 30% (Compensated)    - No Hx of Ischemic Evaluation/Workup  COPD/Emphysema with Exacerbation  Aspiratory Pneumonitis  Possible Bacteremia     - BC x 2 - 1/2 Bottles with GPC/Probably Staph (Micrococcus Species)  Acute Hypoxemic Respiratory Failure requiring BiPAP - Now on Simple Face Mask    - Secondary to Suspected Aspiration Pneumonia, Polysusbstance Abuse, Bacteremia, HF  Chronic Pain with Opioid Use   Degenerative C-Spine  Stenosis  Electrolyte Derangements - Hypokalemia  Former Smoker    Plan:   Difficult to Control Pain with Borderline BP (Defer to Primary Team)  Replace K (3.7)- Potassium 40 mEq PO x 1 Dose  Hold off on Diuresis secondary to Hypotension  Discontinue Toprol XL secondary to Hypotension  Unable to Start ACEi/ARB/ARNI Secondary to Low Baseline BP  Patient needs Outpatient Ischemic Workup, will be, Dependent on Clinical Condition  Consider Goals of Care Discussion, Patient Condition is Guarded  Plan of Care Reviewed and Discussed with Family at Attending MD  Will Monitor From a Distance, Call if Needed    Joel oMore ANP-C  Cardiology  [facility]  01/06/2023

## 2023-01-07 ENCOUNTER — HOSPITAL ENCOUNTER (INPATIENT)
Facility: HOSPITAL | Age: 63
LOS: 5 days | Discharge: HOME HEALTH | DRG: 189 | End: 2023-01-12
Attending: FAMILY MEDICINE | Admitting: STUDENT IN AN ORGANIZED HEALTH CARE EDUCATION/TRAINING PROGRAM
Payer: COMMERCIAL

## 2023-01-07 VITALS
SYSTOLIC BLOOD PRESSURE: 99 MMHG | BODY MASS INDEX: 17.36 KG/M2 | HEIGHT: 70 IN | TEMPERATURE: 98 F | RESPIRATION RATE: 18 BRPM | HEART RATE: 71 BPM | WEIGHT: 121.25 LBS | DIASTOLIC BLOOD PRESSURE: 62 MMHG | OXYGEN SATURATION: 96 %

## 2023-01-07 DIAGNOSIS — I42.9 CARDIOMYOPATHY: ICD-10-CM

## 2023-01-07 DIAGNOSIS — J18.9 PNEUMONIA OF LEFT UPPER LOBE DUE TO INFECTIOUS ORGANISM: ICD-10-CM

## 2023-01-07 DIAGNOSIS — J96.90 RESPIRATORY FAILURE: ICD-10-CM

## 2023-01-07 DIAGNOSIS — E87.5 HYPERKALEMIA: ICD-10-CM

## 2023-01-07 DIAGNOSIS — I50.9 CONGESTIVE HEART FAILURE, UNSPECIFIED HF CHRONICITY, UNSPECIFIED HEART FAILURE TYPE: Primary | ICD-10-CM

## 2023-01-07 DIAGNOSIS — J44.1 COPD EXACERBATION: ICD-10-CM

## 2023-01-07 DIAGNOSIS — R64 PULMONARY CACHEXIA DUE TO COPD: ICD-10-CM

## 2023-01-07 DIAGNOSIS — J44.9 PULMONARY CACHEXIA DUE TO COPD: ICD-10-CM

## 2023-01-07 DIAGNOSIS — R07.9 CHEST PAIN: ICD-10-CM

## 2023-01-07 LAB
ALBUMIN SERPL-MCNC: 1.7 G/DL (ref 3.4–4.8)
ALBUMIN/GLOB SERPL: 0.4 RATIO (ref 1.1–2)
ALP SERPL-CCNC: 89 UNIT/L (ref 40–150)
ALT SERPL-CCNC: 59 UNIT/L (ref 0–55)
AST SERPL-CCNC: 28 UNIT/L (ref 5–34)
BASOPHILS # BLD AUTO: 0.02 X10(3)/MCL (ref 0–0.2)
BASOPHILS NFR BLD AUTO: 0.2 %
BILIRUBIN DIRECT+TOT PNL SERPL-MCNC: 0.3 MG/DL
BUN SERPL-MCNC: 8.2 MG/DL (ref 8.4–25.7)
CALCIUM SERPL-MCNC: 8.7 MG/DL (ref 8.8–10)
CHLORIDE SERPL-SCNC: 92 MMOL/L (ref 98–107)
CO2 SERPL-SCNC: 35 MMOL/L (ref 23–31)
CREAT SERPL-MCNC: 0.61 MG/DL (ref 0.73–1.18)
EOSINOPHIL # BLD AUTO: 0.18 X10(3)/MCL (ref 0–0.9)
EOSINOPHIL NFR BLD AUTO: 2 %
ERYTHROCYTE [DISTWIDTH] IN BLOOD BY AUTOMATED COUNT: 14.3 % (ref 11.6–14.4)
GFR SERPLBLD CREATININE-BSD FMLA CKD-EPI: >90 MLS/MIN/1.73/M2
GLOBULIN SER-MCNC: 3.8 GM/DL (ref 2.4–3.5)
GLUCOSE SERPL-MCNC: 127 MG/DL (ref 82–115)
HCT VFR BLD AUTO: 35 % (ref 42–52)
HGB BLD-MCNC: 10.7 GM/DL (ref 14–18)
IMM GRANULOCYTES # BLD AUTO: 0.08 X10(3)/MCL (ref 0–0.04)
IMM GRANULOCYTES NFR BLD AUTO: 0.9 %
LYMPHOCYTES # BLD AUTO: 0.68 X10(3)/MCL (ref 0.6–4.6)
LYMPHOCYTES NFR BLD AUTO: 7.5 %
MCH RBC QN AUTO: 29.9 PG
MCHC RBC AUTO-ENTMCNC: 30.6 MG/DL (ref 33–36)
MCV RBC AUTO: 97.8 FL (ref 80–94)
MONOCYTES # BLD AUTO: 0.54 X10(3)/MCL (ref 0.1–1.3)
MONOCYTES NFR BLD AUTO: 5.9 %
NEUTROPHILS # BLD AUTO: 7.61 X10(3)/MCL (ref 2.1–9.2)
NEUTROPHILS NFR BLD AUTO: 83.5 %
PLATELET # BLD AUTO: 210 X10(3)/MCL (ref 140–371)
PMV BLD AUTO: 9.9 FL (ref 9.4–12.4)
POTASSIUM SERPL-SCNC: 3.9 MMOL/L (ref 3.5–5.1)
PROT SERPL-MCNC: 5.5 GM/DL (ref 5.8–7.6)
RBC # BLD AUTO: 3.58 X10(6)/MCL (ref 4.7–6.1)
SODIUM SERPL-SCNC: 134 MMOL/L (ref 136–145)
WBC # SPEC AUTO: 9.1 X10(3)/MCL (ref 4.5–11.5)

## 2023-01-07 PROCEDURE — 25000003 PHARM REV CODE 250: Performed by: FAMILY MEDICINE

## 2023-01-07 PROCEDURE — 94660 CPAP INITIATION&MGMT: CPT

## 2023-01-07 PROCEDURE — 99900035 HC TECH TIME PER 15 MIN (STAT)

## 2023-01-07 PROCEDURE — 94640 AIRWAY INHALATION TREATMENT: CPT

## 2023-01-07 PROCEDURE — 36415 COLL VENOUS BLD VENIPUNCTURE: CPT | Performed by: FAMILY MEDICINE

## 2023-01-07 PROCEDURE — 25000242 PHARM REV CODE 250 ALT 637 W/ HCPCS: Performed by: STUDENT IN AN ORGANIZED HEALTH CARE EDUCATION/TRAINING PROGRAM

## 2023-01-07 PROCEDURE — A4216 STERILE WATER/SALINE, 10 ML: HCPCS

## 2023-01-07 PROCEDURE — A4216 STERILE WATER/SALINE, 10 ML: HCPCS | Performed by: FAMILY MEDICINE

## 2023-01-07 PROCEDURE — 80053 COMPREHEN METABOLIC PANEL: CPT | Performed by: FAMILY MEDICINE

## 2023-01-07 PROCEDURE — 85025 COMPLETE CBC W/AUTO DIFF WBC: CPT | Performed by: FAMILY MEDICINE

## 2023-01-07 PROCEDURE — 11000004 HC SNF PRIVATE

## 2023-01-07 PROCEDURE — 97530 THERAPEUTIC ACTIVITIES: CPT

## 2023-01-07 PROCEDURE — 25000003 PHARM REV CODE 250

## 2023-01-07 PROCEDURE — 25000003 PHARM REV CODE 250: Performed by: STUDENT IN AN ORGANIZED HEALTH CARE EDUCATION/TRAINING PROGRAM

## 2023-01-07 PROCEDURE — 63600175 PHARM REV CODE 636 W HCPCS: Performed by: STUDENT IN AN ORGANIZED HEALTH CARE EDUCATION/TRAINING PROGRAM

## 2023-01-07 PROCEDURE — 25000242 PHARM REV CODE 250 ALT 637 W/ HCPCS: Performed by: FAMILY MEDICINE

## 2023-01-07 PROCEDURE — 63600175 PHARM REV CODE 636 W HCPCS: Performed by: FAMILY MEDICINE

## 2023-01-07 PROCEDURE — 27000221 HC OXYGEN, UP TO 24 HOURS

## 2023-01-07 PROCEDURE — 94760 N-INVAS EAR/PLS OXIMETRY 1: CPT

## 2023-01-07 RX ORDER — TALC
9 POWDER (GRAM) TOPICAL NIGHTLY PRN
Status: DISCONTINUED | OUTPATIENT
Start: 2023-01-07 | End: 2023-01-12 | Stop reason: HOSPADM

## 2023-01-07 RX ORDER — IBUPROFEN 200 MG
24 TABLET ORAL
Status: CANCELLED | OUTPATIENT
Start: 2023-01-07

## 2023-01-07 RX ORDER — ENOXAPARIN SODIUM 100 MG/ML
40 INJECTION SUBCUTANEOUS EVERY 24 HOURS
Status: DISCONTINUED | OUTPATIENT
Start: 2023-01-07 | End: 2023-01-12 | Stop reason: HOSPADM

## 2023-01-07 RX ORDER — ENOXAPARIN SODIUM 100 MG/ML
40 INJECTION SUBCUTANEOUS EVERY 24 HOURS
Status: CANCELLED | OUTPATIENT
Start: 2023-01-07

## 2023-01-07 RX ORDER — ONDANSETRON 2 MG/ML
4 INJECTION INTRAMUSCULAR; INTRAVENOUS EVERY 8 HOURS PRN
Status: DISCONTINUED | OUTPATIENT
Start: 2023-01-07 | End: 2023-01-12 | Stop reason: HOSPADM

## 2023-01-07 RX ORDER — OXYCODONE HYDROCHLORIDE 5 MG/1
30 TABLET ORAL EVERY 6 HOURS PRN
Status: DISCONTINUED | OUTPATIENT
Start: 2023-01-07 | End: 2023-01-07 | Stop reason: HOSPADM

## 2023-01-07 RX ORDER — OXYCODONE HYDROCHLORIDE 5 MG/1
30 TABLET ORAL EVERY 6 HOURS PRN
Status: CANCELLED | OUTPATIENT
Start: 2023-01-07

## 2023-01-07 RX ORDER — SODIUM CHLORIDE 0.9 % (FLUSH) 0.9 %
10 SYRINGE (ML) INJECTION EVERY 6 HOURS
Status: DISCONTINUED | OUTPATIENT
Start: 2023-01-07 | End: 2023-01-11

## 2023-01-07 RX ORDER — TALC
6 POWDER (GRAM) TOPICAL NIGHTLY PRN
Status: DISCONTINUED | OUTPATIENT
Start: 2023-01-07 | End: 2023-01-07

## 2023-01-07 RX ORDER — BISACODYL 10 MG
10 SUPPOSITORY, RECTAL RECTAL DAILY PRN
Status: CANCELLED | OUTPATIENT
Start: 2023-01-07

## 2023-01-07 RX ORDER — CALCIUM CARBONATE 200(500)MG
500 TABLET,CHEWABLE ORAL 2 TIMES DAILY PRN
Status: DISCONTINUED | OUTPATIENT
Start: 2023-01-07 | End: 2023-01-12 | Stop reason: HOSPADM

## 2023-01-07 RX ORDER — ACETAMINOPHEN 325 MG/1
650 TABLET ORAL EVERY 4 HOURS PRN
Status: CANCELLED | OUTPATIENT
Start: 2023-01-07

## 2023-01-07 RX ORDER — POLYETHYLENE GLYCOL 3350 17 G/17G
17 POWDER, FOR SOLUTION ORAL 3 TIMES DAILY PRN
Status: DISCONTINUED | OUTPATIENT
Start: 2023-01-07 | End: 2023-01-12 | Stop reason: HOSPADM

## 2023-01-07 RX ORDER — SERTRALINE HYDROCHLORIDE 50 MG/1
50 TABLET, FILM COATED ORAL DAILY
Status: CANCELLED | OUTPATIENT
Start: 2023-01-08

## 2023-01-07 RX ORDER — ACETAMINOPHEN 325 MG/1
650 TABLET ORAL EVERY 6 HOURS PRN
Status: DISCONTINUED | OUTPATIENT
Start: 2023-01-07 | End: 2023-01-11

## 2023-01-07 RX ORDER — GLUCAGON 1 MG
1 KIT INJECTION
Status: DISCONTINUED | OUTPATIENT
Start: 2023-01-07 | End: 2023-01-12 | Stop reason: HOSPADM

## 2023-01-07 RX ORDER — IPRATROPIUM BROMIDE AND ALBUTEROL SULFATE 2.5; .5 MG/3ML; MG/3ML
3 SOLUTION RESPIRATORY (INHALATION)
Status: DISCONTINUED | OUTPATIENT
Start: 2023-01-07 | End: 2023-01-12 | Stop reason: HOSPADM

## 2023-01-07 RX ORDER — ALPRAZOLAM 0.25 MG/1
0.25 TABLET ORAL 3 TIMES DAILY PRN
Status: DISCONTINUED | OUTPATIENT
Start: 2023-01-07 | End: 2023-01-12 | Stop reason: HOSPADM

## 2023-01-07 RX ORDER — AMOXICILLIN 250 MG
1 CAPSULE ORAL 2 TIMES DAILY
Status: DISCONTINUED | OUTPATIENT
Start: 2023-01-07 | End: 2023-01-12 | Stop reason: HOSPADM

## 2023-01-07 RX ORDER — BISACODYL 10 MG
10 SUPPOSITORY, RECTAL RECTAL DAILY PRN
Status: DISCONTINUED | OUTPATIENT
Start: 2023-01-07 | End: 2023-01-12 | Stop reason: HOSPADM

## 2023-01-07 RX ORDER — TALC
6 POWDER (GRAM) TOPICAL NIGHTLY PRN
Status: CANCELLED | OUTPATIENT
Start: 2023-01-07

## 2023-01-07 RX ORDER — CIPROFLOXACIN 2 MG/ML
400 INJECTION, SOLUTION INTRAVENOUS
Status: CANCELLED | OUTPATIENT
Start: 2023-01-07 | End: 2023-01-08

## 2023-01-07 RX ORDER — ACETAMINOPHEN 325 MG/1
650 TABLET ORAL EVERY 6 HOURS PRN
Status: CANCELLED | OUTPATIENT
Start: 2023-01-07

## 2023-01-07 RX ORDER — OXYCODONE HYDROCHLORIDE 5 MG/1
30 TABLET ORAL EVERY 6 HOURS PRN
Status: DISCONTINUED | OUTPATIENT
Start: 2023-01-07 | End: 2023-01-12 | Stop reason: HOSPADM

## 2023-01-07 RX ORDER — IBUPROFEN 200 MG
16 TABLET ORAL
Status: DISCONTINUED | OUTPATIENT
Start: 2023-01-07 | End: 2023-01-12 | Stop reason: HOSPADM

## 2023-01-07 RX ORDER — TALC
9 POWDER (GRAM) TOPICAL NIGHTLY PRN
Status: CANCELLED | OUTPATIENT
Start: 2023-01-07

## 2023-01-07 RX ORDER — NALOXONE HCL 0.4 MG/ML
0.02 VIAL (ML) INJECTION
Status: CANCELLED | OUTPATIENT
Start: 2023-01-07

## 2023-01-07 RX ORDER — ALPRAZOLAM 0.25 MG/1
0.25 TABLET ORAL 3 TIMES DAILY PRN
Status: CANCELLED | OUTPATIENT
Start: 2023-01-07

## 2023-01-07 RX ORDER — IPRATROPIUM BROMIDE AND ALBUTEROL SULFATE 2.5; .5 MG/3ML; MG/3ML
3 SOLUTION RESPIRATORY (INHALATION) EVERY 6 HOURS PRN
Status: CANCELLED | OUTPATIENT
Start: 2023-01-07

## 2023-01-07 RX ORDER — IBUPROFEN 200 MG
16 TABLET ORAL
Status: CANCELLED | OUTPATIENT
Start: 2023-01-07

## 2023-01-07 RX ORDER — SERTRALINE HYDROCHLORIDE 50 MG/1
50 TABLET, FILM COATED ORAL DAILY
Status: DISCONTINUED | OUTPATIENT
Start: 2023-01-08 | End: 2023-01-12 | Stop reason: HOSPADM

## 2023-01-07 RX ORDER — SODIUM CHLORIDE 0.9 % (FLUSH) 0.9 %
10 SYRINGE (ML) INJECTION EVERY 12 HOURS PRN
Status: DISCONTINUED | OUTPATIENT
Start: 2023-01-07 | End: 2023-01-12 | Stop reason: HOSPADM

## 2023-01-07 RX ORDER — IPRATROPIUM BROMIDE AND ALBUTEROL SULFATE 2.5; .5 MG/3ML; MG/3ML
3 SOLUTION RESPIRATORY (INHALATION) EVERY 6 HOURS PRN
Status: DISCONTINUED | OUTPATIENT
Start: 2023-01-07 | End: 2023-01-07 | Stop reason: SDUPTHER

## 2023-01-07 RX ORDER — MAG HYDROX/ALUMINUM HYD/SIMETH 200-200-20
30 SUSPENSION, ORAL (FINAL DOSE FORM) ORAL 4 TIMES DAILY PRN
Status: DISCONTINUED | OUTPATIENT
Start: 2023-01-07 | End: 2023-01-12 | Stop reason: HOSPADM

## 2023-01-07 RX ORDER — SODIUM CHLORIDE 0.9 % (FLUSH) 0.9 %
10 SYRINGE (ML) INJECTION
Status: DISCONTINUED | OUTPATIENT
Start: 2023-01-07 | End: 2023-01-12 | Stop reason: HOSPADM

## 2023-01-07 RX ORDER — MAG HYDROX/ALUMINUM HYD/SIMETH 200-200-20
30 SUSPENSION, ORAL (FINAL DOSE FORM) ORAL 4 TIMES DAILY PRN
Status: CANCELLED | OUTPATIENT
Start: 2023-01-07

## 2023-01-07 RX ORDER — SODIUM CHLORIDE 0.9 % (FLUSH) 0.9 %
10 SYRINGE (ML) INJECTION EVERY 6 HOURS
Status: CANCELLED | OUTPATIENT
Start: 2023-01-07

## 2023-01-07 RX ORDER — AMOXICILLIN 250 MG
1 CAPSULE ORAL 2 TIMES DAILY
Status: CANCELLED | OUTPATIENT
Start: 2023-01-07

## 2023-01-07 RX ORDER — ACETAMINOPHEN 325 MG/1
650 TABLET ORAL EVERY 4 HOURS PRN
Status: DISCONTINUED | OUTPATIENT
Start: 2023-01-07 | End: 2023-01-12 | Stop reason: HOSPADM

## 2023-01-07 RX ORDER — CALCIUM CARBONATE 200(500)MG
500 TABLET,CHEWABLE ORAL 2 TIMES DAILY PRN
Status: CANCELLED | OUTPATIENT
Start: 2023-01-07

## 2023-01-07 RX ORDER — IBUPROFEN 200 MG
24 TABLET ORAL
Status: DISCONTINUED | OUTPATIENT
Start: 2023-01-07 | End: 2023-01-12 | Stop reason: HOSPADM

## 2023-01-07 RX ORDER — SODIUM CHLORIDE 0.9 % (FLUSH) 0.9 %
10 SYRINGE (ML) INJECTION EVERY 12 HOURS PRN
Status: CANCELLED | OUTPATIENT
Start: 2023-01-07

## 2023-01-07 RX ORDER — CIPROFLOXACIN 2 MG/ML
400 INJECTION, SOLUTION INTRAVENOUS
Status: DISCONTINUED | OUTPATIENT
Start: 2023-01-07 | End: 2023-01-08

## 2023-01-07 RX ORDER — ONDANSETRON 2 MG/ML
4 INJECTION INTRAMUSCULAR; INTRAVENOUS EVERY 8 HOURS PRN
Status: CANCELLED | OUTPATIENT
Start: 2023-01-07

## 2023-01-07 RX ORDER — ONDANSETRON 4 MG/1
8 TABLET, ORALLY DISINTEGRATING ORAL EVERY 8 HOURS PRN
Status: DISCONTINUED | OUTPATIENT
Start: 2023-01-07 | End: 2023-01-12 | Stop reason: HOSPADM

## 2023-01-07 RX ORDER — POLYETHYLENE GLYCOL 3350 17 G/17G
17 POWDER, FOR SOLUTION ORAL 3 TIMES DAILY PRN
Status: CANCELLED | OUTPATIENT
Start: 2023-01-07

## 2023-01-07 RX ORDER — GLUCAGON 1 MG
1 KIT INJECTION
Status: CANCELLED | OUTPATIENT
Start: 2023-01-07

## 2023-01-07 RX ORDER — ONDANSETRON 4 MG/1
8 TABLET, ORALLY DISINTEGRATING ORAL EVERY 8 HOURS PRN
Status: CANCELLED | OUTPATIENT
Start: 2023-01-07

## 2023-01-07 RX ORDER — SIMETHICONE 80 MG
1 TABLET,CHEWABLE ORAL 4 TIMES DAILY PRN
Status: DISCONTINUED | OUTPATIENT
Start: 2023-01-07 | End: 2023-01-12 | Stop reason: HOSPADM

## 2023-01-07 RX ORDER — SODIUM CHLORIDE 0.9 % (FLUSH) 0.9 %
10 SYRINGE (ML) INJECTION
Status: CANCELLED | OUTPATIENT
Start: 2023-01-07

## 2023-01-07 RX ORDER — SIMETHICONE 80 MG
1 TABLET,CHEWABLE ORAL 4 TIMES DAILY PRN
Status: CANCELLED | OUTPATIENT
Start: 2023-01-07

## 2023-01-07 RX ORDER — IPRATROPIUM BROMIDE AND ALBUTEROL SULFATE 2.5; .5 MG/3ML; MG/3ML
3 SOLUTION RESPIRATORY (INHALATION)
Status: CANCELLED | OUTPATIENT
Start: 2023-01-07

## 2023-01-07 RX ORDER — NALOXONE HCL 0.4 MG/ML
0.02 VIAL (ML) INJECTION
Status: DISCONTINUED | OUTPATIENT
Start: 2023-01-07 | End: 2023-01-12 | Stop reason: HOSPADM

## 2023-01-07 RX ADMIN — SODIUM CHLORIDE, PRESERVATIVE FREE 10 ML: 5 INJECTION INTRAVENOUS at 12:01

## 2023-01-07 RX ADMIN — CEFEPIME 2 G: 2 INJECTION, POWDER, FOR SOLUTION INTRAVENOUS at 09:01

## 2023-01-07 RX ADMIN — IPRATROPIUM BROMIDE AND ALBUTEROL SULFATE 3 ML: .5; 3 SOLUTION RESPIRATORY (INHALATION) at 07:01

## 2023-01-07 RX ADMIN — ENOXAPARIN SODIUM 40 MG: 40 INJECTION SUBCUTANEOUS at 05:01

## 2023-01-07 RX ADMIN — MELATONIN TAB 3 MG 9 MG: 3 TAB at 09:01

## 2023-01-07 RX ADMIN — OXYCODONE HYDROCHLORIDE 20 MG: 5 TABLET ORAL at 06:01

## 2023-01-07 RX ADMIN — SERTRALINE HYDROCHLORIDE 50 MG: 50 TABLET, FILM COATED ORAL at 09:01

## 2023-01-07 RX ADMIN — ALPRAZOLAM 0.25 MG: 0.25 TABLET ORAL at 09:01

## 2023-01-07 RX ADMIN — OXYCODONE HYDROCHLORIDE 30 MG: 5 TABLET ORAL at 01:01

## 2023-01-07 RX ADMIN — CIPROFLOXACIN 400 MG: 2 INJECTION, SOLUTION INTRAVENOUS at 06:01

## 2023-01-07 RX ADMIN — VANCOMYCIN HYDROCHLORIDE 1250 MG: 1.25 INJECTION, POWDER, LYOPHILIZED, FOR SOLUTION INTRAVENOUS at 06:01

## 2023-01-07 RX ADMIN — SODIUM CHLORIDE, PRESERVATIVE FREE 10 ML: 5 INJECTION INTRAVENOUS at 06:01

## 2023-01-07 RX ADMIN — CIPROFLOXACIN 400 MG: 2 INJECTION, SOLUTION INTRAVENOUS at 01:01

## 2023-01-07 RX ADMIN — OXYCODONE HYDROCHLORIDE 20 MG: 5 TABLET ORAL at 12:01

## 2023-01-07 RX ADMIN — SODIUM CHLORIDE, PRESERVATIVE FREE 10 ML: 5 INJECTION INTRAVENOUS at 05:01

## 2023-01-07 RX ADMIN — Medication 10 ML: at 09:01

## 2023-01-07 RX ADMIN — FLUTICASONE FUROATE 1 PUFF: 100 POWDER RESPIRATORY (INHALATION) at 09:01

## 2023-01-07 RX ADMIN — IPRATROPIUM BROMIDE AND ALBUTEROL SULFATE 3 ML: .5; 3 SOLUTION RESPIRATORY (INHALATION) at 05:01

## 2023-01-07 RX ADMIN — CEFEPIME 2 G: 2 INJECTION, POWDER, FOR SOLUTION INTRAVENOUS at 12:01

## 2023-01-07 RX ADMIN — CEFEPIME 2 G: 2 INJECTION, POWDER, FOR SOLUTION INTRAVENOUS at 05:01

## 2023-01-07 RX ADMIN — DOCUSATE SODIUM 50MG AND SENNOSIDES 8.6MG 1 TABLET: 8.6; 5 TABLET, FILM COATED ORAL at 09:01

## 2023-01-07 RX ADMIN — VANCOMYCIN HYDROCHLORIDE 1250 MG: 1.25 INJECTION, POWDER, LYOPHILIZED, FOR SOLUTION INTRAVENOUS at 05:01

## 2023-01-07 RX ADMIN — OXYCODONE HYDROCHLORIDE 30 MG: 5 TABLET ORAL at 07:01

## 2023-01-07 RX ADMIN — CIPROFLOXACIN 400 MG: 2 INJECTION, SOLUTION INTRAVENOUS at 09:01

## 2023-01-07 RX ADMIN — IPRATROPIUM BROMIDE AND ALBUTEROL SULFATE 3 ML: .5; 3 SOLUTION RESPIRATORY (INHALATION) at 01:01

## 2023-01-07 NOTE — DISCHARGE SUMMARY
"Hospital Medicine  Discharge Summary    Patient Name: Balbir Rogers  MRN: [id number]  Admit Date: 1/1/2023  Discharge Date:    Status: IP- Inpatient   Length of Stay: 4      PHYSICIANS   Admitting Physician: Thairy G Reyes, DO  Discharging Physician: Jay Schneider MD.  Primary Care Physician: Jose Johnson Jr, MD        DISCHARGE DIAGNOSES   Acute hypercapnic respiratory failure  -hypoxemic, hypercapnic  -2/2 suspected aspiration pna and concurrent opiate+benzo use   -qhs bipap refused by patient  -supplemental O2 as required   -pt at baseline O2 requirement at this time  -repeat chest x-ray from 01/03 showed no significant change        Bacteremia  -vancomycin started 1/2  -repeat blood cultures pending  -TTE pending   -FRANCISCO?        Pneumonia of left upper lobe due to infectious organism  -wife affirms brown sputum production--> pseudomonal infection?  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  -wife affirms significant weight loss lately  -smita count        Aspiration pneumonia  -reportedly pt has been experiencing difficulty swallowing since being diagnosed with thrush around Thanksgiving  -no evidence of thrush on exam 1/2  -ST cleared for diet         Myoclonus  -wife reports pt saw neurology 2/2 "tremor"  -was prescribed lorazepam for this         CHF systolic dysfunction  EF 30%     Elevated LFT  Trending down      PROCEDURES         HOSPITAL COURSE    61 yo male with PMH of " emphysema/COPD (on 5L home O2 and also with at home NIPPV), anxiety, chronic opioid dependence, medical marijuana use who presents with complaint of hypoxemia at home. Pt somnolent, poorly arousable during evaluation thus history obtained from wife at bedside. She reports pt has been ill since Thanksgiving when he was treated for PNA and subsequent thrush. She affirms pt has had a broken NIPPV at home and has had difficulty obtaining a new one secondary to insurance issues. She also says pt has not used supplemental O2 for 2 weeks and reportedly was saturating >90% without home O2. Pt with increasing lethargy at home however and found by wife with an O2 sat of 44% thus she brought him in. She reports she administers his pain medications (oxycodone 30mg qid +buprenorphine patch) and there is no way he could have overdosed.      In the ED pt with acute on chronic hypoxemic hypercapnic respiratory failure. Serial ABGs as follows: (7.19/85.4/97 on 12/6, rr 16, 40%; 7.29/69.6/65 on bipap at 20/6, rr16, 36%; 7.32/59/82 on 20/6, rr23, 35%). Pt also received narcan and reportedly had marked improvement in mental status. Wife at bedside reports DNR/DNI status.     At baseline, pt lives with his wife and is essential bed bound 2/2 pain and deconditioning. PCP Dr. Emery; Pulmonologist Dr. Vela.        Overview/Hospital Course:  Patient admitted for acute on chronic hypoxemic, hypercapnic respiratory failure secondary to concurrent opiate, benzo use and aspiration pneumonia. Evaluated by ST on 1/3, ok to resume soft/bite sized diet. Patient's pain currently being addressed with oxycontin 10mg BID, dose reduced from his baseline 2/2 hypotension. Cannot discontinue as pain becomes so poorly controlled pt becomes tachypneic. Hold buprenorphine and benzos at this time. ABG showed significant improvement after BiPAP was in place for 24 hours. Pt strongly advised to utilized qhs bipap, he has refused it here. Maintain on  supplemental O2. On 1/2 blood cultures resulted Staph, vancomycin was started 1/2, suspect this is reason for continued elevated WBC. Pt also remains on cefepime and cipro for CAP. TTE to be done today, if negative pt would benefit from discussion regarding transfer for FRANCISCO, his poor respiratory status may prevent eligibility for FRANC[facility]CO.         Interval History:  Essentially bed-bound at baseline.  Physical therapy was unable to evaluate him given desaturation to 88 percent on 4 liters OxyMask.     01/04/2023 feels a little better today, PT able to get into chair  Got some ativan around 2-3 AM and slept better since then and had panic episode this AM     Echo EF 30%  O2 stable on 4 L today     01/05 wbc cont to improve  C/o of not well controlled pain today  Decrease from home pain med  Eval by cardio this AM  Rec BB, iv lasix  Cont tele     01/06 pain not well controlled overnight and this AM pain med held 2/2 low BP  Rested some   Little output with lasix overnight  Cardio following  Discussed possible hospice eval with family and patient today  Wbc trending down       Discahrge to swing bed today      STATUS  ImprovedStable    DISPOSITION  Discharge to swing bed    DIET  cardiac    ACTIVITY  As tolerated      FOLLOW-UP         DISCHARGE MEDICATION RECONCILIATION        Medication List        ASK your doctor about these medications      albuterol-ipratropium 2.5 mg-0.5 mg/3 mL nebulizer solution  Commonly known as: DUO-NEB     buprenorphine 20 mcg/hour Ptwk  Commonly known as: BUTRANS     fluticasone-umeclidin-vilanter 100-62.5-25 mcg Dsdv  Commonly known as: TRELEGY ELLIPTA     gabapentin 300 MG capsule  Commonly known as: NEURONTIN     LORazepam 1 MG tablet  Commonly known as: ATIVAN  Take 1 tablet (1 mg total) by mouth every 12 (twelve) hours as needed for Anxiety (seizure activity).     oxyCODONE 30 MG Tab  Commonly known as: ROXICODONE     predniSONE 50 MG Tab  Commonly known as: DELTASONE  Take 1 tablet (50 mg  total) by mouth once daily.     sertraline 100 MG tablet  Commonly known as: ZOLOFT     temazepam 30 mg capsule  Commonly known as: RESTORIL                PHYSICAL EXAM   VITALS: T 98.5 °F (36.9 °C)   /63   P 83   RR 18   O2 (!) 90 %    Physical Exam  Vitals and nursing note reviewed.   Constitutional:       Appearance: Normal appearance.   HENT:      Head: Normocephalic and atraumatic.   Cardiovascular:      Rate and Rhythm: Normal rate.      Heart sounds: Normal heart sounds.   Pulmonary:      Effort: Pulmonary effort is normal.      Comments: Scattered bilateral rhonchi  Abdominal:      General: Abdomen is flat. Bowel sounds are normal.      Palpations: Abdomen is soft.   Neurological:      General: No focal deficit present.      Mental Status: He is alert and oriented to person, place, and time.            Discharge time: 33 minutes     Jay Schneider MD  [facility]       DIAGNOSITCS   CBC:   Recent Labs   Lab 01/05/23  0350 01/06/23  0325 01/07/23  0552   WBC 15.0* 14.2* 9.1   HGB 11.3* 11.1* 10.7*   HCT 35.5* 35.2* 35.0*    234 210     COAG:  No results for input(s): APTT, INR, PTT in the last 168 hours.  CMP:   Recent Labs   Lab 01/03/23  0449 01/04/23  0330 01/05/23  0350 01/06/23  0325 01/07/23  0552   CALCIUM 8.9 9.2 8.4* 8.2* 8.7*   ALBUMIN  --  2.5* 2.4* 1.9* 1.7*    136 137 133* 134*   K 3.7 4.3 4.1 3.7 3.9   CO2 25 29 30 35* 35*   BUN 24.2 14.2 9.8 8.3* 8.2*   CREATININE 0.55* 0.64* 0.53* 0.57* 0.61*   ALKPHOS  --  162* 132 109 89   ALT  --  134* 113* 84* 59*   AST  --  104* 66* 46* 28   BILITOT  --  0.7 0.8 0.5 0.3   MG 1.80 2.30  --  2.10  --      Estimated Creatinine Clearance: 95.2 mL/min (A) (based on SCr of 0.61 mg/dL (L)).  CARDIAC ENZYMES: No results for input(s): TROPONINI, CPK, CKMB in the last 72 hours.     No results for input(s): AMYLASE, LIPASE in the last 168 hours.      No results for input(s): POCTGLUCOSE in the last 72 hours.     Microbiology Results  (last 7 days)       Procedure Component Value Units Date/Time    Blood Culture [[id number]]  (Normal) Collected: 01/03/23 1100    Order Status: Completed Specimen: Blood Updated: 01/06/23 1900     CULTURE, BLOOD ([facility]) No Growth At 72 Hours    Blood Culture [[id number]]  (Normal) Collected: 01/03/23 1100    Order Status: Completed Specimen: Blood Updated: 01/06/23 1900     CULTURE, BLOOD ([facility]) No Growth At 72 Hours    Blood culture #2 **CANNOT BE ORDERED STAT** [[id number]]  (Normal) Collected: 01/01/23 0439    Order Status: Completed Specimen: Blood Updated: 01/06/23 1500     CULTURE, BLOOD ([facility]) No Growth at 5 days    Blood culture #1 **CANNOT BE ORDERED STAT** [[id number]]  (Abnormal) Collected: 01/01/23 0439    Order Status: Completed Specimen: Blood Updated: 01/04/23 0646     CULTURE, BLOOD ([facility]) MICROCOCCUS SPECIES     Comment: This organism is commonly considered a contaminant.  No further processing will be done.        GRAM STAIN Gram Positive Cocci, probable Staphylococcus      Seen in gram stain of broth only      1 of 2 Aerobic bottles positive    Respiratory Culture [[id number]]     Order Status: Sent Specimen: Sputum                X-Ray Chest 1 View    Result Date: 1/7/2023  EXAMINATION: Chest one view CLINICAL HISTORY: Pneumonia COMPARISON: 01/06/2023 FINDINGS: Cardiac silhouette is stable.  There are coarse interstitial markings throughout both lungs.  There are bulla noted in the right lung apex.  There is left perihilar architectural destruction noted.  There is persistent confluent ground-glass superimposed alveolar opacities.     No significant interval change. Electronically signed by: Ge Agee MD Date:    01/07/2023 Time:    05:48    X-Ray Chest 1 View    Result Date: 1/6/2023  EXAMINATION: XR CHEST 1 VIEW CLINICAL HISTORY: pneumonia; TECHNIQUE: Single view of the chest COMPARISON: 01/05/2023 FINDINGS: Markedly prominent interstitial markings with no focal opacification.  Small bilateral  pleural effusions.     As above. Electronically signed by: Pio Espinoza Date:    01/06/2023 Time:    06:30    X-Ray Chest 1 View    Result Date: 1/5/2023  EXAMINATION: XR CHEST 1 VIEW CPT 24008 CLINICAL HISTORY: pneumonia; COMPARISON: January 3, 2023 FINDINGS: Cardiomediastinal silhouette and pleuroparenchymal changes are essentially unchanged as compared with the previous exam with prominence of the left hilar region. No new focal consolidative changes are identified     No significant change as compared with the previous exam Electronically signed by: Vincent Yoder Date:    01/05/2023 Time:    09:00    X-Ray Chest 1 View    Result Date: 1/3/2023  EXAMINATION: XR CHEST 1 VIEW CPT 73405 CLINICAL HISTORY: copd; COMPARISON: January 1, 2023 FINDINGS: Examination reveals cardiomediastinal silhouette and pleuroparenchymal changes to be essentially unchanged as compared with the previous exam. There is increased interstitial markings throughout with no definite focal consolidative changes. Prominent left hilum with some fibrotic streaks in the left perihilar region. Hyperinflated lungs with blunting of the costophrenic angles small effusions cannot be completely excluded     No significant change as compared with the previous exam Electronically signed by: Vincent Yoder Date:    01/03/2023 Time:    08:57    CTA Chest Non-Coronary (PE Studies)    Result Date: 1/1/2023  Technique:CT Scan of the chest was performed with intravenous contrast with direct axial images as well as sagittal and coronal reconstruction images pulmonary embolus protocol. Dosage Information:Automated Exposure Control was utilized.   Comparison: November 11, 2022. Clinical History:Arrived on home O2 Sat 78% on 5 L wife states Sat 44% at home for days ) Elevated d dimer. Findings: Contrast:Imaging was performed somewhat late with respect to the contrast bolus which decreases sensitivity and specificity for small distal peripheral  pulmonary emboli. Soft Tissues:Unremarkable. Lines and Tubes:None. Neck:The visualized soft tissues of the neck appear unremarkable. Mediastinum:Multiple similar enlarged mediastinal lymph nodes are seen in the APwindow/subaortic, paratracheal and left hilar spaces . The largest is in left hilar space and measures 11.3 mm in least dimension. This suggests reactive lymphadenopathy. Pulmonary Arteries:No filling defects are seen in the pulmonary arteries to suggest pulmonary embolus to the sensitivity of the study. Lungs:A small area of consolidation is seen in the lingula (image 34 series 4). This shows interval considerable improvement. Minimal reticular densities are also seen in the superior segments of both lower lobes. These may also be part of an infectious process. Centrilobular and paraseptal emphysematous changes are seen in both lungs, predominantly in both upper lobes. Extensive fibrotic densities are also seen in both upper lobes. Pleura:There is a small left sided pleural effusion. Bony Structures: Spine:Severe spondylolytic changes are seen in the thoracic spine. Ribs:No rib fractures are identified. Abdomen:A small hepatic cyst is noted in the left hepatic lobe measuring 4.1 mm (image 140 series 3).     Impression: 1. Multiple similar enlarged mediastinal lymph nodes are seen in the APwindow/subaortic, paratracheal and left hilar spaces 2. No filling defects are seen in the pulmonary arteries to suggest pulmonary embolus to the sensitivity of the study. 3. A small area of consolidation is seen in the lingula (image 34 series 4).  This shows interval considerable improvement.  Minimal reticular densities are also seen in the superior segments of both lower lobes. These may also be part of an infectious process. Centrilobular and paraseptal emphysematous changes are seen in both lungs, predominantly in both upper lobes. Extensive fibrotic densities are also seen in both upper lobes. Follow-up as  clinically indicated. 4. Details and other findings as discussed above. No significant discrepancy with overnight report. Electronically signed by: Raf Robertson Date:    01/01/2023 Time:    07:48    X-Ray Chest AP Portable    Result Date: 1/1/2023  EXAMINATION: XR CHEST AP PORTABLE CLINICAL HISTORY: Shortness of breath; TECHNIQUE: Single frontal view of the chest was performed. COMPARISON: November 11, 2022 FINDINGS: Examination reveals mediastinal silhouette to be within normal limits there is significant decrease in size of a mass density in the left suprahilar region and left upper lobe that was associated with some confluent airspace opacities and seen on the examination of November 11, 2022. There is some prominence of the left hilum. Increase interstitial markings are identified throughout with no definite focal consolidative changes     Significant improvement of a masslike density in confluent opacities identified in the left upper lobe in the examination of November 11 2022. Increase interstitial markings throughout No focal consolidative changes Electronically signed by: Vincent Yoder Date:    01/01/2023 Time:    09:32    Echo    Result Date: 1/3/2023  · Technically difficult study, No Definity contrast is used in this study. · The left ventricle is moderately enlarged with severely decreased systolic function. · Indeterminate left ventricular diastolic function. · The estimated ejection fraction is 30%. · Mild right ventricular enlargement with mildly to moderately reduced right ventricular systolic function. · Mild mitral regurgitation. · Trivial pericardial effusion. · Mild tricuspid regurgitation. · Mild left atrial enlargement.

## 2023-01-07 NOTE — PLAN OF CARE
Problem: Adult Inpatient Plan of Care  Goal: Plan of Care Review  Outcome: Ongoing, Progressing  Flowsheets (Taken 1/6/2023 1954)  Plan of Care Reviewed With:   patient   spouse   daughter   family  Goal: Patient-Specific Goal (Individualized)  Outcome: Ongoing, Progressing  Goal: Absence of Hospital-Acquired Illness or Injury  Outcome: Ongoing, Progressing  Intervention: Identify and Manage Fall Risk  Flowsheets (Taken 1/6/2023 1954)  Safety Promotion/Fall Prevention:   assistive device/personal item within reach   bed alarm set   side rails raised x 3   instructed to call staff for mobility   nonskid shoes/socks when out of bed  Intervention: Prevent Skin Injury  Flowsheets (Taken 1/6/2023 1954)  Skin Protection:   adhesive use limited   incontinence pads utilized   transparent dressing maintained   tubing/devices free from skin contact  Intervention: Prevent and Manage VTE (Venous Thromboembolism) Risk  Flowsheets (Taken 1/6/2023 1954)  Activity Management: Rolling - L1  VTE Prevention/Management:   bleeding precations maintained   fluids promoted  Range of Motion: active ROM (range of motion) encouraged  Intervention: Prevent Infection  Flowsheets (Taken 1/6/2023 1954)  Infection Prevention:   cohorting utilized   personal protective equipment utilized   environmental surveillance performed   equipment surfaces disinfected   single patient room provided   rest/sleep promoted   hand hygiene promoted  Goal: Optimal Comfort and Wellbeing  Outcome: Ongoing, Progressing  Intervention: Monitor Pain and Promote Comfort  Flowsheets (Taken 1/6/2023 1954)  Pain Management Interventions:   care clustered   position adjusted   medication offered  Intervention: Provide Person-Centered Care  Flowsheets (Taken 1/6/2023 1954)  Trust Relationship/Rapport:   care explained   questions encouraged   questions answered  Goal: Readiness for Transition of Care  Outcome: Ongoing, Progressing     Problem: Fluid Imbalance  (Pneumonia)  Goal: Fluid Balance  Outcome: Ongoing, Progressing  Intervention: Monitor and Manage Fluid Balance  Flowsheets (Taken 1/6/2023 1954)  Fluid/Electrolyte Management: fluids provided     Problem: Infection (Pneumonia)  Goal: Resolution of Infection Signs and Symptoms  Outcome: Ongoing, Progressing  Intervention: Prevent Infection Progression  Flowsheets (Taken 1/6/2023 1954)  Infection Management: aseptic technique maintained     Problem: Respiratory Compromise (Pneumonia)  Goal: Effective Oxygenation and Ventilation  Outcome: Ongoing, Progressing  Intervention: Promote Airway Secretion Clearance  Flowsheets (Taken 1/6/2023 1954)  Breathing Techniques/Airway Clearance: deep/controlled cough encouraged  Cough And Deep Breathing: done independently per patient  Intervention: Optimize Oxygenation and Ventilation  Flowsheets (Taken 1/6/2023 1954)  Airway/Ventilation Management: airway patency maintained     Problem: Infection  Goal: Absence of Infection Signs and Symptoms  Outcome: Ongoing, Progressing  Intervention: Prevent or Manage Infection  Flowsheets (Taken 1/6/2023 1954)  Infection Management: aseptic technique maintained

## 2023-01-07 NOTE — PLAN OF CARE
Problem: Adult Inpatient Plan of Care  Goal: Plan of Care Review  Outcome: Ongoing, Progressing  Flowsheets (Taken 1/7/2023 1505)  Plan of Care Reviewed With:   patient   spouse   daughter   son  Goal: Patient-Specific Goal (Individualized)  Outcome: Ongoing, Progressing  Goal: Absence of Hospital-Acquired Illness or Injury  Outcome: Ongoing, Progressing  Intervention: Identify and Manage Fall Risk  Flowsheets (Taken 1/7/2023 1505)  Safety Promotion/Fall Prevention:   assistive device/personal item within reach   bed alarm set   chair alarm set   Fall Risk reviewed with patient/family   in recliner, wheels locked   lighting adjusted   medications reviewed   nonskid shoes/socks when out of bed   instructed to call staff for mobility   side rails raised x 3  Intervention: Prevent Skin Injury  Flowsheets (Taken 1/7/2023 1505)  Skin Protection:   adhesive use limited   incontinence pads utilized   transparent dressing maintained   tubing/devices free from skin contact  Intervention: Prevent and Manage VTE (Venous Thromboembolism) Risk  Flowsheets (Taken 1/7/2023 1505)  Activity Management:   Ambulated in room - L4   Standing - L3   Rolling - L1   Up in chair - L3   Walk with assistive devise and /or staff member - L3  VTE Prevention/Management:   ambulation promoted   bleeding precations maintained   fluids promoted  Range of Motion: active ROM (range of motion) encouraged  Intervention: Prevent Infection  Flowsheets (Taken 1/7/2023 1505)  Infection Prevention:   cohorting utilized   environmental surveillance performed   equipment surfaces disinfected   hand hygiene promoted   single patient room provided   rest/sleep promoted  Goal: Optimal Comfort and Wellbeing  Outcome: Ongoing, Progressing  Intervention: Monitor Pain and Promote Comfort  Flowsheets (Taken 1/7/2023 1505)  Pain Management Interventions:   medication offered   care clustered   pillow support provided   position adjusted  Intervention: Provide  Person-Centered Care  Flowsheets (Taken 1/7/2023 1505)  Trust Relationship/Rapport:   care explained   questions encouraged   questions answered   reassurance provided   thoughts/feelings acknowledged  Goal: Readiness for Transition of Care  Outcome: Ongoing, Progressing     Problem: Fluid Imbalance (Pneumonia)  Goal: Fluid Balance  Outcome: Ongoing, Progressing  Intervention: Monitor and Manage Fluid Balance  Flowsheets (Taken 1/7/2023 1505)  Fluid/Electrolyte Management: fluids provided     Problem: Infection (Pneumonia)  Goal: Resolution of Infection Signs and Symptoms  Outcome: Ongoing, Progressing  Intervention: Prevent Infection Progression  Flowsheets (Taken 1/7/2023 1505)  Infection Management: aseptic technique maintained     Problem: Respiratory Compromise (Pneumonia)  Goal: Effective Oxygenation and Ventilation  Outcome: Ongoing, Progressing  Intervention: Promote Airway Secretion Clearance  Flowsheets (Taken 1/7/2023 1505)  Breathing Techniques/Airway Clearance: deep/controlled cough encouraged  Cough And Deep Breathing: done independently per patient  Intervention: Optimize Oxygenation and Ventilation  Flowsheets (Taken 1/7/2023 1505)  Airway/Ventilation Management: airway patency maintained  Head of Bed (HOB) Positioning: HOB at 30-45 degrees     Problem: Infection  Goal: Absence of Infection Signs and Symptoms  Outcome: Ongoing, Progressing  Intervention: Prevent or Manage Infection  Flowsheets (Taken 1/7/2023 1505)  Infection Management: aseptic technique maintained

## 2023-01-07 NOTE — PLAN OF CARE
[facility] - [facility]  Discharge Final Note    Primary Care Provider: Jose Johnson Jr, MD    Expected Discharge Date: 1/7/2023    Final Discharge Note (most recent)       Final Note - 01/07/23 1641          Final Note    Assessment Type Final Discharge Note     Anticipated Discharge Disposition [facility] Hospital with Planned Readmission     What phone number can be called within the next 1-3 days to see how you are doing after discharge? [phone]     Hospital Resources/Appts/Education Provided Community resources provided        Post-Acute Status    Discharge Delays None known at this time                     Important Message from Medicare

## 2023-01-07 NOTE — PT/OT/SLP PROGRESS
Occupational Therapy  Treatment    Name: Balbir Rogers    : 1960 (62 y.o.)  MRN: [id number]           TREATMENT SUMMARY AND RECOMMENDATIONS:      OT Date of Treatment: 23  OT Start Time: 1200  OT Stop Time: 1210  OT Total Time (min): 10 min      Subjective Assessment:   No complaints  Lethargic   x Awake, alert, cooperative  Impulsive    Uncooperative   Flat affect    Agitated  c/o pain    Appropriate  c/o fatigue    Confused x Treated at bedside     Emotionally labile  Treated in gym/dept.      Other:        Therapy Precautions:    Cognitive deficits  Spinal precautions    Collar - hard  Sternal precautions    Collar - soft   TLSO   x Fall risk  LSO    Hip precautions - posterior  Knee immobilizer    Hip precautions - anterior  WBAT    Impaired communication  Partial weightbearing    Oxygen  TTWB    PEG tube  NWB    Visual deficits      Hearing deficits   Other:        Treatment Objectives:     Mobility Training:    Mobility task Assist level Comments    Bed mobility SBA Supine>EOB    Transfer CGA Stand/pivot t/f with gait x5 feet bed>chair CGA without device   Sit to stands transitions     Functional mobility     Sitting balance     Standing balance      Other:          Additional Comments:      Assessment: Patient tolerated session well. Pt reports he just woke up from a nap and feeling more rested. Not wanting to perform exercises at this but agreed to get into chair.    OT Plan: Continue POC  Revisions made to plan of care: No    GOALS:   Multidisciplinary Problems       Occupational Therapy Goals          Problem: Occupational Therapy    Goal Priority Disciplines Outcome Interventions   Occupational Therapy Goal     OT, PT/OT Ongoing, Progressing    Description: Goals to be met by: 1/10/23     Patient will increase functional independence with ADLs by performing:    UE Dressing with Modified [place].  LE Dressing with Supervision.  Toileting from toilet with Supervision for hygiene and clothing  management.   Toilet transfer to toilet with Stand-by Assistance.                         Skilled OT Minutes Provided: 10  Communication with Treatment Team:     Equipment recommendations:       At end of treatment, patient remained:  x Up in chair     Up in wheelchair in room    In bed   x With alarm activated    Bed rails up   x Call bell in reach    x Family/friends present    Restraints secured properly    In bathroom with CNA/RN notified    In gym with PT/PTA/tech    Nurse aware    Other:

## 2023-01-08 LAB
ANION GAP SERPL CALC-SCNC: 6 MEQ/L
BACTERIA BLD CULT: NORMAL
BACTERIA BLD CULT: NORMAL
BASOPHILS # BLD AUTO: 0.02 X10(3)/MCL (ref 0–0.2)
BASOPHILS NFR BLD AUTO: 0.3 %
BUN SERPL-MCNC: 7.4 MG/DL (ref 8.4–25.7)
CALCIUM SERPL-MCNC: 8.6 MG/DL (ref 8.8–10)
CHLORIDE SERPL-SCNC: 92 MMOL/L (ref 98–107)
CO2 SERPL-SCNC: 38 MMOL/L (ref 23–31)
CREAT SERPL-MCNC: 0.63 MG/DL (ref 0.73–1.18)
CREAT/UREA NIT SERPL: 12
EOSINOPHIL # BLD AUTO: 0.42 X10(3)/MCL (ref 0–0.9)
EOSINOPHIL NFR BLD AUTO: 5.7 %
ERYTHROCYTE [DISTWIDTH] IN BLOOD BY AUTOMATED COUNT: 14 % (ref 11.6–14.4)
GFR SERPLBLD CREATININE-BSD FMLA CKD-EPI: >90 MLS/MIN/1.73/M2
GLUCOSE SERPL-MCNC: 107 MG/DL (ref 82–115)
HCT VFR BLD AUTO: 33.6 % (ref 42–52)
HGB BLD-MCNC: 10.2 GM/DL (ref 14–18)
IMM GRANULOCYTES # BLD AUTO: 0.09 X10(3)/MCL (ref 0–0.04)
IMM GRANULOCYTES NFR BLD AUTO: 1.2 %
LYMPHOCYTES # BLD AUTO: 0.99 X10(3)/MCL (ref 0.6–4.6)
LYMPHOCYTES NFR BLD AUTO: 13.5 %
MCH RBC QN AUTO: 30.4 PG
MCHC RBC AUTO-ENTMCNC: 30.4 MG/DL (ref 33–36)
MCV RBC AUTO: 100.3 FL (ref 80–94)
MONOCYTES # BLD AUTO: 0.65 X10(3)/MCL (ref 0.1–1.3)
MONOCYTES NFR BLD AUTO: 8.9 %
NEUTROPHILS # BLD AUTO: 5.15 X10(3)/MCL (ref 2.1–9.2)
NEUTROPHILS NFR BLD AUTO: 70.4 %
PLATELET # BLD AUTO: 213 X10(3)/MCL (ref 140–371)
PMV BLD AUTO: 9.8 FL (ref 9.4–12.4)
POTASSIUM SERPL-SCNC: 3.9 MMOL/L (ref 3.5–5.1)
RBC # BLD AUTO: 3.35 X10(6)/MCL (ref 4.7–6.1)
SODIUM SERPL-SCNC: 136 MMOL/L (ref 136–145)
WBC # SPEC AUTO: 7.3 X10(3)/MCL (ref 4.5–11.5)

## 2023-01-08 PROCEDURE — 94640 AIRWAY INHALATION TREATMENT: CPT

## 2023-01-08 PROCEDURE — 25000242 PHARM REV CODE 250 ALT 637 W/ HCPCS: Performed by: FAMILY MEDICINE

## 2023-01-08 PROCEDURE — 36415 COLL VENOUS BLD VENIPUNCTURE: CPT | Performed by: FAMILY MEDICINE

## 2023-01-08 PROCEDURE — 80048 BASIC METABOLIC PNL TOTAL CA: CPT | Performed by: FAMILY MEDICINE

## 2023-01-08 PROCEDURE — A4216 STERILE WATER/SALINE, 10 ML: HCPCS | Performed by: FAMILY MEDICINE

## 2023-01-08 PROCEDURE — 94760 N-INVAS EAR/PLS OXIMETRY 1: CPT

## 2023-01-08 PROCEDURE — 25000003 PHARM REV CODE 250: Performed by: FAMILY MEDICINE

## 2023-01-08 PROCEDURE — 85025 COMPLETE CBC W/AUTO DIFF WBC: CPT | Performed by: FAMILY MEDICINE

## 2023-01-08 PROCEDURE — 27000221 HC OXYGEN, UP TO 24 HOURS

## 2023-01-08 PROCEDURE — 11000004 HC SNF PRIVATE

## 2023-01-08 PROCEDURE — 63600175 PHARM REV CODE 636 W HCPCS: Performed by: FAMILY MEDICINE

## 2023-01-08 RX ORDER — LEVOFLOXACIN 750 MG/1
750 TABLET ORAL DAILY
Status: DISCONTINUED | OUTPATIENT
Start: 2023-01-09 | End: 2023-01-11

## 2023-01-08 RX ADMIN — CIPROFLOXACIN 400 MG: 2 INJECTION, SOLUTION INTRAVENOUS at 01:01

## 2023-01-08 RX ADMIN — SERTRALINE HYDROCHLORIDE 50 MG: 50 TABLET, FILM COATED ORAL at 08:01

## 2023-01-08 RX ADMIN — SODIUM CHLORIDE, PRESERVATIVE FREE 10 ML: 5 INJECTION INTRAVENOUS at 05:01

## 2023-01-08 RX ADMIN — OXYCODONE HYDROCHLORIDE 30 MG: 5 TABLET ORAL at 07:01

## 2023-01-08 RX ADMIN — OXYCODONE HYDROCHLORIDE 30 MG: 5 TABLET ORAL at 01:01

## 2023-01-08 RX ADMIN — CIPROFLOXACIN 400 MG: 2 INJECTION, SOLUTION INTRAVENOUS at 09:01

## 2023-01-08 RX ADMIN — IPRATROPIUM BROMIDE AND ALBUTEROL SULFATE 3 ML: .5; 3 SOLUTION RESPIRATORY (INHALATION) at 01:01

## 2023-01-08 RX ADMIN — VANCOMYCIN HYDROCHLORIDE 1250 MG: 1.25 INJECTION, POWDER, LYOPHILIZED, FOR SOLUTION INTRAVENOUS at 05:01

## 2023-01-08 RX ADMIN — DOCUSATE SODIUM 50MG AND SENNOSIDES 8.6MG 1 TABLET: 8.6; 5 TABLET, FILM COATED ORAL at 10:01

## 2023-01-08 RX ADMIN — ALPRAZOLAM 0.25 MG: 0.25 TABLET ORAL at 10:01

## 2023-01-08 RX ADMIN — POLYETHYLENE GLYCOL 3350 17 G: 17 POWDER, FOR SOLUTION ORAL at 10:01

## 2023-01-08 RX ADMIN — CEFEPIME 2 G: 2 INJECTION, POWDER, FOR SOLUTION INTRAVENOUS at 08:01

## 2023-01-08 RX ADMIN — IPRATROPIUM BROMIDE AND ALBUTEROL SULFATE 3 ML: .5; 3 SOLUTION RESPIRATORY (INHALATION) at 07:01

## 2023-01-08 RX ADMIN — FLUTICASONE FUROATE 1 PUFF: 100 POWDER RESPIRATORY (INHALATION) at 08:01

## 2023-01-08 RX ADMIN — IPRATROPIUM BROMIDE AND ALBUTEROL SULFATE 3 ML: .5; 3 SOLUTION RESPIRATORY (INHALATION) at 06:01

## 2023-01-08 RX ADMIN — ENOXAPARIN SODIUM 40 MG: 40 INJECTION SUBCUTANEOUS at 05:01

## 2023-01-08 RX ADMIN — SODIUM CHLORIDE, PRESERVATIVE FREE 10 ML: 5 INJECTION INTRAVENOUS at 12:01

## 2023-01-08 RX ADMIN — DOCUSATE SODIUM 50MG AND SENNOSIDES 8.6MG 1 TABLET: 8.6; 5 TABLET, FILM COATED ORAL at 08:01

## 2023-01-08 RX ADMIN — OXYCODONE HYDROCHLORIDE 30 MG: 5 TABLET ORAL at 10:01

## 2023-01-08 RX ADMIN — CEFEPIME 2 G: 2 INJECTION, POWDER, FOR SOLUTION INTRAVENOUS at 01:01

## 2023-01-08 RX ADMIN — SODIUM CHLORIDE, PRESERVATIVE FREE 10 ML: 5 INJECTION INTRAVENOUS at 01:01

## 2023-01-08 NOTE — PLAN OF CARE
[facility] - Medical Surgical Unit  Initial Discharge Assessment       Primary Care Provider: Jose Johnson Jr, MD    Admission Diagnosis: COPD exacerbation [J44.1]    Admission Date: 1/7/2023  Expected Discharge Date:     Discharge Barriers Identified: None    Payor: MEDICARE / Plan: MEDICARE PART A & B / Product Type: Government /     Extended Emergency Contact Information  Primary Emergency Contact: lupe webb  Mobile Phone: [phone]  Relation: Spouse  Preferred language: English   needed? No    Discharge Plan A: Home Health, Home with family         [facility] - [place], [place] - [address]  [address] [facility] [place] [address]  Phone: [phone] Fax: [phone]    [facility] - [place], [place] - [address]  [address] [facility] [address]  Phone: [phone] Fax: [phone]      Initial Assessment (most recent)       Adult Discharge Assessment - 01/08/23 0856          Discharge Assessment    Assessment Type Discharge Planning Assessment     Confirmed/corrected address, phone number and insurance Yes     Confirmed Demographics Correct on Facesheet     Source of Information family     If unable to respond/provide information was family/caregiver contacted? Yes     Contact Name/Number Lupe valentin [phone]     Communicated JACY with patient/caregiver Date not available/Unable to determine     Reason For Admission COPD CHF     People in Home spouse     Facility Arrived From: home     Do you expect to return to your current living situation? Yes     Do you have help at home or someone to help you manage your care at home? Yes     Who are your caregiver(s) and their phone number(s)? Lupe valentin [phone]     Prior to hospitilization cognitive status: Alert/Oriented     Current cognitive status: Alert/Oriented     Walking or Climbing Stairs ambulation difficulty, requires equipment     Mobility Management walker     Home  Accessibility wheelchair accessible     Home Layout Able to live on 1st floor     Equipment Currently Used at Home bedside commode;nebulizer;oxygen;walker, rolling     Readmission within 30 days? No     Patient currently being followed by outpatient case management? No     Do you currently have service(s) that help you manage your care at home? No     Do you take prescription medications? Yes     Do you have prescription coverage? Yes     Coverage BCBS     Do you have any problems affording any of your prescribed medications? TBD     Who is going to help you get home at discharge? Lupe valentin [phone]     How do you get to doctors appointments? family or friend will provide     Are you on dialysis? No     Do you take coumadin? No     Discharge Plan A Home Health;Home with family     DME Needed Upon Discharge  oxygen     Discharge Plan discussed with: Spouse/sig other     Name(s) and Number(s) Lupe valentin [phone]     Discharge Barriers Identified None        Physical Activity    On average, how many days per week do you engage in moderate to strenuous exercise (like a brisk walk)? 0 days     On average, how many minutes do you engage in exercise at this level? 0 min        Financial Resource Strain    How hard is it for you to pay for the very basics like food, housing, medical care, and heating? Not hard at all        Housing Stability    In the last 12 months, was there a time when you were not able to pay the mortgage or rent on time? No     In the last 12 months, how many places have you lived? 1     In the last 12 months, was there a time when you did not have a steady place to sleep or slept in a shelter (including now)? No        Transportation Needs    In the past 12 months, has lack of transportation kept you from medical appointments or from getting medications? No     In the past 12 months, has lack of transportation kept you from meetings, work, or from getting things needed for daily living? No         Food Insecurity    Within the past 12 months, you worried that your food would run out before you got the money to buy more. Never true     Within the past 12 months, the food you bought just didn't last and you didn't have money to get more. Never true        Stress    Do you feel stress - tense, restless, nervous, or anxious, or unable to sleep at night because your mind is troubled all the time - these days? To some extent        Social Connections    In a typical week, how many times do you talk on the phone with family, friends, or neighbors? More than three times a week     How often do you get together with friends or relatives? More than three times a week     How often do you attend Buddhist or Temple services? More than 4 times per year     Do you belong to any clubs or organizations such as Buddhist groups, unions, fraternal or athletic groups, or school groups? No     How often do you attend meetings of the clubs or organizations you belong to? 1 to 4 times per year     Are you , , , , never , or living with a partner?         Alcohol Use    Q1: How often do you have a drink containing alcohol? Never     Q2: How many drinks containing alcohol do you have on a typical day when you are drinking? Patient does not drink     Q3: How often do you have six or more drinks on one occasion? Never        OTHER    Name(s) of People in Home Lupe wife [phone]

## 2023-01-08 NOTE — PROGRESS NOTES
Hospital Medicine  Progress Note    Patient Name: Balbir Rogers  MRN: [id number]  Status: IP- Swing   Admission Date: 1/7/2023  Length of Stay: 1      CC: hospital follow-up for        SUBJECTIVE     61 yo male with PMH of emphysema/COPD (on 5L home O2 and also with at home NIPPV), anxiety, chronic opioid dependence, medical marijuana use who presents with complaint of hypoxemia at home. Pt somnolent, poorly arousable during evaluation thus history obtained from wife at bedside. She reports pt has been ill since Thanksgiving when he was treated for PNA and subsequent thrush. She affirms pt has had a broken NIPPV at home and has had difficulty obtaining a new one secondary to insurance issues. She also says pt has not used supplemental O2 for 2 weeks and reportedly was saturating >90% without home O2. Pt with increasing lethargy at home however and found by wife with an O2 sat of 44% thus she brought him in. She reports she administers his pain medications (oxycodone 30mg qid +buprenorphine patch) and there is no way he could have overdosed.      In the ED pt with acute on chronic hypoxemic hypercapnic respiratory failure. Serial ABGs as follows: (7.19/85.4/97 on 12/6, rr 16, 40%; 7.29/69.6/65 on bipap at 20/6, rr16, 36%; 7.32/59/82 on 20/6, rr23, 35%). Pt also received narcan and reportedly had marked improvement in mental status. Wife at bedside reports DNR/DNI status.     At baseline, pt lives with his wife and is essential bed bound 2/2 pain and deconditioning. PCP Dr. Emery; Pulmonologist Dr. Vela.        Overview/Hospital Course:  Patient admitted for acute on chronic hypoxemic, hypercapnic respiratory failure secondary to concurrent opiate, benzo use and aspiration pneumonia. Evaluated by ST on 1/3, ok to resume soft/bite sized diet. Patient's pain currently being addressed with oxycontin 10mg BID, dose reduced from his baseline 2/2 hypotension. Cannot discontinue as pain becomes so poorly controlled pt  becomes tachypneic. Hold buprenorphine and benzos at this time. ABG showed significant improvement after BiPAP was in place for 24 hours. Pt strongly advised to utilized qhs bipap, he has refused it here. Maintain on supplemental O2. On 1/2 blood cultures resulted Staph, vancomycin was started 1/2, suspect this is reason for continued elevated WBC. Pt also remains on cefepime and cipro for CAP. TTE to be done today, if negative pt would benefit from discussion regarding transfer for FR[facility]ISCO, his poor respiratory status may prevent eligibility for FRANCISCO.         Interval History:  Essentially bed-bound at baseline.  Physical therapy was unable to evaluate him given desaturation to 88 percent on 4 liters OxyMask.     01/04/2023 feels a little better today, PT able to get into chair  Got some ativan around 2-3 AM and slept better since then and had panic episode this AM     Echo EF 30%  O2 stable on 4 L today     01/05 wbc cont to improve  C/o of not well controlled pain today  Decrease from home pain med  Eval by cardio this AM  Rec BB, iv lasix  Cont tele     01/06 pain not well controlled overnight and this AM pain med held 2/2 low BP  Rested some   Little output with lasix overnight  Cardio following  Discussed possible hospice eval with family and patient today  Wbc trending down    01/08 feels better again today, sob improved, back on home pain meds  BP better controlled overnight       Today: Patient seen and examined at bedside, and chart reviewed.       MEDICATIONS   Scheduled   albuterol-ipratropium  3 mL Nebulization Q6H WAKE    enoxaparin  40 mg Subcutaneous Daily    fluticasone furoate  1 puff Inhalation Daily    [START ON 1/9/2023] levoFLOXacin  750 mg Oral Daily    senna-docusate 8.6-50 mg  1 tablet Oral BID    sertraline  50 mg Oral Daily    sodium chloride 0.9%  10 mL Intravenous Q6H    umeclidinium-vilanteroL  1 puff Inhalation Daily     Continuous Infusions        PHYSICAL EXAM   VITALS: T 97.8 °F (36.6  °C)   /70   P 83   RR 20   O2 97 %    GENERAL: Awake and in NAD  LUNGS: scattered b/l rhonchi  CVS: Normal rate  GI/: Soft, NT, bowel sounds positive.  EXTREMITIES: No peripheral edema, compression stockings on  NEURO: AAOx3  PSYCH: Cooperative      LABS   CBC  Recent Labs     01/08/23  0501   WBC 7.3   HGB 10.2*   HCT 33.6*         CHEM  Recent Labs     01/06/23  0325 01/07/23  0552 01/08/23  0501   * 134* 136   K 3.7 3.9 3.9   MG 2.10  --   --    CO2 35* 35* 38*   BUN 8.3* 8.2* 7.4*   CREATININE 0.57* 0.61* 0.63*   CALCIUM 8.2* 8.7* 8.6*   BILITOT 0.5 0.3  --    AST 46* 28  --    ALT 84* 59*  --    ALKPHOS 109 89  --    ALBUMIN 1.9* 1.7*  --           MICROBIOLOGY     Microbiology Results (last 7 days)       Procedure Component Value Units Date/Time    Respiratory Culture [[id number]]     Order Status: Sent Specimen: Sputum               DIAGNOSTICS   X-Ray Chest 1 View  Narrative: EXAMINATION:  XR CHEST 1 VIEW    CLINICAL HISTORY:  pna;    COMPARISON:  Earlier today    FINDINGS:  Portable frontal view of the chest was obtained. Heart is not significantly enlarged.  Similar left hilar prominence.  Similar appearance of the lungs with irregular bilateral interstitial predominant opacities.  Suspect small pleural effusions.  No pneumothorax.  Impression: No significant interval change.    Electronically signed by: Balbir White  Date:    01/07/2023  Time:    17:24  X-Ray Chest 1 View  Narrative: EXAMINATION:  Chest one view    CLINICAL HISTORY:  Pneumonia    COMPARISON:  01/06/2023    FINDINGS:  Cardiac silhouette is stable.  There are coarse interstitial markings throughout both lungs.  There are bulla noted in the right lung apex.  There is left perihilar architectural destruction noted.  There is persistent confluent ground-glass superimposed alveolar opacities.  Impression: No significant interval change.    Electronically signed by: Ge Agee  "MD  Date:    01/07/2023  Time:    05:48        ASSESSMENT   Acute hypercapnic respiratory failure  -hypoxemic, hypercapnic  -2/2 suspected aspiration pna and concurrent opiate+benzo use   -qhs bipap refused by patient  -supplemental O2 as required   -pt at baseline O2 requirement at this time  -repeat chest x-ray from 01/03 showed no significant change        Bacteremia  -vancomycin started 1/2  -repeat blood cultures pending  -TTE pending   -FRANCISCO?        Pneumonia of left upper lobe due to infectious organism  -wife affirms brown sputum production--> pseudomonal infection?  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  -wife affirms significant weight loss lately  -smita count        Aspiration pneumonia  -reportedly pt has been experiencing difficulty swallowing since being diagnosed with thrush around Thanksgiving  -no evidence of thrush on exam 1/2  -ST cleared for diet         Myoclonus  -wife reports pt saw neurology 2/2 "tremor"  -was prescribed lorazepam for this         CHF systolic dysfunction  EF 30%     Elevated LFT  Trending down       PLAN   Cont abx  Admitted to swing bed  F/u cardio rec's and meds  Case mgmt for home equipment log        Prophylaxis: lovenox        Jay Schneider MD  Hospital Medicine   "

## 2023-01-09 LAB
ANION GAP SERPL CALC-SCNC: 7 MEQ/L
BASOPHILS # BLD AUTO: 0.02 X10(3)/MCL (ref 0–0.2)
BASOPHILS NFR BLD AUTO: 0.3 %
BUN SERPL-MCNC: 8.4 MG/DL (ref 8.4–25.7)
CALCIUM SERPL-MCNC: 8 MG/DL (ref 8.8–10)
CHLORIDE SERPL-SCNC: 93 MMOL/L (ref 98–107)
CO2 SERPL-SCNC: 39 MMOL/L (ref 23–31)
CREAT SERPL-MCNC: 0.63 MG/DL (ref 0.73–1.18)
CREAT/UREA NIT SERPL: 13
EOSINOPHIL # BLD AUTO: 0.39 X10(3)/MCL (ref 0–0.9)
EOSINOPHIL NFR BLD AUTO: 5.4 %
ERYTHROCYTE [DISTWIDTH] IN BLOOD BY AUTOMATED COUNT: 14 % (ref 11.6–14.4)
GFR SERPLBLD CREATININE-BSD FMLA CKD-EPI: >90 MLS/MIN/1.73/M2
GLUCOSE SERPL-MCNC: 94 MG/DL (ref 82–115)
HCT VFR BLD AUTO: 33.2 % (ref 42–52)
HGB BLD-MCNC: 10 GM/DL (ref 14–18)
IMM GRANULOCYTES # BLD AUTO: 0.05 X10(3)/MCL (ref 0–0.04)
IMM GRANULOCYTES NFR BLD AUTO: 0.7 %
LYMPHOCYTES # BLD AUTO: 1.6 X10(3)/MCL (ref 0.6–4.6)
LYMPHOCYTES NFR BLD AUTO: 22.1 %
MAGNESIUM SERPL-MCNC: 2.2 MG/DL (ref 1.6–2.6)
MCH RBC QN AUTO: 30.3 PG
MCHC RBC AUTO-ENTMCNC: 30.1 MG/DL (ref 33–36)
MCV RBC AUTO: 100.6 FL (ref 80–94)
MONOCYTES # BLD AUTO: 0.89 X10(3)/MCL (ref 0.1–1.3)
MONOCYTES NFR BLD AUTO: 12.3 %
NEUTROPHILS # BLD AUTO: 4.29 X10(3)/MCL (ref 2.1–9.2)
NEUTROPHILS NFR BLD AUTO: 59.2 %
PHOSPHATE SERPL-MCNC: 3.2 MG/DL (ref 2.3–4.7)
PLATELET # BLD AUTO: 241 X10(3)/MCL (ref 140–371)
PMV BLD AUTO: 9.6 FL (ref 9.4–12.4)
POTASSIUM SERPL-SCNC: 4.1 MMOL/L (ref 3.5–5.1)
RBC # BLD AUTO: 3.3 X10(6)/MCL (ref 4.7–6.1)
SODIUM SERPL-SCNC: 139 MMOL/L (ref 136–145)
WBC # SPEC AUTO: 7.2 X10(3)/MCL (ref 4.5–11.5)

## 2023-01-09 PROCEDURE — 83735 ASSAY OF MAGNESIUM: CPT | Performed by: FAMILY MEDICINE

## 2023-01-09 PROCEDURE — 84100 ASSAY OF PHOSPHORUS: CPT | Performed by: FAMILY MEDICINE

## 2023-01-09 PROCEDURE — 97116 GAIT TRAINING THERAPY: CPT

## 2023-01-09 PROCEDURE — 94660 CPAP INITIATION&MGMT: CPT

## 2023-01-09 PROCEDURE — 25000242 PHARM REV CODE 250 ALT 637 W/ HCPCS: Performed by: FAMILY MEDICINE

## 2023-01-09 PROCEDURE — 94760 N-INVAS EAR/PLS OXIMETRY 1: CPT

## 2023-01-09 PROCEDURE — 11000004 HC SNF PRIVATE

## 2023-01-09 PROCEDURE — 36415 COLL VENOUS BLD VENIPUNCTURE: CPT | Performed by: FAMILY MEDICINE

## 2023-01-09 PROCEDURE — 85025 COMPLETE CBC W/AUTO DIFF WBC: CPT | Performed by: FAMILY MEDICINE

## 2023-01-09 PROCEDURE — 97161 PT EVAL LOW COMPLEX 20 MIN: CPT

## 2023-01-09 PROCEDURE — 63600175 PHARM REV CODE 636 W HCPCS: Performed by: FAMILY MEDICINE

## 2023-01-09 PROCEDURE — 97165 OT EVAL LOW COMPLEX 30 MIN: CPT

## 2023-01-09 PROCEDURE — A4216 STERILE WATER/SALINE, 10 ML: HCPCS | Performed by: FAMILY MEDICINE

## 2023-01-09 PROCEDURE — 94640 AIRWAY INHALATION TREATMENT: CPT

## 2023-01-09 PROCEDURE — 25000003 PHARM REV CODE 250: Performed by: FAMILY MEDICINE

## 2023-01-09 PROCEDURE — 80048 BASIC METABOLIC PNL TOTAL CA: CPT | Performed by: FAMILY MEDICINE

## 2023-01-09 PROCEDURE — 99900035 HC TECH TIME PER 15 MIN (STAT)

## 2023-01-09 PROCEDURE — 27000221 HC OXYGEN, UP TO 24 HOURS

## 2023-01-09 RX ADMIN — MELATONIN TAB 3 MG 9 MG: 3 TAB at 09:01

## 2023-01-09 RX ADMIN — POLYETHYLENE GLYCOL 3350 17 G: 17 POWDER, FOR SOLUTION ORAL at 09:01

## 2023-01-09 RX ADMIN — SODIUM CHLORIDE, PRESERVATIVE FREE 10 ML: 5 INJECTION INTRAVENOUS at 05:01

## 2023-01-09 RX ADMIN — ENOXAPARIN SODIUM 40 MG: 40 INJECTION SUBCUTANEOUS at 05:01

## 2023-01-09 RX ADMIN — SODIUM CHLORIDE, PRESERVATIVE FREE 10 ML: 5 INJECTION INTRAVENOUS at 01:01

## 2023-01-09 RX ADMIN — SERTRALINE HYDROCHLORIDE 50 MG: 50 TABLET, FILM COATED ORAL at 09:01

## 2023-01-09 RX ADMIN — IPRATROPIUM BROMIDE AND ALBUTEROL SULFATE 3 ML: .5; 3 SOLUTION RESPIRATORY (INHALATION) at 07:01

## 2023-01-09 RX ADMIN — OXYCODONE HYDROCHLORIDE 30 MG: 5 TABLET ORAL at 06:01

## 2023-01-09 RX ADMIN — OXYCODONE HYDROCHLORIDE 30 MG: 5 TABLET ORAL at 12:01

## 2023-01-09 RX ADMIN — ALPRAZOLAM 0.25 MG: 0.25 TABLET ORAL at 09:01

## 2023-01-09 RX ADMIN — IPRATROPIUM BROMIDE AND ALBUTEROL SULFATE 3 ML: .5; 3 SOLUTION RESPIRATORY (INHALATION) at 01:01

## 2023-01-09 RX ADMIN — LEVOFLOXACIN 750 MG: 750 TABLET, FILM COATED ORAL at 09:01

## 2023-01-09 RX ADMIN — DOCUSATE SODIUM 50MG AND SENNOSIDES 8.6MG 1 TABLET: 8.6; 5 TABLET, FILM COATED ORAL at 09:01

## 2023-01-09 RX ADMIN — SODIUM CHLORIDE, PRESERVATIVE FREE 10 ML: 5 INJECTION INTRAVENOUS at 06:01

## 2023-01-09 RX ADMIN — SODIUM CHLORIDE, PRESERVATIVE FREE 10 ML: 5 INJECTION INTRAVENOUS at 12:01

## 2023-01-09 RX ADMIN — IPRATROPIUM BROMIDE AND ALBUTEROL SULFATE 3 ML: .5; 3 SOLUTION RESPIRATORY (INHALATION) at 08:01

## 2023-01-09 RX ADMIN — FLUTICASONE FUROATE 1 PUFF: 100 POWDER RESPIRATORY (INHALATION) at 09:01

## 2023-01-09 NOTE — PT/OT/SLP EVAL
Physical Therapy Swing Bed Evaluation    Patient Name:  Balbir Rogers   MRN:  [id number]    History:   Pt is a 63 y/o M who reports with acute respiratory failure and chronic pain.   Past Medical History:   Diagnosis Date    Collapse, lung     COPD with hypoxia     Degenerative cervical spinal stenosis     Depression     Disuse osteoporosis     Emphysema/COPD     Hepatitis C     Leukocytosis        Past Surgical History:   Procedure Laterality Date    CERVICAL LAMINECTOMY WITH SPINAL FUSION      CHOLECYSTECTOMY      COLONOSCOPY      ESOPHAGOGASTRODUODENOSCOPY      gastrointestinal biopsy      graft to nose      inguinal herniotomy      mandible operation      POLYPECTOMY           Subjective     Chief Complaint: Pain impacting function and fatigue/SOB.   Patient/Family Comments/goals: Improve function and return to PLOF  Pain/Comfort:         Living Environment:  Lives with: Home with wife.   Home Environment: [facility]  Previous level of function: MOD I with all functional mobility. Would use RW as needed, but did not use often.   Equipment used at home: Nonecane, straight, walker, rolling.  DME owned (not currently used): rolling walker.        Objective:     Communicated with RN, Wife and OT prior to session.  Patient found HOB elevated with oxygen, pulse ox (continuous), telemetry  upon PT entry to room.    General Precautions: Standard,     Orthopedic Precautions:    Braces:    Respiratory Status: Nasal cannula, flow 5 L/min     Vitals Value    Room air     6L Oxygen (L) 85% with ambulation    Blood pressure     Heart rate         Exams:  Cognitive Exam:  Patient is oriented to Person, Place, Time, and Situation  RLE ROM: WNL  RLE Strength: Deficits: 3+/5 at hips  LLE ROM: WNL  LLE Strength: Deficits: 3+/5 at hip    Functional Mobility:  Bed Mobility:     Rolling Left:  stand by assistance  Rolling Right: stand by assistance  Supine to Sit: stand by assistance  Sit to Supine: stand by assistance  Transfers:     Sit to  Stand:  contact guard assistance with no AD  Bed to Chair: contact guard assistance with  hand-held assist  using  Stand Pivot  Gait: x 50 feet in the matt at [facility], No AD  Balance: Normal sitting EOB, Fair+ in standing.       Additional information: Pt assisted to the bathroom, Improved tolerance noted today, but drops in O2 impacting further activity tolerance. 2 min recover needed to get back to 93%    Patient left HOB elevated with all lines intact, call button in reach, and wife present.      Assessment:     Balbir Rogers is a 62 y.o. male admitted with a medical diagnosis of <principal problem not specified>.  He presents with the following impairments/functional limitations:  weakness, impaired endurance, impaired functional mobility, gait instability, impaired balance, pain, impaired cardiopulmonary response to activity warranting skilled PT to return to OF.    Rehab Prognosis: Good; patient would benefit from acute skilled PT services to address these deficits and reach maximum level of function.    Recent Surgery: * No surgery found *        Recommendations:     Discharge Recommendations:  home, home with home health, home health PT, home health OT   Discharge Equipment Recommendations: none       Plan:     During this hospitalization, patient to be seen 6 x/week to address the identified rehab impairments via gait training, therapeutic activities, therapeutic exercises and progress toward the following goals:    GOALS:   Multidisciplinary Problems       Physical Therapy Goals          Problem: Physical Therapy    Goal Priority Disciplines Outcome Goal Variances Interventions   Physical Therapy Goal     PT, PT/OT Ongoing, Progressing     Description: Goals to be met by: Discharge      Patient will increase functional independence with mobility by performin. Sit to stand transfer with Modified [place]  2. Bed to chair transfer with Modified [place] using No Assistive Device  3. Gait  x 325  feet with Supervision using Rolling Walker.   4. Increased functional strength to 5/5 for B Hips. .                       Time Tracking:       PT Start Time: 0923     PT Stop Time: 0939  PT Total Time (min): 16 min     Billable Minutes: Evaluation Low complexity       01/09/2023

## 2023-01-09 NOTE — PROGRESS NOTES
Pt worked with PT and OT today, and was getting breathing tx at PT attempt. Pt reported not wanting to get back up. PT to return tomorrow.

## 2023-01-09 NOTE — PLAN OF CARE
Goals to be met by: Discharge      Patient will increase functional independence with mobility by performin. Sit to stand transfer with Modified [place]  2. Bed to chair transfer with Modified [place] using No Assistive Device  3. Gait  x 325 feet with Supervision using Rolling Walker.   4. Increased functional strength to 5/5 for B Hips. .

## 2023-01-09 NOTE — PT/OT/SLP PROGRESS
Physical Therapy Treatment Note           Name: Balbir Rogers    : 1960 (62 y.o.)  MRN: [id number]           TREATMENT SUMMARY AND RECOMMENDATIONS:    PT Received On: 23  PT Start Time: 1450     PT Stop Time: 1505  PT Total Time (min): 15 min     Subjective Assessment:  x No complaints  Lethargic   x Awake, alert, cooperative  Uncooperative    Agitated  c/o pain    Appropriate  c/o fatigue    Confused  Treated at bedside     Emotionally labile  Treated in gym/dept.    Impulsive  Other:    Flat affect       Therapy Precautions:    Cognitive deficits  Spinal precautions    Collar - hard  Sternal precautions    Collar - soft   TLSO    Fall risk  LSO    Hip precautions - posterior  Knee immobilizer    Hip precautions - anterior  WBAT    Impaired communication  Partial weightbearing   x Oxygen  TTWB    PEG tube  NWB    Visual deficits  Other:    Hearing deficits          Treatment Objectives:     Mobility Training:   Assist level Comments    Bed mobility MOD I    Transfer MOD I    Gait CGA 2 x 80 feet without AD, 6L O2, O2 sats 85% first bout and 90% second bout.    Sit to stand transitions     Sitting balance     Standing balance      Wheelchair mobility     Car transfer     Other:          Therapeutic Exercise:   Exercise Sets Reps Comments                               Additional Comments:  Pt had changed his mind and wanted to walk with PT. Pt with improved tolerance and stability vs am session. Pt in good spirits and motivated by his progress.     Assessment: Patient tolerated session well.    PT Plan: Continue per POC  Revisions made to plan of care: No    GOALS:   Multidisciplinary Problems       Physical Therapy Goals          Problem: Physical Therapy    Goal Priority Disciplines Outcome Goal Variances Interventions   Physical Therapy Goal     PT, PT/OT Ongoing, Progressing     Description: Goals to be met by: Discharge      Patient will increase functional independence with mobility by  performin. Sit to stand transfer with Modified [place]  2. Bed to chair transfer with Modified [place] using No Assistive Device  3. Gait  x 325 feet with Supervision using Rolling Walker.   4. Increased functional strength to 5/5 for B Hips. .                       Skilled PT Minutes Provided: 15   Communication with Treatment Team:     Equipment recommendations:       At end of treatment, patient remained:  x Up in chair     Up in wheelchair in room    In bed    With alarm activated    Bed rails up   x Call bell in reach    x Family/friends present    Restraints secured properly    In bathroom with CNA/RN notified    Nurse aware    In gym with therapist/tech    Other:

## 2023-01-09 NOTE — RESPIRATORY THERAPY
Proceeded to pt. Room Pt. In bed watching tv on Oxymask 5L O2sat 96% rr20 Hr77. Placed pt. On 0.5L walked 30ft. With PT in the matt pt having CP and O2 sat dropped to 78%. Pt SOB sat in chair to recover back on 5L with Oxymask. Pt had increased HR 95 RR24 . Impression needs o2 with any activity at the preseent time.    Thank you,  Edilia Fuentes RRT

## 2023-01-09 NOTE — PLAN OF CARE
Problem: Adult Inpatient Plan of Care  Goal: Plan of Care Review  Outcome: Ongoing, Progressing  Flowsheets (Taken 1/9/2023 1321)  Plan of Care Reviewed With:   patient   spouse  Goal: Patient-Specific Goal (Individualized)  Outcome: Ongoing, Progressing  Flowsheets (Taken 1/9/2023 1321)  Anxieties, Fears or Concerns: not being able to breathe  Individualized Care Needs: improve gas exchange  Goal: Absence of Hospital-Acquired Illness or Injury  Outcome: Ongoing, Progressing  Intervention: Identify and Manage Fall Risk  Flowsheets (Taken 1/9/2023 1321)  Safety Promotion/Fall Prevention:   assistive device/personal item within reach   bed alarm set   family to remain at bedside   nonskid shoes/socks when out of bed   instructed to call staff for mobility  Intervention: Prevent Skin Injury  Flowsheets (Taken 1/9/2023 1321)  Body Position: sitting up in bed  Skin Protection:   adhesive use limited   incontinence pads utilized   transparent dressing maintained  Intervention: Prevent and Manage VTE (Venous Thromboembolism) Risk  Flowsheets (Taken 1/9/2023 1321)  Activity Management: Up in chair - L3  VTE Prevention/Management:   bleeding risk assessed   bleeding precations maintained   ambulation promoted   fluids promoted  Range of Motion: active ROM (range of motion) encouraged  Intervention: Prevent Infection  Flowsheets (Taken 1/9/2023 1321)  Infection Prevention:   equipment surfaces disinfected   hand hygiene promoted  Goal: Optimal Comfort and Wellbeing  Outcome: Ongoing, Progressing  Intervention: Monitor Pain and Promote Comfort  Flowsheets (Taken 1/9/2023 1321)  Pain Management Interventions:   care clustered   quiet environment facilitated  Intervention: Provide Person-Centered Care  Flowsheets (Taken 1/9/2023 1321)  Trust Relationship/Rapport:   care explained   choices provided   questions answered  Goal: Readiness for Transition of Care  Outcome: Ongoing, Progressing  Intervention: Mutually Develop  Transition Plan  Flowsheets (Taken 1/9/2023 1321)  Communicated JACY with patient/caregiver: Date not available/Unable to determine  Do you expect to return to your current living situation?: Yes  Do you have help at home or someone to help you manage your care at home?: Yes  Readmission within 30 days?: No  Do you currently have service(s) that help you manage your care at home?: No     Problem: Fluid Imbalance (Pneumonia)  Goal: Fluid Balance  Outcome: Ongoing, Progressing  Intervention: Monitor and Manage Fluid Balance  Flowsheets (Taken 1/9/2023 1321)  Fluid/Electrolyte Management: fluids provided     Problem: Infection (Pneumonia)  Goal: Resolution of Infection Signs and Symptoms  Outcome: Ongoing, Progressing  Intervention: Prevent Infection Progression  Flowsheets (Taken 1/9/2023 1321)  Fever Reduction/Comfort Measures:   lightweight clothing   lightweight bedding  Infection Management: aseptic technique maintained     Problem: Respiratory Compromise (Pneumonia)  Goal: Effective Oxygenation and Ventilation  Outcome: Ongoing, Progressing  Intervention: Promote Airway Secretion Clearance  Flowsheets (Taken 1/9/2023 1321)  Breathing Techniques/Airway Clearance: deep/controlled cough encouraged  Cough And Deep Breathing: done independently per patient  Intervention: Optimize Oxygenation and Ventilation  Flowsheets (Taken 1/9/2023 1321)  Airway/Ventilation Management: airway patency maintained  Head of Bed (HOB) Positioning: HOB at 30-45 degrees     Problem: Infection  Goal: Absence of Infection Signs and Symptoms  Outcome: Ongoing, Progressing  Intervention: Prevent or Manage Infection  Flowsheets (Taken 1/9/2023 1321)  Fever Reduction/Comfort Measures:   lightweight clothing   lightweight bedding  Infection Management: aseptic technique maintained

## 2023-01-09 NOTE — PLAN OF CARE
Oxygen sat on 4L oxygen at rest 88%    Patient placed on Oxygen at 5L oxygen at rest increased to 96%

## 2023-01-09 NOTE — PROGRESS NOTES
Hospital Medicine  Progress Note    Patient Name: Balbir Rogers  MRN: [id number]  Status: IP- Swing   Admission Date: 1/7/2023  Length of Stay: 2      CC: hospital follow-up for        SUBJECTIVE   61 yo male with PMH of emphysema/COPD (on 5L home O2 and also with at home NIPPV), anxiety, chronic opioid dependence, medical marijuana use who presents with complaint of hypoxemia at home. Pt somnolent, poorly arousable during evaluation thus history obtained from wife at bedside. She reports pt has been ill since Thanksgiving when he was treated for PNA and subsequent thrush. She affirms pt has had a broken NIPPV at home and has had difficulty obtaining a new one secondary to insurance issues. She also says pt has not used supplemental O2 for 2 weeks and reportedly was saturating >90% without home O2. Pt with increasing lethargy at home however and found by wife with an O2 sat of 44% thus she brought him in. She reports she administers his pain medications (oxycodone 30mg qid +buprenorphine patch) and there is no way he could have overdosed.      In the ED pt with acute on chronic hypoxemic hypercapnic respiratory failure. Serial ABGs as follows: (7.19/85.4/97 on 12/6, rr 16, 40%; 7.29/69.6/65 on bipap at 20/6, rr16, 36%; 7.32/59/82 on 20/6, rr23, 35%). Pt also received narcan and reportedly had marked improvement in mental status. Wife at bedside reports DNR/DNI status.     At baseline, pt lives with his wife and is essential bed bound 2/2 pain and deconditioning. PCP Dr. Emery; Pulmonologist Dr. Vela.        Overview/Hospital Course:  Patient admitted for acute on chronic hypoxemic, hypercapnic respiratory failure secondary to concurrent opiate, benzo use and aspiration pneumonia. Evaluated by ST on 1/3, ok to resume soft/bite sized diet. Patient's pain currently being addressed with oxycontin 10mg BID, dose reduced from his baseline 2/2 hypotension. Cannot discontinue as pain becomes so poorly controlled pt becomes  tachypneic. Hold buprenorphine and benzos at this time. ABG showed significant improvement after BiPAP was in place for 24 hours. Pt strongly advised to utilized qhs bipap, he has refused it here. Maintain on supplemental O2. On 1/2 blood cultures resulted Staph, vancomycin was started 1/2, suspect this is reason for continued elevated WBC. Pt also remains on cefepime and cipro for CAP. TTE to be done today, if negative pt would benefit from discussion regarding transfer for [facility], his poor respiratory status may prevent eligibility for [facility].         Interval History:  Essentially bed-bound at baseline.  Physical therapy was unable to evaluate him given desaturation to 88 percent on 4 liters OxyMask.     01/04/2023 feels a little better today, PT able to get into chair  Got some ativan around 2-3 AM and slept better since then and had panic episode this AM     Echo EF 30%  O2 stable on 4 L today     01/05 wbc cont to improve  C/o of not well controlled pain today  Decrease from home pain med  Eval by cardio this AM  Rec BB, iv lasix  Cont tele     01/06 pain not well controlled overnight and this AM pain med held 2/2 low BP  Rested some   Little output with lasix overnight  Cardio following  Discussed possible hospice eval with family and patient today  Wbc trending down     01/08 feels better again today, sob improved, back on home pain meds  BP better controlled overnight      01/09 stable overnight no new c/o  Case mgmt working on trilogy and HH O2 support     Today: Patient seen and examined at bedside, and chart reviewed.       MEDICATIONS   Scheduled   albuterol-ipratropium  3 mL Nebulization Q6H WAKE    enoxaparin  40 mg Subcutaneous Daily    fluticasone furoate  1 puff Inhalation Daily    levoFLOXacin  750 mg Oral Daily    senna-docusate 8.6-50 mg  1 tablet Oral BID    sertraline  50 mg Oral Daily    sodium chloride 0.9%  10 mL Intravenous Q6H    umeclidinium-vilanteroL  1 puff Inhalation Daily     Continuous  Infusions        PHYSICAL EXAM   VITALS: T 97.4 °F (36.3 °C)   /71   P 70   RR 18   O2 96 %    GENERAL: Awake and in NAD  LUNGS: CTA B/L  CVS: Normal rate  GI/: Soft, NT, bowel sounds positive.  EXTREMITIES: No peripheral edema  NEURO: AAOx3  PSYCH: Cooperative      LABS   CBC  Recent Labs     01/08/23  0501 01/09/23  0330   WBC 7.3 7.2   RBC 3.35* 3.30*   HGB 10.2* 10.0*   HCT 33.6* 33.2*   .3* 100.6*   MCH 30.4 30.3   MCHC 30.4* 30.1*   RDW 14.0 14.0    241     CHEM  Recent Labs     01/07/23  0552 01/08/23  0501 01/09/23  0330   * 136 139   K 3.9 3.9 4.1   CHLORIDE 92* 92* 93*   CO2 35* 38* 39*   BUN 8.2* 7.4* 8.4   CREATININE 0.61* 0.63* 0.63*   GLUCOSE 127* 107 94   CALCIUM 8.7* 8.6* 8.0*   MG  --   --  2.20   PHOS  --   --  3.2   ALBUMIN 1.7*  --   --    GLOBULIN 3.8*  --   --    ALKPHOS 89  --   --    ALT 59*  --   --    AST 28  --   --    BILITOT 0.3  --   --          MICROBIOLOGY     Microbiology Results (last 7 days)       Procedure Component Value Units Date/Time    Respiratory Culture [[id number]]     Order Status: Sent Specimen: Sputum               DIAGNOSTICS   X-Ray Chest 1 View  Narrative: EXAMINATION:  XR CHEST 1 VIEW    CPT 65678    CLINICAL HISTORY:  pna;    COMPARISON:  January 7, 2023    FINDINGS:  Cardiomediastinal silhouette and pleuroparenchymal changes are essentially unchanged as compared with the previous exam when accounting for difference in technique  Impression: No significant change    Electronically signed by: Vincent Yoder  Date:    01/09/2023  Time:    08:32        ASSESSMENT   Acute hypercapnic respiratory failure  -hypoxemic, hypercapnic  -2/2 suspected aspiration pna and concurrent opiate+benzo use   -qhs bipap refused by patient  -supplemental O2 as required   -pt at baseline O2 requirement at this time  -repeat chest x-ray from 01/03 showed no significant change        Bacteremia  -vancomycin started 1/2  -repeat blood cultures pending  -TTE  "pending   -FRANC[facility]CO?        Pneumonia of left upper lobe due to infectious organism  -wife affirms brown sputum production--> pseudomonal infection?  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  -wife affirms significant weight loss lately  -smita count        Aspiration pneumonia  -reportedly pt has been experiencing difficulty swallowing since being diagnosed with thrush around Thanksgiving  -no evidence of thrush on exam 1/2  -ST cleared for diet         Myoclonus  -wife reports pt saw neurology 2/2 "tremor"  -was prescribed lorazepam for this         CHF systolic dysfunction  EF 30%     Elevated LFT  Trending down       PLAN   Cont abx  Admitted to swing bed  F/u cardio rec's and meds  Trilogy and HH pending  O2 concentrator not working  Case mgmt for home equipment log        Prophylaxis: pino Schneider MD  Hospital Medicine   "

## 2023-01-09 NOTE — PT/OT/SLP EVAL
"Occupational Therapy   [facility]    Name: Balbir Rogers  MRN: [id number]  Admitting Diagnosis:  COPD exacerbation       History:   Balbir Rogers is a 62 y.o. male with a medical diagnosis of COPD exacerbation.     Past Medical History:   Diagnosis Date    Collapse, lung     COPD with hypoxia     Degenerative cervical spinal stenosis     Depression     Disuse osteoporosis     Emphysema/COPD     Hepatitis C     Leukocytosis          Past Surgical History:   Procedure Laterality Date    CERVICAL LAMINECTOMY WITH SPINAL FUSION      CHOLECYSTECTOMY      COLONOSCOPY      ESOPHAGOGASTRODUODENOSCOPY      gastrointestinal biopsy      graft to nose      inguinal herniotomy      mandible operation      POLYPECTOMY         Subjective     Chief Complaint: "ready to go home"; SOB with mobility   Patient/Family Comments/goals: return home     Occupational Profile:  Lives with: spouse   Home Environment: [facility]  Previous level of function: MOD I with all functional mobility. Would use RW as needed, but did not use often.   Equipment used at home: none; owns straight cane and RW and would use as needed      Objective:     Communicated with: RT prior to session.  Patient found sitting edge of bed with RT present with oxygen, pulse ox (continuous), telemetry upon OT entry to room.    General Precautions: Standard,     Orthopedic Precautions:N/A   Braces: N/A  Respiratory Status: Room air d/t testing; placed on 5L via oxy mask after gait. Ambulated 20-30 feet and Pt dropped to 77%    Occupational Performance:    Mobility  Assist level Comments    Bed mobility independent    Transfer supervision    Functional mobility supervision    Sit to stand transitions independent    Other:          Activities of Daily Living Assist level Comments    Feeding independent    Grooming/hygiene independent    Bathing supervision    Upper body dressing independent    Lower body dressing supervision    Toileting supervision    Toilet transfer " supervision    Adaptive equipment training     Other:       Physical Exam:  Upper Extremity Range of Motion:     -       Right Upper Extremity: WNL  -       Left Upper Extremity: WNL  Upper Extremity Strength:    -       Right Upper Extremity: WNL  -       Left Upper Extremity: WNL    Cognitive/Visual Perceptual:  Cognitive/Psychosocial Skills:     -       Oriented to: Person, Place, Time, and Situation       Treatment & Education:  Ambulated in hallway on room air with RT to test room air sats. Pt reports he is only wanting 1x/day and can perform ADL tasks without assistance, therefore will let PT see Pt and DC OT.     Patient left sitting edge of bed with  RT present    Assessment:     He presents with decreased endurance, SOB with mobility, and overall deconditioning from COPD and heart issues. Performance deficits affecting function: impaired endurance, impaired cardiopulmonary response to activity, weakness.          Plan:     Patient does not warrant OT services at this time d/t Pt performing ADL tasks without assistance and has returned to baseline level. Pt wife reports she helps with ADLs at home d/t SOB. This will serve as EVAL ONLY.   Plan of Care Reviewed with: patient, spouse      Recommendations:     Discharge Recommendations: home health PT  Discharge Equipment Recommendations:  none      Time Tracking:     OT Date of Treatment: 01/09/23  OT Start Time: 1030  OT Stop Time: 1040  OT Total Time (min): 10 min    Billable Minutes:Evaluation 10 min    1/9/2023

## 2023-01-09 NOTE — PLAN OF CARE
Problem: Adult Inpatient Plan of Care  Goal: Plan of Care Review  Outcome: Ongoing, Progressing  Flowsheets (Taken 1/8/2023 1848)  Plan of Care Reviewed With:   patient   spouse   daughter  Goal: Patient-Specific Goal (Individualized)  Outcome: Ongoing, Progressing  Goal: Absence of Hospital-Acquired Illness or Injury  Outcome: Ongoing, Progressing  Intervention: Identify and Manage Fall Risk  Flowsheets (Taken 1/8/2023 1848)  Safety Promotion/Fall Prevention:   assistive device/personal item within reach   bed alarm set   commode/urinal/bedpan at bedside   Fall Risk reviewed with patient/family   instructed to call staff for mobility   side rails raised x 3   nonskid shoes/socks when out of bed   in recliner, wheels locked  Intervention: Prevent Skin Injury  Flowsheets (Taken 1/8/2023 1848)  Skin Protection:   adhesive use limited   incontinence pads utilized   transparent dressing maintained  Intervention: Prevent and Manage VTE (Venous Thromboembolism) Risk  Flowsheets (Taken 1/8/2023 1848)  Activity Management: Ambulated to bathroom - L4  VTE Prevention/Management:   fluids promoted   bleeding precations maintained  Intervention: Prevent Infection  Flowsheets (Taken 1/8/2023 1848)  Infection Prevention:   cohorting utilized   environmental surveillance performed   equipment surfaces disinfected   hand hygiene promoted   single patient room provided   rest/sleep promoted  Goal: Optimal Comfort and Wellbeing  Outcome: Ongoing, Progressing  Intervention: Monitor Pain and Promote Comfort  Flowsheets (Taken 1/8/2023 1848)  Pain Management Interventions:   care clustered   pillow support provided   medication offered  Intervention: Provide Person-Centered Care  Flowsheets (Taken 1/8/2023 1848)  Trust Relationship/Rapport:   questions encouraged   questions answered   care explained  Goal: Readiness for Transition of Care  Outcome: Ongoing, Progressing     Problem: Fluid Imbalance (Pneumonia)  Goal: Fluid  Balance  Outcome: Ongoing, Progressing  Intervention: Monitor and Manage Fluid Balance  Flowsheets (Taken 1/8/2023 1848)  Fluid/Electrolyte Management: fluids provided     Problem: Infection (Pneumonia)  Goal: Resolution of Infection Signs and Symptoms  Outcome: Ongoing, Progressing  Intervention: Prevent Infection Progression  Flowsheets (Taken 1/8/2023 1848)  Infection Management: aseptic technique maintained     Problem: Respiratory Compromise (Pneumonia)  Goal: Effective Oxygenation and Ventilation  Outcome: Ongoing, Progressing  Intervention: Promote Airway Secretion Clearance  Flowsheets (Taken 1/8/2023 1848)  Cough And Deep Breathing: done independently per patient  Intervention: Optimize Oxygenation and Ventilation  Flowsheets (Taken 1/8/2023 1848)  Head of Bed (HOB) Positioning: HOB at 30-45 degrees     Problem: Infection  Goal: Absence of Infection Signs and Symptoms  Outcome: Ongoing, Progressing  Intervention: Prevent or Manage Infection  Flowsheets (Taken 1/8/2023 1848)  Infection Management: aseptic technique maintained

## 2023-01-10 LAB
ANION GAP SERPL CALC-SCNC: 9 MEQ/L
BASOPHILS # BLD AUTO: 0.02 X10(3)/MCL (ref 0–0.2)
BASOPHILS NFR BLD AUTO: 0.3 %
BUN SERPL-MCNC: 9.7 MG/DL (ref 8.4–25.7)
CALCIUM SERPL-MCNC: 8.8 MG/DL (ref 8.8–10)
CHLORIDE SERPL-SCNC: 93 MMOL/L (ref 98–107)
CO2 SERPL-SCNC: 40 MMOL/L (ref 23–31)
CREAT SERPL-MCNC: 0.66 MG/DL (ref 0.73–1.18)
CREAT/UREA NIT SERPL: 15
EOSINOPHIL # BLD AUTO: 0.27 X10(3)/MCL (ref 0–0.9)
EOSINOPHIL NFR BLD AUTO: 4.2 %
ERYTHROCYTE [DISTWIDTH] IN BLOOD BY AUTOMATED COUNT: 14 % (ref 11.5–17)
GFR SERPLBLD CREATININE-BSD FMLA CKD-EPI: >60 MLS/MIN/1.73/M2
GLUCOSE SERPL-MCNC: 113 MG/DL (ref 82–115)
HCT VFR BLD AUTO: 34.8 % (ref 42–52)
HGB BLD-MCNC: 10.2 GM/DL (ref 14–18)
IMM GRANULOCYTES # BLD AUTO: 0.02 X10(3)/MCL (ref 0–0.04)
IMM GRANULOCYTES NFR BLD AUTO: 0.3 %
LYMPHOCYTES # BLD AUTO: 1.52 X10(3)/MCL (ref 0.6–4.6)
LYMPHOCYTES NFR BLD AUTO: 23.5 %
MCH RBC QN AUTO: 29.8 PG
MCHC RBC AUTO-ENTMCNC: 29.3 MG/DL (ref 33–36)
MCV RBC AUTO: 101.8 FL (ref 80–94)
MONOCYTES # BLD AUTO: 0.63 X10(3)/MCL (ref 0.1–1.3)
MONOCYTES NFR BLD AUTO: 9.7 %
NEUTROPHILS # BLD AUTO: 4.01 X10(3)/MCL (ref 2.1–9.2)
NEUTROPHILS NFR BLD AUTO: 62 %
PLATELET # BLD AUTO: 273 X10(3)/MCL (ref 130–400)
PMV BLD AUTO: 9.3 FL (ref 7.4–10.4)
POTASSIUM SERPL-SCNC: 4.4 MMOL/L (ref 3.5–5.1)
POTASSIUM SERPL-SCNC: 5.8 MMOL/L (ref 3.5–5.1)
RBC # BLD AUTO: 3.42 X10(6)/MCL (ref 4.7–6.1)
SODIUM SERPL-SCNC: 142 MMOL/L (ref 136–145)
WBC # SPEC AUTO: 6.5 X10(3)/MCL (ref 4.5–11.5)

## 2023-01-10 PROCEDURE — 25000003 PHARM REV CODE 250: Performed by: FAMILY MEDICINE

## 2023-01-10 PROCEDURE — A4216 STERILE WATER/SALINE, 10 ML: HCPCS | Performed by: FAMILY MEDICINE

## 2023-01-10 PROCEDURE — 63600175 PHARM REV CODE 636 W HCPCS: Performed by: FAMILY MEDICINE

## 2023-01-10 PROCEDURE — 94640 AIRWAY INHALATION TREATMENT: CPT

## 2023-01-10 PROCEDURE — 27000221 HC OXYGEN, UP TO 24 HOURS

## 2023-01-10 PROCEDURE — 85025 COMPLETE CBC W/AUTO DIFF WBC: CPT | Performed by: FAMILY MEDICINE

## 2023-01-10 PROCEDURE — 80048 BASIC METABOLIC PNL TOTAL CA: CPT | Performed by: FAMILY MEDICINE

## 2023-01-10 PROCEDURE — 94760 N-INVAS EAR/PLS OXIMETRY 1: CPT

## 2023-01-10 PROCEDURE — 25000242 PHARM REV CODE 250 ALT 637 W/ HCPCS: Performed by: FAMILY MEDICINE

## 2023-01-10 PROCEDURE — 99900035 HC TECH TIME PER 15 MIN (STAT)

## 2023-01-10 PROCEDURE — 36415 COLL VENOUS BLD VENIPUNCTURE: CPT | Performed by: FAMILY MEDICINE

## 2023-01-10 PROCEDURE — 94660 CPAP INITIATION&MGMT: CPT

## 2023-01-10 PROCEDURE — 84132 ASSAY OF SERUM POTASSIUM: CPT | Performed by: FAMILY MEDICINE

## 2023-01-10 PROCEDURE — 11000004 HC SNF PRIVATE

## 2023-01-10 RX ORDER — MUPIROCIN 20 MG/G
OINTMENT TOPICAL 2 TIMES DAILY
Status: DISCONTINUED | OUTPATIENT
Start: 2023-01-10 | End: 2023-01-11

## 2023-01-10 RX ORDER — BISACODYL 10 MG
10 SUPPOSITORY, RECTAL RECTAL ONCE
Status: DISCONTINUED | OUTPATIENT
Start: 2023-01-10 | End: 2023-01-12

## 2023-01-10 RX ADMIN — ALPRAZOLAM 0.25 MG: 0.25 TABLET ORAL at 08:01

## 2023-01-10 RX ADMIN — OXYCODONE HYDROCHLORIDE 30 MG: 5 TABLET ORAL at 11:01

## 2023-01-10 RX ADMIN — OXYCODONE HYDROCHLORIDE 30 MG: 5 TABLET ORAL at 06:01

## 2023-01-10 RX ADMIN — IPRATROPIUM BROMIDE AND ALBUTEROL SULFATE 3 ML: .5; 3 SOLUTION RESPIRATORY (INHALATION) at 01:01

## 2023-01-10 RX ADMIN — DOCUSATE SODIUM 50MG AND SENNOSIDES 8.6MG 1 TABLET: 8.6; 5 TABLET, FILM COATED ORAL at 08:01

## 2023-01-10 RX ADMIN — MUPIROCIN: 20 OINTMENT TOPICAL at 11:01

## 2023-01-10 RX ADMIN — SODIUM CHLORIDE, PRESERVATIVE FREE 10 ML: 5 INJECTION INTRAVENOUS at 06:01

## 2023-01-10 RX ADMIN — SODIUM CHLORIDE, PRESERVATIVE FREE 10 ML: 5 INJECTION INTRAVENOUS at 12:01

## 2023-01-10 RX ADMIN — UMECLIDINIUM BROMIDE AND VILANTEROL TRIFENATATE 1 PUFF: 62.5; 25 POWDER RESPIRATORY (INHALATION) at 08:01

## 2023-01-10 RX ADMIN — MELATONIN TAB 3 MG 9 MG: 3 TAB at 08:01

## 2023-01-10 RX ADMIN — IPRATROPIUM BROMIDE AND ALBUTEROL SULFATE 3 ML: .5; 3 SOLUTION RESPIRATORY (INHALATION) at 07:01

## 2023-01-10 RX ADMIN — LEVOFLOXACIN 750 MG: 750 TABLET, FILM COATED ORAL at 08:01

## 2023-01-10 RX ADMIN — OXYCODONE HYDROCHLORIDE 30 MG: 5 TABLET ORAL at 12:01

## 2023-01-10 RX ADMIN — ENOXAPARIN SODIUM 40 MG: 40 INJECTION SUBCUTANEOUS at 05:01

## 2023-01-10 RX ADMIN — SERTRALINE HYDROCHLORIDE 50 MG: 50 TABLET, FILM COATED ORAL at 08:01

## 2023-01-10 RX ADMIN — SODIUM CHLORIDE, PRESERVATIVE FREE 10 ML: 5 INJECTION INTRAVENOUS at 11:01

## 2023-01-10 RX ADMIN — SODIUM CHLORIDE, PRESERVATIVE FREE 10 ML: 5 INJECTION INTRAVENOUS at 05:01

## 2023-01-10 RX ADMIN — FLUTICASONE FUROATE 1 PUFF: 100 POWDER RESPIRATORY (INHALATION) at 08:01

## 2023-01-10 RX ADMIN — BISACODYL 10 MG: 10 SUPPOSITORY RECTAL at 09:01

## 2023-01-10 RX ADMIN — ALPRAZOLAM 0.25 MG: 0.25 TABLET ORAL at 06:01

## 2023-01-10 NOTE — PROGRESS NOTES
Hospital Medicine  Progress Note    Patient Name: Balbir Rogers  MRN: [id number]  Status: IP- Swing   Admission Date: 1/7/2023  Length of Stay: 3      CC: hospital follow-up for        SUBJECTIVE   63 yo male with PMH of emphysema/COPD (on 5L home O2 and also with at home NIPPV), anxiety, chronic opioid dependence, medical marijuana use who presents with complaint of hypoxemia at home. Pt somnolent, poorly arousable during evaluation thus history obtained from wife at bedside. She reports pt has been ill since Thanksgiving when he was treated for PNA and subsequent thrush. She affirms pt has had a broken NIPPV at home and has had difficulty obtaining a new one secondary to insurance issues. She also says pt has not used supplemental O2 for 2 weeks and reportedly was saturating >90% without home O2. Pt with increasing lethargy at home however and found by wife with an O2 sat of 44% thus she brought him in. She reports she administers his pain medications (oxycodone 30mg qid +buprenorphine patch) and there is no way he could have overdosed.      In the ED pt with acute on chronic hypoxemic hypercapnic respiratory failure. Serial ABGs as follows: (7.19/85.4/97 on 12/6, rr 16, 40%; 7.29/69.6/65 on bipap at 20/6, rr16, 36%; 7.32/59/82 on 20/6, rr23, 35%). Pt also received narcan and reportedly had marked improvement in mental status. Wife at bedside reports DNR/DNI status.     At baseline, pt lives with his wife and is essential bed bound 2/2 pain and deconditioning. PCP Dr. Emery; Pulmonologist Dr. Vela.        Overview/Hospital Course:  Patient admitted for acute on chronic hypoxemic, hypercapnic respiratory failure secondary to concurrent opiate, benzo use and aspiration pneumonia. Evaluated by ST on 1/3, ok to resume soft/bite sized diet. Patient's pain currently being addressed with oxycontin 10mg BID, dose reduced from his baseline 2/2 hypotension. Cannot discontinue as pain becomes so poorly controlled pt becomes  tachypneic. Hold buprenorphine and benzos at this time. ABG showed significant improvement after BiPAP was in place for 24 hours. Pt strongly advised to utilized qhs bipap, he has refused it here. Maintain on supplemental O2. On 1/2 blood cultures resulted Staph, vancomycin was started 1/2, suspect this is reason for continued elevated WBC. Pt also remains on cefepime and cipro for CAP. TTE to be done today, if negative pt would benefit from discussion regarding transfer for [facility], his poor respiratory status may prevent eligibility for [facility].         Interval History:  Essentially bed-bound at baseline.  Physical therapy was unable to evaluate him given desaturation to 88 percent on 4 liters OxyMask.     01/04/2023 feels a little better today, PT able to get into chair  Got some ativan around 2-3 AM and slept better since then and had panic episode this AM     Echo EF 30%  O2 stable on 4 L today     01/05 wbc cont to improve  C/o of not well controlled pain today  Decrease from home pain med  Eval by cardio this AM  Rec BB, iv lasix  Cont tele     01/06 pain not well controlled overnight and this AM pain med held 2/2 low BP  Rested some   Little output with lasix overnight  Cardio following  Discussed possible hospice eval with family and patient today  Wbc trending down     01/08 feels better again today, sob improved, back on home pain meds  BP better controlled overnight      01/09 stable overnight no new c/o  Case mgmt working on trilogy and HH O2 support      01/10 working on trilogy machine  Repeat K, today at 5.8    Today: Patient seen and examined at bedside, and chart reviewed.       MEDICATIONS   Scheduled   albuterol-ipratropium  3 mL Nebulization Q6H WAKE    enoxaparin  40 mg Subcutaneous Daily    fluticasone furoate  1 puff Inhalation Daily    levoFLOXacin  750 mg Oral Daily    senna-docusate 8.6-50 mg  1 tablet Oral BID    sertraline  50 mg Oral Daily    sodium chloride 0.9%  10 mL Intravenous Q6H     umeclidinium-vilanteroL  1 puff Inhalation Daily     Continuous Infusions        PHYSICAL EXAM   VITALS: T 97.8 °F (36.6 °C)   /63   P 68   RR 20   O2 96 %    GENERAL: Awake and in NAD  LUNGS: CTA B/L  CVS: Normal rate  GI/: Soft, NT, bowel sounds positive.  EXTREMITIES: No peripheral edema  NEURO: AAOx3  PSYCH: Cooperative      LABS   CBC  Recent Labs     01/09/23  0330 01/10/23  0325   WBC 7.2 6.5   RBC 3.30* 3.42*   HGB 10.0* 10.2*   HCT 33.2* 34.8*   .6* 101.8*   MCH 30.3 29.8   MCHC 30.1* 29.3*   RDW 14.0 14.0    273     CHEM  Recent Labs     01/09/23  0330 01/10/23  0325    142   K 4.1 5.8*   CHLORIDE 93* 93*   CO2 39* 40*   BUN 8.4 9.7   CREATININE 0.63* 0.66*   GLUCOSE 94 113   CALCIUM 8.0* 8.8   MG 2.20  --    PHOS 3.2  --          MICROBIOLOGY     Microbiology Results (last 7 days)       Procedure Component Value Units Date/Time    Respiratory Culture [[id number]]     Order Status: Sent Specimen: Sputum               DIAGNOSTICS   X-Ray Chest 1 View  Narrative: EXAMINATION:  XR CHEST 1 VIEW    CPT 33567    CLINICAL HISTORY:  pna;    COMPARISON:  January 7, 2023    FINDINGS:  Cardiomediastinal silhouette and pleuroparenchymal changes are essentially unchanged as compared with the previous exam when accounting for difference in technique  Impression: No significant change    Electronically signed by: Vincent Yoder  Date:    01/09/2023  Time:    08:32        ASSESSMENT     Acute hypercapnic respiratory failure  -hypoxemic, hypercapnic  -2/2 suspected aspiration pna and concurrent opiate+benzo use   -qhs bipap refused by patient  -supplemental O2 as required   -pt at baseline O2 requirement at this time  -repeat chest x-ray from 01/03 showed no significant change        Bacteremia  -vancomycin started 1/2  -repeat blood cultures pending  -TTE pending   -FRANCISCO?        Pneumonia of left upper lobe due to infectious organism  -wife affirms brown sputum production--> pseudomonal  "infection?  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  -wife affirms significant weight loss lately  -smita count        Aspiration pneumonia  -reportedly pt has been experiencing difficulty swallowing since being diagnosed with thrush around Thanksgiving  -no evidence of thrush on exam 1/2  -ST cleared for diet         Myoclonus  -wife reports pt saw neurology 2/2 "tremor"  -was prescribed lorazepam for this         CHF systolic dysfunction  EF 30%     Elevated LFT  Trending down      Chronic Respiratory Failure secondary to COPD and CHF  PLAN   Cont abx  Admitted to swing bed  F/u cardio rec's and meds  Trilogy and HH pending  O2 concentrator not working  Case mgmt for home equipment log    CPAP machine has not been effective for patient respiratory needs  Volume ventilation is indicated  Ordering non invasive volume ventilator for use during hours of sleep as well as during waking hours when symptoms arise  Home CPAP/BIPAP is not appropriate for patient ventilatory requirments  Repeat K       Prophylaxis: pino Schneider MD  [facility]   "

## 2023-01-10 NOTE — PT/OT/SLP PROGRESS
Name: Balbir Rogers    : 1960 (62 y.o.)  MRN: [id number]           Patient Unavailable      Patient unable to be seen at this time secondary to: son request that pt remains asleep- he did not have a good night... awaiting trilogy machine for DC

## 2023-01-10 NOTE — NURSING
Pt upset that pain medication is not scheduled. Slamming his fist on bedside table yelling that he wants his medication, and he wanted it at 0600. Explained to pt that his pain medication is as needed every 6 hrs.and not a schedule  medication, also explained that his medicine could not be given at 0600.( I had previously explained this to him when he called at 0600 that it was to early and I could not give it to him until 0630). Pt stated he understood. PRN pain med given at  present time. No S&S of distress at present time. Will inform oncoming nurse. Charge nurse made aware.

## 2023-01-10 NOTE — NURSING
Notified  of critical  Co2 level of 40. No new orders noted. Will inform oncoming nurse. Charge nurse aware.

## 2023-01-11 PROBLEM — G25.3 MYOCLONUS: Status: RESOLVED | Noted: 2022-07-27 | Resolved: 2023-01-11

## 2023-01-11 PROBLEM — J69.0 ASPIRATION PNEUMONIA: Status: RESOLVED | Noted: 2023-01-01 | Resolved: 2023-01-11

## 2023-01-11 PROCEDURE — 25000242 PHARM REV CODE 250 ALT 637 W/ HCPCS: Performed by: FAMILY MEDICINE

## 2023-01-11 PROCEDURE — 97535 SELF CARE MNGMENT TRAINING: CPT

## 2023-01-11 PROCEDURE — 94760 N-INVAS EAR/PLS OXIMETRY 1: CPT

## 2023-01-11 PROCEDURE — 94640 AIRWAY INHALATION TREATMENT: CPT

## 2023-01-11 PROCEDURE — 63600175 PHARM REV CODE 636 W HCPCS: Performed by: FAMILY MEDICINE

## 2023-01-11 PROCEDURE — 25000003 PHARM REV CODE 250: Performed by: FAMILY MEDICINE

## 2023-01-11 PROCEDURE — 99900035 HC TECH TIME PER 15 MIN (STAT)

## 2023-01-11 PROCEDURE — 11000004 HC SNF PRIVATE

## 2023-01-11 PROCEDURE — 94660 CPAP INITIATION&MGMT: CPT

## 2023-01-11 PROCEDURE — 27000221 HC OXYGEN, UP TO 24 HOURS

## 2023-01-11 PROCEDURE — A4216 STERILE WATER/SALINE, 10 ML: HCPCS | Performed by: FAMILY MEDICINE

## 2023-01-11 RX ADMIN — IPRATROPIUM BROMIDE AND ALBUTEROL SULFATE 3 ML: .5; 3 SOLUTION RESPIRATORY (INHALATION) at 01:01

## 2023-01-11 RX ADMIN — SODIUM CHLORIDE, PRESERVATIVE FREE 10 ML: 5 INJECTION INTRAVENOUS at 12:01

## 2023-01-11 RX ADMIN — FLUTICASONE FUROATE 1 PUFF: 100 POWDER RESPIRATORY (INHALATION) at 09:01

## 2023-01-11 RX ADMIN — OXYCODONE HYDROCHLORIDE 30 MG: 5 TABLET ORAL at 05:01

## 2023-01-11 RX ADMIN — ENOXAPARIN SODIUM 40 MG: 40 INJECTION SUBCUTANEOUS at 05:01

## 2023-01-11 RX ADMIN — MUPIROCIN: 20 OINTMENT TOPICAL at 09:01

## 2023-01-11 RX ADMIN — IPRATROPIUM BROMIDE AND ALBUTEROL SULFATE 3 ML: .5; 3 SOLUTION RESPIRATORY (INHALATION) at 07:01

## 2023-01-11 RX ADMIN — ALPRAZOLAM 0.25 MG: 0.25 TABLET ORAL at 06:01

## 2023-01-11 RX ADMIN — OXYCODONE HYDROCHLORIDE 30 MG: 5 TABLET ORAL at 11:01

## 2023-01-11 RX ADMIN — UMECLIDINIUM BROMIDE AND VILANTEROL TRIFENATATE 1 PUFF: 62.5; 25 POWDER RESPIRATORY (INHALATION) at 09:01

## 2023-01-11 RX ADMIN — DOCUSATE SODIUM 50MG AND SENNOSIDES 8.6MG 1 TABLET: 8.6; 5 TABLET, FILM COATED ORAL at 09:01

## 2023-01-11 RX ADMIN — MELATONIN TAB 3 MG 9 MG: 3 TAB at 08:01

## 2023-01-11 RX ADMIN — SODIUM CHLORIDE, PRESERVATIVE FREE 10 ML: 5 INJECTION INTRAVENOUS at 06:01

## 2023-01-11 RX ADMIN — SERTRALINE HYDROCHLORIDE 50 MG: 50 TABLET, FILM COATED ORAL at 09:01

## 2023-01-11 RX ADMIN — LEVOFLOXACIN 750 MG: 750 TABLET, FILM COATED ORAL at 09:01

## 2023-01-11 NOTE — ASSESSMENT & PLAN NOTE
-hypoxemic, hypercapnic  -2/2 suspected aspiration pna and concurrent opiate+benzo use   -qhs bipap refused by patient  -supplemental O2 as required   -pt at baseline O2 requirement at this time  -DC with trilogy, have replaced his oxygen concentrator with one that isn't broken and is pending delivery of a 10L tank of O2 for the drive home

## 2023-01-11 NOTE — SUBJECTIVE & OBJECTIVE
Interval History:  Patient is ambulating 25 ft with Min to moderate assistance for ADLs.  Review of Systems   Constitutional:  Negative for activity change, appetite change, fatigue and fever.   HENT:  Negative for congestion, sore throat and trouble swallowing.    Eyes:  Negative for pain and redness.   Respiratory:  Negative for cough, chest tightness, shortness of breath and wheezing.    Cardiovascular:  Negative for chest pain and leg swelling.   Gastrointestinal:  Negative for abdominal distention, abdominal pain, diarrhea, nausea and vomiting.   Genitourinary:  Negative for dysuria and frequency.   Musculoskeletal:  Positive for arthralgias and myalgias. Negative for back pain and neck pain.   Neurological:  Negative for weakness and headaches.   Psychiatric/Behavioral:  Negative for agitation and confusion.    Objective:     Vital Signs (Most Recent):  Temp: 97.6 °F (36.4 °C) (01/11/23 1104)  Pulse: 90 (01/11/23 1104)  Resp: 20 (01/11/23 1133)  BP: (!) 93/49 (01/11/23 1104)  SpO2: (!) 94 % (01/11/23 1104)   Vital Signs (24h Range):  Temp:  [97.4 °F (36.3 °C)-98.1 °F (36.7 °C)] 97.6 °F (36.4 °C)  Pulse:  [74-90] 90  Resp:  [18-22] 20  SpO2:  [90 %-95 %] 94 %  BP: ()/(45-65) 93/49     Weight: 59.6 kg (131 lb 6.3 oz)  Body mass index is 18.85 kg/m².    Intake/Output Summary (Last 24 hours) at 1/11/2023 1326  Last data filed at 1/11/2023 0929  Gross per 24 hour   Intake 837 ml   Output 900 ml   Net -63 ml        Physical Exam  Constitutional:       General: He is not in acute distress.     Appearance: Normal appearance. He is cachectic.   HENT:      Head: Normocephalic and atraumatic.      Nose: No congestion or rhinorrhea.      Mouth/Throat:      Mouth: Mucous membranes are moist.   Eyes:      Conjunctiva/sclera: Conjunctivae normal.      Pupils: Pupils are equal, round, and reactive to light.   Cardiovascular:      Rate and Rhythm: Normal rate and regular rhythm.      Heart sounds: No murmur  heard.  Pulmonary:      Effort: Pulmonary effort is normal.      Breath sounds: Normal breath sounds. No wheezing.   Abdominal:      General: Bowel sounds are normal. There is no distension.      Palpations: Abdomen is soft.      Tenderness: There is no abdominal tenderness.   Musculoskeletal:         General: No swelling. Normal range of motion.      Cervical back: Normal range of motion and neck supple.      Right lower leg: No edema.      Left lower leg: No edema.   Skin:     General: Skin is warm and dry.      Findings: No rash.   Neurological:      General: No focal deficit present.      Mental Status: He is alert and oriented to person, place, and time.   Psychiatric:         Mood and Affect: Mood normal.         Behavior: Behavior normal.       Significant Labs: All pertinent labs within the past 24 hours have been reviewed.    Significant Imaging: I have reviewed all pertinent imaging results/findings within the past 24 hours.

## 2023-01-11 NOTE — PLAN OF CARE
[facility]  Discharge Final Note    Primary Care Provider: Jose Johnson Jr, MD    Expected Discharge Date:     Final Discharge Note (most recent)       Final Note - 01/11/23 1314          Final Note    Assessment Type Final Discharge Note     Anticipated Discharge Disposition Home-Health Care Sv     What phone number can be called within the next 1-3 days to see how you are doing after discharge? [phone]     Hospital Resources/Appts/Education Provided Provided patient/caregiver with written discharge plan information        Post-Acute Status    Post-Acute Authorization Home Health     Home Health Status Set-up Complete/Auth obtained     Patient choice form signed by patient/caregiver List with quality metrics by geographic area provided     Discharge Delays None known at this time                     Important Message from Medicare             Contact Info       [address] [address]   Phone: [phone]       Next Steps: Follow up    Instructions: Will handle patient's oxygen and trilogy machine    Jose Johnson Jr, MD   Specialty: Family Medicine   Relationship: PCP - General    [address] [place] [address]  [address]  [facility] [address]   Phone: [phone]       Next Steps: Follow up    Instructions: The Office will call the patient with appointment date and time.  Spoke to Darron Giordano   Specialty: DME Provider, Home Health Services    [address]  [facility]  [place] [address]   Phone: [phone]       Next Steps: Follow up    [facility]   Specialty: Cardiovascular Disease, Cardiology    [address]  [facility] [address]  [facility] [address]   Phone: [phone]       Next Steps: Go on 1/18/2023    Instructions: Referred patient and the follow up appointment with Dr. VICENTE Orozco's NP is on Wednesday, January 18, 2023 @ 2:00pm.  Spoke to Danni

## 2023-01-11 NOTE — ASSESSMENT & PLAN NOTE
-wife affirms brown sputum production--> pseudomonal infection?  -cefepime, cipro for 7 days completed   -also received 3 days of levaquin

## 2023-01-11 NOTE — HPI
63 yo male with PMH of emphysema/COPD (on 5L home O2 and also with at home NIPPV), anxiety, chronic opioid dependence, medical marijuana use who presents with complaint of hypoxemia at home. Pt somnolent, poorly arousable during evaluation thus history obtained from wife at bedside. She reports pt has been ill since Thanksgiving when he was treated for PNA and subsequent thrush. She affirms pt has had a broken NIPPV at home and has had difficulty obtaining a new one secondary to insurance issues. She also says pt has not used supplemental O2 for 2 weeks and reportedly was saturating >90% without home O2. Pt with increasing lethargy at home however and found by wife with an O2 sat of 44% thus she brought him in. She reports she administers his pain medications (oxycodone 30mg qid +buprenorphine patch) and there is no way he could have overdosed.      In the ED pt with acute on chronic hypoxemic hypercapnic respiratory failure. Serial ABGs as follows: (7.19/85.4/97 on 12/6, rr 16, 40%; 7.29/69.6/65 on bipap at 20/6, rr16, 36%; 7.32/59/82 on 20/6, rr23, 35%). Pt also received narcan and reportedly had marked improvement in mental status. Wife at bedside reports DNR/DNI status.     At baseline, pt lives with his wife and is essential bed bound 2/2 pain and deconditioning. PCP Dr. Emery; Pulmonologist Dr. Vela.

## 2023-01-11 NOTE — PROGRESS NOTES
Upon PT attempt pt was sleeping, not wanting to get up with PT, standing he didn't sleep last night. PT to return later if able.

## 2023-01-11 NOTE — HOSPITAL COURSE
Patient admitted for acute on chronic hypoxemic, hypercapnic respiratory failure secondary to concurrent opiate, benzo use and aspiration pneumonia. Evaluated by ST on 1/3, ok to resume soft/bite sized diet. Patient's pain currently being addressed with oxycontin 10mg BID, dose reduced from his baseline 2/2 hypotension. Cannot discontinue as pain becomes so poorly controlled pt becomes tachypneic. Hold buprenorphine and benzos at this time. ABG showed significant improvement after BiPAP was in place for 24 hours. Pt strongly advised to utilized qhs bipap, he has refused it here. Maintain on supplemental O2. On 1/2 blood cultures resulted micrococcus, considered contaminant.  Received treatment with cefepime and cipro for CAP. FRANCISCO found reduced EF 30% however pt not tolerating GDMT 2/2 hypotension, will need continued outpatient f/u with [facility], appt is set. Pt was started on levaquin on 1/9, reason unclear given no leukocytosis, fever, increased O2 requirements and unchanged CXR therefore will DC antiobiotics. Trilogy obtained for pt, new oxygen concentrator and 10L oxygen tank at bedside for discharge. Pt has a pulmonology appt on Feb 7 per his family. Pt with hyperkalemia on day of discharge, responded well to tx. DC with order for repeat BMP in 1 week to re-evaluate. Pt has improved significantly however remains with guarded prognosis given comorbidites. Family wish to be discharged home with [facility] and if they do not feel this is adequate after some time at home will request hospice services at a later date.

## 2023-01-11 NOTE — PT/OT/SLP PROGRESS
Name: Balbir Rogers    : 1960 (62 y.o.)  MRN: [id number]            Interdisciplinary Team Conference     Case conference held with patient/family and care team to discuss progress, plan of care, barriers to be addressed for safe return home, equipment recommendations, and discharge planning. Communicated therapy progress with MD, RN, therapy clinicians and case management. All questions/concerns answered.

## 2023-01-11 NOTE — PROGRESS NOTES
[facility] Medicine  Progress Note    Patient Name: Balbir Rogers  MRN: [id number]  Patient Class: IP- Swing   Admission Date: 1/7/2023  Length of Stay: 4 days  Attending Physician: Jay Schneider MD  Primary Care Provider: Jose Johnson Jr, MD        Subjective:     Principal Problem:Acute hypercapnic respiratory failure        HPI:  61 yo male with PMH of emphysema/COPD (on 5L home O2 and also with at home NIPPV), anxiety, chronic opioid dependence, medical marijuana use who presents with complaint of hypoxemia at home. Pt somnolent, poorly arousable during evaluation thus history obtained from wife at bedside. She reports pt has been ill since Thanksgiving when he was treated for PNA and subsequent thrush. She affirms pt has had a broken NIPPV at home and has had difficulty obtaining a new one secondary to insurance issues. She also says pt has not used supplemental O2 for 2 weeks and reportedly was saturating >90% without home O2. Pt with increasing lethargy at home however and found by wife with an O2 sat of 44% thus she brought him in. She reports she administers his pain medications (oxycodone 30mg qid +buprenorphine patch) and there is no way he could have overdosed.      In the ED pt with acute on chronic hypoxemic hypercapnic respiratory failure. Serial ABGs as follows: (7.19/85.4/97 on 12/6, rr 16, 40%; 7.29/69.6/65 on bipap at 20/6, rr16, 36%; 7.32/59/82 on 20/6, rr23, 35%). Pt also received narcan and reportedly had marked improvement in mental status. Wife at bedside reports DNR/DNI status.     At baseline, pt lives with his wife and is essential bed bound 2/2 pain and deconditioning. PCP Dr. Emery; Pulmonologist Dr. Vela.      Overview/Hospital Course:  Patient admitted for acute on chronic hypoxemic, hypercapnic respiratory failure secondary to concurrent opiate, benzo use and aspiration pneumonia. Evaluated by ST on 1/3, ok to resume soft/bite sized  diet. Patient's pain currently being addressed with oxycontin 10mg BID, dose reduced from his baseline 2/2 hypotension. Cannot discontinue as pain becomes so poorly controlled pt becomes tachypneic. Hold buprenorphine and benzos at this time. ABG showed significant improvement after BiPAP was in place for 24 hours. Pt strongly advised to utilized qhs bipap, he has refused it here. Maintain on supplemental O2. On 1/2 blood cultures resulted micrococcus, considered contaminant.  Received treatment with cefepime and cipro for CAP. FRANCISCO found reduced EF 30% however pt not tolerating GDMT 2/2 hypotension, will need continued outpatient f/u with [facility]. Pt was started on levaquin on 1/9, reason unclear given no leukocytosis, fever, increased O2 requirements and unchanged CXR therefore will DC antiobiotics. Trilogy obtained for pt, at baseline. Awaiting deliver of 10L oxygen tank for discharge. Pt has improved significantly however remains with guarded prognosis given comorbidites. Family wish to be discharged home with [facility] and if they do not feel this is adequate after some time at home will request hospice services at a later date.       Interval History:  Patient is ambulating 25 ft with Min to moderate assistance for ADLs.  Review of Systems   Constitutional:  Negative for activity change, appetite change, fatigue and fever.   HENT:  Negative for congestion, sore throat and trouble swallowing.    Eyes:  Negative for pain and redness.   Respiratory:  Negative for cough, chest tightness, shortness of breath and wheezing.    Cardiovascular:  Negative for chest pain and leg swelling.   Gastrointestinal:  Negative for abdominal distention, abdominal pain, diarrhea, nausea and vomiting.   Genitourinary:  Negative for dysuria and frequency.   Musculoskeletal:  Positive for arthralgias and myalgias. Negative for back pain and neck pain.   Neurological:  Negative for weakness and headaches.   Psychiatric/Behavioral:  Negative for  agitation and confusion.    Objective:     Vital Signs (Most Recent):  Temp: 97.6 °F (36.4 °C) (01/11/23 1104)  Pulse: 90 (01/11/23 1104)  Resp: 20 (01/11/23 1133)  BP: (!) 93/49 (01/11/23 1104)  SpO2: (!) 94 % (01/11/23 1104)   Vital Signs (24h Range):  Temp:  [97.4 °F (36.3 °C)-98.1 °F (36.7 °C)] 97.6 °F (36.4 °C)  Pulse:  [74-90] 90  Resp:  [18-22] 20  SpO2:  [90 %-95 %] 94 %  BP: ()/(45-65) 93/49     Weight: 59.6 kg (131 lb 6.3 oz)  Body mass index is 18.85 kg/m².    Intake/Output Summary (Last 24 hours) at 1/11/2023 1326  Last data filed at 1/11/2023 0929  Gross per 24 hour   Intake 837 ml   Output 900 ml   Net -63 ml        Physical Exam  Constitutional:       General: He is not in acute distress.     Appearance: Normal appearance. He is cachectic.   HENT:      Head: Normocephalic and atraumatic.      Nose: No congestion or rhinorrhea.      Mouth/Throat:      Mouth: Mucous membranes are moist.   Eyes:      Conjunctiva/sclera: Conjunctivae normal.      Pupils: Pupils are equal, round, and reactive to light.   Cardiovascular:      Rate and Rhythm: Normal rate and regular rhythm.      Heart sounds: No murmur heard.  Pulmonary:      Effort: Pulmonary effort is normal.      Breath sounds: Normal breath sounds. No wheezing.   Abdominal:      General: Bowel sounds are normal. There is no distension.      Palpations: Abdomen is soft.      Tenderness: There is no abdominal tenderness.   Musculoskeletal:         General: No swelling. Normal range of motion.      Cervical back: Normal range of motion and neck supple.      Right lower leg: No edema.      Left lower leg: No edema.   Skin:     General: Skin is warm and dry.      Findings: No rash.   Neurological:      General: No focal deficit present.      Mental Status: He is alert and oriented to person, place, and time.   Psychiatric:         Mood and Affect: Mood normal.         Behavior: Behavior normal.       Significant Labs: All pertinent labs within the past  24 hours have been reviewed.    Significant Imaging: I have reviewed all pertinent imaging results/findings within the past 24 hours.      Assessment/Plan:      * Acute hypercapnic respiratory failure  -hypoxemic, hypercapnic  -2/2 suspected aspiration pna and concurrent opiate+benzo use   -qhs bipap refused by patient  -supplemental O2 as required   -pt at baseline O2 requirement at this time  -DC with trilogy, have replaced his oxygen concentrator with one that isn't broken and is pending delivery of a 10L tank of O2 for the drive home       Bacteremia  -vancomycin started 1/2, cultures grew micrococcus which is considered contaminant and vanc was subsequently discontinued   -repeat blood cultures w/no growth         Pneumonia of left upper lobe due to infectious organism  -wife affirms brown sputum production--> pseudomonal infection?  -cefepime, cipro for 7 days completed   -also received 3 days of levaquin       Chronic, continuous use of opioids  -oxycodone 30 q.6 p.r.n.      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  -refused hospice         VTE Risk Mitigation (From admission, onward)         Ordered     enoxaparin injection 40 mg  Daily         01/07/23 1259     IP VTE HIGH RISK PATIENT  Once         01/07/23 1259     Place sequential compression device  Until discontinued         01/07/23 1259                Discharge Planning   JACY:      Code Status: DNR   Is the patient medically ready for discharge?:     Reason for patient still in hospital (select all that apply): Treatment and PT / OT recommendations  Discharge Plan A: Home Health, Home with family   Discharge Delays: None known at this time              Thairy G Reyes, DO  Department of Hospital Medicine   [facility]

## 2023-01-11 NOTE — ASSESSMENT & PLAN NOTE
-vancomycin started 1/2, cultures grew micrococcus which is considered contaminant and vanc was subsequently discontinued   -repeat blood cultures w/no growth

## 2023-01-11 NOTE — PLAN OF CARE
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 Walking 80ft independently. Oxygen fluctuates when walking. Bed mobility and transfers mod I. OT-discharged from OT as he is doing well. Nutritionally- calorie count did not meet needs so supplement added. Appetite has increased. Medically- Doing well. Plan for discharge when oxygen and trilogy approved. Discussed heart issues and follow-up with [facility]. Explained that home health can only see him 2-3 times. Offered Hospice services as well. Family refused hospice services. They would like to get the visits from home health. All questions answered at this time.                 Please see Documented PT/OT/ST, Dietary, Nursing, and Physician notes for detailed treatment information.

## 2023-01-11 NOTE — PROGRESS NOTES
Name: Balbir Rogers    : 1960 (62 y.o.)  MRN: [id number]            Interdisciplinary Team Conference     Case conference held with patient/family and care team to discuss progress, plan of care, barriers to be addressed for safe return home, equipment recommendations, and discharge planning. Communicated therapy progress with MD, RN, therapy clinicians and case management. All questions/concerns answered.

## 2023-01-11 NOTE — PROGRESS NOTES
Upon attempt pt was being fitted for bi-pap, wife stating they are going home today since he got approved.

## 2023-01-11 NOTE — PLAN OF CARE
Problem: Adult Inpatient Plan of Care  Goal: Plan of Care Review  Outcome: Ongoing, Progressing  Flowsheets (Taken 1/10/2023  Plan of Care Reviewed With:   patient   spouse  Goal: Patient-Specific Goal (Individualized)  Outcome: Ongoing, Progressing  Goal: Absence of Hospital-Acquired Illness or Injury  Outcome: Ongoing, Progressing  Intervention: Identify and Manage Fall Risk  Flowsheets (Taken 1/10/2023   Safety Promotion/Fall Prevention:   assistive device/personal item within reach   bed alarm set   nonskid shoes/socks when out of bed   side rails raised x 2   instructed to call staff for mobility  Intervention: Prevent Skin Injury  Flowsheets (Taken 1/10/2023   Body Position:   position maintained   position changed independently   sitting up in bed  Skin Protection: adhesive use limited  Intervention: Prevent and Manage VTE (Venous Thromboembolism) Risk  Flowsheets (Taken 1/10/2023  Activity Management: Up to bedside commode - L3  VTE Prevention/Management:   bleeding precations maintained   bleeding risk assessed  Range of Motion:   active ROM (range of motion) encouraged   ROM (range of motion) performed  Intervention: Prevent Infection  Flowsheets (Taken 1/10/2023  Infection Prevention:   hand hygiene promoted   equipment surfaces disinfected  Goal: Optimal Comfort and Wellbeing  Outcome: Ongoing, Progressing  Intervention: Monitor Pain and Promote Comfort  Flowsheets (Taken 1/10/2023   Pain Management Interventions:   care clustered   medication offered   position adjusted  Intervention: Provide Person-Centered Care  Flowsheets (Taken 1/10/2023   Trust Relationship/Rapport:   care explained   questions encouraged   thoughts/feelings acknowledged   questions answered  Goal: Readiness for Transition of Care  Outcome: Ongoing, Progressing     Problem: Fluid Imbalance (Pneumonia)  Goal: Fluid Balance  Outcome: Ongoing, Progressing  Intervention: Monitor and Manage Fluid Balance  Flowsheets (Taken  1/10/2023  Fluid/Electrolyte Management: fluids provided     Problem: Infection (Pneumonia)  Goal: Resolution of Infection Signs and Symptoms  Outcome: Ongoing, Progressing     Problem: Respiratory Compromise (Pneumonia)  Goal: Effective Oxygenation and Ventilation  Outcome: Ongoing, Progressing     Problem: Infection  Goal: Absence of Infection Signs and Symptoms  Outcome: Ongoing, Progressing

## 2023-01-11 NOTE — PLAN OF CARE
Problem: Adult Inpatient Plan of Care  Goal: Plan of Care Review  Outcome: Ongoing, Progressing  Flowsheets (Taken 1/10/2023 1819)  Plan of Care Reviewed With:   patient   spouse  Goal: Patient-Specific Goal (Individualized)  Outcome: Ongoing, Progressing  Goal: Absence of Hospital-Acquired Illness or Injury  Outcome: Ongoing, Progressing  Intervention: Identify and Manage Fall Risk  Flowsheets (Taken 1/10/2023 1819)  Safety Promotion/Fall Prevention:   assistive device/personal item within reach   bed alarm set   nonskid shoes/socks when out of bed   side rails raised x 2   instructed to call staff for mobility  Intervention: Prevent Skin Injury  Flowsheets (Taken 1/10/2023 1819)  Body Position:   position maintained   position changed independently   sitting up in bed  Skin Protection: adhesive use limited  Intervention: Prevent and Manage VTE (Venous Thromboembolism) Risk  Flowsheets (Taken 1/10/2023 1819)  Activity Management: Up to bedside commode - L3  VTE Prevention/Management:   bleeding precations maintained   bleeding risk assessed  Range of Motion:   active ROM (range of motion) encouraged   ROM (range of motion) performed  Intervention: Prevent Infection  Flowsheets (Taken 1/10/2023 1819)  Infection Prevention:   hand hygiene promoted   equipment surfaces disinfected  Goal: Optimal Comfort and Wellbeing  Outcome: Ongoing, Progressing  Intervention: Monitor Pain and Promote Comfort  Flowsheets (Taken 1/10/2023 1819)  Pain Management Interventions:   care clustered   medication offered   position adjusted  Intervention: Provide Person-Centered Care  Flowsheets (Taken 1/10/2023 1819)  Trust Relationship/Rapport:   care explained   questions encouraged   thoughts/feelings acknowledged   questions answered  Goal: Readiness for Transition of Care  Outcome: Ongoing, Progressing     Problem: Fluid Imbalance (Pneumonia)  Goal: Fluid Balance  Outcome: Ongoing, Progressing  Intervention: Monitor and Manage Fluid  Balance  Flowsheets (Taken 1/10/2023 1819)  Fluid/Electrolyte Management: fluids provided     Problem: Infection (Pneumonia)  Goal: Resolution of Infection Signs and Symptoms  Outcome: Ongoing, Progressing     Problem: Respiratory Compromise (Pneumonia)  Goal: Effective Oxygenation and Ventilation  Outcome: Ongoing, Progressing     Problem: Infection  Goal: Absence of Infection Signs and Symptoms  Outcome: Ongoing, Progressing

## 2023-01-12 VITALS
WEIGHT: 127.44 LBS | HEART RATE: 79 BPM | DIASTOLIC BLOOD PRESSURE: 54 MMHG | BODY MASS INDEX: 18.25 KG/M2 | HEIGHT: 70 IN | OXYGEN SATURATION: 94 % | TEMPERATURE: 98 F | RESPIRATION RATE: 20 BRPM | SYSTOLIC BLOOD PRESSURE: 101 MMHG

## 2023-01-12 PROBLEM — E87.5 HYPERKALEMIA: Status: ACTIVE | Noted: 2023-01-12

## 2023-01-12 LAB
ANION GAP SERPL CALC-SCNC: 6 MEQ/L
BASOPHILS # BLD AUTO: 0.04 X10(3)/MCL (ref 0–0.2)
BASOPHILS NFR BLD AUTO: 0.6 %
BUN SERPL-MCNC: 9.3 MG/DL (ref 8.4–25.7)
CALCIUM SERPL-MCNC: 9 MG/DL (ref 8.8–10)
CHLORIDE SERPL-SCNC: 95 MMOL/L (ref 98–107)
CO2 SERPL-SCNC: 38 MMOL/L (ref 23–31)
CREAT SERPL-MCNC: 0.69 MG/DL (ref 0.73–1.18)
CREAT/UREA NIT SERPL: 13
EOSINOPHIL # BLD AUTO: 0.22 X10(3)/MCL (ref 0–0.9)
EOSINOPHIL NFR BLD AUTO: 3.3 %
ERYTHROCYTE [DISTWIDTH] IN BLOOD BY AUTOMATED COUNT: 14.1 % (ref 11.5–17)
GFR SERPLBLD CREATININE-BSD FMLA CKD-EPI: >60 MLS/MIN/1.73/M2
GLUCOSE SERPL-MCNC: 86 MG/DL (ref 82–115)
HCT VFR BLD AUTO: 33.4 % (ref 42–52)
HGB BLD-MCNC: 9.8 GM/DL (ref 14–18)
IMM GRANULOCYTES # BLD AUTO: 0.01 X10(3)/MCL (ref 0–0.04)
IMM GRANULOCYTES NFR BLD AUTO: 0.1 %
LYMPHOCYTES # BLD AUTO: 1.46 X10(3)/MCL (ref 0.6–4.6)
LYMPHOCYTES NFR BLD AUTO: 21.7 %
MAGNESIUM SERPL-MCNC: 2.4 MG/DL (ref 1.6–2.6)
MCH RBC QN AUTO: 29.7 PG
MCHC RBC AUTO-ENTMCNC: 29.3 MG/DL (ref 33–36)
MCV RBC AUTO: 101.2 FL (ref 80–94)
MONOCYTES # BLD AUTO: 0.82 X10(3)/MCL (ref 0.1–1.3)
MONOCYTES NFR BLD AUTO: 12.2 %
NEUTROPHILS # BLD AUTO: 4.19 X10(3)/MCL (ref 2.1–9.2)
NEUTROPHILS NFR BLD AUTO: 62.1 %
PLATELET # BLD AUTO: 352 X10(3)/MCL (ref 130–400)
PMV BLD AUTO: 9.4 FL (ref 7.4–10.4)
POTASSIUM SERPL-SCNC: 4.9 MMOL/L (ref 3.5–5.1)
POTASSIUM SERPL-SCNC: 6.1 MMOL/L (ref 3.5–5.1)
RBC # BLD AUTO: 3.3 X10(6)/MCL (ref 4.7–6.1)
SODIUM SERPL-SCNC: 139 MMOL/L (ref 136–145)
WBC # SPEC AUTO: 6.7 X10(3)/MCL (ref 4.5–11.5)

## 2023-01-12 PROCEDURE — 84132 ASSAY OF SERUM POTASSIUM: CPT | Performed by: STUDENT IN AN ORGANIZED HEALTH CARE EDUCATION/TRAINING PROGRAM

## 2023-01-12 PROCEDURE — 83735 ASSAY OF MAGNESIUM: CPT | Performed by: STUDENT IN AN ORGANIZED HEALTH CARE EDUCATION/TRAINING PROGRAM

## 2023-01-12 PROCEDURE — 80048 BASIC METABOLIC PNL TOTAL CA: CPT | Performed by: STUDENT IN AN ORGANIZED HEALTH CARE EDUCATION/TRAINING PROGRAM

## 2023-01-12 PROCEDURE — 25000003 PHARM REV CODE 250: Performed by: FAMILY MEDICINE

## 2023-01-12 PROCEDURE — 27000221 HC OXYGEN, UP TO 24 HOURS

## 2023-01-12 PROCEDURE — 36415 COLL VENOUS BLD VENIPUNCTURE: CPT | Performed by: STUDENT IN AN ORGANIZED HEALTH CARE EDUCATION/TRAINING PROGRAM

## 2023-01-12 PROCEDURE — 25000242 PHARM REV CODE 250 ALT 637 W/ HCPCS

## 2023-01-12 PROCEDURE — 25000003 PHARM REV CODE 250: Performed by: STUDENT IN AN ORGANIZED HEALTH CARE EDUCATION/TRAINING PROGRAM

## 2023-01-12 PROCEDURE — 94640 AIRWAY INHALATION TREATMENT: CPT

## 2023-01-12 PROCEDURE — 25000242 PHARM REV CODE 250 ALT 637 W/ HCPCS: Performed by: FAMILY MEDICINE

## 2023-01-12 PROCEDURE — 85025 COMPLETE CBC W/AUTO DIFF WBC: CPT | Performed by: STUDENT IN AN ORGANIZED HEALTH CARE EDUCATION/TRAINING PROGRAM

## 2023-01-12 PROCEDURE — 63600175 PHARM REV CODE 636 W HCPCS: Performed by: STUDENT IN AN ORGANIZED HEALTH CARE EDUCATION/TRAINING PROGRAM

## 2023-01-12 RX ORDER — ALBUTEROL SULFATE 0.83 MG/ML
SOLUTION RESPIRATORY (INHALATION)
Status: COMPLETED
Start: 2023-01-12 | End: 2023-01-12

## 2023-01-12 RX ORDER — INSULIN ASPART 100 [IU]/ML
10 INJECTION, SOLUTION INTRAVENOUS; SUBCUTANEOUS ONCE
Status: COMPLETED | OUTPATIENT
Start: 2023-01-12 | End: 2023-01-12

## 2023-01-12 RX ORDER — SERTRALINE HYDROCHLORIDE 50 MG/1
50 TABLET, FILM COATED ORAL DAILY
Qty: 30 TABLET | Refills: 0 | Status: SHIPPED | OUTPATIENT
Start: 2023-01-13 | End: 2023-12-09

## 2023-01-12 RX ORDER — ALBUTEROL SULFATE 0.83 MG/ML
20 SOLUTION RESPIRATORY (INHALATION) ONCE
Status: DISCONTINUED | OUTPATIENT
Start: 2023-01-12 | End: 2023-01-12

## 2023-01-12 RX ORDER — OXYCODONE HYDROCHLORIDE 30 MG/1
30 TABLET ORAL EVERY 6 HOURS PRN
Qty: 20 TABLET | Refills: 0 | Status: SHIPPED | OUTPATIENT
Start: 2023-01-12 | End: 2023-01-19

## 2023-01-12 RX ORDER — NALOXONE HYDROCHLORIDE 4 MG/.1ML
1 SPRAY NASAL DAILY PRN
Qty: 2 EACH | Refills: 0 | Status: SHIPPED | OUTPATIENT
Start: 2023-01-12 | End: 2023-02-07 | Stop reason: ALTCHOICE

## 2023-01-12 RX ORDER — ALBUTEROL SULFATE 0.83 MG/ML
20 SOLUTION RESPIRATORY (INHALATION) ONCE
Status: COMPLETED | OUTPATIENT
Start: 2023-01-12 | End: 2023-01-12

## 2023-01-12 RX ORDER — IPRATROPIUM BROMIDE AND ALBUTEROL SULFATE 2.5; .5 MG/3ML; MG/3ML
3 SOLUTION RESPIRATORY (INHALATION) EVERY 6 HOURS PRN
Qty: 75 ML | Refills: 0 | Status: SHIPPED | OUTPATIENT
Start: 2023-01-12 | End: 2024-01-09 | Stop reason: SDUPTHER

## 2023-01-12 RX ADMIN — DOCUSATE SODIUM 50MG AND SENNOSIDES 8.6MG 1 TABLET: 8.6; 5 TABLET, FILM COATED ORAL at 08:01

## 2023-01-12 RX ADMIN — IPRATROPIUM BROMIDE AND ALBUTEROL SULFATE 3 ML: .5; 3 SOLUTION RESPIRATORY (INHALATION) at 07:01

## 2023-01-12 RX ADMIN — OXYCODONE HYDROCHLORIDE 30 MG: 5 TABLET ORAL at 05:01

## 2023-01-12 RX ADMIN — OXYCODONE HYDROCHLORIDE 30 MG: 5 TABLET ORAL at 11:01

## 2023-01-12 RX ADMIN — ALBUTEROL SULFATE 20 MG: 2.5 SOLUTION RESPIRATORY (INHALATION) at 07:01

## 2023-01-12 RX ADMIN — ALPRAZOLAM 0.25 MG: 0.25 TABLET ORAL at 10:01

## 2023-01-12 RX ADMIN — INSULIN ASPART 10 UNITS: 100 INJECTION, SOLUTION INTRAVENOUS; SUBCUTANEOUS at 07:01

## 2023-01-12 RX ADMIN — ALBUTEROL SULFATE 20 MG: 0.83 SOLUTION RESPIRATORY (INHALATION) at 07:01

## 2023-01-12 RX ADMIN — DEXTROSE MONOHYDRATE 250 ML: 100 INJECTION, SOLUTION INTRAVENOUS at 07:01

## 2023-01-12 RX ADMIN — ALPRAZOLAM 0.25 MG: 0.25 TABLET ORAL at 02:01

## 2023-01-12 RX ADMIN — SODIUM ZIRCONIUM CYCLOSILICATE 10 G: 10 POWDER, FOR SUSPENSION ORAL at 07:01

## 2023-01-12 RX ADMIN — SERTRALINE HYDROCHLORIDE 50 MG: 50 TABLET, FILM COATED ORAL at 08:01

## 2023-01-12 NOTE — PLAN OF CARE
Problem: Adult Inpatient Plan of Care  Goal: Plan of Care Review  Outcome: Ongoing, Progressing  Flowsheets (Taken 1/11/2023 1816)  Plan of Care Reviewed With:   patient   spouse  Goal: Patient-Specific Goal (Individualized)  Outcome: Ongoing, Progressing  Goal: Absence of Hospital-Acquired Illness or Injury  Outcome: Ongoing, Progressing  Intervention: Identify and Manage Fall Risk  Flowsheets (Taken 1/11/2023 1816)  Safety Promotion/Fall Prevention:   assistive device/personal item within reach   bed alarm set   chair alarm set   in recliner, wheels locked   side rails raised x 3  Intervention: Prevent Skin Injury  Flowsheets (Taken 1/11/2023 1816)  Skin Protection:   adhesive use limited   incontinence pads utilized   tubing/devices free from skin contact   transparent dressing maintained  Intervention: Prevent and Manage VTE (Venous Thromboembolism) Risk  Flowsheets (Taken 1/11/2023 1816)  Activity Management:   Ambulated to bathroom - L4   Rolling - L1   Up in chair - L3  VTE Prevention/Management:   fluids promoted   bleeding precations maintained  Range of Motion: active ROM (range of motion) encouraged  Intervention: Prevent Infection  Flowsheets (Taken 1/11/2023 1816)  Infection Prevention:   cohorting utilized   environmental surveillance performed   equipment surfaces disinfected   rest/sleep promoted   single patient room provided  Goal: Optimal Comfort and Wellbeing  Outcome: Ongoing, Progressing  Intervention: Monitor Pain and Promote Comfort  Flowsheets (Taken 1/11/2023 1816)  Pain Management Interventions:   care clustered   medication offered  Intervention: Provide Person-Centered Care  Flowsheets (Taken 1/11/2023 1816)  Trust Relationship/Rapport:   care explained   questions encouraged   questions answered  Goal: Readiness for Transition of Care  Outcome: Ongoing, Progressing     Problem: Fluid Imbalance (Pneumonia)  Goal: Fluid Balance  Outcome: Ongoing, Progressing  Intervention: Monitor and  Manage Fluid Balance  Flowsheets (Taken 1/11/2023 1816)  Fluid/Electrolyte Management: fluids provided     Problem: Infection (Pneumonia)  Goal: Resolution of Infection Signs and Symptoms  Outcome: Ongoing, Progressing  Intervention: Prevent Infection Progression  Flowsheets (Taken 1/11/2023 1816)  Infection Management: aseptic technique maintained     Problem: Respiratory Compromise (Pneumonia)  Goal: Effective Oxygenation and Ventilation  Outcome: Ongoing, Progressing  Intervention: Promote Airway Secretion Clearance  Flowsheets (Taken 1/11/2023 1816)  Cough And Deep Breathing: done independently per patient  Intervention: Optimize Oxygenation and Ventilation  Flowsheets (Taken 1/11/2023 1816)  Airway/Ventilation Management: airway patency maintained  Head of Bed (HOB) Positioning: HOB at 30-45 degrees     Problem: Infection  Goal: Absence of Infection Signs and Symptoms  Outcome: Ongoing, Progressing  Intervention: Prevent or Manage Infection  Flowsheets (Taken 1/11/2023 1816)  Infection Management: aseptic technique maintained

## 2023-01-12 NOTE — PLAN OF CARE
Problem: Adult Inpatient Plan of Care  Goal: Plan of Care Review  Outcome: Ongoing, Progressing  Flowsheets (Taken 1/11/2023 Plan of Care Reviewed With:   patient   spouse  Goal: Patient-Specific Goal (Individualized)  Outcome: Ongoing, Progressing  Goal: Absence of Hospital-Acquired Illness or Injury  Outcome: Ongoing, Progressing  Intervention: Identify and Manage Fall Risk  Flowsheets (Taken 1/11/2023   Safety Promotion/Fall Prevention:   assistive device/personal item within reach   bed alarm set   chair alarm set   in recliner, wheels locked   side rails raised x 3  Intervention: Prevent Skin Injury  Flowsheets (Taken 1/11/2023   Skin Protection:   adhesive use limited   incontinence pads utilized   tubing/devices free from skin contact   transparent dressing maintained  Intervention: Prevent and Manage VTE (Venous Thromboembolism) Risk  Flowsheets (Taken 1/11/2023   Activity Management:   Ambulated to bathroom - L4   Rolling - L1   Up in chair - L3  VTE Prevention/Management:   fluids promoted   bleeding precations maintained  Range of Motion: active ROM (range of motion) encouraged  Intervention: Prevent Infection  Flowsheets (Taken 1/11/2023   Infection Prevention:   cohorting utilized   environmental surveillance performed   equipment surfaces disinfected   rest/sleep promoted   single patient room provided  Goal: Optimal Comfort and Wellbeing  Outcome: Ongoing, Progressing  Intervention: Monitor Pain and Promote Comfort  Flowsheets (Taken 1/11/2023   Pain Management Interventions:   care clustered   medication offered  Intervention: Provide Person-Centered Care  Flowsheets (Taken 1/11/2023   Trust Relationship/Rapport:   care explained   questions encouraged   questions answered  Goal: Readiness for Transition of Care  Outcome: Ongoing, Progressing     Problem: Fluid Imbalance (Pneumonia)  Goal: Fluid Balance  Outcome: Ongoing, Progressing  Intervention: Monitor and Manage Fluid Balance  Flowsheets  (Taken 1/11/2023   Fluid/Electrolyte Management: fluids provided     Problem: Infection (Pneumonia)  Goal: Resolution of Infection Signs and Symptoms  Outcome: Ongoing, Progressing  Intervention: Prevent Infection Progression  Flowsheets (Taken 1/11/2023   Infection Management: aseptic technique maintained     Problem: Respiratory Compromise (Pneumonia)  Goal: Effective Oxygenation and Ventilation  Outcome: Ongoing, Progressing  Intervention: Promote Airway Secretion Clearance  Flowsheets (Taken 1/11/2023   Cough And Deep Breathing: done independently per patient  Intervention: Optimize Oxygenation and Ventilation  Flowsheets (Taken 1/11/2023   Airway/Ventilation Management: airway patency maintained  Head of Bed (HOB) Positioning: HOB at 30-45 degrees     Problem: Infection  Goal: Absence of Infection Signs and Symptoms  Outcome: Ongoing, Progressing  Intervention: Prevent or Manage Infection  Flowsheets (Taken 1/11/2023   Infection Management: aseptic technique maintained

## 2023-01-12 NOTE — ASSESSMENT & PLAN NOTE
-pt had 2 episodes during this stay  -responded well to albuterol, lokelma, insulin  -repeat BMP in 1 week

## 2023-01-12 NOTE — ASSESSMENT & PLAN NOTE
-oxycodone 30 q.6 p.r.n.  -recommend pt stop concurrent use of benzo and opioids  -DC with narcan rx

## 2023-01-12 NOTE — ASSESSMENT & PLAN NOTE
-acute on chronic hypoxemic, hypercapnic  -2/2 suspected aspiration pna and concurrent opiate+benzo use   -qhs bipap refused by patient  -supplemental O2 as required   -pt at baseline O2 requirement at this time  -DC with trilogy, have replaced his oxygen concentrator with one that isn't broken and a 10L tank of O2 for the drive home

## 2023-01-12 NOTE — NURSING
Patient and spouse educated on discharge instructions and verbalized understanding at this time.  Midline discontinued at this time.  Patient sent home with oxymask and oxygen tank.  Patient left unit via wheelchair and private vehicle.  Patient discharged to home with home health

## 2023-01-12 NOTE — PHYSICIAN QUERY
PT Name: Balbir Rogers  MR #: [id number]    DOCUMENTATION CLARIFICATION     CDS: Silvia Gee RN, CCDS   Contact Information: [email or web address]    This form is a permanent document in the medical record.     Query Date: January 12, 2023    By submitting this query, we are merely seeking further clarification of documentation.. Please utilize your independent clinical judgment when addressing the question(s) below.    The medical record contains the following:   Indicators  Supporting Clinical Findings Location in Medical Record    x Energy Intake  Energy Intake: </= 75% of estimated energy requirement for >/= 1 month   Calories: not meeting estimated needs   Protein: not meeting estimated needs  1/4 RD    x Weight Loss  Interpretation of Weight Loss: 14% in 1 yr  1/4 RD    x Fat Loss  Body Fat: severe depletion   Area of Body Fat Loss: upper arm region - triceps / biceps  1/4 RD    x Muscle Loss  Muscle Mass Loss: severe depletion   Area of Muscle Mass Loss: clavicle and acromion bone region - deltoid muscle  1/4 RD    x Edema/Fluid Accumulation  Fluid Accumulation: severe  1/4 RD    Reduced  Strength (by dynamometer)      x Weight, BMI, Usual Body Weight  Last Weight: 60.3 kg    BMI (Calculated): 19.1   UBW: 70 kg.      Wt Readings from Last 5 Encounters:   01/02/23 60.3 kg (132 lb 15 oz)   08/04/22 61.7 kg (136 lb)   07/27/22 63.5 kg (140 lb)   07/26/22 63.5 kg (140 lb)   07/20/21 67.1 kg (147 lb 15.9 oz)     1/4 RD    Delayed Wound Healing      x Registered Dietician Diagnosis  Malnutrition in the context of chronic illness   Degree of Malnutrition: severe malnutrition   Have you been eating poorly because of a decreased appetite?: Yes  1/4 RD    x Acute or Chronic Illness  PMH of emphysema/COPD (on 5L home O2 and also with at home NIPPV), anxiety, chronic opioid dependence, medical marijuana use who presents with complaint of hypoxemia at home.    Acute hypercapnic respiratory failure, hypoxemic,  hypercapnic   Aspiration Pneumonia   COPD exacerbation 1/1 H&P, Dr. Reyes    Social or Environmental Circumstances      x Treatment  Nutrition Recommendation/Prescription   Continue soft and bite sized. 2. Continue boost plus BID or per patient's preference. 3. Continue calorie count as ordered. 4. Discussed appetite stimulant and nutrition support (PPN) with MD.  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  1/1-1/7  Daily Progress Notes     Academy of Nutrition and Dietetics (Academy) and the American Society for Parenteral and Enteral Nutrition (A.S.P.E.N.) Clinical Characteristics to support Malnutrition   Malnutrition in the Context of Acute Illness or Injury Malnutrition in the Context of Chronic Illness or Injury Malnutrition in the Context of Social or Environmental Circumstances   Malnutrition Level Moderate Severe Moderate Severe   Moderate   Severe   Energy Intake <75%                   >7 days <50%                 >5 days <75%           >1 month <75%                      >1 month   <75% for >3 months   <50% for >1 month   Weight Loss   1-2% in 1 week >2% in 1 week 5% in 1 month >5% in 1 month 5% in 1 month >5% in 1 month    5% in 1 month >5% in 1 month 7.5% in 3 months >7.5% in 3 months 7.5% in 3 months >7.5% in 3 months    7.5% in 3 months >7.5% in 3 months 10% in 6 months >10% in 6 months 10% in 6 months >10% in 6 months        20% in 1 year                    >20% in 1 year                                                                  20% in 1 year                            >20% in 1 year                                                  Subcutaneous Fat Loss Mild  Moderate  Mild  Severe    Mild   Severe   Muscle Loss Mild  Moderate  Mild  Severe    Mild   Severe   Edema/Fluid Accumulation Mild Moderate to severe  Mild  Severe   Mild   Severe   Reduced  Strength         (based on standards supplied by  of dynamometer) N/A Measurably reduced N/A Measurably reduced N/A  Measurably reduced     Criteria for mild malnutrition is defined as 1 characteristic outlined above within the established moderate or severe parameters.  A minimum of 2 out of the 6 characteristics noted above are recommended for a diagnosis of moderate or severe malnutrition.  Chronic illness/injury is a disease/condition lasting 3 months or longer.    The noted clinical guidelines are only system guidelines and do not replace the providers clinical judgment.    Provider, please specify diagnosis or diagnoses associated with above clinical findings.    [  x] Severe Malnutrition - a minimum of 2 of the 6 severe malnutrition characteristics noted above    [  ] Cachexia - involuntary weight loss of greater than 10% of baseline body weight characteried by muscle atrophy and depletion of lean body mass usually associated with a chronic condition   [  ] Other Nutritional Diagnosis (please specify): _______   [  ] Clinically Undetermined     Please document in your progress notes daily for the duration of treatment until resolved and  include in your discharge summary.      References:    CHERI Poole, & SAM Ulloa (2022, April). Assessment and management of anorexia and cachexia in palliative care. Retrieved May 23, 2022, from [email or web address].[employer]/contents/assessment-and-management-of-anorexia-and-cachexia-in-palliative-care?[email or web address]&source=see_link     RAMONA Villanueva, PhD, RD, Gil DE LA GARZA P., PhD, RN, RENATA Moss MD, PhD, Richard KEYS A., MS, RD, [facility], NAVJOT Diallo, MS, RD, The Academy Malnutrition Work Group, The A.S.P.E.N. Board of Directors. (2012). Consensus Statement: Academy of Nutrition and Dietetics and American Society for Parenteral and Enteral Nutrition: Characteristics Recommended for the Identification and Documentation of Adult Malnutrition (Undernutrition). Journal of Parenteral and Enteral Nutrition, 36(3), 275-283. doi:10.1177/6933754196916094     Form No. 62883

## 2023-01-12 NOTE — DISCHARGE SUMMARY
[facility]  Hospital Medicine  Discharge Summary      Patient Name: Balbir Rogers  MRN: [id number]  CAROLE: [id number]  Patient Class: IP- Swing  Admission Date: 1/7/2023  Hospital Length of Stay: 5 days  Discharge Date and Time:  01/12/2023 10:28 AM  Attending Physician: Jay Schneider MD   Discharging Provider: Thairy G Reyes, DO  Primary Care Provider: Jose Johnson Jr, MD    Primary Care Team: Networked reference to record PCT     HPI:   61 yo male with PMH of emphysema/COPD (on 5L home O2 and also with at home NIPPV), anxiety, chronic opioid dependence, medical marijuana use who presents with complaint of hypoxemia at home. Pt somnolent, poorly arousable during evaluation thus history obtained from wife at bedside. She reports pt has been ill since Thanksgiving when he was treated for PNA and subsequent thrush. She affirms pt has had a broken NIPPV at home and has had difficulty obtaining a new one secondary to insurance issues. She also says pt has not used supplemental O2 for 2 weeks and reportedly was saturating >90% without home O2. Pt with increasing lethargy at home however and found by wife with an O2 sat of 44% thus she brought him in. She reports she administers his pain medications (oxycodone 30mg qid +buprenorphine patch) and there is no way he could have overdosed.      In the ED pt with acute on chronic hypoxemic hypercapnic respiratory failure. Serial ABGs as follows: (7.19/85.4/97 on 12/6, rr 16, 40%; 7.29/69.6/65 on bipap at 20/6, rr16, 36%; 7.32/59/82 on 20/6, rr23, 35%). Pt also received narcan and reportedly had marked improvement in mental status. Wife at bedside reports DNR/DNI status.     At baseline, pt lives with his wife and is essential bed bound 2/2 pain and deconditioning. PCP Dr. Emery; Pulmonologist Dr. Vela.      * No surgery found *      Hospital Course:   Patient admitted for acute on chronic hypoxemic, hypercapnic respiratory failure  secondary to concurrent opiate, benzo use and aspiration pneumonia. Evaluated by ST on 1/3, ok to resume soft/bite sized diet. Patient's pain currently being addressed with oxycontin 10mg BID, dose reduced from his baseline 2/2 hypotension. Cannot discontinue as pain becomes so poorly controlled pt becomes tachypneic. Hold buprenorphine and benzos at this time. ABG showed significant improvement after BiPAP was in place for 24 hours. Pt strongly advised to utilized qhs bipap, he has refused it here. Maintain on supplemental O2. On 1/2 blood cultures resulted micrococcus, considered contaminant.  Received treatment with cefepime and cipro for CAP. FRANCISCO found reduced EF 30% however pt not tolerating GDMT 2/2 hypotension, will need continued outpatient f/u with [facility], appt is set. Pt was started on levaquin on 1/9, reason unclear given no leukocytosis, fever, increased O2 requirements and unchanged CXR therefore will DC antiobiotics. Trilogy obtained for pt, new oxygen concentrator and 10L oxygen tank at bedside for discharge. Pt has a pulmonology appt on Feb 7 per his family. Pt with hyperkalemia on day of discharge, responded well to tx. DC with order for repeat BMP in 1 week to re-evaluate. Pt has improved significantly however remains with guarded prognosis given comorbidites. Family wish to be discharged home with [facility] and if they do not feel this is adequate after some time at home will request hospice services at a later date.        Goals of Care Treatment Preferences:  Code Status: DNR      Consults:   Consults (From admission, onward)        Status Ordering Provider     Inpatient consult to Midline team  Once        Provider:  (Not yet assigned)    Acknowledged JAGDEEP PIMENTEL     Inpatient consult to Registered Dietitian/Nutritionist  Once        Provider:  (Not yet assigned)    Completed JAGDEEP PIMENTEL          * Acute hypercapnic respiratory failure  -acute on chronic hypoxemic, hypercapnic  -2/2  suspected aspiration pna and concurrent opiate+benzo use   -qhs bipap refused by patient  -supplemental O2 as required   -pt at baseline O2 requirement at this time  -DC with trilogy, have replaced his oxygen concentrator with one that isn't broken and a 10L tank of O2 for the drive home       Bacteremia  -vancomycin started 1/2, cultures grew micrococcus which is considered contaminant and vanc was subsequently discontinued   -repeat blood cultures w/no growth         Pneumonia of left upper lobe due to infectious organism  -wife affirms brown sputum production--> pseudomonal infection?  -cefepime, cipro for 7 days completed   -also received 3 days of levaquin       Chronic, continuous use of opioids  -oxycodone 30 q.6 p.r.n.  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  -refused hospice       Hyperkalemia  -pt had 2 episodes during this stay  -responded well to albuterol, lokelma, insulin  -repeat BMP in 1 week      Critical care time 30 min   Final Active Diagnoses:    Diagnosis Date Noted POA    PRINCIPAL PROBLEM:  Acute hypercapnic respiratory failure [J96.02] 01/01/2023 Yes    Bacteremia [R78.81] 01/03/2023 Yes    Pneumonia of left upper lobe due to infectious organism [J18.9] 01/01/2023 Yes    Chronic, continuous use of opioids [F11.90]  Yes    COPD exacerbation [J44.1] 01/01/2023 Yes    Degenerative cervical spinal stenosis [M48.02]  Yes    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    Why: Will handle patient's oxygen and trilogy machine  Contact information:  [address] [facility] [place] [address]  [phone]             Jose Johnson Jr, MD .    Specialty: Family Medicine  Why: The Office will call the patient with appointment date and time.  Spoke to Lucy.  Contact information:  [address] [facility]  [address]  [place] [address]  [phone]             Steven [facility] .    Specialties: DME Provider, Home Health Services  Contact information:  [address]  [facility] [address] [facility] [address] [facility] [address]  [place] [address]  [phone]             [facility]. Go on 1/18/2023.    Specialties: Cardiovascular Disease, Cardiology  Why: Referred patient and the follow up appointment with Dr. VICENTE Orozco's NP is on Wednesday, January 18, 2023 @ 2:00pm.  Spoke to Pippa.  Contact information:  [address]  [facility]  [facility] [id number] [phone]                       Patient Instructions:      OXYGEN FOR HOME USE     Order Specific Question Answer Comments   Liter Flow 5    Duration Continuous    Qualifying Test Performed at: Rest    Oxygen saturation: 88    Portable mode: continuous    Route nasal cannula    Device: home concentrator with portable concentrator Home Concentrator & portable O2 set up 5l/m prt NC continuous   Length of need (in months): 3 mos    Patient condition with qualifying saturation CHF    Additional information: depended edema    Height: 5' 10" (1.778 m)    Weight: 53.7 kg (118 lb 6.2 oz)    Alternative treatment measures have been tried or considered and deemed clinically ineffective. Yes      VENTILATOR FOR HOME USE     Order Specific Question Answer Comments   Height: 5' 10" (1.778 m)    Weight: 60.4 kg (133 lb 2.5 oz)    Does patient have medical equipment at home? bedside commode    Does patient have medical equipment at home? " nebulizer    Does patient have medical equipment at home? oxygen    Does patient have medical equipment at home? walker, rolling    Length of need (1-99 months): 99    Type (): Non-invasive    Interface needed: Full face mask    Mode: NIPPV    PEEP - EPAP OTHER    Humidifier needed? Yes      Basic Metabolic Panel   Standing Status: Future Standing Exp. Date: 03/12/24     Ambulatory referral/consult to Home Health   Standing Status: Future   Referral Priority: Routine Referral Type: Home Health   Referral Reason: Specialty Services Required   Requested Specialty: Home Health Services   Number of Visits Requested: 1       Significant Diagnostic Studies: Labs:   BMP:   Recent Labs   Lab 01/12/23  0453 01/12/23  0834     --    K 6.1* 4.9   CO2 38*  --    BUN 9.3  --    CREATININE 0.69*  --    CALCIUM 9.0  --    MG 2.40  --        Pending Diagnostic Studies:     None         Medications:  Reconciled Home Medications:      Medication List      START taking these medications    fluticasone furoate 100 mcg/actuation inhaler  Commonly known as: ARNUITY ELLIPTA  Inhale 1 puff into the lungs once daily. Controller     naloxone 4 mg/actuation Spry  Commonly known as: NARCAN  1 spray (4 mg total) by Nasal route daily as needed (sedation).     umeclidinium-vilanteroL 62.5-25 mcg/actuation Dsdv  Commonly known as: ANORO ELLIPTA  Inhale 1 puff into the lungs once daily. Controller        CHANGE how you take these medications    albuterol-ipratropium 2.5 mg-0.5 mg/3 mL nebulizer solution  Commonly known as: DUO-NEB  Take 3 mLs by nebulization every 6 (six) hours as needed for Wheezing.  What changed:   · how much to take  · when to take this  · reasons to take this     sertraline 50 MG tablet  Commonly known as: ZOLOFT  Take 1 tablet (50 mg total) by mouth once daily.  Start taking on: January 13, 2023  What changed:   · medication strength  · how much to take        CONTINUE taking these medications     fluticasone-umeclidin-vilanter 100-62.5-25 mcg Dsdv  Commonly known as: TRELEGY ELLIPTA  Inhale 1 puff into the lungs once daily.     oxyCODONE 30 MG Tab  Commonly known as: ROXICODONE  Take 1 tablet (30 mg total) by mouth every 6 (six) hours as needed.        STOP taking these medications    buprenorphine 20 mcg/hour Ptwk  Commonly known as: BUTRANS     gabapentin 300 MG capsule  Commonly known as: NEURONTIN     LORazepam 1 MG tablet  Commonly known as: ATIVAN     predniSONE 50 MG Tab  Commonly known as: DELTASONE     temazepam 30 mg capsule  Commonly known as: RESTORIL            Indwelling Lines/Drains at time of discharge:   Lines/Drains/Airways     None                 Time spent on the discharge of patient: 35 minutes         Thairy G Reyes, DO  Department of Hospital Medicine  [facility]

## 2023-01-13 ENCOUNTER — PATIENT OUTREACH (OUTPATIENT)
Dept: ADMINISTRATIVE | Facility: CLINIC | Age: 63
End: 2023-01-13
Payer: COMMERCIAL

## 2023-01-13 NOTE — PROGRESS NOTES
C3 nurse spoke with Balbir Rogers's wife, Lupe for a TCC post hospital discharge follow up call. The patient has a scheduled [facility] appointment with oJse Johnson Jr, MD on 2/23/23 @ 3:30 and  Dr. VICENTE Orozco's NP (cardiology) on January 18, 2023 @ 2:00pm.

## 2023-01-17 ENCOUNTER — LAB REQUISITION (OUTPATIENT)
Dept: LAB | Facility: HOSPITAL | Age: 63
End: 2023-01-17
Attending: INTERNAL MEDICINE
Payer: COMMERCIAL

## 2023-01-17 DIAGNOSIS — I10 ESSENTIAL (PRIMARY) HYPERTENSION: ICD-10-CM

## 2023-01-17 DIAGNOSIS — J44.1 CHRONIC OBSTRUCTIVE PULMONARY DISEASE WITH (ACUTE) EXACERBATION: ICD-10-CM

## 2023-01-17 LAB
ANION GAP SERPL CALC-SCNC: 12 MEQ/L
BUN SERPL-MCNC: 12.1 MG/DL (ref 8.4–25.7)
CALCIUM SERPL-MCNC: 9.7 MG/DL (ref 8.8–10)
CHLORIDE SERPL-SCNC: 98 MMOL/L (ref 98–107)
CO2 SERPL-SCNC: 31 MMOL/L (ref 23–31)
CREAT SERPL-MCNC: 0.75 MG/DL (ref 0.73–1.18)
CREAT/UREA NIT SERPL: 16
GFR SERPLBLD CREATININE-BSD FMLA CKD-EPI: >60 MLS/MIN/1.73/M2
GLUCOSE SERPL-MCNC: 108 MG/DL (ref 82–115)
POTASSIUM SERPL-SCNC: 5.7 MMOL/L (ref 3.5–5.1)
SODIUM SERPL-SCNC: 141 MMOL/L (ref 136–145)

## 2023-01-17 PROCEDURE — 80048 BASIC METABOLIC PNL TOTAL CA: CPT | Performed by: FAMILY MEDICINE

## 2023-01-17 NOTE — PLAN OF CARE
Problem: Physical Therapy  Goal: Physical Therapy Goal  Description: Goals to be met by: Discharge      Patient will increase functional independence with mobility by performin. Sit to stand transfer with Modified [place]  2. Bed to chair transfer with Modified [place] using No Assistive Device  3. Gait  x 325 feet with Supervision using Rolling Walker.   4. Increased functional strength to 5/5 for B Hips. .  Outcome: Adequate for Care Transition

## 2023-01-17 NOTE — PT/OT/SLP DISCHARGE
Physical Therapy Discharge Summary    Name: Balbir Rogers  MRN: [id number]   Principal Problem: Acute hypercapnic respiratory failure     Patient Discharged from acute Physical Therapy on 2023.  Please refer to prior PT noted date on 2023 for functional status.     Assessment:     Goals partially met. Patient appropriate for care in another setting.    Objective:     GOALS:   Multidisciplinary Problems       Physical Therapy Goals          Problem: Physical Therapy    Goal Priority Disciplines Outcome Goal Variances Interventions   Physical Therapy Goal     PT, PT/OT Adequate for Care Transition     Description: Goals to be met by: Discharge      Patient will increase functional independence with mobility by performin. Sit to stand transfer with Modified [place]  2. Bed to chair transfer with Modified [place] using No Assistive Device  3. Gait  x 325 feet with Supervision using Rolling Walker.   4. Increased functional strength to 5/5 for B Hips. .                       Reasons for Discontinuation of Therapy Services  Transfer to alternate level of care.      Plan:     Patient Discharged to: Home with Home Health Service.      2023

## 2023-01-20 ENCOUNTER — LAB VISIT (OUTPATIENT)
Dept: LAB | Facility: HOSPITAL | Age: 63
End: 2023-01-20
Attending: NURSE PRACTITIONER
Payer: COMMERCIAL

## 2023-01-20 DIAGNOSIS — E87.5 HYPERPOTASSEMIA: Primary | ICD-10-CM

## 2023-01-20 LAB — POTASSIUM SERPL-SCNC: 5.7 MMOL/L (ref 3.5–5.1)

## 2023-01-20 PROCEDURE — 36415 COLL VENOUS BLD VENIPUNCTURE: CPT

## 2023-01-20 PROCEDURE — 84132 ASSAY OF SERUM POTASSIUM: CPT

## 2023-01-26 NOTE — H&P
Hospital Medicine  History & Physical Examination       Patient: Balbir Rogers  MRN: [id number]  STATUS: IP- Swing   DOS: 1/26/2023   PCP: Jose Johnson Jr, MD      CC: sob       HISTORY OF PRESENT ILLNESS           Overview/Hospital Course:  89 yo M Patient admitted for acute on chronic hypoxemic, hypercapnic respiratory failure secondary to concurrent opiate, benzo use and aspiration pneumonia. Evaluated by ST on 1/3, ok to resume soft/bite sized diet. Patient's pain currently being addressed with oxycontin 10mg BID, dose reduced from his baseline 2/2 hypotension. Cannot discontinue as pain becomes so poorly controlled pt becomes tachypneic. Hold buprenorphine and benzos at this time. ABG showed significant improvement after BiPAP was in place for 24 hours. Pt strongly advised to utilized qhs bipap, he has refused it here. Maintain on supplemental O2. On 1/2 blood cultures resulted Staph, vancomycin was started 1/2, suspect this is reason for continued elevated WBC. Pt also remains on cefepime and cipro for CAP. TTE to be done today, if negative pt would benefit from discussion regarding transfer for [facility], his poor respiratory status may prevent eligibility for [facility].    Admitted to swing bed.          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      SOCIAL HISTORY     Social History     Tobacco Use    Smoking status: Former     Packs/day: 2.00     Years: 40.00     Pack years: 80.00     Types: Cigarettes     Quit date: 2017     Years since quittin.4    Smokeless tobacco: Never   Substance Use Topics    Alcohol use: Never          ALLERGIES   Penicillin      HOME MEDICATIONS     Current Outpatient Medications   Medication Instructions    albuterol-ipratropium (DUO-NEB) 2.5 mg-0.5 mg/3 mL nebulizer solution 3 mLs, Nebulization, Every 6 hours PRN    fluticasone furoate (ARNUITY ELLIPTA) 100 mcg/actuation inhaler 1 puff, Inhalation, Daily, Controller    fluticasone-umeclidin-vilanter (TRELEGY ELLIPTA) 100-62.5-25 mcg DsDv 1 puff, Inhalation, Daily    naloxone (NARCAN) 4 mg, Nasal, Daily PRN    sertraline (ZOLOFT) 50 mg, Oral, Daily    umeclidinium-vilanteroL (ANORO ELLIPTA) 62.5-25 mcg/actuation DsDv 1 puff, Inhalation, Daily, Controller          PHYSICAL EXAM   VITALS: T 97.8 °F (36.6 °C)   BP (!) 101/54   P 79   RR 20   O2 (!) 94 %    GENERAL: Awake and in NAD  HEENT: NC/AT, EOMI, PERRL.  NECK: Supple,  No JVD  LUNGS: CTA B/L  CVS: RRR, S1S2 positive  GI/: Soft, NT/ND, bowel sounds positive.  EXTREMITIES: Peripheral pulses 2+, no peripheral edema  DERM: Warm, dry.  No rashes or lesions noted.  NEURO: AAOx3, no focal neurologic deficit  PSYCH: Cooperative, appropriate mood and affect       DIAGNOSTICS     No results for input(s): WBC, RBC, HGB, HCT, MCV, MCH, MCHC, RDW, PLT, RETIC, ESR in the last 72 hours.  No results for input(s): LACTIC in the last 72 hours.  No results for input(s): INR, APTT, D-DIMER in the last 72 hours.  No results for input(s): HGBA1C, CHOL, TRIG, LDL, VLDL, HDL in the last 72 hours.   No results for input(s): NA, K, CHLORIDE, CO2, BUN, CREATININE, GLUCOSE, CALCIUM, MG, PHOS, ALBUMIN, GLOBULIN, ALKPHOS, ALT, AST, BILITOT, LIPASE, CRP, LDH, HAPTOGLOBIN, FERRITIN,  IRON, TIBC, TSH, FREET4 in the last 72 hours.  No results for input(s): BNP, CPK, TROPONINI in the last 72 hours.       X-Ray Chest 1 View  Narrative: EXAMINATION:  XR CHEST 1 VIEW    CPT 32081    CLINICAL HISTORY:  pna;    COMPARISON:  January 7, 2023    FINDINGS:  Cardiomediastinal silhouette and pleuroparenchymal changes are essentially unchanged as compared with the previous exam when accounting for difference in technique  Impression: No significant change    Electronically signed by: Vincent Yoder  Date:    01/09/2023  Time:    08:32        ASSESSMENT/PLAN:   Acute hypercapnic respiratory failure  -hypoxemic, hypercapnic  -2/2 suspected aspiration pna and concurrent opiate+benzo use   -qhs bipap refused by patient  -supplemental O2 as required   -pt at baseline O2 requirement at this time  -repeat chest x-ray from 01/03 showed no significant change        Bacteremia  -vancomycin started 1/2  -repeat blood cultures pending  -TTE pending   -FRANCISCO?        Pneumonia of left upper lobe due to infectious organism  -wife affirms brown sputum production--> pseudomonal infection?  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  -wife affirms significant weight loss lately  -smita count        Aspiration pneumonia  -reportedly pt has been experiencing difficulty swallowing since being diagnosed with thrush around Thanksgiving  -no evidence of thrush on exam 1/2  -ST cleared for diet        "  Myoclonus  -wife reports pt saw neurology 2/2 "tremor"  -was prescribed lorazepam for this         CHF systolic dysfunction  EF 30%     Elevated LFT  Trending down        Prophylaxis: lovenox  Code Status: full code      Jay Schneider MD  Hospital Medicine        If the patient has been admitted under observation status, it is at my discretion and under our care with hospital medicine services. [TWA]    "

## 2023-01-27 PROCEDURE — 85025 COMPLETE CBC W/AUTO DIFF WBC: CPT | Performed by: INTERNAL MEDICINE

## 2023-01-28 ENCOUNTER — LAB REQUISITION (OUTPATIENT)
Dept: LAB | Facility: HOSPITAL | Age: 63
End: 2023-01-28
Payer: COMMERCIAL

## 2023-01-28 DIAGNOSIS — I50.21 ACUTE SYSTOLIC (CONGESTIVE) HEART FAILURE: ICD-10-CM

## 2023-01-28 LAB
BASOPHILS # BLD AUTO: 0.05 X10(3)/MCL (ref 0–0.2)
BASOPHILS NFR BLD AUTO: 0.9 %
EOSINOPHIL # BLD AUTO: 0.28 X10(3)/MCL (ref 0–0.9)
EOSINOPHIL NFR BLD AUTO: 5 %
ERYTHROCYTE [DISTWIDTH] IN BLOOD BY AUTOMATED COUNT: 13.9 % (ref 11.5–17)
HCT VFR BLD AUTO: 43 % (ref 42–52)
HGB BLD-MCNC: 13.5 GM/DL (ref 14–18)
LYMPHOCYTES # BLD AUTO: 1.64 X10(3)/MCL (ref 0.6–4.6)
LYMPHOCYTES NFR BLD AUTO: 29.5 %
MCH RBC QN AUTO: 30.7 PG
MCHC RBC AUTO-ENTMCNC: 31.4 MG/DL (ref 33–36)
MCV RBC AUTO: 97.7 FL (ref 80–94)
MONOCYTES # BLD AUTO: 0.52 X10(3)/MCL (ref 0.1–1.3)
MONOCYTES NFR BLD AUTO: 9.4 %
NEUTROPHILS # BLD AUTO: 3.04 X10(3)/MCL (ref 2.1–9.2)
NEUTROPHILS NFR BLD AUTO: 54.8 %
PLATELET # BLD AUTO: 227 X10(3)/MCL (ref 130–400)
PMV BLD AUTO: 11.8 FL (ref 7.4–10.4)
RBC # BLD AUTO: 4.4 X10(6)/MCL (ref 4.7–6.1)
WBC # SPEC AUTO: 5.6 X10(3)/MCL (ref 4.5–11.5)

## 2023-01-30 DIAGNOSIS — F41.9 ANXIETY: Primary | ICD-10-CM

## 2023-01-30 RX ORDER — LORAZEPAM 1 MG/1
1 TABLET ORAL EVERY 6 HOURS PRN
COMMUNITY
End: 2023-01-30 | Stop reason: SDUPTHER

## 2023-01-30 RX ORDER — LORAZEPAM 1 MG/1
1 TABLET ORAL EVERY 6 HOURS PRN
Qty: 15 TABLET | Refills: 3 | Status: SHIPPED | OUTPATIENT
Start: 2023-01-30 | End: 2023-06-11 | Stop reason: SDUPTHER

## 2023-01-30 NOTE — TELEPHONE ENCOUNTER
Medication: Lorazepam 1 mg    [facility]    Last Appointment: 07/27/2022    Next Appointment: 07/25/2023

## 2023-02-01 ENCOUNTER — HOSPITAL ENCOUNTER (OUTPATIENT)
Facility: HOSPITAL | Age: 63
Discharge: HOME | End: 2023-02-01
Attending: INTERNAL MEDICINE | Admitting: INTERNAL MEDICINE
Payer: COMMERCIAL

## 2023-02-01 VITALS
BODY MASS INDEX: 18.43 KG/M2 | TEMPERATURE: 98 F | WEIGHT: 131.63 LBS | HEIGHT: 71 IN | SYSTOLIC BLOOD PRESSURE: 91 MMHG | HEART RATE: 63 BPM | OXYGEN SATURATION: 95 % | DIASTOLIC BLOOD PRESSURE: 49 MMHG | RESPIRATION RATE: 17 BRPM

## 2023-02-01 DIAGNOSIS — I42.0 DILATED CARDIOMYOPATHY: ICD-10-CM

## 2023-02-01 PROCEDURE — C1894 INTRO/SHEATH, NON-LASER: HCPCS | Performed by: INTERNAL MEDICINE

## 2023-02-01 PROCEDURE — 25000003 PHARM REV CODE 250: Performed by: INTERNAL MEDICINE

## 2023-02-01 PROCEDURE — 27201423 OPTIME MED/SURG SUP & DEVICES STERILE SUPPLY: Performed by: INTERNAL MEDICINE

## 2023-02-01 PROCEDURE — 25500020 PHARM REV CODE 255: Performed by: INTERNAL MEDICINE

## 2023-02-01 PROCEDURE — 99152 MOD SED SAME PHYS/QHP 5/>YRS: CPT | Performed by: INTERNAL MEDICINE

## 2023-02-01 PROCEDURE — 93458 L HRT ARTERY/VENTRICLE ANGIO: CPT | Performed by: INTERNAL MEDICINE

## 2023-02-01 PROCEDURE — C1769 GUIDE WIRE: HCPCS | Performed by: INTERNAL MEDICINE

## 2023-02-01 PROCEDURE — C1887 CATHETER, GUIDING: HCPCS | Performed by: INTERNAL MEDICINE

## 2023-02-01 PROCEDURE — 63600175 PHARM REV CODE 636 W HCPCS: Performed by: INTERNAL MEDICINE

## 2023-02-01 RX ORDER — HEPARIN SODIUM 1000 [USP'U]/ML
INJECTION, SOLUTION INTRAVENOUS; SUBCUTANEOUS
Status: DISCONTINUED | OUTPATIENT
Start: 2023-02-01 | End: 2023-02-01 | Stop reason: HOSPADM

## 2023-02-01 RX ORDER — LIDOCAINE HYDROCHLORIDE 10 MG/ML
INJECTION, SOLUTION EPIDURAL; INFILTRATION; INTRACAUDAL; PERINEURAL
Status: DISCONTINUED | OUTPATIENT
Start: 2023-02-01 | End: 2023-02-01 | Stop reason: HOSPADM

## 2023-02-01 RX ORDER — NITROGLYCERIN 20 MG/100ML
INJECTION INTRAVENOUS
Status: DISCONTINUED | OUTPATIENT
Start: 2023-02-01 | End: 2023-02-01 | Stop reason: HOSPADM

## 2023-02-01 RX ORDER — DIAZEPAM 5 MG/1
10 TABLET ORAL
Status: ACTIVE | OUTPATIENT
Start: 2023-02-01

## 2023-02-01 RX ORDER — SODIUM CHLORIDE 0.9 % (FLUSH) 0.9 %
10 SYRINGE (ML) INJECTION
Status: ACTIVE | OUTPATIENT
Start: 2023-02-01

## 2023-02-01 RX ORDER — SODIUM CHLORIDE 9 MG/ML
INJECTION, SOLUTION INTRAVENOUS ONCE
Status: COMPLETED | OUTPATIENT
Start: 2023-02-01 | End: 2023-02-01

## 2023-02-01 RX ORDER — VERAPAMIL HYDROCHLORIDE 2.5 MG/ML
INJECTION, SOLUTION INTRAVENOUS
Status: DISCONTINUED | OUTPATIENT
Start: 2023-02-01 | End: 2023-02-01 | Stop reason: HOSPADM

## 2023-02-01 RX ORDER — DIPHENHYDRAMINE HCL 50 MG
50 CAPSULE ORAL
Status: DISPENSED | OUTPATIENT
Start: 2023-02-01

## 2023-02-01 RX ORDER — MIDAZOLAM HYDROCHLORIDE 1 MG/ML
INJECTION, SOLUTION INTRAMUSCULAR; INTRAVENOUS
Status: DISCONTINUED | OUTPATIENT
Start: 2023-02-01 | End: 2023-02-01 | Stop reason: HOSPADM

## 2023-02-01 RX ADMIN — DIAZEPAM 10 MG: 5 TABLET ORAL at 07:02

## 2023-02-01 RX ADMIN — DIPHENHYDRAMINE HCL 50 MG: 25 CAPSULE ORAL at 07:02

## 2023-02-01 RX ADMIN — SODIUM CHLORIDE: 9 INJECTION, SOLUTION INTRAVENOUS at 07:02

## 2023-02-01 NOTE — Clinical Note
The closure device was inserted into the right radial artery. 14 cc's of air were inserted into the closure device.

## 2023-02-01 NOTE — DISCHARGE SUMMARY
[facility] - Cath Lab  Discharge Note  Short Stay    Procedure(s) (LRB):  CATHETERIZATION, HEART, LEFT (Left)      OUTCOME: Patient tolerated treatment/procedure well without complication and is now ready for discharge.    DISPOSITION: Home or Self Care    FINAL DIAGNOSIS:  <principal problem not specified>    FOLLOWUP: In clinic    DISCHARGE INSTRUCTIONS:  No discharge procedures on file.     TIME SPENT ON DISCHARGE: 31 minutes

## 2023-02-01 NOTE — Clinical Note
The catheter was inserted into the and was inserted over the wire into the left ventricle. Hemodynamics were performed.

## 2023-02-01 NOTE — DISCHARGE INSTRUCTIONS
Remove all dressings in 24 hours.  No submersion in water.  Can take a shower after all is removed.  No heavy lifting ( over 10 lbs.) for five days.  No driving for 24 hours.

## 2023-02-01 NOTE — INTERVAL H&P NOTE
Patient name: Balbir Rogers  MRN: [id number]  : 1960  Cath Lab Procedure H&P Update    Pre-Procedure Assessment:    I saw and examined the patient face to face. The patient has been re-evaluated and his condition is unchanged. The reason for admission, procedure and care is still present.  Based on the patients H&P, pre-procedure physical exam, relevant diagnostic studies, NPO status and information obtained from the patient, I determine the patient is an appropriate candidate for the proposed procedure and anesthesia planned. I further certify the anesthesia risks, benefits and options have been explained to the patient to which he agrees as documented on the procedural consent.

## 2023-02-01 NOTE — Clinical Note
The catheter was inserted into the and was inserted over the wire into the left ventricle. Hemodynamics were performed.  LV EF 50%

## 2023-02-01 NOTE — Clinical Note
The catheter was inserted into the ostium   left main. Hemodynamics were performed.  An angiography was performed of the left coronary arteries. Multiple views were taken.

## 2023-02-07 PROBLEM — I42.0 DILATED CARDIOMYOPATHY: Status: ACTIVE | Noted: 2023-02-07

## 2023-02-07 PROBLEM — J96.12 CHRONIC HYPERCAPNIC RESPIRATORY FAILURE: Status: ACTIVE | Noted: 2023-02-07

## 2023-02-07 PROBLEM — J44.9 CHRONIC OBSTRUCTIVE PULMONARY DISEASE: Status: ACTIVE | Noted: 2023-01-01

## 2023-02-09 ENCOUNTER — LAB REQUISITION (OUTPATIENT)
Dept: LAB | Facility: HOSPITAL | Age: 63
End: 2023-02-09
Payer: COMMERCIAL

## 2023-02-09 DIAGNOSIS — I50.20 UNSPECIFIED SYSTOLIC (CONGESTIVE) HEART FAILURE: ICD-10-CM

## 2023-02-09 LAB
ALBUMIN SERPL-MCNC: 3.6 G/DL (ref 3.4–4.8)
ALBUMIN/GLOB SERPL: 1.2 RATIO (ref 1.1–2)
ALP SERPL-CCNC: 81 UNIT/L (ref 40–150)
ALT SERPL-CCNC: 10 UNIT/L (ref 0–55)
AST SERPL-CCNC: 17 UNIT/L (ref 5–34)
BASOPHILS # BLD AUTO: 0.03 X10(3)/MCL (ref 0–0.2)
BASOPHILS NFR BLD AUTO: 0.5 %
BILIRUBIN DIRECT+TOT PNL SERPL-MCNC: 0.3 MG/DL
BUN SERPL-MCNC: 15.8 MG/DL (ref 8.4–25.7)
CALCIUM SERPL-MCNC: 9.3 MG/DL (ref 8.8–10)
CHLORIDE SERPL-SCNC: 104 MMOL/L (ref 98–107)
CO2 SERPL-SCNC: 31 MMOL/L (ref 23–31)
CREAT SERPL-MCNC: 0.82 MG/DL (ref 0.73–1.18)
EOSINOPHIL # BLD AUTO: 0.24 X10(3)/MCL (ref 0–0.9)
EOSINOPHIL NFR BLD AUTO: 3.7 %
ERYTHROCYTE [DISTWIDTH] IN BLOOD BY AUTOMATED COUNT: 13.6 % (ref 11.5–17)
GFR SERPLBLD CREATININE-BSD FMLA CKD-EPI: >60 MLS/MIN/1.73/M2
GLOBULIN SER-MCNC: 3.1 GM/DL (ref 2.4–3.5)
GLUCOSE SERPL-MCNC: 79 MG/DL (ref 82–115)
HCT VFR BLD AUTO: 35.5 % (ref 42–52)
HGB BLD-MCNC: 10.5 GM/DL (ref 14–18)
IMM GRANULOCYTES # BLD AUTO: 0 X10(3)/MCL (ref 0–0.04)
IMM GRANULOCYTES NFR BLD AUTO: 0 %
LYMPHOCYTES # BLD AUTO: 1.99 X10(3)/MCL (ref 0.6–4.6)
LYMPHOCYTES NFR BLD AUTO: 30.9 %
MCH RBC QN AUTO: 29.2 PG
MCHC RBC AUTO-ENTMCNC: 29.6 MG/DL (ref 33–36)
MCV RBC AUTO: 98.9 FL (ref 80–94)
MONOCYTES # BLD AUTO: 0.61 X10(3)/MCL (ref 0.1–1.3)
MONOCYTES NFR BLD AUTO: 9.5 %
NEUTROPHILS # BLD AUTO: 3.58 X10(3)/MCL (ref 2.1–9.2)
NEUTROPHILS NFR BLD AUTO: 55.4 %
PLATELET # BLD AUTO: 206 X10(3)/MCL (ref 130–400)
PMV BLD AUTO: 11.1 FL (ref 7.4–10.4)
POTASSIUM SERPL-SCNC: 4.6 MMOL/L (ref 3.5–5.1)
PROT SERPL-MCNC: 6.7 GM/DL (ref 5.8–7.6)
RBC # BLD AUTO: 3.59 X10(6)/MCL (ref 4.7–6.1)
SODIUM SERPL-SCNC: 142 MMOL/L (ref 136–145)
WBC # SPEC AUTO: 6.5 X10(3)/MCL (ref 4.5–11.5)

## 2023-02-09 PROCEDURE — 83880 ASSAY OF NATRIURETIC PEPTIDE: CPT | Performed by: NURSE PRACTITIONER

## 2023-02-09 PROCEDURE — 85025 COMPLETE CBC W/AUTO DIFF WBC: CPT | Performed by: NURSE PRACTITIONER

## 2023-02-09 PROCEDURE — 80053 COMPREHEN METABOLIC PANEL: CPT | Performed by: NURSE PRACTITIONER

## 2023-02-10 LAB — NT-PROBNP SERPL IA-MCNC: 647 PG/ML

## 2023-02-21 ENCOUNTER — HOSPITAL ENCOUNTER (EMERGENCY)
Facility: HOSPITAL | Age: 63
Discharge: HOME | End: 2023-02-21
Attending: EMERGENCY MEDICINE
Payer: COMMERCIAL

## 2023-02-21 VITALS
OXYGEN SATURATION: 98 % | TEMPERATURE: 98 F | RESPIRATION RATE: 20 BRPM | SYSTOLIC BLOOD PRESSURE: 130 MMHG | HEART RATE: 64 BPM | DIASTOLIC BLOOD PRESSURE: 75 MMHG

## 2023-02-21 DIAGNOSIS — R06.02 SOB (SHORTNESS OF BREATH): ICD-10-CM

## 2023-02-21 LAB
ANION GAP SERPL CALC-SCNC: 9 MEQ/L
BASOPHILS # BLD AUTO: 0.03 X10(3)/MCL (ref 0–0.2)
BASOPHILS NFR BLD AUTO: 0.3 %
BUN SERPL-MCNC: 15.9 MG/DL (ref 8.4–25.7)
CALCIUM SERPL-MCNC: 9.5 MG/DL (ref 8.8–10)
CHLORIDE SERPL-SCNC: 100 MMOL/L (ref 98–107)
CO2 SERPL-SCNC: 28 MMOL/L (ref 23–31)
CREAT SERPL-MCNC: 0.83 MG/DL (ref 0.73–1.18)
CREAT/UREA NIT SERPL: 19
EOSINOPHIL # BLD AUTO: 0.37 X10(3)/MCL (ref 0–0.9)
EOSINOPHIL NFR BLD AUTO: 4.2 %
ERYTHROCYTE [DISTWIDTH] IN BLOOD BY AUTOMATED COUNT: 13.4 % (ref 11.5–17)
GFR SERPLBLD CREATININE-BSD FMLA CKD-EPI: >60 MLS/MIN/1.73/M2
GLUCOSE SERPL-MCNC: 91 MG/DL (ref 82–115)
HCT VFR BLD AUTO: 36 % (ref 42–52)
HGB BLD-MCNC: 11.4 G/DL (ref 14–18)
IMM GRANULOCYTES # BLD AUTO: 0.01 X10(3)/MCL (ref 0–0.04)
IMM GRANULOCYTES NFR BLD AUTO: 0.1 %
LYMPHOCYTES # BLD AUTO: 2.06 X10(3)/MCL (ref 0.6–4.6)
LYMPHOCYTES NFR BLD AUTO: 23.4 %
MCH RBC QN AUTO: 29.8 PG
MCHC RBC AUTO-ENTMCNC: 31.7 G/DL (ref 33–36)
MCV RBC AUTO: 94 FL (ref 80–94)
MONOCYTES # BLD AUTO: 1.08 X10(3)/MCL (ref 0.1–1.3)
MONOCYTES NFR BLD AUTO: 12.3 %
NEUTROPHILS # BLD AUTO: 5.24 X10(3)/MCL (ref 2.1–9.2)
NEUTROPHILS NFR BLD AUTO: 59.7 %
PLATELET # BLD AUTO: 242 X10(3)/MCL (ref 130–400)
PMV BLD AUTO: 9.2 FL (ref 7.4–10.4)
POTASSIUM SERPL-SCNC: 4.5 MMOL/L (ref 3.5–5.1)
RBC # BLD AUTO: 3.83 X10(6)/MCL (ref 4.7–6.1)
SODIUM SERPL-SCNC: 137 MMOL/L (ref 136–145)
WBC # SPEC AUTO: 8.8 X10(3)/MCL (ref 4.5–11.5)

## 2023-02-21 PROCEDURE — 80048 BASIC METABOLIC PNL TOTAL CA: CPT | Performed by: EMERGENCY MEDICINE

## 2023-02-21 PROCEDURE — 25000242 PHARM REV CODE 250 ALT 637 W/ HCPCS: Performed by: EMERGENCY MEDICINE

## 2023-02-21 PROCEDURE — 85025 COMPLETE CBC W/AUTO DIFF WBC: CPT | Performed by: EMERGENCY MEDICINE

## 2023-02-21 PROCEDURE — 99284 EMERGENCY DEPT VISIT MOD MDM: CPT | Mod: 25

## 2023-02-21 PROCEDURE — 27000221 HC OXYGEN, UP TO 24 HOURS

## 2023-02-21 PROCEDURE — 94640 AIRWAY INHALATION TREATMENT: CPT

## 2023-02-21 RX ORDER — IPRATROPIUM BROMIDE AND ALBUTEROL SULFATE 2.5; .5 MG/3ML; MG/3ML
3 SOLUTION RESPIRATORY (INHALATION)
Status: COMPLETED | OUTPATIENT
Start: 2023-02-21 | End: 2023-02-21

## 2023-02-21 RX ADMIN — IPRATROPIUM BROMIDE AND ALBUTEROL SULFATE 3 ML: .5; 3 SOLUTION RESPIRATORY (INHALATION) at 04:02

## 2023-02-21 NOTE — ED PROVIDER NOTES
Encounter Date: 2/21/2023       History     Chief Complaint   Patient presents with    Shortness of Breath     Sent for  wheezing/sob- recent admit pneumonia- hx copd/ home 02     This 63-year-old man with history of COPD presents to the emergency room with complaints of increased wheezing and shortness of breath.  He states that his symptoms were actually worse yesterday and they are today and that when he went to his doctor's appointment yesterday told him to come to the emergency room.  He is better today.  He states he has a cough productive of clear sputum.  He has had no fever.  His last breathing treatment was this morning at 9.     Review of patient's allergies indicates:   Allergen Reactions    Penicillin Rash     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     Past Surgical History:   Procedure Laterality Date    CERVICAL LAMINECTOMY WITH SPINAL FUSION      CHOLECYSTECTOMY      COLONOSCOPY      ESOPHAGOGASTRODUODENOSCOPY      gastrointestinal biopsy      graft to nose      inguinal herniotomy      LEFT HEART CATHETERIZATION Left 2/1/2023    Procedure: CATHETERIZATION, HEART, LEFT;  Surgeon: Dulce Orozco MD;  Location: [facility] CATH LAB;  Service: Cardiology;  Laterality: Left;  via radial approach    mandible operation      POLYPECTOMY       Family History   Problem Relation Age of Onset    Cancer Mother     Heart disease Father     Hypertension Father      Social History     Tobacco Use    Smoking status: Former     Packs/day: 2.00     Years: 40.00     Pack years: 80.00     Types: Cigarettes     Quit date:  2017     Years since quittin.5    Smokeless tobacco: Never   Substance Use Topics    Alcohol use: Never    Drug use: Yes     Types: Oxycodone     Review of Systems   Constitutional:  Negative for fever.   HENT:  Negative for sore throat.    Respiratory:  Positive for cough, shortness of breath and wheezing.    Cardiovascular:  Negative for chest pain.   Gastrointestinal:  Negative for nausea.   Genitourinary:  Negative for dysuria.   Musculoskeletal:  Negative for back pain.   Skin:  Negative for rash.   Neurological:  Negative for weakness.   Hematological:  Does not bruise/bleed easily.     Physical Exam     Initial Vitals [23 1548]   BP Pulse Resp Temp SpO2   (!) 144/67 70 (!) 24 97.9 °F (36.6 °C) (!) 90 %      MAP       --         Physical Exam    Nursing note and vitals reviewed.  Constitutional: He appears well-developed and well-nourished.   HENT:   Head: Normocephalic and atraumatic.   Mouth/Throat: Mucous membranes are normal.   Eyes: EOM are normal. Pupils are equal, round, and reactive to light.   Neck: Neck supple.   Normal range of motion.  Cardiovascular:  Normal rate, regular rhythm, normal heart sounds and intact distal pulses.           Pulmonary/Chest: Breath sounds normal. No respiratory distress. He has no wheezes. He has no rhonchi. He has no rales.   Abdominal: Abdomen is soft. Bowel sounds are normal.   Musculoskeletal:         General: Normal range of motion.      Cervical back: Normal range of motion and neck supple.     Neurological: He is alert and oriented to person, place, and time. He has normal strength.   Skin: Skin is warm and dry. Capillary refill takes less than 2 seconds.   Psychiatric: He has a normal mood and affect. His behavior is normal. Judgment and thought content normal.       ED Course   Procedures  Labs Reviewed   CBC WITH DIFFERENTIAL - Abnormal; Notable for the following components:       Result Value    RBC 3.83 (*)     Hgb 11.4 (*)     Hct 36.0 (*)      MCHC 31.7 (*)     All other components within normal limits   CBC W/ AUTO DIFFERENTIAL    Narrative:     The following orders were created for panel order CBC auto differential.  Procedure                               Abnormality         Status                     ---------                               -----------         ------                     CBC with Differential[[id number]]        Abnormal            Final result                 Please view results for these tests on the individual orders.   BASIC METABOLIC PANEL          Imaging Results              X-Ray Chest PA And Lateral (Final result)  Result time 02/21/23 17:01:10      Final result by Pio Espinoza MD (02/21/23 17:01:10)                   Impression:      No acute cardiopulmonary process.  Findings of chronic lung disease.      Electronically signed by: Pio Espinoza  Date:    02/21/2023  Time:    17:01               Narrative:    EXAMINATION:  XR CHEST PA AND LATERAL    CLINICAL HISTORY:  Shortness of breath    TECHNIQUE:  Two views of the chest    COMPARISON:  01/09/2023    FINDINGS:  Prominent interstitial markings with improved aeration of the right lung as compared to prior.    The cardiomediastinal silhouette is within normal limits.    No acute osseous abnormality.                                       Medications   albuterol-ipratropium 2.5 mg-0.5 mg/3 mL nebulizer solution 3 mL (3 mLs Nebulization Given 2/21/23 1605)                              Clinical Impression:   Final diagnoses:  [R06.02] SOB (shortness of breath)        ED Disposition Condition    Discharge Stable          ED Prescriptions    None       Follow-up Information       Follow up With Specialties Details Why Contact Info    Jose Johnson Jr., MD Family Medicine Schedule an appointment as soon as possible for a visit   [address] [place] [address] A  [facility] [address]  [phone]               Yash Shah MD  02/21/23 8728

## 2023-02-21 NOTE — ED NOTES
Pt referred to ED by home health nurse for wheezing. Pt c/o SOB and clear productive cough. Pt reports increased SOB with activity. Pt reports having a hx of COPD. Pt wears 5-6L O2 at home.

## 2023-03-19 ENCOUNTER — HOSPITAL ENCOUNTER (EMERGENCY)
Facility: HOSPITAL | Age: 63
Discharge: HOME | End: 2023-03-19
Attending: GENERAL PRACTICE
Payer: COMMERCIAL

## 2023-03-19 VITALS
SYSTOLIC BLOOD PRESSURE: 164 MMHG | WEIGHT: 136 LBS | HEIGHT: 71 IN | HEART RATE: 95 BPM | OXYGEN SATURATION: 97 % | BODY MASS INDEX: 19.04 KG/M2 | RESPIRATION RATE: 20 BRPM | TEMPERATURE: 98 F | DIASTOLIC BLOOD PRESSURE: 80 MMHG

## 2023-03-19 DIAGNOSIS — S60.221A CONTUSION OF RIGHT HAND, INITIAL ENCOUNTER: Primary | ICD-10-CM

## 2023-03-19 PROCEDURE — 99283 EMERGENCY DEPT VISIT LOW MDM: CPT

## 2023-03-19 RX ORDER — DICLOFENAC SODIUM 75 MG/1
75 TABLET, DELAYED RELEASE ORAL 2 TIMES DAILY
Qty: 14 TABLET | Refills: 0 | Status: SHIPPED | OUTPATIENT
Start: 2023-03-19 | End: 2023-07-25

## 2023-03-20 NOTE — DISCHARGE INSTRUCTIONS
Follow up with PCP in 3 days     Take all meds as prescribed, ice hand as tolerated     Return to ed if symptoms worsen

## 2023-03-20 NOTE — ED PROVIDER NOTES
Encounter Date: 3/19/2023       History     Chief Complaint   Patient presents with    Hand Injury     Reports slipping on steps and landing on right hand. Reports pain to right hand and wrist, pulses intact. Denies hitting head or loc.      Pt presents to ed for hand pain. Pt was walking down steps, fell and hurt his right hand.     The history is provided by the patient. No  was used.   Review of patient's allergies indicates:   Allergen Reactions    Penicillin Rash     Past Medical History:   Diagnosis Date    Acute clinical systolic heart failure     Acute hypercapnic respiratory failure     Aspiration pneumonia 2023    Bacteremia     Cardiomyopathy     Chronic, continuous use of opioids     Collapse, lung     COPD with hypoxia     Degenerative cervical spinal stenosis     Depression     Disuse osteoporosis     Emphysema/COPD     Hepatitis C     Hyperkalemia     Leukocytosis     Myoclonus 2022     Past Surgical History:   Procedure Laterality Date    CERVICAL LAMINECTOMY WITH SPINAL FUSION      CHOLECYSTECTOMY      COLONOSCOPY      ESOPHAGOGASTRODUODENOSCOPY      gastrointestinal biopsy      graft to nose      inguinal herniotomy      LEFT HEART CATHETERIZATION Left 2023    Procedure: CATHETERIZATION, HEART, LEFT;  Surgeon: Dulce Orozco MD;  Location: [facility] CATH LAB;  Service: Cardiology;  Laterality: Left;  via radial approach    mandible operation      POLYPECTOMY       Family History   Problem Relation Age of Onset    Cancer Mother     Heart disease Father     Hypertension Father      Social History     Tobacco Use    Smoking status: Former     Packs/day: 2.00     Years: 40.00     Pack years: 80.00     Types: Cigarettes     Quit date: 2017     Years since quittin.6    Smokeless tobacco: Never   Substance Use Topics    Alcohol use: Never    Drug use: Yes     Types: Oxycodone     Review of Systems   Musculoskeletal:  Positive for arthralgias and myalgias.   All  other systems reviewed and are negative.    Physical Exam     Initial Vitals [03/19/23 1913]   BP Pulse Resp Temp SpO2   (!) 164/80 95 20 97.7 °F (36.5 °C) 97 %      MAP       --         Physical Exam    Constitutional: He appears well-developed and well-nourished.   HENT:   Head: Normocephalic and atraumatic.   Eyes: EOM are normal. Pupils are equal, round, and reactive to light.   Neck:   Normal range of motion.  Cardiovascular:  Normal rate, regular rhythm, normal heart sounds and intact distal pulses.           Pulmonary/Chest: Breath sounds normal.   Abdominal: Abdomen is soft.   Musculoskeletal:      Cervical back: Normal range of motion.      Comments: Right hand and finger 1-4 tenderness, normal sensation, normal range of motion     Neurological: He is alert and oriented to person, place, and time. He has normal strength. GCS score is 15. GCS eye subscore is 4. GCS verbal subscore is 5. GCS motor subscore is 6.   Skin: Capillary refill takes less than 2 seconds.   Psychiatric: He has a normal mood and affect.       ED Course   Procedures  Labs Reviewed - No data to display       Imaging Results              X-Ray Hand 3 view Right (Final result)  Result time 03/19/23 19:26:58      Final result by Pio Espinoza MD (03/19/23 19:26:58)                   Impression:      No acute osseous abnormality, fracture, or dislocation.    There is no significant degenerative change.      Electronically signed by: Pio Espinoza  Date:    03/19/2023  Time:    19:26               Narrative:    EXAMINATION:  XR HAND COMPLETE 3 VIEW RIGHT    CLINICAL HISTORY:  injury;    TECHNIQUE:  Radiographs of the right hand with AP, lateral and oblique  views.    COMPARISON:  No prior imaging available for comparison    FINDINGS:  There is no acute fracture, subluxation or dislocation.    Joints and interspaces appear maintained.    Osseous structures show normal bone mineral density.    Soft tissues are unremarkable.    There are no  radiopaque foreign bodies.                                       Medications - No data to display  Medical Decision Making:   Initial Assessment:   Right hand pain after fall   Differential Diagnosis:   Hand and wrist sprain   Contusion   Fracture                         Clinical Impression:   Final diagnoses:  [A19.322A] Contusion of right hand, initial encounter (Primary)               Wyatt Hansen MD  03/19/23 1938

## 2023-03-20 NOTE — ED NOTES
Discharge instruction given wants a shot. Explain nothing was order prior to discharge. Prescription send to pharmacy.

## 2023-03-20 NOTE — ED NOTES
Enter er c/o right  hand injury. He fell off the step and hurt his hand. Some swelling to 2nd-3rd digit.

## 2023-03-21 ENCOUNTER — HOSPITAL ENCOUNTER (OUTPATIENT)
Dept: RADIOLOGY | Facility: HOSPITAL | Age: 63
Discharge: HOME | End: 2023-03-21
Attending: NURSE PRACTITIONER
Payer: COMMERCIAL

## 2023-03-21 DIAGNOSIS — J15.69 PNEUMONIA OF RIGHT MIDDLE LOBE DUE TO OTHER AEROBIC GRAM-NEGATIVE BACTERIA: ICD-10-CM

## 2023-03-21 PROCEDURE — 71250 CT THORAX DX C-: CPT | Mod: TC

## 2023-04-17 PROBLEM — J96.02 ACUTE HYPERCAPNIC RESPIRATORY FAILURE: Status: RESOLVED | Noted: 2023-01-01 | Resolved: 2023-04-17

## 2023-05-15 PROBLEM — J96.12 CHRONIC HYPERCAPNIC RESPIRATORY FAILURE: Status: RESOLVED | Noted: 2023-02-07 | Resolved: 2023-05-15

## 2023-05-15 PROBLEM — J18.9 RIGHT MIDDLE LOBE PNEUMONIA: Status: RESOLVED | Noted: 2023-01-01 | Resolved: 2023-05-15

## 2023-05-18 ENCOUNTER — LAB REQUISITION (OUTPATIENT)
Dept: LAB | Facility: HOSPITAL | Age: 63
End: 2023-05-18
Attending: FAMILY MEDICINE
Payer: COMMERCIAL

## 2023-05-18 DIAGNOSIS — I50.20 UNSPECIFIED SYSTOLIC (CONGESTIVE) HEART FAILURE: ICD-10-CM

## 2023-05-18 DIAGNOSIS — J96.12 CHRONIC RESPIRATORY FAILURE WITH HYPERCAPNIA: ICD-10-CM

## 2023-05-18 DIAGNOSIS — R58 HEMORRHAGE, NOT ELSEWHERE CLASSIFIED: ICD-10-CM

## 2023-05-18 DIAGNOSIS — R56.9 UNSPECIFIED CONVULSIONS: ICD-10-CM

## 2023-05-18 DIAGNOSIS — J96.11 CHRONIC RESPIRATORY FAILURE WITH HYPOXIA: ICD-10-CM

## 2023-05-18 DIAGNOSIS — J43.9 EMPHYSEMA, UNSPECIFIED: ICD-10-CM

## 2023-05-18 LAB
ALBUMIN SERPL-MCNC: 3.5 G/DL (ref 3.4–4.8)
ALBUMIN/GLOB SERPL: 1.4 RATIO (ref 1.1–2)
ALP SERPL-CCNC: 93 UNIT/L (ref 40–150)
ALT SERPL-CCNC: 12 UNIT/L (ref 0–55)
AST SERPL-CCNC: 18 UNIT/L (ref 5–34)
BASOPHILS # BLD AUTO: 0.03 X10(3)/MCL
BASOPHILS NFR BLD AUTO: 0.4 %
BILIRUBIN DIRECT+TOT PNL SERPL-MCNC: 0.2 MG/DL
BUN SERPL-MCNC: 18.1 MG/DL (ref 8.4–25.7)
CALCIUM SERPL-MCNC: 8.5 MG/DL (ref 8.8–10)
CHLORIDE SERPL-SCNC: 103 MMOL/L (ref 98–107)
CO2 SERPL-SCNC: 30 MMOL/L (ref 23–31)
CREAT SERPL-MCNC: 0.65 MG/DL (ref 0.73–1.18)
EOSINOPHIL # BLD AUTO: 0.38 X10(3)/MCL (ref 0–0.9)
EOSINOPHIL NFR BLD AUTO: 4.9 %
ERYTHROCYTE [DISTWIDTH] IN BLOOD BY AUTOMATED COUNT: 13 % (ref 11.5–17)
GFR SERPLBLD CREATININE-BSD FMLA CKD-EPI: >60 MLS/MIN/1.73/M2
GLOBULIN SER-MCNC: 2.5 GM/DL (ref 2.4–3.5)
GLUCOSE SERPL-MCNC: 79 MG/DL (ref 82–115)
HCT VFR BLD AUTO: 36.2 % (ref 42–52)
HGB BLD-MCNC: 11 G/DL (ref 14–18)
IMM GRANULOCYTES # BLD AUTO: 0.02 X10(3)/MCL (ref 0–0.04)
IMM GRANULOCYTES NFR BLD AUTO: 0.3 %
INR BLD: 0.95 (ref 0–1.3)
LYMPHOCYTES # BLD AUTO: 2.06 X10(3)/MCL (ref 0.6–4.6)
LYMPHOCYTES NFR BLD AUTO: 26.5 %
MCH RBC QN AUTO: 30.1 PG (ref 27–31)
MCHC RBC AUTO-ENTMCNC: 30.4 G/DL (ref 33–36)
MCV RBC AUTO: 99.2 FL (ref 80–94)
MONOCYTES # BLD AUTO: 0.73 X10(3)/MCL (ref 0.1–1.3)
MONOCYTES NFR BLD AUTO: 9.4 %
NEUTROPHILS # BLD AUTO: 4.55 X10(3)/MCL (ref 2.1–9.2)
NEUTROPHILS NFR BLD AUTO: 58.5 %
PLATELET # BLD AUTO: 212 X10(3)/MCL (ref 130–400)
PMV BLD AUTO: 11.6 FL (ref 7.4–10.4)
POTASSIUM SERPL-SCNC: 4.7 MMOL/L (ref 3.5–5.1)
PROT SERPL-MCNC: 6 GM/DL (ref 5.8–7.6)
PROTHROMBIN TIME: <10 SECONDS (ref 12.5–14.5)
RBC # BLD AUTO: 3.65 X10(6)/MCL (ref 4.7–6.1)
SODIUM SERPL-SCNC: 146 MMOL/L (ref 136–145)
WBC # SPEC AUTO: 7.77 X10(3)/MCL (ref 4.5–11.5)

## 2023-05-18 PROCEDURE — 85025 COMPLETE CBC W/AUTO DIFF WBC: CPT | Performed by: FAMILY MEDICINE

## 2023-05-18 PROCEDURE — 85610 PROTHROMBIN TIME: CPT | Performed by: FAMILY MEDICINE

## 2023-05-18 PROCEDURE — 80053 COMPREHEN METABOLIC PANEL: CPT | Performed by: FAMILY MEDICINE

## 2023-06-11 ENCOUNTER — HOSPITAL ENCOUNTER (EMERGENCY)
Facility: HOSPITAL | Age: 63
Discharge: HOME | End: 2023-06-11
Attending: FAMILY MEDICINE
Payer: COMMERCIAL

## 2023-06-11 VITALS
DIASTOLIC BLOOD PRESSURE: 57 MMHG | TEMPERATURE: 99 F | HEART RATE: 60 BPM | SYSTOLIC BLOOD PRESSURE: 102 MMHG | BODY MASS INDEX: 21 KG/M2 | HEIGHT: 71 IN | OXYGEN SATURATION: 99 % | WEIGHT: 150 LBS | RESPIRATION RATE: 16 BRPM

## 2023-06-11 DIAGNOSIS — F41.9 ANXIETY: ICD-10-CM

## 2023-06-11 DIAGNOSIS — E87.5 HYPERKALEMIA: ICD-10-CM

## 2023-06-11 DIAGNOSIS — R25.1 TREMOR: Primary | ICD-10-CM

## 2023-06-11 LAB
ALBUMIN SERPL-MCNC: 3.7 G/DL (ref 3.4–4.8)
ALBUMIN/GLOB SERPL: 1.4 RATIO (ref 1.1–2)
ALP SERPL-CCNC: 104 UNIT/L (ref 40–150)
ALT SERPL-CCNC: 7 UNIT/L (ref 0–55)
AMPHET UR QL SCN: NEGATIVE
APPEARANCE UR: CLEAR
AST SERPL-CCNC: 15 UNIT/L (ref 5–34)
BACTERIA #/AREA URNS AUTO: NORMAL /HPF
BARBITURATE SCN PRESENT UR: NEGATIVE
BASOPHILS # BLD AUTO: 0.06 X10(3)/MCL
BASOPHILS NFR BLD AUTO: 0.6 %
BENZODIAZ UR QL SCN: NEGATIVE
BILIRUB UR QL STRIP.AUTO: NEGATIVE MG/DL
BILIRUBIN DIRECT+TOT PNL SERPL-MCNC: 0.2 MG/DL
BUN SERPL-MCNC: 18.9 MG/DL (ref 8.4–25.7)
CALCIUM SERPL-MCNC: 9.1 MG/DL (ref 8.8–10)
CANNABINOIDS UR QL SCN: POSITIVE
CHLORIDE SERPL-SCNC: 105 MMOL/L (ref 98–107)
CO2 SERPL-SCNC: 27 MMOL/L (ref 23–31)
COCAINE UR QL SCN: NEGATIVE
COLOR UR: YELLOW
CREAT SERPL-MCNC: 0.77 MG/DL (ref 0.73–1.18)
EOSINOPHIL # BLD AUTO: 0.51 X10(3)/MCL (ref 0–0.9)
EOSINOPHIL NFR BLD AUTO: 5.5 %
ERYTHROCYTE [DISTWIDTH] IN BLOOD BY AUTOMATED COUNT: 13.3 % (ref 11.5–17)
ETHANOL SERPL-MCNC: <10 MG/DL
GFR SERPLBLD CREATININE-BSD FMLA CKD-EPI: >60 MLS/MIN/1.73/M2
GLOBULIN SER-MCNC: 2.7 GM/DL (ref 2.4–3.5)
GLUCOSE SERPL-MCNC: 88 MG/DL (ref 82–115)
GLUCOSE UR QL STRIP.AUTO: NEGATIVE MG/DL
HCT VFR BLD AUTO: 35.7 % (ref 42–52)
HGB BLD-MCNC: 10.9 G/DL (ref 14–18)
IMM GRANULOCYTES # BLD AUTO: 0 X10(3)/MCL (ref 0–0.04)
IMM GRANULOCYTES NFR BLD AUTO: 0 %
KETONES UR QL STRIP.AUTO: NEGATIVE MG/DL
LEUKOCYTE ESTERASE UR QL STRIP.AUTO: NEGATIVE UNIT/L
LYMPHOCYTES # BLD AUTO: 2.28 X10(3)/MCL (ref 0.6–4.6)
LYMPHOCYTES NFR BLD AUTO: 24.5 %
MCH RBC QN AUTO: 29.9 PG (ref 27–31)
MCHC RBC AUTO-ENTMCNC: 30.5 G/DL (ref 33–36)
MCV RBC AUTO: 98.1 FL (ref 80–94)
MONOCYTES # BLD AUTO: 0.95 X10(3)/MCL (ref 0.1–1.3)
MONOCYTES NFR BLD AUTO: 10.2 %
NEUTROPHILS # BLD AUTO: 5.51 X10(3)/MCL (ref 2.1–9.2)
NEUTROPHILS NFR BLD AUTO: 59.2 %
NITRITE UR QL STRIP.AUTO: NEGATIVE
OPIATES UR QL SCN: POSITIVE
PCP UR QL: NEGATIVE
PH UR STRIP.AUTO: 5.5 [PH]
PH UR: 5.5 [PH] (ref 3–11)
PLATELET # BLD AUTO: 263 X10(3)/MCL (ref 130–400)
PMV BLD AUTO: 9.5 FL (ref 7.4–10.4)
POC CARDIAC TROPONIN I: 0.01 NG/ML (ref 0–0.08)
POTASSIUM SERPL-SCNC: 5.2 MMOL/L (ref 3.5–5.1)
PROT SERPL-MCNC: 6.4 GM/DL (ref 5.8–7.6)
PROT UR QL STRIP.AUTO: NEGATIVE MG/DL
RBC # BLD AUTO: 3.64 X10(6)/MCL (ref 4.7–6.1)
RBC #/AREA URNS AUTO: NORMAL /HPF
RBC UR QL AUTO: NEGATIVE UNIT/L
SAMPLE: NORMAL
SODIUM SERPL-SCNC: 140 MMOL/L (ref 136–145)
SP GR UR STRIP.AUTO: 1.01
SQUAMOUS #/AREA URNS AUTO: NORMAL /HPF
UROBILINOGEN UR STRIP-ACNC: 0.2 MG/DL
WBC # SPEC AUTO: 9.31 X10(3)/MCL (ref 4.5–11.5)
WBC #/AREA URNS AUTO: NORMAL /HPF

## 2023-06-11 PROCEDURE — 96361 HYDRATE IV INFUSION ADD-ON: CPT

## 2023-06-11 PROCEDURE — 93005 ELECTROCARDIOGRAM TRACING: CPT

## 2023-06-11 PROCEDURE — 81001 URINALYSIS AUTO W/SCOPE: CPT | Mod: 59 | Performed by: FAMILY MEDICINE

## 2023-06-11 PROCEDURE — 80307 DRUG TEST PRSMV CHEM ANLYZR: CPT | Performed by: FAMILY MEDICINE

## 2023-06-11 PROCEDURE — 80053 COMPREHEN METABOLIC PANEL: CPT | Performed by: FAMILY MEDICINE

## 2023-06-11 PROCEDURE — 63600175 PHARM REV CODE 636 W HCPCS: Performed by: STUDENT IN AN ORGANIZED HEALTH CARE EDUCATION/TRAINING PROGRAM

## 2023-06-11 PROCEDURE — 82077 ASSAY SPEC XCP UR&BREATH IA: CPT | Performed by: FAMILY MEDICINE

## 2023-06-11 PROCEDURE — 84484 ASSAY OF TROPONIN QUANT: CPT

## 2023-06-11 PROCEDURE — 96374 THER/PROPH/DIAG INJ IV PUSH: CPT

## 2023-06-11 PROCEDURE — 93010 EKG 12-LEAD: ICD-10-PCS | Mod: ,,, | Performed by: INTERNAL MEDICINE

## 2023-06-11 PROCEDURE — 63600175 PHARM REV CODE 636 W HCPCS

## 2023-06-11 PROCEDURE — 96376 TX/PRO/DX INJ SAME DRUG ADON: CPT

## 2023-06-11 PROCEDURE — 99285 EMERGENCY DEPT VISIT HI MDM: CPT | Mod: 25

## 2023-06-11 PROCEDURE — 85025 COMPLETE CBC W/AUTO DIFF WBC: CPT | Performed by: FAMILY MEDICINE

## 2023-06-11 PROCEDURE — 25000003 PHARM REV CODE 250: Performed by: FAMILY MEDICINE

## 2023-06-11 PROCEDURE — 93010 ELECTROCARDIOGRAM REPORT: CPT | Mod: ,,, | Performed by: INTERNAL MEDICINE

## 2023-06-11 RX ORDER — LORAZEPAM 2 MG/ML
INJECTION INTRAMUSCULAR
Status: COMPLETED
Start: 2023-06-11 | End: 2023-06-11

## 2023-06-11 RX ORDER — TRAMADOL HYDROCHLORIDE 50 MG/1
50 TABLET ORAL
Status: DISCONTINUED | OUTPATIENT
Start: 2023-06-11 | End: 2023-06-11

## 2023-06-11 RX ORDER — LORAZEPAM 1 MG/1
1 TABLET ORAL EVERY 6 HOURS PRN
Qty: 10 TABLET | Refills: 0 | Status: SHIPPED | OUTPATIENT
Start: 2023-06-11 | End: 2023-07-26 | Stop reason: SDUPTHER

## 2023-06-11 RX ORDER — LORAZEPAM 2 MG/ML
1 INJECTION INTRAMUSCULAR
Status: COMPLETED | OUTPATIENT
Start: 2023-06-11 | End: 2023-06-11

## 2023-06-11 RX ADMIN — SODIUM CHLORIDE 1000 ML: 9 INJECTION, SOLUTION INTRAVENOUS at 05:06

## 2023-06-11 RX ADMIN — LORAZEPAM 1 MG: 2 INJECTION INTRAMUSCULAR; INTRAVENOUS at 05:06

## 2023-06-11 RX ADMIN — LORAZEPAM 1 MG: 2 INJECTION INTRAMUSCULAR; INTRAVENOUS at 06:06

## 2023-06-11 RX ADMIN — LORAZEPAM 1 MG: 2 INJECTION INTRAMUSCULAR; INTRAVENOUS at 09:06

## 2023-06-11 NOTE — ED PROVIDER NOTES
Encounter Date: 2023       History     Chief Complaint   Patient presents with    Tremors     Uncontrollable tremors and twitching movements since 9pm. Denies any head trauma. Wife reports history of spontaneous episodes like this.      63-year-old male presents to ED with uncontrollable tremors and twitching since 9:00 p.m. h/o tremors, takes ativan PRN tremors prescribed by neuro however they ran out. Denies any other issues.  Patient received 4 mg Ativan IV prior to exam so history is obtained from patient's wife      Review of patient's allergies indicates:   Allergen Reactions    Penicillin Rash     Past Medical History:   Diagnosis Date    Acute clinical systolic heart failure     Acute hypercapnic respiratory failure     Aspiration pneumonia 2023    Bacteremia     Cardiomyopathy     Chronic, continuous use of opioids     Collapse, lung     COPD with hypoxia     Degenerative cervical spinal stenosis     Depression     Disuse osteoporosis     Emphysema/COPD     Hepatitis C     Hyperkalemia     Leukocytosis     Myoclonus 2022     Past Surgical History:   Procedure Laterality Date    CERVICAL LAMINECTOMY WITH SPINAL FUSION      CHOLECYSTECTOMY      COLONOSCOPY      ESOPHAGOGASTRODUODENOSCOPY      gastrointestinal biopsy      graft to nose      inguinal herniotomy      LEFT HEART CATHETERIZATION Left 2023    Procedure: CATHETERIZATION, HEART, LEFT;  Surgeon: Dulce Orozco MD;  Location: [facility] CATH LAB;  Service: Cardiology;  Laterality: Left;  via radial approach    mandible operation      POLYPECTOMY       Family History   Problem Relation Age of Onset    Cancer Mother     Heart disease Father     Hypertension Father      Social History     Tobacco Use    Smoking status: Former     Packs/day: 2.00     Years: 40.00     Pack years: 80.00     Types: Cigarettes     Quit date: 2017     Years since quittin.8    Smokeless tobacco: Never   Substance Use Topics    Alcohol use: Never     Drug use: Yes     Types: Oxycodone     Review of Systems   Neurological:  Positive for tremors.   All other systems reviewed and are negative.    Physical Exam     Initial Vitals   BP Pulse Resp Temp SpO2   06/11/23 0603 06/11/23 0543 06/11/23 0543 06/11/23 0543 06/11/23 0543   (!) 99/50 80 (!) 22 98.8 °F (37.1 °C) 95 %      MAP       --                Physical Exam    Constitutional: He appears well-developed and well-nourished.   Resting  comfortably on exam   HENT:   Head: Normocephalic.   Eyes: EOM are normal. Pupils are equal, round, and reactive to light.   Neck:   Normal range of motion.  Cardiovascular:  Normal rate, regular rhythm and normal pulses.           Pulmonary/Chest: Breath sounds normal. No respiratory distress.   Abdominal: Abdomen is soft. Bowel sounds are normal. There is no abdominal tenderness.   Musculoskeletal:         General: Normal range of motion.      Cervical back: Normal range of motion.     Neurological: He is alert.   Skin: Skin is warm. Capillary refill takes less than 2 seconds.   Psychiatric: He has a normal mood and affect.       ED Course   Procedures  Labs Reviewed   COMPREHENSIVE METABOLIC PANEL - Abnormal; Notable for the following components:       Result Value    Potassium Level 5.2 (*)     All other components within normal limits   DRUG SCREEN, URINE (BEAKER) - Abnormal; Notable for the following components:    Cannabinoids, Urine Positive (*)     Opiates, Urine Positive (*)     All other components within normal limits    Narrative:     Cut off concentrations:    Amphetamines - 1000 ng/ml  Barbiturates - 200 ng/ml  Benzodiazepine - 200 ng/ml  Cannabinoids (THC) - 50 ng/ml  Cocaine - 300 ng/ml  Fentanyl - 1.0 ng/ml  MDMA - 500 ng/ml  Opiates - 300 ng/ml   Phencyclidine (PCP) - 25 ng/ml    Specimen submitted for drug analysis and tested for pH and specific gravity in order to evaluate sample integrity. Suspect tampering if specific gravity is <1.003 and/or pH is not  within the range of 4.5 - 8.0  False negatives may result form substances such as bleach added to urine.  False positives may result for the presence of a substance with similar chemical structure to the drug or its metabolite.    This test provides only a PRELIMINARY analytical test result. A more specific alternate chemical method must be used in order to obtain a confirmed analytical result. Gas chromatography/mass spectrometry (GC/MS) is the preferred confirmatory method. Other chemical confirmation methods are available. Clinical consideration and professional judgement should be applied to any drug of abuse test result, particularly when preliminary positive results are used.    Positive results will be confirmed only at the physicians request. Unconfirmed screening results are to be used only for medical purposes (treatment).        CBC WITH DIFFERENTIAL - Abnormal; Notable for the following components:    RBC 3.64 (*)     Hgb 10.9 (*)     Hct 35.7 (*)     MCV 98.1 (*)     MCHC 30.5 (*)     All other components within normal limits   ALCOHOL,MEDICAL (ETHANOL) - Normal   URINALYSIS, MICROSCOPIC - Normal   CBC W/ AUTO DIFFERENTIAL    Narrative:     The following orders were created for panel order CBC auto differential.  Procedure                               Abnormality         Status                     ---------                               -----------         ------                     CBC with Differential[[id number]]        Abnormal            Final result                 Please view results for these tests on the individual orders.   URINALYSIS, REFLEX TO URINE CULTURE   TROPONIN ISTAT     EKG Readings: (Independently Interpreted)   Initial Reading: No STEMI. Rhythm: Normal Sinus Rhythm. Heart Rate: 62. Conduction: Normal. Axis: Normal.   Occasional PVCs     Imaging Results              X-Ray Chest AP Portable (Final result)  Result time 06/11/23 06:29:51      Final result by Brennan Ayala MD  (06/11/23 06:29:51)                   Narrative:    EXAMINATION  XR CHEST AP PORTABLE    CLINICAL HISTORY  Chest Pain;    TECHNIQUE  A total of 2 frontal image(s) submitted of the chest.    COMPARISON  21 February 2023    FINDINGS  Lines/tubes/devices: None visualized.    The cardiac silhouette and central vascular structures are unchanged.  The trachea is midline. No evidence of organized airspace consolidation or other acute lung field abnormality identified.  Chronic bilateral lung parenchymal changes and biapical pleuroparenchymal scarring similar to the prior.  There is no large pleural effusion or convincing pneumothorax.    Regional osseous structures and extrathoracic soft tissues are similar.    IMPRESSION  1. No acute cardiopulmonary process.  2. Chronic secondary details discussed above, similar in the interval.      Electronically signed by: Brennan Ayala  Date:    06/11/2023  Time:    06:29                                     CT Head Without Contrast (Final result)  Result time 06/11/23 06:47:09      Final result by Brennan Ayala MD (06/11/23 06:47:09)                   Narrative:    EXAMINATION  CT HEAD WITHOUT CONTRAST    CLINICAL HISTORY  Headache, sudden, severe;    TECHNIQUE  Axial non-contrast CT images of the head were acquired and multiplanar reconstructions accomplished by a CT technologist at a separate workstation, pushed to PACS for physician review.    COMPARISON  14 November 2019    FINDINGS  Images were reviewed in subdural, brain, soft tissue, and bone windows.    Exam quality: Motion/streak artifact limits assessment of the posterior fossa.    Hemorrhage: No evidence of acute hyperattenuating blood products.    Parenchyma: There is diffuse bilateral supratentorial white matter hypoattenuation, typical of chronic microvascular changes. No discrete mass, mass effect, or CT evidence of acute large vascular territory insult. Gray-white differentiation is preserved.    Midline shift:  None.    CSF spaces: Proportional appearance of ventricular and sulcal enlargement. No hydrocephalus. No masses or fluid collections.    Vasculature: No focally hyperdense artery. Scattered carotid siphon calcifications are present. No abnormal densities within the dural sinuses.    Other findings: No abnormalities of the scalp or subjacent osseous structures. Mastoids are well aerated. No focal abnormality of the sella. The included facial structures are unremarkable.    IMPRESSION  1. No convincing acute intracranial abnormality.  2. Additional secondary details discussed above, relatively unchanged from the 14 November 2019 appearance.  ==========    No significant discrepancy identified in relation to the teleradiology preliminary report.    RADIATION DOSE  Automated tube current modulation, weight-based exposure dosing, and/or iterative reconstruction technique utilized to reach lowest reasonably achievable exposure rate.    DLP: 1243.2 mGy*cm      Electronically signed by: Brennan Ayala  Date:    06/11/2023  Time:    06:47                      Preliminary result by Brennan Ayala MD (06/11/23 06:25:24)                   Narrative:    START OF REPORT:  Technique: CT of the head was performed without intravenous contrast with axial as well as coronal and sagittal images.    Comparison: Comparison is with study dated 2019-11-14 12:51:34.    Dosage Information: Automated Exposure Control was utilized 1243.24 mGy.cm.    Clinical history: Tremors (Uncontrollable tremors and twitching movements since 9pm. Denies any head trauma. Wife reports history of spontaneous episodes like this. ).    Findings:  Artifact: There is mild to moderate artifact on a few of the images which decreases sensitivity and specificity of the study for subtle intracranial hemorrhage and subtle nondisplaced fractures.  Hemorrhage: No acute intracranial hemorrhage is seen.  CSF spaces: The ventricles sulci and basal cisterns are within normal  limits.  Brain parenchyma: There is preservation of the grey white junction throughout.  Cerebellum: Unremarkable.  Vascular: Stable appearing atheromatous calcification of the intracranial arteries is seen.  Sella and skull base: The sella appears to be within normal limits for age.  Intracranial calcifications: Incidental note is made of bilateral choroid plexus calcification. Incidental note is made of some pineal region calcification.  Calvarium: No acute linear or depressed skull fracture is seen.    Maxillofacial Structures:  Paranasal sinuses: The visualized paranasal sinuses appear clear with no mucoperiosteal thickening or air fluid levels identified.  Orbits: The orbits appear unremarkable.  Zygomatic arches: The zygomatic arches are intact and unremarkable.  Temporal bones and mastoids: The temporal bones and mastoids appear unremarkable.  TMJ: The mandibular condyles appear normally placed with respect to the mandibular fossa.    Visualized upper cervical spine: The visualized cervical spine appears unremarkable.      Impression:  1. No acute intracranial process identified. Details as above.                                      X-Rays:   Independently Interpreted Readings:   Chest X-Ray: Normal heart size.  No infiltrates.  No acute abnormalities.   Head CT: No hemorrhage.  No skull fracture.  No acute stroke.   Medications   sodium chloride 0.9% bolus 1,000 mL 1,000 mL (0 mLs Intravenous Stopped 6/11/23 0658)   LORazepam (ATIVAN) 2 mg/mL injection (1 mg Intravenous Given 6/11/23 0551)   LORazepam (ATIVAN) 2 mg/mL injection (1 mg  Given 6/11/23 0600)   LORazepam injection 1 mg (1 mg Intravenous Given 6/11/23 0901)     Medical Decision Making:   History:   I obtained history from: someone other than patient.       <> Summary of History: Patient's wife provides additional history  Old Records Summarized: records from clinic visits, records from previous admission(s) and records from another  [facility].  Differential Diagnosis:   Tremors/neurological history, chronic pain, electrolyte imbalance, substance abuse, dehydration, other intracranial pathology  Independently Interpreted Test(s):   I have ordered and independently interpreted X-rays - see prior notes.  I have ordered and independently interpreted EKG Reading(s) - see prior notes  Clinical Tests:   Lab Tests: Ordered and Reviewed  Radiological Study: Ordered and Reviewed  Medical Tests: Ordered and Reviewed  ED Management:  ED workup including labs and imaging with no acute findings patient with mild hyperkalemia which he has a chronic history of, no EKG changes or other acute issues related to this.  P.o. Ativan prescribed for as needed use for tremors due to patient being out of medication however advised to follow up with Neurology for further refills.  The patient is resting comfortably in no acute distress.  He is hemodynamically stable and is without objective evidence for acute process requiring urgent intervention or hospitalization. I provided counseling to patient with regard to condition, the treatment plan, specific conditions for return, and the importance of follow up. Detailed written and verbal instructions provided to patient and he expressed a verbal understanding of the discharge instructions and overall management plan. Reiterated the importance of medication administration and safety and advised patient to follow up with primary care provider in 3-5 days or sooner if needed.  Answered questions at this time. The patient is stable for discharge.              ED Course as of 06/11/23 0924   [place] Jun 11, 2023   0716 Check on patient received from Dr. Mara Rodriguez at 6:00 a.m., awaiting CT and lab results patient resting comfortably after receiving Ativan 4 mg IV for tremors [MG]      ED Course User Index  [MG] Natasha Ding MD                 Clinical Impression:   Final diagnoses:  [E87.5] Hyperkalemia  [R25.1] Tremor  (Primary)        ED Disposition Condition    Discharge Stable          ED Prescriptions       Medication Sig Dispense Start Date End Date Auth. Provider    LORazepam (ATIVAN) 1 MG tablet Take 1 tablet (1 mg total) by mouth every 6 (six) hours as needed for Anxiety. 10 tablet 6/11/2023 -- Natasha Ding MD          Follow-up Information       Follow up With Specialties Details Why Contact Info    Tory Figueroa MD Neurology Schedule an appointment as soon as possible for a visit   [address] [place] [address]  [address]  [facility] [address]  [phone]               Natasha Ding MD  06/11/23 0926       Natasha Ding MD  06/11/23 4408

## 2023-07-25 ENCOUNTER — OFFICE VISIT (OUTPATIENT)
Dept: NEUROLOGY | Facility: CLINIC | Age: 63
End: 2023-07-25
Payer: COMMERCIAL

## 2023-07-25 VITALS
WEIGHT: 135 LBS | SYSTOLIC BLOOD PRESSURE: 112 MMHG | HEART RATE: 64 BPM | DIASTOLIC BLOOD PRESSURE: 70 MMHG | BODY MASS INDEX: 18.9 KG/M2 | HEIGHT: 71 IN

## 2023-07-25 DIAGNOSIS — G25.3 MYOCLONUS: Primary | ICD-10-CM

## 2023-07-25 DIAGNOSIS — F41.9 ANXIETY: ICD-10-CM

## 2023-07-25 PROCEDURE — 3288F FALL RISK ASSESSMENT DOCD: CPT | Mod: CPTII,S$GLB,, | Performed by: NURSE PRACTITIONER

## 2023-07-25 PROCEDURE — 99215 OFFICE O/P EST HI 40 MIN: CPT | Mod: S$GLB,,, | Performed by: NURSE PRACTITIONER

## 2023-07-25 PROCEDURE — 3008F PR BODY MASS INDEX (BMI) DOCUMENTED: ICD-10-PCS | Mod: CPTII,S$GLB,, | Performed by: NURSE PRACTITIONER

## 2023-07-25 PROCEDURE — 3078F PR MOST RECENT DIASTOLIC BLOOD PRESSURE < 80 MM HG: ICD-10-PCS | Mod: CPTII,S$GLB,, | Performed by: NURSE PRACTITIONER

## 2023-07-25 PROCEDURE — 1159F MED LIST DOCD IN RCRD: CPT | Mod: CPTII,S$GLB,, | Performed by: NURSE PRACTITIONER

## 2023-07-25 PROCEDURE — 3078F DIAST BP <80 MM HG: CPT | Mod: CPTII,S$GLB,, | Performed by: NURSE PRACTITIONER

## 2023-07-25 PROCEDURE — 1160F RVW MEDS BY RX/DR IN RCRD: CPT | Mod: CPTII,S$GLB,, | Performed by: NURSE PRACTITIONER

## 2023-07-25 PROCEDURE — 1160F PR REVIEW ALL MEDS BY PRESCRIBER/CLIN PHARMACIST DOCUMENTED: ICD-10-PCS | Mod: CPTII,S$GLB,, | Performed by: NURSE PRACTITIONER

## 2023-07-25 PROCEDURE — 1101F PT FALLS ASSESS-DOCD LE1/YR: CPT | Mod: CPTII,S$GLB,, | Performed by: NURSE PRACTITIONER

## 2023-07-25 PROCEDURE — 99215 PR OFFICE/OUTPT VISIT, EST, LEVL V, 40-54 MIN: ICD-10-PCS | Mod: S$GLB,,, | Performed by: NURSE PRACTITIONER

## 2023-07-25 PROCEDURE — 99999 PR PBB SHADOW E&M-EST. PATIENT-LVL III: CPT | Mod: PBBFAC,,, | Performed by: NURSE PRACTITIONER

## 2023-07-25 PROCEDURE — 1101F PR PT FALLS ASSESS DOC 0-1 FALLS W/OUT INJ PAST YR: ICD-10-PCS | Mod: CPTII,S$GLB,, | Performed by: NURSE PRACTITIONER

## 2023-07-25 PROCEDURE — 3008F BODY MASS INDEX DOCD: CPT | Mod: CPTII,S$GLB,, | Performed by: NURSE PRACTITIONER

## 2023-07-25 PROCEDURE — 1159F PR MEDICATION LIST DOCUMENTED IN MEDICAL RECORD: ICD-10-PCS | Mod: CPTII,S$GLB,, | Performed by: NURSE PRACTITIONER

## 2023-07-25 PROCEDURE — 3074F PR MOST RECENT SYSTOLIC BLOOD PRESSURE < 130 MM HG: ICD-10-PCS | Mod: CPTII,S$GLB,, | Performed by: NURSE PRACTITIONER

## 2023-07-25 PROCEDURE — 3074F SYST BP LT 130 MM HG: CPT | Mod: CPTII,S$GLB,, | Performed by: NURSE PRACTITIONER

## 2023-07-25 PROCEDURE — 4010F ACE/ARB THERAPY RXD/TAKEN: CPT | Mod: CPTII,S$GLB,, | Performed by: NURSE PRACTITIONER

## 2023-07-25 PROCEDURE — 99999 PR PBB SHADOW E&M-EST. PATIENT-LVL III: ICD-10-PCS | Mod: PBBFAC,,, | Performed by: NURSE PRACTITIONER

## 2023-07-25 PROCEDURE — 4010F PR ACE/ARB THEARPY RXD/TAKEN: ICD-10-PCS | Mod: CPTII,S$GLB,, | Performed by: NURSE PRACTITIONER

## 2023-07-25 PROCEDURE — 3288F PR FALLS RISK ASSESSMENT DOCUMENTED: ICD-10-PCS | Mod: CPTII,S$GLB,, | Performed by: NURSE PRACTITIONER

## 2023-07-25 RX ORDER — OXYCODONE HYDROCHLORIDE 30 MG/1
30 TABLET ORAL EVERY 6 HOURS PRN
COMMUNITY
Start: 2023-07-07

## 2023-07-25 RX ORDER — OXCARBAZEPINE 150 MG/1
TABLET, FILM COATED ORAL
Qty: 120 TABLET | Refills: 11 | Status: ON HOLD | OUTPATIENT
Start: 2023-07-25 | End: 2023-09-20 | Stop reason: HOSPADM

## 2023-07-26 RX ORDER — LORAZEPAM 1 MG/1
1 TABLET ORAL EVERY 12 HOURS PRN
Qty: 10 TABLET | Refills: 5 | Status: SHIPPED | OUTPATIENT
Start: 2023-07-26 | End: 2023-12-05 | Stop reason: SDUPTHER

## 2023-07-26 NOTE — ASSESSMENT & PLAN NOTE
-Significant association with anxiety/stress.  Unsure when Keppra was stopped.  Unlikely that AED coverage is needed given history remains most consistent with stress reaction.  He is on chronic narcotic pain medication so discussed that he cannot be on maintenance benzo therapy.  He can continue to use ativan very sparingly.  Would like for him to use it no more than once weekly.  Trial of trileptal which could potentially help with mood stabilization as well as myoclonus.  -Will get updated ammonia level since this has been elevated in the past

## 2023-07-26 NOTE — PROGRESS NOTES
Subjective:       Patient ID: Balbir Rogers is a 63 y.o. male.    Chief Complaint: Mycolonus (1 - 2 months ago had a very bad episode of shaking all night. Did go to [facility] and was given IV Ativan. Ativan does help with controlling the shaking. )      History of Present Illness:  Follow-up visit for myoclonus and anxiety.  He was last seen 1 year ago.  He was admitted to the hospital in June for an exacerbation of tremor and myoclonus.  He says he felt overwhelmingly anxious the evening of admission.  Then began having exacerbation of his tremors and started having severe jerking throughout his body.  No alteration in consciousness.  He would run out of his p.r.n. Ativan so he went to the hospital.  He received 4 mg of Ativan upon arrival and symptoms significantly improved.  He had a CT head which was unremarkable.  Labs revealed mild anemia RBC 3.64, normal LFTs and bilirubin, potassium 5.2, normal ethanol level, UA drug screen positive for cannabinoids and opiates, normal troponin.  He says he thinks the exacerbations of his symptoms happened because he is not on daily Xanax as he was many years ago.  These symptoms never occurred when he was taking daily Xanax.  Recall, patient is seeing Dr. Nolasco for chronic pain.  He is prescribed oxycodone 30 mg q.6 hours and temazepam 30 mg q.p.m..  He says he absolutely can not tolerate gabapentin.  It is far too sedating for him.  He is already on Zoloft 100 mg daily.      Review of Systems  I have reviewed a 12 point review of systems which is negative unless otherwise stated in HPI        Past Medical History:   Diagnosis Date    Acute clinical systolic heart failure     Acute hypercapnic respiratory failure     Aspiration pneumonia 01/01/2023    Bacteremia     Cardiomyopathy     Chronic, continuous use of opioids     Collapse, lung     COPD with hypoxia     Degenerative cervical spinal stenosis     Depression     Disuse osteoporosis      Emphysema/COPD     Hepatitis C     Hyperkalemia     Leukocytosis     Myoclonus 2022       Past Surgical History:   Procedure Laterality Date    CERVICAL LAMINECTOMY WITH SPINAL FUSION      CHOLECYSTECTOMY      COLONOSCOPY      ESOPHAGOGASTRODUODENOSCOPY      gastrointestinal biopsy      graft to nose      inguinal herniotomy      LEFT HEART CATHETERIZATION Left 2023    Procedure: CATHETERIZATION, HEART, LEFT;  Surgeon: Dulce Orozco MD;  [facility] CATH LAB;  Service: Cardiology;  Laterality: Left;  via radial approach    mandible operation      POLYPECTOMY          Family History   Problem Relation Age of Onset    Cancer Mother     Heart disease Father     Hypertension Father         Social History     Socioeconomic History    Marital status:    Tobacco Use    Smoking status: Former     Packs/day: 2.00     Years: 40.00     Pack years: 80.00     Types: Cigarettes     Quit date: 2017     Years since quittin.9    Smokeless tobacco: Never   Substance and Sexual Activity    Alcohol use: Never    Drug use: Yes     Types: Oxycodone    Sexual activity: Not Currently     Social Determinants of Health     Financial Resource Strain: Low Risk     Difficulty of Paying Living Expenses: Not hard at all   Food Insecurity: No Food Insecurity    Worried About Running Out of Food in the Last Year: Never true    Ran Out of Food in the Last Year: Never true   Transportation Needs: No Transportation Needs    Lack of Transportation (Medical): No    Lack of Transportation (Non-Medical): No   Physical Activity: Inactive    Days of Exercise per Week: 0 days    Minutes of Exercise per Session: 0 min   Stress: Stress Concern Present    Feeling of Stress : To some extent   Social Connections: Socially Integrated    Frequency of Communication with Friends and Family: More than three times a week    Frequency of Social Gatherings with Friends and Family: More than three times a  week    Attends Jewish Services: More than 4 times per year    Active Member of Clubs or Organizations: No    Attends Club or Organization Meetings: 1 to 4 times per year    Marital Status:    Housing Stability: Low Risk     Unable to Pay for Housing in the Last Year: No    Number of Places Lived in the Last Year: 1    Unstable Housing in the Last Year: No        Outpatient Encounter Medications as of 7/25/2023   Medication Sig Dispense Refill    fluticasone-umeclidin-vilanter (TRELEGY ELLIPTA) 100-62.5-25 mcg DsDv Inhale 1 puff into the lungs once daily.      medical supply, miscellaneous (O-2 SUSPENSORY MISC) 2 L by Misc.(Non-Drug; Combo Route) route.      oxyCODONE (ROXICODONE) 30 MG Tab Take 30 mg by mouth every 6 (six) hours as needed.      temazepam (RESTORIL) 30 mg capsule Take 30 mg by mouth every evening.      [DISCONTINUED] LORazepam (ATIVAN) 1 MG tablet Take 1 tablet (1 mg total) by mouth every 6 (six) hours as needed for Anxiety. 10 tablet 0    albuterol-ipratropium (DUO-NEB) 2.5 mg-0.5 mg/3 mL nebulizer solution Take 3 mLs by nebulization every 6 (six) hours as needed for Wheezing. 75 mL 0    LORazepam (ATIVAN) 1 MG tablet Take 1 tablet (1 mg total) by mouth every 12 (twelve) hours as needed for Anxiety. 10 tablet 5    OXcarbazepine (TRILEPTAL) 150 MG Tab 1 tab PO BID for 2 wk, then increase to 2 PO BID thereafter 120 tablet 11    sertraline (ZOLOFT) 50 MG tablet Take 1 tablet (50 mg total) by mouth once daily. 30 tablet 0    [DISCONTINUED] diclofenac (VOLTAREN) 75 MG EC tablet Take 1 tablet (75 mg total) by mouth 2 (two) times daily. 14 tablet 0    [DISCONTINUED] ENTRESTO 24-26 mg per tablet Take 1 tablet by mouth 2 (two) times daily.       Facility-Administered Encounter Medications as of 7/25/2023   Medication Dose Route Frequency Provider Last Rate Last Admin    diazePAM tablet 10 mg  10 mg Oral On Call Procedure Dulce Orozco MD   10 mg at 02/01/23 0758     "diphenhydrAMINE capsule 50 mg  50 mg Oral On Call Procedure Dulce Orozco MD   50 mg at 02/01/23 0759    iopamidoL (ISOVUE-370) injection    PRN Dulce Orozco MD   70 mL at 02/01/23 0901    sodium chloride 0.9% flush 10 mL  10 mL Intravenous PRN Dulce Orozco MD          Objective:   /70 (BP Location: Left arm)   Pulse 64   Ht 5' 11" (1.803 m)   Wt 61.2 kg (135 lb)   BMI 18.83 kg/m²        Physical Exam  General:  Alert and oriented  NAD  02 via NC    Cognition and Comprehension:  Speech and language intact  Follows commands    Cranial nerves:   CN 2_ Visual fields (full to confrontation both eyes)  CN 3, 4, 6_ Intact, MAHOGANY, no nystagmus  CN 5_facial tactile sensation intact  CN 7_no face asymmetry; normal eye closure and smile  CN 8_hearing intact to spoken voice  CN 9, 10, 11_voice normal, shoulder shrug ok; deltoids not fatigable   CN 12 tongue_protrudes mid line    Muscle Strength and Tone:  Normal upper extremity tone  Normal lower extremity tone  Normal upper extremity strength  Normal lower extremity strength  BUE tremor worse with intention    Reflexes:  Normal and symmetric    Sensation:  Intact to light touch and temperature    Coordination and Gait:  Coordination and gait are normal       Assessment & Plan:      1. Myoclonus  Overview:  Initially evaluated inpatient in 2019 for myoclonic jerks.  According to original consult note: Patient with "history of chronic pain on narcotics for 10+ years, COPD who comes in with jerking movements. He has had 2 prior episodes of hospital admissions for the same movements and work-up was unremarkable. Symptoms eventually resolved with Ativan."  Jerking movements occurred in mouth, arms, legs, and trunk without any loss of consciousness or impairment in consciousness.  His myoclonic jerking was felt to be result of metabolic etiology with a combination of opioid use an elevated CO2 as well as hyperammonemia.  During his hospitalization in " 2019, he was started on Keppra 500 mg b.i.d. which improved the myoclonic activity.  He underwent lab testing with ceruloplasmin, CK, TSH, ferritin which were all normal.  MRI brain was overall unrevealing apart from mild chronic microvascular ischemia.  His EEG at that time just showed generalized slowing consistent with metabolic abnormalities.      Assessment & Plan:  -Significant association with anxiety/stress.  Unsure when Keppra was stopped.  Unlikely that AED coverage is needed given history remains most consistent with stress reaction.  He is on chronic narcotic pain medication so discussed that he cannot be on maintenance benzo therapy.  He can continue to use ativan very sparingly.  Would like for him to use it no more than once weekly.  Trial of trileptal which could potentially help with mood stabilization as well as myoclonus.  -Will get updated ammonia level since this has been elevated in the past     Orders:  -     Ammonia; Future; Expected date: 07/25/2023  -     OXcarbazepine (TRILEPTAL) 150 MG Tab; 1 tab PO BID for 2 wk, then increase to 2 PO BID thereafter  Dispense: 120 tablet; Refill: 11    2. Anxiety  -     LORazepam (ATIVAN) 1 MG tablet; Take 1 tablet (1 mg total) by mouth every 12 (twelve) hours as needed for Anxiety.  Dispense: 10 tablet; Refill: 5      Prior to the patient's arrival on the same day, I spent 10 minutes reviewing chart. Once in the exam room with the patient, I spent 25 minutes in the room with the member performing a history and exam as well as reviewing the test results and recommendations with the patient. After leaving the exam room, I spend an additional 5 minutes completing the electronic health record. The total time spent  that day caring for the member is 40 minutes, and this time--including the breakdown--is documented in the medical record.     This note was created with the assistance of voice recognition software. There may be transcription errors as a result  of using this technology however minimal. Effort has been made to assure accuracy of transcription but any obvious errors or omissions should be clarified with the author of the document.

## 2023-08-16 ENCOUNTER — TELEPHONE (OUTPATIENT)
Dept: NEUROLOGY | Facility: CLINIC | Age: 63
End: 2023-08-16
Payer: COMMERCIAL

## 2023-08-16 ENCOUNTER — LAB VISIT (OUTPATIENT)
Dept: LAB | Facility: HOSPITAL | Age: 63
End: 2023-08-16
Attending: NURSE PRACTITIONER
Payer: COMMERCIAL

## 2023-08-16 DIAGNOSIS — G25.3 MYOCLONUS: ICD-10-CM

## 2023-08-16 LAB — AMMONIA PLAS-MSCNC: 20.6 UMOL/L (ref 18–72)

## 2023-08-16 PROCEDURE — 36415 COLL VENOUS BLD VENIPUNCTURE: CPT

## 2023-08-16 PROCEDURE — 82140 ASSAY OF AMMONIA: CPT

## 2023-09-12 ENCOUNTER — LAB VISIT (OUTPATIENT)
Dept: LAB | Facility: HOSPITAL | Age: 63
End: 2023-09-12
Attending: NURSE PRACTITIONER
Payer: COMMERCIAL

## 2023-09-12 DIAGNOSIS — E87.5 HYPERPOTASSEMIA: Primary | ICD-10-CM

## 2023-09-12 LAB — POTASSIUM SERPL-SCNC: 4.2 MMOL/L (ref 3.5–5.1)

## 2023-09-12 PROCEDURE — 84132 ASSAY OF SERUM POTASSIUM: CPT

## 2023-09-12 PROCEDURE — 36415 COLL VENOUS BLD VENIPUNCTURE: CPT

## 2023-09-14 ENCOUNTER — HOSPITAL ENCOUNTER (EMERGENCY)
Facility: HOSPITAL | Age: 63
Discharge: ANOTHER HOSPITAL | End: 2023-09-14
Attending: FAMILY MEDICINE
Payer: COMMERCIAL

## 2023-09-14 VITALS
WEIGHT: 138 LBS | SYSTOLIC BLOOD PRESSURE: 129 MMHG | TEMPERATURE: 98 F | DIASTOLIC BLOOD PRESSURE: 69 MMHG | RESPIRATION RATE: 18 BRPM | HEART RATE: 53 BPM | OXYGEN SATURATION: 100 % | BODY MASS INDEX: 19.25 KG/M2

## 2023-09-14 DIAGNOSIS — J44.1 COPD WITH ACUTE EXACERBATION: ICD-10-CM

## 2023-09-14 DIAGNOSIS — J93.9 PNEUMOTHORAX ON RIGHT: Primary | ICD-10-CM

## 2023-09-14 DIAGNOSIS — R05.9 COUGH: ICD-10-CM

## 2023-09-14 DIAGNOSIS — Z93.8 S/P CHEST TUBE PLACEMENT: ICD-10-CM

## 2023-09-14 LAB
ALBUMIN SERPL-MCNC: 4.3 G/DL (ref 3.4–4.8)
ALBUMIN/GLOB SERPL: 1.3 RATIO (ref 1.1–2)
ALP SERPL-CCNC: 115 UNIT/L (ref 40–150)
ALT SERPL-CCNC: 16 UNIT/L (ref 0–55)
APPEARANCE UR: CLEAR
AST SERPL-CCNC: 21 UNIT/L (ref 5–34)
BACTERIA #/AREA URNS AUTO: NORMAL /HPF
BASOPHILS # BLD AUTO: 0.03 X10(3)/MCL
BASOPHILS NFR BLD AUTO: 0.3 %
BILIRUB SERPL-MCNC: 0.6 MG/DL
BILIRUB UR QL STRIP.AUTO: NEGATIVE
BNP BLD-MCNC: 83.5 PG/ML
BUN SERPL-MCNC: 10.6 MG/DL (ref 8.4–25.7)
CALCIUM SERPL-MCNC: 10.3 MG/DL (ref 8.8–10)
CHLORIDE SERPL-SCNC: 103 MMOL/L (ref 98–107)
CO2 SERPL-SCNC: 27 MMOL/L (ref 23–31)
COLOR UR: YELLOW
CREAT SERPL-MCNC: 0.77 MG/DL (ref 0.73–1.18)
CRP SERPL HS-MCNC: 16.13 MG/L
EOSINOPHIL # BLD AUTO: 0.11 X10(3)/MCL (ref 0–0.9)
EOSINOPHIL NFR BLD AUTO: 1.2 %
ERYTHROCYTE [DISTWIDTH] IN BLOOD BY AUTOMATED COUNT: 12.5 % (ref 11.5–17)
FLUAV AG UPPER RESP QL IA.RAPID: NOT DETECTED
FLUBV AG UPPER RESP QL IA.RAPID: NOT DETECTED
GFR SERPLBLD CREATININE-BSD FMLA CKD-EPI: >60 MLS/MIN/1.73/M2
GLOBULIN SER-MCNC: 3.4 GM/DL (ref 2.4–3.5)
GLUCOSE SERPL-MCNC: 131 MG/DL (ref 82–115)
GLUCOSE UR QL STRIP.AUTO: NEGATIVE
HCT VFR BLD AUTO: 43 % (ref 42–52)
HGB BLD-MCNC: 13.8 G/DL (ref 14–18)
IMM GRANULOCYTES # BLD AUTO: 0.02 X10(3)/MCL (ref 0–0.04)
IMM GRANULOCYTES NFR BLD AUTO: 0.2 %
KETONES UR QL STRIP.AUTO: NEGATIVE
LEUKOCYTE ESTERASE UR QL STRIP.AUTO: NEGATIVE
LYMPHOCYTES # BLD AUTO: 2.11 X10(3)/MCL (ref 0.6–4.6)
LYMPHOCYTES NFR BLD AUTO: 23.6 %
MAGNESIUM SERPL-MCNC: 2.1 MG/DL (ref 1.6–2.6)
MCH RBC QN AUTO: 31.1 PG (ref 27–31)
MCHC RBC AUTO-ENTMCNC: 32.1 G/DL (ref 33–36)
MCV RBC AUTO: 96.8 FL (ref 80–94)
MONOCYTES # BLD AUTO: 0.88 X10(3)/MCL (ref 0.1–1.3)
MONOCYTES NFR BLD AUTO: 9.9 %
NEUTROPHILS # BLD AUTO: 5.78 X10(3)/MCL (ref 2.1–9.2)
NEUTROPHILS NFR BLD AUTO: 64.8 %
NITRITE UR QL STRIP.AUTO: NEGATIVE
PH UR STRIP.AUTO: 6 [PH]
PLATELET # BLD AUTO: 284 X10(3)/MCL (ref 130–400)
PMV BLD AUTO: 9.6 FL (ref 7.4–10.4)
POC CARDIAC TROPONIN I: 0.01 NG/ML (ref 0–0.08)
POTASSIUM SERPL-SCNC: 4.5 MMOL/L (ref 3.5–5.1)
PROT SERPL-MCNC: 7.7 GM/DL (ref 5.8–7.6)
PROT UR QL STRIP.AUTO: NEGATIVE
RBC # BLD AUTO: 4.44 X10(6)/MCL (ref 4.7–6.1)
RBC #/AREA URNS AUTO: NORMAL /HPF
RBC UR QL AUTO: ABNORMAL
RSV A 5' UTR RNA NPH QL NAA+PROBE: NOT DETECTED
SAMPLE: NORMAL
SARS-COV-2 RNA RESP QL NAA+PROBE: NOT DETECTED
SODIUM SERPL-SCNC: 138 MMOL/L (ref 136–145)
SP GR UR STRIP.AUTO: 1.02 (ref 1–1.03)
SQUAMOUS #/AREA URNS AUTO: NORMAL /HPF
TSH SERPL-ACNC: 1.16 UIU/ML (ref 0.35–4.94)
UROBILINOGEN UR STRIP-ACNC: 0.2
WBC # SPEC AUTO: 8.93 X10(3)/MCL (ref 4.5–11.5)
WBC #/AREA URNS AUTO: NORMAL /HPF

## 2023-09-14 PROCEDURE — 93010 ELECTROCARDIOGRAM REPORT: CPT | Mod: ,,, | Performed by: INTERNAL MEDICINE

## 2023-09-14 PROCEDURE — 63600175 PHARM REV CODE 636 W HCPCS: Performed by: SPECIALIST

## 2023-09-14 PROCEDURE — 96374 THER/PROPH/DIAG INJ IV PUSH: CPT

## 2023-09-14 PROCEDURE — 27000221 HC OXYGEN, UP TO 24 HOURS

## 2023-09-14 PROCEDURE — 96361 HYDRATE IV INFUSION ADD-ON: CPT

## 2023-09-14 PROCEDURE — 86141 C-REACTIVE PROTEIN HS: CPT | Performed by: FAMILY MEDICINE

## 2023-09-14 PROCEDURE — 96375 TX/PRO/DX INJ NEW DRUG ADDON: CPT

## 2023-09-14 PROCEDURE — 93010 EKG 12-LEAD: ICD-10-PCS | Mod: ,,, | Performed by: INTERNAL MEDICINE

## 2023-09-14 PROCEDURE — 93005 ELECTROCARDIOGRAM TRACING: CPT

## 2023-09-14 PROCEDURE — 25000242 PHARM REV CODE 250 ALT 637 W/ HCPCS: Performed by: FAMILY MEDICINE

## 2023-09-14 PROCEDURE — 96376 TX/PRO/DX INJ SAME DRUG ADON: CPT

## 2023-09-14 PROCEDURE — 25000003 PHARM REV CODE 250: Performed by: EMERGENCY MEDICINE

## 2023-09-14 PROCEDURE — 80053 COMPREHEN METABOLIC PANEL: CPT | Performed by: FAMILY MEDICINE

## 2023-09-14 PROCEDURE — 0241U COVID/RSV/FLU A&B PCR: CPT | Performed by: FAMILY MEDICINE

## 2023-09-14 PROCEDURE — 32551 INSERTION OF CHEST TUBE: CPT

## 2023-09-14 PROCEDURE — 85025 COMPLETE CBC W/AUTO DIFF WBC: CPT | Performed by: FAMILY MEDICINE

## 2023-09-14 PROCEDURE — 83735 ASSAY OF MAGNESIUM: CPT | Performed by: FAMILY MEDICINE

## 2023-09-14 PROCEDURE — 99291 CRITICAL CARE FIRST HOUR: CPT

## 2023-09-14 PROCEDURE — 63600175 PHARM REV CODE 636 W HCPCS: Performed by: FAMILY MEDICINE

## 2023-09-14 PROCEDURE — 32556 INSERT CATH PLEURA W/O IMAGE: CPT

## 2023-09-14 PROCEDURE — 83880 ASSAY OF NATRIURETIC PEPTIDE: CPT | Performed by: FAMILY MEDICINE

## 2023-09-14 PROCEDURE — 84443 ASSAY THYROID STIM HORMONE: CPT | Performed by: FAMILY MEDICINE

## 2023-09-14 PROCEDURE — 94760 N-INVAS EAR/PLS OXIMETRY 1: CPT

## 2023-09-14 PROCEDURE — 63600175 PHARM REV CODE 636 W HCPCS: Performed by: EMERGENCY MEDICINE

## 2023-09-14 PROCEDURE — 99900031 HC PATIENT EDUCATION (STAT)

## 2023-09-14 PROCEDURE — 81001 URINALYSIS AUTO W/SCOPE: CPT | Performed by: FAMILY MEDICINE

## 2023-09-14 PROCEDURE — 96372 THER/PROPH/DIAG INJ SC/IM: CPT | Mod: 59 | Performed by: EMERGENCY MEDICINE

## 2023-09-14 PROCEDURE — 94640 AIRWAY INHALATION TREATMENT: CPT

## 2023-09-14 RX ORDER — LORAZEPAM 2 MG/ML
2 INJECTION INTRAMUSCULAR
Status: COMPLETED | OUTPATIENT
Start: 2023-09-14 | End: 2023-09-14

## 2023-09-14 RX ORDER — MORPHINE SULFATE 4 MG/ML
2 INJECTION, SOLUTION INTRAMUSCULAR; INTRAVENOUS
Status: COMPLETED | OUTPATIENT
Start: 2023-09-14 | End: 2023-09-14

## 2023-09-14 RX ORDER — HYDROMORPHONE HYDROCHLORIDE 2 MG/ML
1 INJECTION, SOLUTION INTRAMUSCULAR; INTRAVENOUS; SUBCUTANEOUS
Status: COMPLETED | OUTPATIENT
Start: 2023-09-14 | End: 2023-09-14

## 2023-09-14 RX ORDER — KETOROLAC TROMETHAMINE 30 MG/ML
15 INJECTION, SOLUTION INTRAMUSCULAR; INTRAVENOUS
Status: COMPLETED | OUTPATIENT
Start: 2023-09-14 | End: 2023-09-14

## 2023-09-14 RX ORDER — METHYLPREDNISOLONE SOD SUCC 125 MG
40 VIAL (EA) INJECTION
Status: COMPLETED | OUTPATIENT
Start: 2023-09-14 | End: 2023-09-14

## 2023-09-14 RX ORDER — MORPHINE SULFATE 4 MG/ML
4 INJECTION, SOLUTION INTRAMUSCULAR; INTRAVENOUS
Status: COMPLETED | OUTPATIENT
Start: 2023-09-14 | End: 2023-09-14

## 2023-09-14 RX ORDER — SODIUM CHLORIDE 9 MG/ML
1000 INJECTION, SOLUTION INTRAVENOUS
Status: COMPLETED | OUTPATIENT
Start: 2023-09-14 | End: 2023-09-14

## 2023-09-14 RX ORDER — ONDANSETRON 2 MG/ML
4 INJECTION INTRAMUSCULAR; INTRAVENOUS
Status: COMPLETED | OUTPATIENT
Start: 2023-09-14 | End: 2023-09-14

## 2023-09-14 RX ORDER — FENTANYL CITRATE 50 UG/ML
50 INJECTION, SOLUTION INTRAMUSCULAR; INTRAVENOUS
Status: COMPLETED | OUTPATIENT
Start: 2023-09-14 | End: 2023-09-14

## 2023-09-14 RX ORDER — IPRATROPIUM BROMIDE AND ALBUTEROL SULFATE 2.5; .5 MG/3ML; MG/3ML
3 SOLUTION RESPIRATORY (INHALATION)
Status: COMPLETED | OUTPATIENT
Start: 2023-09-14 | End: 2023-09-14

## 2023-09-14 RX ORDER — ZIPRASIDONE MESYLATE 20 MG/ML
20 INJECTION, POWDER, LYOPHILIZED, FOR SOLUTION INTRAMUSCULAR
Status: COMPLETED | OUTPATIENT
Start: 2023-09-14 | End: 2023-09-14

## 2023-09-14 RX ORDER — METHYLPREDNISOLONE SOD SUCC 125 MG
80 VIAL (EA) INJECTION
Status: DISCONTINUED | OUTPATIENT
Start: 2023-09-14 | End: 2023-09-14

## 2023-09-14 RX ORDER — LORAZEPAM 2 MG/ML
1 INJECTION INTRAMUSCULAR
Status: COMPLETED | OUTPATIENT
Start: 2023-09-14 | End: 2023-09-14

## 2023-09-14 RX ADMIN — HYDROMORPHONE HYDROCHLORIDE 1 MG: 2 INJECTION INTRAMUSCULAR; INTRAVENOUS; SUBCUTANEOUS at 12:09

## 2023-09-14 RX ADMIN — LORAZEPAM 1 MG: 2 INJECTION INTRAMUSCULAR; INTRAVENOUS at 11:09

## 2023-09-14 RX ADMIN — HYDROMORPHONE HYDROCHLORIDE 1 MG: 2 INJECTION INTRAMUSCULAR; INTRAVENOUS; SUBCUTANEOUS at 04:09

## 2023-09-14 RX ADMIN — ZIPRASIDONE MESYLATE 20 MG: 20 INJECTION, POWDER, LYOPHILIZED, FOR SOLUTION INTRAMUSCULAR at 03:09

## 2023-09-14 RX ADMIN — HYDROMORPHONE HYDROCHLORIDE 1 MG: 2 INJECTION, SOLUTION INTRAMUSCULAR; INTRAVENOUS; SUBCUTANEOUS at 02:09

## 2023-09-14 RX ADMIN — ONDANSETRON 4 MG: 2 INJECTION INTRAMUSCULAR; INTRAVENOUS at 11:09

## 2023-09-14 RX ADMIN — IPRATROPIUM BROMIDE AND ALBUTEROL SULFATE 3 ML: .5; 3 SOLUTION RESPIRATORY (INHALATION) at 08:09

## 2023-09-14 RX ADMIN — METHYLPREDNISOLONE SODIUM SUCCINATE 40 MG: 125 INJECTION INTRAMUSCULAR; INTRAVENOUS at 09:09

## 2023-09-14 RX ADMIN — FENTANYL CITRATE 50 MCG: 50 INJECTION, SOLUTION INTRAMUSCULAR; INTRAVENOUS at 11:09

## 2023-09-14 RX ADMIN — MORPHINE SULFATE 2 MG: 4 INJECTION INTRAVENOUS at 10:09

## 2023-09-14 RX ADMIN — KETOROLAC TROMETHAMINE 15 MG: 30 INJECTION, SOLUTION INTRAMUSCULAR at 11:09

## 2023-09-14 RX ADMIN — MORPHINE SULFATE 4 MG: 4 INJECTION, SOLUTION INTRAMUSCULAR; INTRAVENOUS at 10:09

## 2023-09-14 RX ADMIN — MORPHINE SULFATE 2 MG: 4 INJECTION INTRAVENOUS at 11:09

## 2023-09-14 RX ADMIN — SODIUM CHLORIDE 1000 ML: 9 INJECTION, SOLUTION INTRAVENOUS at 03:09

## 2023-09-14 RX ADMIN — LORAZEPAM 2 MG: 2 INJECTION INTRAMUSCULAR; INTRAVENOUS at 03:09

## 2023-09-14 NOTE — ED PROVIDER NOTES
Encounter Date: 9/14/2023       History     Chief Complaint   Patient presents with    chest pain with sob     Cp with sob starting yesterday/ attempted to get a ct of chest yesterday but could not get it due to cp sob/ came to er with oxygen at 6 liters/ changed to our oxygen  at 3 liters with sat at 94%     63-year-old presents with shortness of breath patient states has a history of chronic COPD wears oxygen at home at 4 liters/minute is a smoker's continues to smoke but he says the last couple days shortness of breath gotten worse increased coughing no fevers no chills no chest pain no chest tightness no diaphoresis said he went from 4 L to 6 L on his oxygen feels like his COPD is just flaring up        Review of patient's allergies indicates:   Allergen Reactions    Penicillin Rash     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     Past Surgical History:   Procedure Laterality Date    CERVICAL LAMINECTOMY WITH SPINAL FUSION      CHOLECYSTECTOMY      COLONOSCOPY      ESOPHAGOGASTRODUODENOSCOPY      gastrointestinal biopsy      graft to nose      inguinal herniotomy      LEFT HEART CATHETERIZATION Left 2/1/2023    Procedure: CATHETERIZATION, HEART, LEFT;  Surgeon: Dulce Orozco MD;  Location: [facility] CATH LAB;  Service: Cardiology;  Laterality: Left;  via radial approach    mandible operation      POLYPECTOMY       Family History   Problem Relation Age of Onset    Cancer Mother     Heart disease Father     Hypertension Father      Social History     Tobacco Use    Smoking status: Former     Current packs/day: 0.00      Average packs/day: 2.0 packs/day for 40.0 years (80.0 ttl pk-yrs)     Types: Cigarettes     Start date: 1977     Quit date: 2017     Years since quittin.1    Smokeless tobacco: Never   Substance Use Topics    Alcohol use: Never    Drug use: Yes     Types: Oxycodone     Review of Systems   Constitutional:  Negative for fever.   HENT:  Negative for sore throat.    Respiratory:  Positive for cough, shortness of breath and wheezing.    Cardiovascular:  Negative for chest pain.   Gastrointestinal:  Negative for nausea.   Genitourinary:  Negative for dysuria.   Musculoskeletal:  Negative for back pain.   Skin:  Negative for rash.   Neurological:  Negative for weakness.   Hematological:  Does not bruise/bleed easily.   All other systems reviewed and are negative.      Physical Exam     Initial Vitals [23 0827]   BP Pulse Resp Temp SpO2   (!) 178/89 89 (!) 40 98.3 °F (36.8 °C) (!) 93 %      MAP       --         Physical Exam    Nursing note and vitals reviewed.  Constitutional: He appears well-developed and well-nourished. He is active.   HENT:   Head: Normocephalic and atraumatic.   Nose: Nose normal.   Mouth/Throat: Oropharynx is clear and moist.   Eyes: Conjunctivae, EOM and lids are normal. Pupils are equal, round, and reactive to light.   Neck: Trachea normal and phonation normal. Neck supple. No thyroid mass present.   Normal range of motion.  Cardiovascular:  Normal rate, regular rhythm, normal heart sounds and normal pulses.           Pulmonary/Chest: He is in respiratory distress. He has wheezes. He has rhonchi.   Abdominal: Abdomen is soft.   Musculoskeletal:         General: Normal range of motion.      Cervical back: Normal range of motion and neck supple.     Neurological: He is alert and oriented to person, place, and time.   Skin: Skin is warm and intact.   Psychiatric: He has a normal mood and affect. His speech is normal and behavior is normal. Judgment and thought content normal.  Cognition and memory are normal.         ED Course   Chest Tube    Date/Time: 9/14/2023 11:07 AM  Location procedure was performed: [facility] EMERGENCY DEPARTMENT    Performed by: Alberto Fragoso MD  Authorized by: Alberto Fragoso MD  Post-operative diagnosis: Spontaneous pneumothorax  Pre-operative diagnosis: Spontaneous pneumothorax  Consent Done: Emergent Situation  Indications: pneumothorax    Patient sedated: no  Anesthesia: local infiltration    Anesthesia:  Local Anesthetic: lidocaine 1% without epinephrine  Anesthetic total: 10 mL  Preparation: skin prepped with Betadine  Placement location: right lateral  Scalpel size: 11  Tube size: 28 Indian  Dissection instrument: finger  Ultrasound guidance: no  Tension pneumothorax heard: no  Tube connected to: water seal  Drainage amount: 0 ml  Suture material: 2-0 silk  Dressing: petrolatum-impregnated gauze and 4x4 sterile gauze  Post-insertion x-ray findings: tube in good position  Patient tolerance: Patient tolerated the procedure well with no immediate complications  Complications: No  Specimens: No  Implants: No      Critical Care    Date/Time: 9/14/2023 11:09 AM    Performed by: Alberto Fragoso MD  Authorized by: Alberto Fragoso MD  Direct patient critical care time: 30 minutes  Additional history critical care time: 10 minutes  Ordering / reviewing critical care time: 10 minutes  Documentation critical care time: 10 minutes  Total critical care time (exclusive of procedural time) : 60 minutes  Critical care was necessary to treat or prevent imminent or life-threatening deterioration of the following conditions: respiratory failure.  Critical care was time spent personally by me on the following activities: development of treatment plan with patient or surrogate, evaluation of patient's response to treatment, examination of patient, obtaining history from patient or surrogate, ordering and performing treatments and interventions, ordering and  review of laboratory studies, ordering and review of radiographic studies, pulse oximetry and re-evaluation of patient's condition.        Labs Reviewed   COMPREHENSIVE METABOLIC PANEL - Abnormal; Notable for the following components:       Result Value    Glucose Level 131 (*)     Calcium Level Total 10.3 (*)     Protein Total 7.7 (*)     All other components within normal limits   HIGH SENSITIVITY CRP - Abnormal; Notable for the following components:    C-Reactive Protein High Sensitivity 16.13 (*)     All other components within normal limits   URINALYSIS, REFLEX TO URINE CULTURE - Abnormal; Notable for the following components:    Blood, UA Trace-Intact (*)     All other components within normal limits   CBC WITH DIFFERENTIAL - Abnormal; Notable for the following components:    RBC 4.44 (*)     Hgb 13.8 (*)     MCV 96.8 (*)     MCH 31.1 (*)     MCHC 32.1 (*)     All other components within normal limits   TSH - Normal   MAGNESIUM - Normal   B-TYPE NATRIURETIC PEPTIDE - Normal   COVID/RSV/FLU A&B PCR - Normal    Narrative:     The Xpert Xpress SARS-CoV-2/FLU/RSV plus is a rapid, multiplexed real-time PCR test intended for the simultaneous qualitative detection and differentiation of SARS-CoV-2, Influenza A, Influenza B, and respiratory syncytial virus (RSV) viral RNA in either nasopharyngeal swab or nasal swab specimens.         URINALYSIS, MICROSCOPIC - Normal   CBC W/ AUTO DIFFERENTIAL    Narrative:     The following orders were created for panel order CBC Auto Differential.  Procedure                               Abnormality         Status                     ---------                               -----------         ------                     CBC with Differential[[id number]]       Abnormal            Final result                 Please view results for these tests on the individual orders.   TROPONIN ISTAT     EKG Readings: (Independently Interpreted)   Initial Reading: No STEMI. Rhythm: Normal Sinus  Rhythm. Heart Rate: 65. Ectopy: No Ectopy. ST Segments: Normal ST Segments. T Waves: Normal. Clinical Impression: Normal Sinus Rhythm with PVCs     ECG Results              EKG 12-lead (Final result)  Result time 09/15/23 20:38:44      Final result by Interface, Lab In [facility] (09/15/23 20:38:44)                   Narrative:    Test Reason : R05.9,    Vent. Rate : 072 BPM     Atrial Rate : 072 BPM     P-R Int : 128 ms          QRS Dur : 086 ms      QT Int : 414 ms       P-R-T Axes : 087 069 088 degrees     QTc Int : 453 ms    Sinus rhythm with sinus arrhythmia with occasional Premature ventricular  complexes  Otherwise normal ECG  When compared with ECG of 11-JUN-2023 07:18,  No significant change was found  Confirmed by Pardeep Dan MD ([id number]) on 9/15/2023 8:38:34 PM    Referred By: [email or web address]   SELF           Confirmed By:Pardeep Dan MD                                     EKG 12-LEAD (Final result)  Result time 09/22/23 12:20:27      Final result by Unknown User (09/22/23 12:20:27)                                      Imaging Results              X-Ray Chest 1 View (Final result)  Result time 09/14/23 12:19:40      Final result by Balbir White MD (09/14/23 12:19:40)                   Impression:      Interval placement of a chest tube on the right with resolution of the pneumothorax.      Electronically signed by: Balbir White  Date:    09/14/2023  Time:    12:19               Narrative:    EXAMINATION:  XR CHEST 1 VIEW    CLINICAL HISTORY:  Other artificial opening status    COMPARISON:  Earlier today    FINDINGS:  Portable frontal view of the chest was obtained. Interval placement of chest tube on the right with resolution of the pneumothorax.  No other significant change.                        Wet Read by Alberto Fragoso MD (09/14/23 11:45:34, [facility] - Emergency Dept, Emergency Medicine)    Chest tube in place pneumothorax resolved                                      X-Ray Chest 1 View (Final  result)  Result time 09/14/23 09:19:36      Final result by Sharon Johnson MD (09/14/23 09:19:36)                   Impression:      Right pneumothorax, approximately 30% with lateral and basilar components.    Findings discussed with clinician caring for patient Alberto Fragoso in the emergency room by [facility] text and telephone 9/14/2023 9:19 hours.    This report was flagged in Epic as abnormal.      Electronically signed by: Sharon Johnson  Date:    09/14/2023  Time:    09:19               Narrative:    EXAMINATION:  XR CHEST 1 VIEW    CLINICAL HISTORY:  Cough, unspecified    TECHNIQUE:  Single frontal view of the chest was performed.    COMPARISON:  Chest radiograph 06/11/2023    FINDINGS:  LINES AND TUBES: EKG/telemetry leads overlie the chest.    MEDIASTINUM AND JEANNINE: The cardiac silhouette is normal.    LUNGS: Emphysematous changes are present within the lungs.  There is biapical scarring.  There is partial right lung collapse related to pneumothorax.  Patient is rotated to the right.    PLEURA:No pleural effusion.Right pneumothorax, approximately 30% with lateral and basilar components.    OTHER: No acute osseous abnormality.                                       Medications   albuterol-ipratropium 2.5 mg-0.5 mg/3 mL nebulizer solution 3 mL (3 mLs Nebulization Given 9/14/23 0846)   methylPREDNISolone sodium succinate injection 40 mg (40 mg Intravenous Given 9/14/23 0915)   morphine injection 2 mg (2 mg Intravenous Given 9/14/23 1045)   ondansetron injection 4 mg (4 mg Intravenous Given 9/14/23 1100)   morphine injection 2 mg (2 mg Intravenous Given 9/14/23 1100)   fentaNYL injection 50 mcg (50 mcg Intravenous Given 9/14/23 1119)   ketorolac injection 15 mg (15 mg Intravenous Given 9/14/23 1130)   LORazepam injection 1 mg (1 mg Intravenous Given 9/14/23 1149)   HYDROmorphone (PF) injection 1 mg (1 mg Intramuscular Given 9/14/23 1230)   HYDROmorphone (PF) injection 1 mg (1 mg Intravenous Given  9/14/23 1437)   ziprasidone injection 20 mg (20 mg Intramuscular Given 9/14/23 1538)   LORazepam injection 2 mg (2 mg Intramuscular Given 9/14/23 1538)   0.9%  NaCl infusion (0 mLs Intravenous Stopped 9/14/23 2243)   HYDROmorphone (PF) injection 1 mg (1 mg Intravenous Given 9/14/23 1615)   morphine injection 4 mg (4 mg Intravenous Given 9/14/23 2209)     Medical Decision Making  63-year-old presents with shortness of breath patient states has a history of chronic COPD wears oxygen at home at 4 liters/minute is a smoker's continues to smoke but he says the last couple days shortness of breath gotten worse increased coughing no fevers no chills no chest pain no chest tightness no diaphoresis said he went from 4 L to 6 L on his oxygen feels like his COPD is just flaring up      Vital signs evaluated sats were 94% on 4 L on exam initially had some decreased breath sounds in the right chest x-ray showed about a 30% pneumothorax not attention pneumo no acute respiratory distress but gets 30% patient said he is had these before and has had chest tube placement in the past does have a history of some blebs and spontaneous pneumo is a long smoker still smokes history of COPD no fevers no chills no other symptoms acute pain started yesterday shortness of breath just slowly got worse chest tube was placed right chest lateral position mid axillary line around 5th intercostal space sats went up to 99% chest tube secured x-ray taken call transfer center will be transferred    Amount and/or Complexity of Data Reviewed  Labs: ordered. Decision-making details documented in ED Course.  Radiology: ordered and independent interpretation performed.  ECG/medicine tests: ordered and independent interpretation performed.  Discussion of management or test interpretation with external provider(s): Patient accepted by Dr. Del[facility]    Risk  Prescription drug management.  Decision regarding hospitalization.  Risk  Details: Differential diagnosis pneumonia COVID flu RSV pneumothorax COPD exacerbation    Critical Care  Total time providing critical care: 50 minutes               ED Course as of 10/09/23 1239   Thu Sep 14, 2023   0910 POC Cardiac Troponin I: 0.01 [BL]   0910 WBC: 8.93 [BL]   0910 Hemoglobin(!): 13.8 [BL]   0910 Hematocrit: 43.0 [BL]   0920 Spoke with radiology patient has about a 30% pneumothorax right side patient is stable at this time most likely happened yesterday when he felt the acute sharp pain in the right side of his chest we will discuss with transfer center [BL]   0920 Magnesium : 2.10 [BL]   0920 WBC: 8.93 [BL]   0921 Hemoglobin(!): 13.8 [BL]   0921 Hematocrit: 43.0 [BL]   0921 CRP, High Sensitivity(!): 16.13 [BL]   0921 Sodium: 138 [BL]   0921 Potassium: 4.5 [BL]   0921 Chloride: 103 [BL]   0921 CO2: 27 [BL]   0921 Glucose(!): 131 [BL]   0921 BUN: 10.6 [BL]   0921 Creatinine: 0.77 [BL]   0921 Calcium(!): 10.3 [BL]   0921 PROTEIN TOTAL(!): 7.7 [BL]   0921 POC Cardiac Troponin I: 0.01 [BL]   1019 Influenza A, Molecular: Not Detected [BL]   1019 Influenza B, Molecular: Not Detected [BL]   1019 RSV Ag by Molecular Method: Not Detected [BL]   1019 SARS-CoV2 (COVID-19) Qualitative PCR: Not Detected [BL]   1019 BNP: 83.5 [BL]   1019 CRP, High Sensitivity(!): 16.13 [BL]   1019 Thyroid Stimulating Hormone: 1.162 [BL]   1019 Magnesium : 2.10 [BL]   1145 Post chest tube x-ray read longest reinflated chest tube is in place patient's oxygen saturation is now 100% on his 4 L which he normally takes at home denies any respiratory distress [BL]   1151 Influenza A, Molecular: Not Detected [BL]   1151 Influenza B, Molecular: Not Detected [BL]   1151 RSV Ag by Molecular Method: Not Detected [BL]   1151 SARS-CoV2 (COVID-19) Qualitative PCR: Not Detected [BL]   1151 BUN: 10.6 [BL]   1151 Creatinine: 0.77 [BL]   1151 POC Cardiac Troponin I: 0.01 [BL]   1151 BNP: 83.5 [BL]      ED Course User Index  [BL] Alberto Fragoso  MD CHERI                    Clinical Impression:   Final diagnoses:  [R05.9] Cough  [J93.9] Pneumothorax on right (Primary)  [J44.1] COPD with acute exacerbation  [Z93.8] S/P chest tube placement        ED Disposition Condition    Transfer to Another Facility Stable                Alberto Fragoso MD  09/14/23 1112       Alberto Fragoso MD  09/14/23 1140       Alberto Fragoso MD  10/09/23 1239     No

## 2023-09-15 ENCOUNTER — HOSPITAL ENCOUNTER (INPATIENT)
Facility: HOSPITAL | Age: 63
LOS: 5 days | Discharge: HOME HEALTH | DRG: 199 | End: 2023-09-20
Attending: INTERNAL MEDICINE | Admitting: INTERNAL MEDICINE
Payer: COMMERCIAL

## 2023-09-15 DIAGNOSIS — J44.9 COPD (CHRONIC OBSTRUCTIVE PULMONARY DISEASE): ICD-10-CM

## 2023-09-15 DIAGNOSIS — J44.9 PULMONARY CACHEXIA DUE TO COPD: Primary | ICD-10-CM

## 2023-09-15 DIAGNOSIS — R07.9 CHEST PAIN: ICD-10-CM

## 2023-09-15 DIAGNOSIS — R64 PULMONARY CACHEXIA DUE TO COPD: Primary | ICD-10-CM

## 2023-09-15 DIAGNOSIS — J93.0 SPONTANEOUS TENSION PNEUMOTHORAX: ICD-10-CM

## 2023-09-15 LAB
ALBUMIN SERPL-MCNC: 3.7 G/DL (ref 3.4–4.8)
ALBUMIN/GLOB SERPL: 1.5 RATIO (ref 1.1–2)
ALP SERPL-CCNC: 108 UNIT/L (ref 40–150)
ALT SERPL-CCNC: 13 UNIT/L (ref 0–55)
AST SERPL-CCNC: 21 UNIT/L (ref 5–34)
BASOPHILS # BLD AUTO: 0.01 X10(3)/MCL
BASOPHILS NFR BLD AUTO: 0.1 %
BILIRUB SERPL-MCNC: 0.5 MG/DL
BUN SERPL-MCNC: 13.4 MG/DL (ref 8.4–25.7)
CALCIUM SERPL-MCNC: 9.3 MG/DL (ref 8.8–10)
CHLORIDE SERPL-SCNC: 108 MMOL/L (ref 98–107)
CO2 SERPL-SCNC: 24 MMOL/L (ref 23–31)
CREAT SERPL-MCNC: 0.74 MG/DL (ref 0.73–1.18)
EOSINOPHIL # BLD AUTO: 0.07 X10(3)/MCL (ref 0–0.9)
EOSINOPHIL NFR BLD AUTO: 0.9 %
ERYTHROCYTE [DISTWIDTH] IN BLOOD BY AUTOMATED COUNT: 12.6 % (ref 11.5–17)
GFR SERPLBLD CREATININE-BSD FMLA CKD-EPI: >60 MLS/MIN/1.73/M2
GLOBULIN SER-MCNC: 2.5 GM/DL (ref 2.4–3.5)
GLUCOSE SERPL-MCNC: 94 MG/DL (ref 82–115)
HCT VFR BLD AUTO: 37.9 % (ref 42–52)
HGB BLD-MCNC: 12.6 G/DL (ref 14–18)
IMM GRANULOCYTES # BLD AUTO: 0.02 X10(3)/MCL (ref 0–0.04)
IMM GRANULOCYTES NFR BLD AUTO: 0.3 %
LYMPHOCYTES # BLD AUTO: 1.76 X10(3)/MCL (ref 0.6–4.6)
LYMPHOCYTES NFR BLD AUTO: 22.6 %
MAGNESIUM SERPL-MCNC: 2.1 MG/DL (ref 1.6–2.6)
MCH RBC QN AUTO: 31.9 PG (ref 27–31)
MCHC RBC AUTO-ENTMCNC: 33.2 G/DL (ref 33–36)
MCV RBC AUTO: 95.9 FL (ref 80–94)
MONOCYTES # BLD AUTO: 0.88 X10(3)/MCL (ref 0.1–1.3)
MONOCYTES NFR BLD AUTO: 11.3 %
NEUTROPHILS # BLD AUTO: 5.04 X10(3)/MCL (ref 2.1–9.2)
NEUTROPHILS NFR BLD AUTO: 64.8 %
NRBC BLD AUTO-RTO: 0 %
PHOSPHATE SERPL-MCNC: 3.7 MG/DL (ref 2.3–4.7)
PLATELET # BLD AUTO: 221 X10(3)/MCL (ref 130–400)
PMV BLD AUTO: 10.5 FL (ref 7.4–10.4)
POTASSIUM SERPL-SCNC: 5.2 MMOL/L (ref 3.5–5.1)
PROT SERPL-MCNC: 6.2 GM/DL (ref 5.8–7.6)
RBC # BLD AUTO: 3.95 X10(6)/MCL (ref 4.7–6.1)
SODIUM SERPL-SCNC: 140 MMOL/L (ref 136–145)
WBC # SPEC AUTO: 7.78 X10(3)/MCL (ref 4.5–11.5)

## 2023-09-15 PROCEDURE — 63600175 PHARM REV CODE 636 W HCPCS: Performed by: NURSE PRACTITIONER

## 2023-09-15 PROCEDURE — 83735 ASSAY OF MAGNESIUM: CPT | Performed by: NURSE PRACTITIONER

## 2023-09-15 PROCEDURE — 85025 COMPLETE CBC W/AUTO DIFF WBC: CPT | Performed by: NURSE PRACTITIONER

## 2023-09-15 PROCEDURE — 80053 COMPREHEN METABOLIC PANEL: CPT | Performed by: NURSE PRACTITIONER

## 2023-09-15 PROCEDURE — 11000001 HC ACUTE MED/SURG PRIVATE ROOM

## 2023-09-15 PROCEDURE — 25000003 PHARM REV CODE 250: Performed by: INTERNAL MEDICINE

## 2023-09-15 PROCEDURE — 84100 ASSAY OF PHOSPHORUS: CPT | Performed by: NURSE PRACTITIONER

## 2023-09-15 PROCEDURE — 21400001 HC TELEMETRY ROOM

## 2023-09-15 PROCEDURE — 63600175 PHARM REV CODE 636 W HCPCS: Performed by: INTERNAL MEDICINE

## 2023-09-15 RX ORDER — FLUTICASONE FUROATE AND VILANTEROL 100; 25 UG/1; UG/1
1 POWDER RESPIRATORY (INHALATION) DAILY
Status: DISCONTINUED | OUTPATIENT
Start: 2023-09-16 | End: 2023-09-20 | Stop reason: HOSPADM

## 2023-09-15 RX ORDER — POLYETHYLENE GLYCOL 3350 17 G/17G
17 POWDER, FOR SOLUTION ORAL 2 TIMES DAILY PRN
Status: DISCONTINUED | OUTPATIENT
Start: 2023-09-15 | End: 2023-09-20 | Stop reason: HOSPADM

## 2023-09-15 RX ORDER — MORPHINE SULFATE 4 MG/ML
4 INJECTION, SOLUTION INTRAMUSCULAR; INTRAVENOUS EVERY 4 HOURS PRN
Status: DISCONTINUED | OUTPATIENT
Start: 2023-09-15 | End: 2023-09-15

## 2023-09-15 RX ORDER — HYDROMORPHONE HYDROCHLORIDE 2 MG/ML
1 INJECTION, SOLUTION INTRAMUSCULAR; INTRAVENOUS; SUBCUTANEOUS ONCE
Status: COMPLETED | OUTPATIENT
Start: 2023-09-15 | End: 2023-09-15

## 2023-09-15 RX ORDER — MAG HYDROX/ALUMINUM HYD/SIMETH 200-200-20
30 SUSPENSION, ORAL (FINAL DOSE FORM) ORAL 4 TIMES DAILY PRN
Status: DISCONTINUED | OUTPATIENT
Start: 2023-09-15 | End: 2023-09-20 | Stop reason: HOSPADM

## 2023-09-15 RX ORDER — ONDANSETRON 2 MG/ML
4 INJECTION INTRAMUSCULAR; INTRAVENOUS EVERY 4 HOURS PRN
Status: DISCONTINUED | OUTPATIENT
Start: 2023-09-15 | End: 2023-09-20 | Stop reason: HOSPADM

## 2023-09-15 RX ORDER — LORAZEPAM 1 MG/1
1 TABLET ORAL EVERY 12 HOURS PRN
Status: DISCONTINUED | OUTPATIENT
Start: 2023-09-15 | End: 2023-09-20 | Stop reason: HOSPADM

## 2023-09-15 RX ORDER — PROCHLORPERAZINE EDISYLATE 5 MG/ML
5 INJECTION INTRAMUSCULAR; INTRAVENOUS EVERY 6 HOURS PRN
Status: DISCONTINUED | OUTPATIENT
Start: 2023-09-15 | End: 2023-09-20 | Stop reason: HOSPADM

## 2023-09-15 RX ORDER — NALOXONE HCL 0.4 MG/ML
0.02 VIAL (ML) INJECTION
Status: DISCONTINUED | OUTPATIENT
Start: 2023-09-15 | End: 2023-09-20 | Stop reason: HOSPADM

## 2023-09-15 RX ORDER — OXYCODONE HYDROCHLORIDE 10 MG/1
30 TABLET ORAL EVERY 6 HOURS PRN
Status: DISCONTINUED | OUTPATIENT
Start: 2023-09-15 | End: 2023-09-18

## 2023-09-15 RX ORDER — SIMETHICONE 80 MG
1 TABLET,CHEWABLE ORAL 4 TIMES DAILY PRN
Status: DISCONTINUED | OUTPATIENT
Start: 2023-09-15 | End: 2023-09-20 | Stop reason: HOSPADM

## 2023-09-15 RX ORDER — ENOXAPARIN SODIUM 100 MG/ML
40 INJECTION SUBCUTANEOUS EVERY 24 HOURS
Status: DISCONTINUED | OUTPATIENT
Start: 2023-09-15 | End: 2023-09-20 | Stop reason: HOSPADM

## 2023-09-15 RX ORDER — TALC
6 POWDER (GRAM) TOPICAL NIGHTLY PRN
Status: DISCONTINUED | OUTPATIENT
Start: 2023-09-15 | End: 2023-09-20 | Stop reason: HOSPADM

## 2023-09-15 RX ORDER — ACETAMINOPHEN 325 MG/1
650 TABLET ORAL EVERY 6 HOURS PRN
Status: DISCONTINUED | OUTPATIENT
Start: 2023-09-15 | End: 2023-09-20 | Stop reason: HOSPADM

## 2023-09-15 RX ORDER — SERTRALINE HYDROCHLORIDE 50 MG/1
50 TABLET, FILM COATED ORAL DAILY
Status: DISCONTINUED | OUTPATIENT
Start: 2023-09-15 | End: 2023-09-20 | Stop reason: HOSPADM

## 2023-09-15 RX ORDER — ZOLPIDEM TARTRATE 5 MG/1
5 TABLET ORAL NIGHTLY
Status: DISCONTINUED | OUTPATIENT
Start: 2023-09-15 | End: 2023-09-20 | Stop reason: HOSPADM

## 2023-09-15 RX ORDER — HYDROMORPHONE HYDROCHLORIDE 2 MG/ML
1 INJECTION, SOLUTION INTRAMUSCULAR; INTRAVENOUS; SUBCUTANEOUS EVERY 4 HOURS PRN
Status: DISCONTINUED | OUTPATIENT
Start: 2023-09-15 | End: 2023-09-16

## 2023-09-15 RX ORDER — IPRATROPIUM BROMIDE AND ALBUTEROL SULFATE 2.5; .5 MG/3ML; MG/3ML
3 SOLUTION RESPIRATORY (INHALATION) EVERY 4 HOURS PRN
Status: DISCONTINUED | OUTPATIENT
Start: 2023-09-15 | End: 2023-09-20 | Stop reason: HOSPADM

## 2023-09-15 RX ORDER — PREDNISONE 20 MG/1
40 TABLET ORAL DAILY
Status: DISCONTINUED | OUTPATIENT
Start: 2023-09-15 | End: 2023-09-20

## 2023-09-15 RX ADMIN — ENOXAPARIN SODIUM 40 MG: 40 INJECTION SUBCUTANEOUS at 04:09

## 2023-09-15 RX ADMIN — OXYCODONE HYDROCHLORIDE 30 MG: 10 TABLET ORAL at 10:09

## 2023-09-15 RX ADMIN — HYDROMORPHONE HYDROCHLORIDE 1 MG: 2 INJECTION INTRAMUSCULAR; INTRAVENOUS; SUBCUTANEOUS at 12:09

## 2023-09-15 RX ADMIN — PREDNISONE 40 MG: 20 TABLET ORAL at 08:09

## 2023-09-15 RX ADMIN — HYDROMORPHONE HYDROCHLORIDE 1 MG: 2 INJECTION INTRAMUSCULAR; INTRAVENOUS; SUBCUTANEOUS at 07:09

## 2023-09-15 RX ADMIN — GUAIFENESIN AND DEXTROMETHORPHAN HYDROBROMIDE 1 TABLET: 30; 600 TABLET, EXTENDED RELEASE ORAL at 10:09

## 2023-09-15 RX ADMIN — OXYCODONE HYDROCHLORIDE 30 MG: 10 TABLET ORAL at 03:09

## 2023-09-15 RX ADMIN — SERTRALINE HYDROCHLORIDE 50 MG: 50 TABLET ORAL at 04:09

## 2023-09-15 RX ADMIN — MORPHINE SULFATE 4 MG: 4 INJECTION INTRAVENOUS at 08:09

## 2023-09-15 RX ADMIN — GUAIFENESIN AND DEXTROMETHORPHAN HYDROBROMIDE 1 TABLET: 30; 600 TABLET, EXTENDED RELEASE ORAL at 08:09

## 2023-09-15 RX ADMIN — ZOLPIDEM TARTRATE 5 MG: 5 TABLET ORAL at 08:09

## 2023-09-15 RX ADMIN — MORPHINE SULFATE 4 MG: 4 INJECTION INTRAVENOUS at 04:09

## 2023-09-15 NOTE — ED NOTES
Pt accepted to [facility] Rm 956. Report to Meredith JARRETT. ADINAI notified of transfer

## 2023-09-15 NOTE — PLAN OF CARE
09/15/23 0915   Discharge Assessment   Confirmed/corrected address, phone number and insurance Yes   Confirmed Demographics Correct on Facesheet   Source of Information patient;family  (Wife: Lupe Rogers: [phone])   Communicated JACY with patient/caregiver Date not available/Unable to determine   Reason For Admission copd pt with 30% pneumo  J44.9 COPD (chronic obstructive pulmonary disease)   People in Home spouse  (Lupe Rogers (Spouse)   [phone] (Mobile))   Facility Arrived From: Private residence.   Do you expect to return to your current living situation? Yes   Do you have help at home or someone to help you manage your care at home? Yes   Who are your caregiver(s) and their phone number(s)? Lupe Rogers (Spouse)   [phone] (Mobile)   Prior to hospitilization cognitive status: Alert/Oriented   Current cognitive status: Alert/Oriented   Walking or Climbing Stairs ambulation difficulty, requires equipment   Mobility Management Patient uses a straight cane as needed for mobility concerns PRN.   Home Accessibility   (There are (4) steps to climb prior to entering the patient's mobile home.)   Home Layout Able to live on 1st floor   Equipment Currently Used at Home cane, straight;oxygen  (Patient receives: home oxygen; [employer]; O2 concentrator & portable oxygen through [employer].)   Readmission within 30 days? No   Patient currently being followed by outpatient case management? No   Do you currently have service(s) that help you manage your care at home? No   Do you take prescription medications? Yes   Do you have prescription coverage? Yes   Coverage Payor:  [place] CROSS BLUE SHIELD - [facility] ALL OUT OF STATE   Do you have any problems affording any of your prescribed medications? No   Is the patient taking medications as prescribed? yes   Who is going to help you get home at discharge? Lupe Rogers (Spouse)   [phone] (Mobile) and Steven () nurses will assist with review of medications and  administration as needed.   How do you get to doctors appointments? car, drives self;family or friend will provide   Are you on dialysis? No   Do you take coumadin? No   DME Needed Upon Discharge  none   Discharge Plan discussed with: Patient;Spouse/sig other  (Wife: Lupe Rogers: [phone])   Name(s) and Number(s) Lupe Rogers (Spouse)   [phone] (Mobile)   Transition of Care Barriers None   Discharge Plan A Home Health  (FOC: patient and family are requesting (HH) services through [facility] as he was active in the past with this agency.)   Discharge Plan B Home Health   Social Connections   In a typical week, how many times do you talk on the phone with family, friends, or neighbors? Twice a week   How often do you get together with friends or relatives? Twice   How often do you attend Hindu or Oriental orthodox services? Never   Do you belong to any clubs or organizations such as Hindu groups, unions, fraternal or athletic groups, or school groups? No   How often do you attend meetings of the clubs or organizations you belong to? Never   Are you , , , , never , or living with a partner?   ( x37 years.)   Alcohol Use   Q1: How often do you have a drink containing alcohol? Never   Q2: How many drinks containing alcohol do you have on a typical day when you are drinking? None   Q3: How often do you have six or more drinks on one occasion? Never   OTHER   Name(s) of People in Home Wife: Lupe Rogers: [phone]

## 2023-09-15 NOTE — CONSULTS
[facility] - [facility] Medical Telemetry  Pulmonary Critical Care Note    Patient Name: Balbir Rogers  MRN: [id number]  Admission Date: 9/15/2023  Hospital Length of Stay: 0 days  Code Status: DNR  Attending Provider: Brad Deluca MD  Primary Care Provider: Jose Johnson Jr., MD     Subjective:     HPI:   This is a 62 y/o patient of Dr. Vela, followed for severe COPD (FEV1 37) Chronic hypercapnic/hypoxemic Respiratory failure on continuous 02 and and trilogy with all sleep. He presented to [facility] on 9/15/2023 with complaints of worsening SOB over the last week. He was found to have right pneumothorax requiring chest tube insertion in the ER.  He was then transferred to our facility for further care. Pulmonary consulted for assistance and chest tube mgmt.       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       Past Surgical History:   Procedure Laterality Date    CERVICAL LAMINECTOMY WITH SPINAL FUSION      CHOLECYSTECTOMY      COLONOSCOPY      ESOPHAGOGASTRODUODENOSCOPY      gastrointestinal biopsy      graft to nose      inguinal herniotomy      LEFT HEART CATHETERIZATION Left 2/1/2023    Procedure: CATHETERIZATION, HEART, LEFT;  Surgeon: Dulce Orozco MD;  Location: [facility] CATH LAB;  Service: Cardiology;  Laterality: Left;  via radial approach    mandible operation      POLYPECTOMY         Social History     Socioeconomic History    Marital status:    Tobacco Use    Smoking status: Former      Current packs/day: 0.00     Average packs/day: 2.0 packs/day for 40.0 years (80.0 ttl pk-yrs)     Types: Cigarettes     Start date: 1977     Quit date: 2017     Years since quittin.1    Smokeless tobacco: Never   Substance and Sexual Activity    Alcohol use: Never    Drug use: Yes     Types: Oxycodone    Sexual activity: Not Currently     Social Determinants of Health     Financial Resource Strain: Low Risk  (2023)    Overall Financial Resource Strain (CARDIA)     Difficulty of Paying Living Expenses: Not hard at all   Food Insecurity: No Food Insecurity (2023)    Hunger Vital Sign     Worried About Running Out of Food in the Last Year: Never true     Ran Out of Food in the Last Year: Never true   Transportation Needs: No Transportation Needs (2023)    PRAPARE - Transportation     Lack of Transportation (Medical): No     Lack of Transportation (Non-Medical): No   Physical Activity: Inactive (2023)    Exercise Vital Sign     Days of Exercise per Week: 0 days     Minutes of Exercise per Session: 0 min   Stress: Stress Concern Present (2023)    [place] [employer] - Occupational Stress Questionnaire     Feeling of Stress : To some extent   Social Connections: Moderately Isolated (9/15/2023)    Social Connection and Isolation Panel [NHANES]     Frequency of Communication with Friends and Family: Twice a week     Frequency of Social Gatherings with Friends and Family: Twice a week     Attends Christianity Services: Never     Active Member of Clubs or Organizations: No     Attends Club or Organization Meetings: Never     Marital Status:    Housing Stability: Low Risk  (2023)    Housing Stability Vital Sign     Unable to Pay for Housing in the Last Year: No     Number of Places Lived in the Last Year: 1     Unstable Housing in the Last Year: No           Current Outpatient Medications   Medication Instructions    albuterol-ipratropium (DUO-NEB) 2.5 mg-0.5 mg/3 mL  nebulizer solution 3 mLs, Nebulization, Every 6 hours PRN    fluticasone-umeclidin-vilanter (TRELEGY ELLIPTA) 100-62.5-25 mcg DsDv 1 puff, Inhalation, Daily    LORazepam (ATIVAN) 1 mg, Oral, Every 12 hours PRN    medical supply, miscellaneous (O-2 SUSPENSORY MISC) 2 L, Misc.(Non-Drug; Combo Route)    OXcarbazepine (TRILEPTAL) 150 MG Tab 1 tab PO BID for 2 wk, then increase to 2 PO BID thereafter    oxyCODONE (ROXICODONE) 30 mg, Oral, Every 6 hours PRN    sertraline (ZOLOFT) 50 mg, Oral, Daily    temazepam (RESTORIL) 30 mg, Oral, Nightly       Current Inpatient Medications   dextromethorphan-guaiFENesin  mg  1 tablet Oral BID    enoxparin  40 mg Subcutaneous Q24H (prophylaxis, 1700)    fluticasone-umeclidin-vilanter  1 puff Inhalation Daily    predniSONE  40 mg Oral Daily    sertraline  50 mg Oral Daily    zolpidem  5 mg Oral QHS       Review of Systems   Constitutional: Negative.    HENT: Negative.     Respiratory:  Positive for shortness of breath.    Cardiovascular:  Positive for chest pain.   Genitourinary: Negative.    Musculoskeletal: Negative.    Skin: Negative.           Objective:       Intake/Output Summary (Last 24 hours) at 9/15/2023 1458  Last data filed at 9/15/2023 1452  Gross per 24 hour   Intake 360 ml   Output --   Net 360 ml         Vital Signs (Most Recent):  Temp: 98.5 °F (36.9 °C) (09/15/23 1147)  Pulse: 60 (09/15/23 1147)  Resp: 18 (09/15/23 1250)  BP: (!) 106/53 (09/15/23 1147)  SpO2: 97 % (09/15/23 1147)  Body mass index is 21.61 kg/m².  Weight: 62.6 kg (138 lb) Vital Signs (24h Range):  Temp:  [97.8 °F (36.6 °C)-98.5 °F (36.9 °C)] 98.5 °F (36.9 °C)  Pulse:  [53-76] 60  Resp:  [18-20] 18  SpO2:  [92 %-100 %] 97 %  BP: (100-142)/(53-81) 106/53     Physical Exam  Constitutional:       Appearance: Normal appearance.   HENT:      Head: Normocephalic and atraumatic.   Cardiovascular:      Rate and Rhythm: Normal rate and regular rhythm.      Heart sounds: Normal heart sounds.   Pulmonary:     "  Effort: Pulmonary effort is normal.      Comments: Chest tube to right connected to suction   Neurological:      General: No focal deficit present.      Mental Status: He is alert and oriented to person, place, and time.   Psychiatric:         Mood and Affect: Mood normal.         Behavior: Behavior normal.         Thought Content: Thought content normal.         Judgment: Judgment normal.           Lines/Drains/Airways       Peripheral Intravenous Line  Duration                  Midline Catheter Insertion/Assessment  - Single Lumen 01/04/23 1548 Left basilic vein (medial side of arm) other (see comments) 253 days                    Significant Labs:    Lab Results   Component Value Date    WBC 7.78 09/15/2023    HGB 12.6 (L) 09/15/2023    HCT 37.9 (L) 09/15/2023    MCV 95.9 (H) 09/15/2023     09/15/2023       BMP  Lab Results   Component Value Date     09/15/2023    K 5.2 (H) 09/15/2023    CHLORIDE 108 (H) 09/15/2023    CO2 24 09/15/2023    BUN 13.4 09/15/2023    CREATININE 0.74 09/15/2023    CALCIUM 9.3 09/15/2023    AGAP 9.0 02/21/2023    EGFRNONAA >60 12/15/2021       ABG  No results for input(s): "PH", "PO2", "PCO2", "HCO3", "POCBASEDEF" in the last 168 hours.      Significant Imaging:  X-Ray Chest 1 View  Narrative: EXAMINATION:  XR CHEST 1 VIEW    CLINICAL HISTORY:  placement verification; right pneumo;    TECHNIQUE:  Single frontal view of the chest was performed.    COMPARISON:  None    FINDINGS:  Cardiomediastinal silhouette and pulmonary ervin normal.  Ill-defined reticular opacities at the lung bases with architectural distortion parenchymal contraction.  Flattening of the hemidiaphragms suggesting COPD.  Suspected bullous changes of the apices.  Right-sided chest tube satisfactory position, stable.  No infiltrate.  Impression: No significant interval change.    Electronically signed by: Jones Porter MD  Date:    09/15/2023  Time:    09:11       Assessment/Plan:     Assessment  Severe " COPD on 5 L nasal cannula oxygen at home   Previous tobacco use discontinued in 2017  Abnormal CT of the chest with scarring in the left apex and a 9 mm precarinal lymph node now resolved  Cardiomyopathy and reduced ejection fraction of 30%,  left heart catheterization 2/23 shows EF had increased to 50%  Acute on chronic hypercapnic respiratory failure on Trilogy with all sleep   Right Pneumothorax s/p chest insertion     Plan  Pain mgmt per attending.   Continue chest tube to suction   Repeat CXR in the AM   Continue inhalers from home       KESHIA Price  Pulmonary Critical Care Medicine  [facility] - [facility] Telemetry

## 2023-09-15 NOTE — H&P
[facility] Hospital Medicine History & Physical Examination       Patient Name: Balbir Rogers  MRN: [id number]  Patient Class: IP- Inpatient   Admission Date: 9/15/2023 12:01 AM  Length of Stay: 0  Admitting Service: Hospital Medicine   Attending Physician: Mohan Moore MD   Primary Care Provider: Jose Johnson Jr., MD  History source: EMR, patient and/or patient's family    CHIEF COMPLAINT   SOB, pain at chest tube site    HISTORY OF PRESENT ILLNESS:   Mr. Rogers is a 64 yo male with PMH of COPD (on 4L home O2), CHF, anxiety, chronic pain, osteoporosis who presented to [facility] ED with c/o SOB, getting progressively worse over the last week.  He states that he usually uses O2 at night, but has been having to use it all of the time and had to increase to 6L.  He denies any chest pain, fever, chills.  He states that he felt like his COPD was flaring up.    ED vitals on arrival:  Temp 98.3° F, pulse 89, resp 40, /89, SpO2 93%.  ED lab work revealed CRP 16.13, flu/COVID negative.  CXR showed right pneumothorax, proximally 30% with lateral and basilar components.  A right lateral chest tube was placed in the ED. repeat CXR showed interval placement of a chest tube on the right with resolution of the pneumothorax.  He was transferred to [facility] and admitted to Hospital Medicine for management.      PAST MEDICAL HISTORY:     Chronic hypercapnic respiratory failure  CHF (EF 30% 01/02/2023)  COPD with hypoxia   Hepatitis-C   Anxiety/depression  Diffuse osteoporosis    PAST SURGICAL HISTORY:     Past Surgical History:   Procedure Laterality Date    CERVICAL LAMINECTOMY WITH SPINAL FUSION      CHOLECYSTECTOMY      COLONOSCOPY      ESOPHAGOGASTRODUODENOSCOPY      gastrointestinal biopsy      graft to nose      inguinal herniotomy      LEFT HEART CATHETERIZATION Left 2/1/2023    Procedure: CATHETERIZATION, HEART, LEFT;  Surgeon: Dulce Orozco MD;  Location: [facility] CATH  LAB;  Service: Cardiology;  Laterality: Left;  via radial approach    mandible operation      POLYPECTOMY         ALLERGIES:   Penicillin    FAMILY HISTORY:   Reviewed and non-contributory     SOCIAL HISTORY:     Social History     Tobacco Use    Smoking status: Former     Current packs/day: 0.00     Average packs/day: 2.0 packs/day for 40.0 years (80.0 ttl pk-yrs)     Types: Cigarettes     Start date: 1977     Quit date: 2017     Years since quittin.1    Smokeless tobacco: Never   Substance Use Topics    Alcohol use: Never        HOME MEDICATIONS:     Prior to Admission medications    Medication Sig Start Date End Date Taking? Authorizing Provider   albuterol-ipratropium (DUO-NEB) 2.5 mg-0.5 mg/3 mL nebulizer solution Take 3 mLs by nebulization every 6 (six) hours as needed for Wheezing. 23  Reyes, Thairy G, DO   fluticasone-umeclidin-vilanter (TRELEGY ELLIPTA) 100-62.5-25 mcg DsDv Inhale 1 puff into the lungs once daily.    Provider, Historical   LORazepam (ATIVAN) 1 MG tablet Take 1 tablet (1 mg total) by mouth every 12 (twelve) hours as needed for Anxiety. 23   Tonja Wahl FNP   medical supply, miscellaneous (O-2 SUSPENSORY MISC) 2 L by Misc.(Non-Drug; Combo Route) route.    Provider, Historical   OXcarbazepine (TRILEPTAL) 150 MG Tab 1 tab PO BID for 2 wk, then increase to 2 PO BID thereafter 23   Tonja Wahl FNP   oxyCODONE (ROXICODONE) 30 MG Tab Take 30 mg by mouth every 6 (six) hours as needed. 23   Provider, Historical   sertraline (ZOLOFT) 50 MG tablet Take 1 tablet (50 mg total) by mouth once daily. 23  Reyes, Thairy G, DO   temazepam (RESTORIL) 30 mg capsule Take 30 mg by mouth every evening. 23   Provider, Historical       REVIEW OF SYSTEMS:   Except as documented, all other systems reviewed and negative     PHYSICAL EXAM:   T 98.2 °F (36.8 °C)   /69   P 60   RR 18   O2 100 %  GENERAL: Well developed, well nourished. In no acute  "distress, non-toxic appearing.   HEENT: normocephalic atraumatic   NECK: supple   LUNGS: Clear diminished bilaterally, no wheezing or rales, no accessory muscle use. Right lateral chest tube in place to water seal  CVS: Regular rate and rhythm, normal peripheral perfusion  ABD: Soft, non-tender, non-distended, bowel sounds present  EXTREMITIES: no clubbing or cyanosis  SKIN: Warm, dry.   NEURO: alert and oriented, grossly without focal deficits   PSYCHIATRIC: Cooperative    LABS AND IMAGING:     Recent Labs     09/14/23  0850   WBC 8.93   RBC 4.44*   HGB 13.8*   HCT 43.0   MCV 96.8*   MCH 31.1*   MCHC 32.1*   RDW 12.5        No results for input(s): "LACTIC" in the last 72 hours.  No results for input(s): "INR", "APTT", "D-DIMER" in the last 72 hours.  No results for input(s): "HGBA1C", "CHOL", "TRIG", "LDL", "VLDL", "HDL" in the last 72 hours.   Recent Labs     09/12/23  1147 09/14/23  0850   NA  --  138   K 4.2 4.5   CHLORIDE  --  103   CO2  --  27   BUN  --  10.6   CREATININE  --  0.77   GLUCOSE  --  131*   CALCIUM  --  10.3*   MG  --  2.10   ALBUMIN  --  4.3   GLOBULIN  --  3.4   ALKPHOS  --  115   ALT  --  16   AST  --  21   BILITOT  --  0.6   TSH  --  1.162     Recent Labs     09/14/23  0850   BNP 83.5          X-Ray Chest 1 View  Narrative: EXAMINATION:  XR CHEST 1 VIEW    CLINICAL HISTORY:  Other artificial opening status    COMPARISON:  Earlier today    FINDINGS:  Portable frontal view of the chest was obtained. Interval placement of chest tube on the right with resolution of the pneumothorax.  No other significant change.  Impression: Interval placement of a chest tube on the right with resolution of the pneumothorax.    Electronically signed by: Balbir White  Date:    09/14/2023  Time:    12:19  X-Ray Chest 1 View  Narrative: EXAMINATION:  XR CHEST 1 VIEW    CLINICAL HISTORY:  Cough, unspecified    TECHNIQUE:  Single frontal view of the chest was performed.    COMPARISON:  Chest radiograph " 06/11/2023    FINDINGS:  LINES AND TUBES: EKG/telemetry leads overlie the chest.    MEDIASTINUM AND JEANNINE: The cardiac silhouette is normal.    LUNGS: Emphysematous changes are present within the lungs.  There is biapical scarring.  There is partial right lung collapse related to pneumothorax.  Patient is rotated to the right.    PLEURA:No pleural effusion.Right pneumothorax, approximately 30% with lateral and basilar components.    OTHER: No acute osseous abnormality.  Impression: Right pneumothorax, approximately 30% with lateral and basilar components.    Findings discussed with clinician caring for patient Alberto Fragoso in the emergency room by Beaming text and telephone 9/14/2023 9:19 hours.    This report was flagged in Epic as abnormal.    Electronically signed by: Sharon Johnson  Date:    09/14/2023  Time:    09:19      ASSESSMENT & PLAN:     1.  Right pneumothorax s/p chest tube placement  2.  COPD exacerbation (chronic hypoxia on 4 L continuously, trilogy nightly)    History:  COPD, CHF, anxiety, chronic pain, osteoporosis    PLAN:  -O2 delivery as needed  -Telemetry monitoring  -Pulmonology consult for chest tube management  -Duonebs q 4 hours  -Prednisone 40 mg PO daily  -Antihypertensives as needed  -Resume home meds as appropriate once available  -Labs in AM    I, Kasey Padilla NP have reviewed and discussed this case with Dr. Moore.  Please see addendum for further assessment and plan from attending MD.    DVT prophylaxis: SCDs  Code status: DNR discussed at bedside    ________________________________________________________________________  I, Dr. Mohan Moore assumed care of this patient.  For the patient encounter, I performed the substantive portion of the visit, I reviewed the NPPA documentation, treatment plan, and medical decision making.  I had face to face time with this patient.  I have personally reviewed the labs and test results that are presently available. I have reviewed or  attempted to review medical records based upon their availability. If patient was admitted under observational status it is with my approval.      63-year-old male present with sudden onset chest pain and found to have a spontaneous right pneumothorax.  This has happened to him in the remote past as well.  A chest tube was placed to that facility was transferred here for further care.  Currently he feels much more comfortable, no respiratory distress, moving air well bilaterally.  Consultation to Pulmonary, continue nebs/steroids for COPD exacerbation.    Time seen: 12:20 AM 9/15/23  Mohan Moore MD

## 2023-09-16 PROCEDURE — 21400001 HC TELEMETRY ROOM

## 2023-09-16 PROCEDURE — 63600175 PHARM REV CODE 636 W HCPCS: Performed by: INTERNAL MEDICINE

## 2023-09-16 PROCEDURE — 25000242 PHARM REV CODE 250 ALT 637 W/ HCPCS: Performed by: INTERNAL MEDICINE

## 2023-09-16 PROCEDURE — 11000001 HC ACUTE MED/SURG PRIVATE ROOM

## 2023-09-16 PROCEDURE — 63600175 PHARM REV CODE 636 W HCPCS: Performed by: NURSE PRACTITIONER

## 2023-09-16 PROCEDURE — 25000003 PHARM REV CODE 250: Performed by: INTERNAL MEDICINE

## 2023-09-16 RX ORDER — HYDROMORPHONE HYDROCHLORIDE 2 MG/ML
2 INJECTION, SOLUTION INTRAMUSCULAR; INTRAVENOUS; SUBCUTANEOUS EVERY 4 HOURS PRN
Status: DISCONTINUED | OUTPATIENT
Start: 2023-09-16 | End: 2023-09-18

## 2023-09-16 RX ADMIN — GUAIFENESIN AND DEXTROMETHORPHAN HYDROBROMIDE 1 TABLET: 30; 600 TABLET, EXTENDED RELEASE ORAL at 08:09

## 2023-09-16 RX ADMIN — ENOXAPARIN SODIUM 40 MG: 40 INJECTION SUBCUTANEOUS at 05:09

## 2023-09-16 RX ADMIN — HYDROMORPHONE HYDROCHLORIDE 1 MG: 2 INJECTION INTRAMUSCULAR; INTRAVENOUS; SUBCUTANEOUS at 09:09

## 2023-09-16 RX ADMIN — HYDROMORPHONE HYDROCHLORIDE 2 MG: 2 INJECTION INTRAMUSCULAR; INTRAVENOUS; SUBCUTANEOUS at 03:09

## 2023-09-16 RX ADMIN — PREDNISONE 40 MG: 20 TABLET ORAL at 09:09

## 2023-09-16 RX ADMIN — HYDROMORPHONE HYDROCHLORIDE 2 MG: 2 INJECTION INTRAMUSCULAR; INTRAVENOUS; SUBCUTANEOUS at 08:09

## 2023-09-16 RX ADMIN — SERTRALINE HYDROCHLORIDE 50 MG: 50 TABLET ORAL at 09:09

## 2023-09-16 RX ADMIN — OXYCODONE HYDROCHLORIDE 30 MG: 10 TABLET ORAL at 06:09

## 2023-09-16 RX ADMIN — ZOLPIDEM TARTRATE 5 MG: 5 TABLET ORAL at 08:09

## 2023-09-16 RX ADMIN — GUAIFENESIN AND DEXTROMETHORPHAN HYDROBROMIDE 1 TABLET: 30; 600 TABLET, EXTENDED RELEASE ORAL at 09:09

## 2023-09-16 RX ADMIN — UMECLIDINIUM 62.5 MCG: 62.5 AEROSOL, POWDER ORAL at 05:09

## 2023-09-16 RX ADMIN — HYDROMORPHONE HYDROCHLORIDE 1 MG: 2 INJECTION INTRAMUSCULAR; INTRAVENOUS; SUBCUTANEOUS at 02:09

## 2023-09-16 RX ADMIN — OXYCODONE HYDROCHLORIDE 30 MG: 10 TABLET ORAL at 12:09

## 2023-09-16 NOTE — PROGRESS NOTES
[facility]  Pulmonary Critical Care Note    Patient Name: Balbir Rogers  MRN: [id number]  Admission Date: 9/15/2023  Hospital Length of Stay: 1 days  Code Status: DNR  Attending Provider: Brad Deluca MD  Primary Care Provider: Jose Johnson Jr., MD     Subjective:     HPI:   This is a 64 y/o patient of Dr. Vela, followed for severe COPD (FEV1 37) Chronic hypercapnic/hypoxemic Respiratory failure on continuous 02 and and trilogy with all sleep. He presented to [facility] on 9/15/2023 with complaints of worsening SOB over the last week. He was found to have right pneumothorax requiring chest tube insertion in the ER.  He was then transferred to our facility for further care. Pulmonary consulted for assistance and chest tube mgmt.       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       Past Surgical History:   Procedure Laterality Date    CERVICAL LAMINECTOMY WITH SPINAL FUSION      CHOLECYSTECTOMY      COLONOSCOPY      ESOPHAGOGASTRODUODENOSCOPY      gastrointestinal biopsy      graft to nose      inguinal herniotomy      LEFT HEART CATHETERIZATION Left 2/1/2023    Procedure: CATHETERIZATION, HEART, LEFT;  Surgeon: Dulce Orozco MD;  Location: [facility] CATH LAB;  Service: Cardiology;  Laterality: Left;  via radial approach    mandible operation      POLYPECTOMY         Social History     Socioeconomic History    Marital status:    Tobacco Use    Smoking status: Former      Current packs/day: 0.00     Average packs/day: 2.0 packs/day for 40.0 years (80.0 ttl pk-yrs)     Types: Cigarettes     Start date: 1977     Quit date: 2017     Years since quittin.1    Smokeless tobacco: Never   Substance and Sexual Activity    Alcohol use: Never    Drug use: Yes     Types: Oxycodone    Sexual activity: Not Currently     Social Determinants of Health     Financial Resource Strain: Low Risk  (2023)    Overall Financial Resource Strain (CARDIA)     Difficulty of Paying Living Expenses: Not hard at all   Food Insecurity: No Food Insecurity (2023)    Hunger Vital Sign     Worried About Running Out of Food in the Last Year: Never true     Ran Out of Food in the Last Year: Never true   Transportation Needs: No Transportation Needs (2023)    PRAPARE - Transportation     Lack of Transportation (Medical): No     Lack of Transportation (Non-Medical): No   Physical Activity: Inactive (2023)    Exercise Vital Sign     Days of Exercise per Week: 0 days     Minutes of Exercise per Session: 0 min   Stress: Stress Concern Present (2023)    [place] [place] [employer] - Occupational Stress Questionnaire     Feeling of Stress : To some extent   Social Connections: Moderately Isolated (9/15/2023)    Social Connection and Isolation Panel [NHANES]     Frequency of Communication with Friends and Family: Twice a week     Frequency of Social Gatherings with Friends and Family: Twice a week     Attends Adventism Services: Never     Active Member of Clubs or Organizations: No     Attends Club or Organization Meetings: Never     Marital Status:    Housing Stability: Low Risk  (2023)    Housing Stability Vital Sign     Unable to Pay for Housing in the Last Year: No     Number of Places Lived in the Last Year: 1     Unstable Housing in the Last Year: No           Current Outpatient Medications   Medication Instructions    albuterol-ipratropium (DUO-NEB) 2.5 mg-0.5 mg/3 mL  nebulizer solution 3 mLs, Nebulization, Every 6 hours PRN    fluticasone-umeclidin-vilanter (TRELEGY ELLIPTA) 100-62.5-25 mcg DsDv 1 puff, Inhalation, Daily    LORazepam (ATIVAN) 1 mg, Oral, Every 12 hours PRN    medical supply, miscellaneous (O-2 SUSPENSORY MISC) 2 L, Misc.(Non-Drug; Combo Route)    OXcarbazepine (TRILEPTAL) 150 MG Tab 1 tab PO BID for 2 wk, then increase to 2 PO BID thereafter    oxyCODONE (ROXICODONE) 30 mg, Oral, Every 6 hours PRN    sertraline (ZOLOFT) 50 mg, Oral, Daily    temazepam (RESTORIL) 30 mg, Oral, Nightly       Current Inpatient Medications   dextromethorphan-guaiFENesin  mg  1 tablet Oral BID    enoxparin  40 mg Subcutaneous Q24H (prophylaxis, 1700)    fluticasone furoate-vilanteroL  1 puff Inhalation Daily    And    umeclidinium  1 capsule Inhalation Daily    predniSONE  40 mg Oral Daily    sertraline  50 mg Oral Daily    zolpidem  5 mg Oral QHS       Review of Systems   Constitutional: Negative.    HENT: Negative.     Respiratory:  Positive for shortness of breath.    Cardiovascular:  Positive for chest pain.   Genitourinary: Negative.    Musculoskeletal: Negative.    Skin: Negative.           Objective:       Intake/Output Summary (Last 24 hours) at 9/16/2023 0909  Last data filed at 9/15/2023 1452  Gross per 24 hour   Intake 360 ml   Output --   Net 360 ml           Vital Signs (Most Recent):  Temp: 97.6 °F (36.4 °C) (09/16/23 0719)  Pulse: (!) 54 (09/16/23 0719)  Resp: 18 (09/16/23 0719)  BP: (!) 111/57 (09/16/23 0719)  SpO2: 97 % (09/16/23 0719)  Body mass index is 21.61 kg/m².  Weight: 62.6 kg (138 lb) Vital Signs (24h Range):  Temp:  [97.6 °F (36.4 °C)-98.5 °F (36.9 °C)] 97.6 °F (36.4 °C)  Pulse:  [54-75] 54  Resp:  [16-20] 18  SpO2:  [97 %-98 %] 97 %  BP: (106-153)/(53-70) 111/57     Physical Exam  Constitutional:       Appearance: Normal appearance.   HENT:      Head: Normocephalic and atraumatic.   Cardiovascular:      Rate and Rhythm: Normal rate and regular rhythm.  "     Heart sounds: Normal heart sounds.   Pulmonary:      Effort: Pulmonary effort is normal.      Comments: Chest tube to right connected to suction   Neurological:      General: No focal deficit present.      Mental Status: He is alert and oriented to person, place, and time.   Psychiatric:         Mood and Affect: Mood normal.         Behavior: Behavior normal.         Thought Content: Thought content normal.         Judgment: Judgment normal.           Lines/Drains/Airways       Peripheral Intravenous Line  Duration                  Midline Catheter Insertion/Assessment  - Single Lumen 01/04/23 1548 Left basilic vein (medial side of arm) other (see comments) 254 days                    Significant Labs:    Lab Results   Component Value Date    WBC 7.78 09/15/2023    HGB 12.6 (L) 09/15/2023    HCT 37.9 (L) 09/15/2023    MCV 95.9 (H) 09/15/2023     09/15/2023       BMP  Lab Results   Component Value Date     09/15/2023    K 5.2 (H) 09/15/2023    CHLORIDE 108 (H) 09/15/2023    CO2 24 09/15/2023    BUN 13.4 09/15/2023    CREATININE 0.74 09/15/2023    CALCIUM 9.3 09/15/2023    AGAP 9.0 02/21/2023    EGFRNONAA >60 12/15/2021       ABG  No results for input(s): "PH", "PO2", "PCO2", "HCO3", "POCBASEDEF" in the last 168 hours.      Significant Imaging:  X-Ray Chest 1 View  Narrative: EXAMINATION:  XR CHEST 1 VIEW    CLINICAL HISTORY:  chest tube mgmt;Spontaneous tension pneumothorax    TECHNIQUE:  One view    COMPARISON:  September 15, 2023..    FINDINGS:  Cardiopericardial silhouette is within normal limits.  The right chest tube is in similar location.  Lungs are hyperinflated and bilateral upper lung lobes are remarkable for bullous emphysematous changes combined with scarring with similar appearance.  No new airspace consolidation, pulmonary edema, significant fluid within the pleural space or pneumothorax.  Impression: No significant interval change.    Electronically signed by: Raf" Robertson  Date:    09/16/2023  Time:    09:01       Assessment/Plan:     Assessment  Severe COPD on 5 L nasal cannula oxygen at home   Previous tobacco use discontinued in 2017  Abnormal CT of the chest with scarring in the left apex and a 9 mm precarinal lymph node now resolved  Cardiomyopathy and reduced ejection fraction of 30%,  left heart catheterization 2/23 shows EF had increased to 50%  Acute on chronic hypercapnic respiratory failure on Trilogy with all sleep   Right Pneumothorax s/p chest insertion     Plan  Pain mgmt per attending.   Remove chest tube from suction   Repeat CXR in the AM with possible dc in am if lung remains up   Continue inhalers from home       Rosalie Ohara, ANP  Pulmonary Critical Care Medicine  [facility]

## 2023-09-16 NOTE — PROGRESS NOTES
[facility]  Hospital Medicine Progress Note        CHIEF COMPLAINT   SOB, pain at chest tube site     HISTORY OF PRESENT ILLNESS:   Mr. Rogers is a 62 yo male with PMH of COPD (on 4L home O2), CHF, anxiety, chronic pain, osteoporosis who presented to [facility] ED with c/o SOB, getting progressively worse over the last week.  He states that he usually uses O2 at night, but has been having to use it all of the time and had to increase to 6L.  He denies any chest pain, fever, chills.  He states that he felt like his COPD was flaring up.     ED vitals on arrival:  Temp 98.3° F, pulse 89, resp 40, /89, SpO2 93%.  ED lab work revealed CRP 16.13, flu/COVID negative.  CXR showed right pneumothorax, proximally 30% with lateral and basilar components.  A right lateral chest tube was placed in the ED. repeat CXR showed interval placement of a chest tube on the right with resolution of the pneumothorax.  He was transferred to [facility] and admitted to Hospital Medicine for management.      Todays information  - patient crying at bedside d/t uncontrolled pain   - vitals stable on oxygen   - appreciate pulm input  - will discuss with pulm - use of trilogy and state of patients lungs- will this increase risk of rupture of blebs   - will consult palliative on Monday for GOC     Exam  GENERAL: Well developed, well nourished. In no acute distress, non-toxic appearing.   HEENT: normocephalic atraumatic   NECK: supple   LUNGS: Clear diminished bilaterally, no wheezing or rales, no accessory muscle use. Right lateral chest tube in place to water seal  CVS: Regular rate and rhythm, normal peripheral perfusion  ABD: Soft, non-tender, non-distended, bowel sounds present  EXTREMITIES: no clubbing or cyanosis  SKIN: Warm, dry.   NEURO: alert and oriented, grossly without focal deficits   PSYCHIATRIC: Cooperative          ASSESSMENT & PLAN:     1.  Right pneumothorax s/p chest tube placement  2.  COPD  exacerbation (chronic hypoxia on 4 L continuously, trilogy nightly)     History:  COPD, CHF, anxiety, chronic pain, osteoporosis    PLAN:  - vitals stable on oxygen   - appreciate pulm input  - will discuss with pulm - use of trilogy and state of patients lungs- will this increase risk of rupture of blebs   - will consult palliative on Monday for GOC   -O2 delivery as needed  -Telemetry monitoring  - pain control prn ; iv dilaudid 2 mg q4hr  -Duonebs q 4 hours  -Prednisone 40 mg PO daily  -Antihypertensives as needed  -Resume home meds as appropriate once available  -Labs in AM      DVT prophylaxis: SCDs  Code status: DNR discussed at bedside       VITAL SIGNS: 24 HRS MIN & MAX LAST   Temp  Min: 97.6 °F (36.4 °C)  Max: 98.5 °F (36.9 °C) 97.6 °F (36.4 °C)   BP  Min: 106/53  Max: 153/69 (!) 111/57   Pulse  Min: 54  Max: 75  (!) 54   Resp  Min: 16  Max: 20 18   SpO2  Min: 97 %  Max: 98 % 97 %     I have reviewed the following labs:  Recent Labs   Lab 09/14/23  0850 09/15/23  0356   WBC 8.93 7.78   RBC 4.44* 3.95*   HGB 13.8* 12.6*   HCT 43.0 37.9*   MCV 96.8* 95.9*   MCH 31.1* 31.9*   MCHC 32.1* 33.2   RDW 12.5 12.6    221   MPV 9.6 10.5*     Recent Labs   Lab 09/12/23  1147 09/14/23  0850 09/15/23  0356   NA  --  138 140   K 4.2 4.5 5.2*   CO2  --  27 24   BUN  --  10.6 13.4   CREATININE  --  0.77 0.74   CALCIUM  --  10.3* 9.3   MG  --  2.10 2.10   ALBUMIN  --  4.3 3.7   ALKPHOS  --  115 108   ALT  --  16 13   AST  --  21 21   BILITOT  --  0.6 0.5     Microbiology Results (last 7 days)       ** No results found for the last 168 hours. **             See below for Radiology    Scheduled Med:   dextromethorphan-guaiFENesin  mg  1 tablet Oral BID    enoxparin  40 mg Subcutaneous Q24H (prophylaxis, 1700)    fluticasone furoate-vilanteroL  1 puff Inhalation Daily    And    umeclidinium  1 capsule Inhalation Daily    predniSONE  40 mg Oral Daily    sertraline  50 mg Oral Daily    zolpidem  5 mg Oral QHS       Continuous Infusions:     PRN Meds:  acetaminophen, albuterol-ipratropium, aluminum-magnesium hydroxide-simethicone, HYDROmorphone, LORazepam, melatonin, naloxone, ondansetron, oxyCODONE, polyethylene glycol, prochlorperazine, simethicone     Assessment/Plan:      VTE prophylaxis:     Patient condition:  Stable/Fair/Guarded/ Serious/ Critical    Anticipated discharge and Disposition:         All diagnosis and differential diagnosis have been reviewed; assessment and plan has been documented; I have personally reviewed the labs and test results that are presently available; I have reviewed the patients medication list; I have reviewed the consulting providers response and recommendations. I have reviewed or attempted to review medical records based upon their availability    All of the patient's questions have been  addressed and answered. Patient's is agreeable to the above stated plan. I will continue to monitor closely and make adjustments to medical management as needed.  _____________________________________________________________________    Nutrition Status:    Radiology:  I have personally reviewed the following imaging and agree with the radiologist.     X-Ray Chest 1 View  Narrative: EXAMINATION:  XR CHEST 1 VIEW    CLINICAL HISTORY:  chest tube mgmt;Spontaneous tension pneumothorax    TECHNIQUE:  One view    COMPARISON:  September 15, 2023..    FINDINGS:  Cardiopericardial silhouette is within normal limits.  The right chest tube is in similar location.  Lungs are hyperinflated and bilateral upper lung lobes are remarkable for bullous emphysematous changes combined with scarring with similar appearance.  No new airspace consolidation, pulmonary edema, significant fluid within the pleural space or pneumothorax.  Impression: No significant interval change.    Electronically signed by: Raf Robertson  Date:    09/16/2023  Time:    09:01      Danika Medina MD   09/16/2023

## 2023-09-17 LAB
ANION GAP SERPL CALC-SCNC: 8 MEQ/L
BUN SERPL-MCNC: 14.1 MG/DL (ref 8.4–25.7)
CALCIUM SERPL-MCNC: 9.5 MG/DL (ref 8.8–10)
CHLORIDE SERPL-SCNC: 102 MMOL/L (ref 98–107)
CO2 SERPL-SCNC: 30 MMOL/L (ref 23–31)
CREAT SERPL-MCNC: 0.73 MG/DL (ref 0.73–1.18)
CREAT/UREA NIT SERPL: 19
GFR SERPLBLD CREATININE-BSD FMLA CKD-EPI: >60 MLS/MIN/1.73/M2
GLUCOSE SERPL-MCNC: 83 MG/DL (ref 82–115)
MAGNESIUM SERPL-MCNC: 2.2 MG/DL (ref 1.6–2.6)
POTASSIUM SERPL-SCNC: 4.2 MMOL/L (ref 3.5–5.1)
SODIUM SERPL-SCNC: 140 MMOL/L (ref 136–145)

## 2023-09-17 PROCEDURE — 63600175 PHARM REV CODE 636 W HCPCS: Performed by: INTERNAL MEDICINE

## 2023-09-17 PROCEDURE — 21400001 HC TELEMETRY ROOM

## 2023-09-17 PROCEDURE — 83735 ASSAY OF MAGNESIUM: CPT | Performed by: INTERNAL MEDICINE

## 2023-09-17 PROCEDURE — 80048 BASIC METABOLIC PNL TOTAL CA: CPT | Performed by: INTERNAL MEDICINE

## 2023-09-17 PROCEDURE — 25000003 PHARM REV CODE 250: Performed by: INTERNAL MEDICINE

## 2023-09-17 PROCEDURE — 27000221 HC OXYGEN, UP TO 24 HOURS

## 2023-09-17 PROCEDURE — 25000242 PHARM REV CODE 250 ALT 637 W/ HCPCS: Performed by: INTERNAL MEDICINE

## 2023-09-17 PROCEDURE — 63600175 PHARM REV CODE 636 W HCPCS: Performed by: NURSE PRACTITIONER

## 2023-09-17 RX ADMIN — SERTRALINE HYDROCHLORIDE 50 MG: 50 TABLET ORAL at 08:09

## 2023-09-17 RX ADMIN — ENOXAPARIN SODIUM 40 MG: 40 INJECTION SUBCUTANEOUS at 06:09

## 2023-09-17 RX ADMIN — FLUTICASONE FUROATE AND VILANTEROL TRIFENATATE 1 PUFF: 100; 25 POWDER RESPIRATORY (INHALATION) at 09:09

## 2023-09-17 RX ADMIN — UMECLIDINIUM 62.5 MCG: 62.5 AEROSOL, POWDER ORAL at 08:09

## 2023-09-17 RX ADMIN — GUAIFENESIN AND DEXTROMETHORPHAN HYDROBROMIDE 1 TABLET: 30; 600 TABLET, EXTENDED RELEASE ORAL at 09:09

## 2023-09-17 RX ADMIN — HYDROMORPHONE HYDROCHLORIDE 2 MG: 2 INJECTION INTRAMUSCULAR; INTRAVENOUS; SUBCUTANEOUS at 09:09

## 2023-09-17 RX ADMIN — HYDROMORPHONE HYDROCHLORIDE 2 MG: 2 INJECTION INTRAMUSCULAR; INTRAVENOUS; SUBCUTANEOUS at 03:09

## 2023-09-17 RX ADMIN — OXYCODONE HYDROCHLORIDE 30 MG: 10 TABLET ORAL at 06:09

## 2023-09-17 RX ADMIN — HYDROMORPHONE HYDROCHLORIDE 2 MG: 2 INJECTION INTRAMUSCULAR; INTRAVENOUS; SUBCUTANEOUS at 02:09

## 2023-09-17 RX ADMIN — OXYCODONE HYDROCHLORIDE 30 MG: 10 TABLET ORAL at 12:09

## 2023-09-17 RX ADMIN — ZOLPIDEM TARTRATE 5 MG: 5 TABLET ORAL at 09:09

## 2023-09-17 RX ADMIN — PREDNISONE 40 MG: 20 TABLET ORAL at 08:09

## 2023-09-17 RX ADMIN — HYDROMORPHONE HYDROCHLORIDE 2 MG: 2 INJECTION INTRAMUSCULAR; INTRAVENOUS; SUBCUTANEOUS at 08:09

## 2023-09-17 RX ADMIN — GUAIFENESIN AND DEXTROMETHORPHAN HYDROBROMIDE 1 TABLET: 30; 600 TABLET, EXTENDED RELEASE ORAL at 08:09

## 2023-09-17 NOTE — PROGRESS NOTES
[facility]  Pulmonary Critical Care Note    Patient Name: Balbir Rogers  MRN: [id number]  Admission Date: 9/15/2023  Hospital Length of Stay: 2 days  Code Status: DNR  Attending Provider: Brad Deluca MD  Primary Care Provider: Jose Johnson Jr., MD     Subjective:     HPI:   This is a 64 y/o patient of Dr. Vela, followed for severe COPD (FEV1 37) Chronic hypercapnic/hypoxemic Respiratory failure on continuous 02 and and trilogy with all sleep. He presented to [facility] on 9/15/2023 with complaints of worsening SOB over the last week. He was found to have right pneumothorax requiring chest tube insertion in the ER.  He was then transferred to our facility for further care. Pulmonary consulted for assistance and chest tube mgmt.       Hospital Course/Significant events:  9/16/2023- CT placed to suction     24 Hour Interval History:  Some burning reported around chest tube site. ON RA with stable sats.  Patient reports prior to admit his inspiratory settings on his Diane was recently increased. Will need to verify with [employer] med.     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       Past Surgical History:   Procedure Laterality Date    CERVICAL LAMINECTOMY WITH SPINAL FUSION      CHOLECYSTECTOMY      COLONOSCOPY      ESOPHAGOGASTRODUODENOSCOPY      gastrointestinal biopsy      graft to nose      inguinal herniotomy      LEFT HEART CATHETERIZATION Left 2/1/2023    Procedure: CATHETERIZATION, HEART, LEFT;  Surgeon: Dulce Orozco MD;  Location: [facility] CATH LAB;  Service:  Cardiology;  Laterality: Left;  via radial approach    mandible operation      POLYPECTOMY         Social History     Socioeconomic History    Marital status:    Tobacco Use    Smoking status: Former     Current packs/day: 0.00     Average packs/day: 2.0 packs/day for 40.0 years (80.0 ttl pk-yrs)     Types: Cigarettes     Start date: 1977     Quit date: 2017     Years since quittin.1    Smokeless tobacco: Never   Substance and Sexual Activity    Alcohol use: Never    Drug use: Yes     Types: Oxycodone    Sexual activity: Not Currently     Social Determinants of Health     Financial Resource Strain: Low Risk  (2023)    Overall Financial Resource Strain (CARDIA)     Difficulty of Paying Living Expenses: Not hard at all   Food Insecurity: No Food Insecurity (2023)    Hunger Vital Sign     Worried About Running Out of Food in the Last Year: Never true     Ran Out of Food in the Last Year: Never true   Transportation Needs: No Transportation Needs (2023)    PRAPARE - Transportation     Lack of Transportation (Medical): No     Lack of Transportation (Non-Medical): No   Physical Activity: Inactive (2023)    Exercise Vital Sign     Days of Exercise per Week: 0 days     Minutes of Exercise per Session: 0 min   Stress: Stress Concern Present (2023)    [place] [employer] - Occupational Stress Questionnaire     Feeling of Stress : To some extent   Social Connections: Moderately Isolated (9/15/2023)    Social Connection and Isolation Panel [NHANES]     Frequency of Communication with Friends and Family: Twice a week     Frequency of Social Gatherings with Friends and Family: Twice a week     Attends Roman Catholic Services: Never     Active Member of Clubs or Organizations: No     Attends Club or Organization Meetings: Never     Marital Status:    Housing Stability: Low Risk  (2023)    Housing Stability Vital Sign     Unable to Pay for Housing in the Last Year:  No     Number of Places Lived in the Last Year: 1     Unstable Housing in the Last Year: No           Current Outpatient Medications   Medication Instructions    albuterol-ipratropium (DUO-NEB) 2.5 mg-0.5 mg/3 mL nebulizer solution 3 mLs, Nebulization, Every 6 hours PRN    fluticasone-umeclidin-vilanter (TRELEGY ELLIPTA) 100-62.5-25 mcg DsDv 1 puff, Inhalation, Daily    LORazepam (ATIVAN) 1 mg, Oral, Every 12 hours PRN    medical supply, miscellaneous (O-2 SUSPENSORY MISC) 2 L, Misc.(Non-Drug; Combo Route)    OXcarbazepine (TRILEPTAL) 150 MG Tab 1 tab PO BID for 2 wk, then increase to 2 PO BID thereafter    oxyCODONE (ROXICODONE) 30 mg, Oral, Every 6 hours PRN    sertraline (ZOLOFT) 50 mg, Oral, Daily    temazepam (RESTORIL) 30 mg, Oral, Nightly       Current Inpatient Medications   dextromethorphan-guaiFENesin  mg  1 tablet Oral BID    enoxparin  40 mg Subcutaneous Q24H (prophylaxis, 1700)    fluticasone furoate-vilanteroL  1 puff Inhalation Daily    And    umeclidinium  1 capsule Inhalation Daily    predniSONE  40 mg Oral Daily    sertraline  50 mg Oral Daily    zolpidem  5 mg Oral QHS       Review of Systems   Constitutional: Negative.    HENT: Negative.     Cardiovascular:  Positive for chest pain.   Genitourinary: Negative.    Musculoskeletal: Negative.    Skin: Negative.           Objective:       Intake/Output Summary (Last 24 hours) at 9/17/2023 0857  Last data filed at 9/16/2023 1300  Gross per 24 hour   Intake 120 ml   Output 200 ml   Net -80 ml           Vital Signs (Most Recent):  Temp: 97.5 °F (36.4 °C) (09/17/23 0757)  Pulse: 60 (09/17/23 0843)  Resp: 18 (09/17/23 0843)  BP: 109/69 (09/17/23 0757)  SpO2: 98 % (09/17/23 0757)  Body mass index is 21.61 kg/m².  Weight: 62.6 kg (138 lb) Vital Signs (24h Range):  Temp:  [97.5 °F (36.4 °C)-98.4 °F (36.9 °C)] 97.5 °F (36.4 °C)  Pulse:  [54-90] 60  Resp:  [16-20] 18  SpO2:  [91 %-98 %] 98 %  BP: (109-154)/(67-72) 109/69     Physical Exam  Constitutional:  "      Appearance: Normal appearance.   HENT:      Head: Normocephalic and atraumatic.   Cardiovascular:      Rate and Rhythm: Normal rate and regular rhythm.      Heart sounds: Normal heart sounds.   Pulmonary:      Effort: Pulmonary effort is normal.      Comments: Chest tube to air; no leak appreciated.   Neurological:      General: No focal deficit present.      Mental Status: He is alert and oriented to person, place, and time.   Psychiatric:         Mood and Affect: Mood normal.         Behavior: Behavior normal.         Thought Content: Thought content normal.         Judgment: Judgment normal.           Lines/Drains/Airways       Peripheral Intravenous Line  Duration                  Midline Catheter Insertion/Assessment  - Single Lumen 01/04/23 1548 Left basilic vein (medial side of arm) other (see comments) 255 days                    Significant Labs:    Lab Results   Component Value Date    WBC 7.78 09/15/2023    HGB 12.6 (L) 09/15/2023    HCT 37.9 (L) 09/15/2023    MCV 95.9 (H) 09/15/2023     09/15/2023       BMP  Lab Results   Component Value Date     09/17/2023    K 4.2 09/17/2023    CHLORIDE 102 09/17/2023    CO2 30 09/17/2023    BUN 14.1 09/17/2023    CREATININE 0.73 09/17/2023    CALCIUM 9.5 09/17/2023    AGAP 8.0 09/17/2023    EGFRNONAA >60 12/15/2021       ABG  No results for input(s): "PH", "PO2", "PCO2", "HCO3", "POCBASEDEF" in the last 168 hours.      Significant Imaging:  X-Ray Chest 1 View  START OF REPORT:  Technique: 1 portable AP view of the chest was performed.    Comparison: Comparison is with prior study dated 2023-09-16 05:24:22.    Clinical History: Pneumo.    Findings:  Soft Tissues: Unremarkable.  Lines and Tubes: The right pleural chest tube is in stable position.  Mediastinum: The cardiomediastinal silhouette is within normal limits and stable after accounting for patient position, technique, and patient body habitus.  Heart: The heart appears to be within normal " limits and stable after accounting for patient position, technique, and, patient body habitus.  Lungs: Compared to the prior study the lung findings appear relatively stable with increased reticular opacity in the upper lungs bilaterally. No focal consolidation is seen.  Pleura: No pleural effusions are seen. No gross pneumothorax is identified however the patient position is not noted on the exam.  Bony Structures: The visualized bony structures appear stable.    Impression:  1. The right pleural chest tube is in stable position.  2. The cardiomediastinal silhouette is within normal limits and stable after accounting for patient position, technique, and patient body habitus.  3. Compared to the prior study the lung findings appear relatively stable with increased reticular opacity in the upper lungs bilaterally. No focal consolidation is seen.  4. Details as above.  5. Details and other findings as noted above.       Assessment/Plan:     Assessment  Severe COPD on 5 L nasal cannula oxygen at home   Previous tobacco use discontinued in 2017  Abnormal CT of the chest with scarring in the left apex and a 9 mm precarinal lymph node now resolved  Cardiomyopathy and reduced ejection fraction of 30%,  left heart catheterization 2/23 shows EF had increased to 50%  Acute on chronic hypercapnic respiratory failure on Trilogy with all sleep   Right Pneumothorax s/p chest insertion     Plan  Pain mgmt per attending.   MD to review CXR consider removing CT this am; tube was clamped this AM will repeat CXR at 3 pm. Unclamp for any worsening chest pain, sudden desaturation thi was explained to nursing in detail   Will message [facility] med with an update       Rosalie Ohara ANP  Pulmonary Critical Care Medicine  [facility]

## 2023-09-17 NOTE — PROGRESS NOTES
[facility] Hospital Medicine Progress Note        CHIEF COMPLAINT   SOB, pain at chest tube site     HISTORY OF PRESENT ILLNESS:   Mr. Rogers is a 62 yo male with PMH of COPD (on 4L home O2), CHF, anxiety, chronic pain, osteoporosis who presented to [facility] ED with c/o SOB, getting progressively worse over the last week.  He states that he usually uses O2 at night, but has been having to use it all of the time and had to increase to 6L.  He denies any chest pain, fever, chills.  He states that he felt like his COPD was flaring up.     ED vitals on arrival:  Temp 98.3° F, pulse 89, resp 40, /89, SpO2 93%.  ED lab work revealed CRP 16.13, flu/COVID negative.  CXR showed right pneumothorax, proximally 30% with lateral and basilar components.  A right lateral chest tube was placed in the ED. repeat CXR showed interval placement of a chest tube on the right with resolution of the pneumothorax.  He was transferred to [facility] and admitted to Hospital Medicine for management.        Todays information  Patient seen and examined at bedside, family at bedside   Patient complaining of uncontrolled chest pain on the right side towards his right diaphragm  Will consult palliative Care for pain control patient was told complaining of pain despite huge pain meds   He is currently off oxygen and saturating adequately   Labs reviewed and stable   Repeat chest x-ray this morning reviewed with no pneumothorax reported   Pulmonology has decided to clamp the chest tube and will repeat a chest x-ray in the morning to reassess   Patient complaining of a left groin mass/pain seems to be hernia- abdominal versus inguinal, he is refusing to allow me examining at this time, will get a CT of the abdomen and pelvis for further examination       Exam  GENERAL: Well developed, well nourished. In no acute distress, non-toxic appearing.   HEENT: normocephalic atraumatic   NECK: supple   LUNGS: Clear  diminished bilaterally, no wheezing or rales, no accessory muscle use. Right lateral chest tube in place to water seal  CVS: Regular rate and rhythm, normal peripheral perfusion  ABD: Soft, non-tender, non-distended, bowel sounds present  EXTREMITIES: no clubbing or cyanosis  SKIN: Warm, dry.   NEURO: alert and oriented, grossly without focal deficits   PSYCHIATRIC: Cooperative           ASSESSMENT & PLAN:     Right pneumothorax s/p chest tube placement  COPD exacerbation (chronic hypoxia on 4 L continuously, trilogy nightly)  Right sided chest pain  Left lower quadrant mass/painful- intermittent- ? Abd hernia vs left inguinal hernia      History:  COPD, CHF, anxiety, chronic pain, osteoporosis    PLAN:  Patient complaining of uncontrolled chest pain on the right side towards his right diaphragm  Will consult palliative Care for pain control patient was told complaining of pain despite huge pain meds   He is currently off oxygen and saturating adequately   Repeat chest x-ray this morning reviewed with no pneumothorax reported   Pulmonology has decided to clamp the chest tube and will repeat a chest x-ray in the morning to reassess   Patient complaining of a left groin mass/pain seems to be hernia- abdominal versus inguinal, he is refusing to allow me examining at this time, will get a CT of the abdomen and pelvis for further examination   will discuss with pulm - use of trilogy and state of patients lungs- will this increase risk of rupture of blebs   -O2 delivery as needed  -Telemetry monitoring  - pain control prn ; iv dilaudid 2 mg q4hr  -Duonebs q 4 hours  -Prednisone 40 mg PO daily  -Antihypertensives as needed  -Resume home meds as appropriate once available  -Labs in AM       DVT prophylaxis: SCDs  Code status: DNR discussed at bedside         VITAL SIGNS: 24 HRS MIN & MAX LAST   Temp  Min: 97.5 °F (36.4 °C)  Max: 98.4 °F (36.9 °C) 98 °F (36.7 °C)   BP  Min: 109/69  Max: 154/72 (!) 122/57   Pulse  Min: 54   Max: 90  (!) 56   Resp  Min: 16  Max: 20 18   SpO2  Min: 91 %  Max: 98 % 96 %     I have reviewed the following labs:  Recent Labs   Lab 09/14/23  0850 09/15/23  0356   WBC 8.93 7.78   RBC 4.44* 3.95*   HGB 13.8* 12.6*   HCT 43.0 37.9*   MCV 96.8* 95.9*   MCH 31.1* 31.9*   MCHC 32.1* 33.2   RDW 12.5 12.6    221   MPV 9.6 10.5*     Recent Labs   Lab 09/14/23  0850 09/15/23  0356 09/17/23  0438    140 140   K 4.5 5.2* 4.2   CO2 27 24 30   BUN 10.6 13.4 14.1   CREATININE 0.77 0.74 0.73   CALCIUM 10.3* 9.3 9.5   MG 2.10 2.10 2.20   ALBUMIN 4.3 3.7  --    ALKPHOS 115 108  --    ALT 16 13  --    AST 21 21  --    BILITOT 0.6 0.5  --      Microbiology Results (last 7 days)       ** No results found for the last 168 hours. **             See below for Radiology    Scheduled Med:   dextromethorphan-guaiFENesin  mg  1 tablet Oral BID    enoxparin  40 mg Subcutaneous Q24H (prophylaxis, 1700)    fluticasone furoate-vilanteroL  1 puff Inhalation Daily    And    umeclidinium  1 capsule Inhalation Daily    predniSONE  40 mg Oral Daily    sertraline  50 mg Oral Daily    zolpidem  5 mg Oral QHS      Continuous Infusions:     PRN Meds:  acetaminophen, albuterol-ipratropium, aluminum-magnesium hydroxide-simethicone, HYDROmorphone, LORazepam, melatonin, naloxone, ondansetron, oxyCODONE, polyethylene glycol, prochlorperazine, simethicone     Assessment/Plan:      VTE prophylaxis:     Patient condition:  Stable/Fair/Guarded/ Serious/ Critical    Anticipated discharge and Disposition:         All diagnosis and differential diagnosis have been reviewed; assessment and plan has been documented; I have personally reviewed the labs and test results that are presently available; I have reviewed the patients medication list; I have reviewed the consulting providers response and recommendations. I have reviewed or attempted to review medical records based upon their availability    All of the patient's questions have been   addressed and answered. Patient's is agreeable to the above stated plan. I will continue to monitor closely and make adjustments to medical management as needed.  _____________________________________________________________________    Nutrition Status:    Radiology:  I have personally reviewed the following imaging and agree with the radiologist.     X-Ray Chest 1 View  START OF REPORT:  Technique: 1 portable AP view of the chest was performed.    Comparison: Comparison is with prior study dated 2023-09-16 05:24:22.    Clinical History: Pneumo.    Findings:  Soft Tissues: Unremarkable.  Lines and Tubes: The right pleural chest tube is in stable position.  Mediastinum: The cardiomediastinal silhouette is within normal limits and stable after accounting for patient position, technique, and patient body habitus.  Heart: The heart appears to be within normal limits and stable after accounting for patient position, technique, and, patient body habitus.  Lungs: Compared to the prior study the lung findings appear relatively stable with increased reticular opacity in the upper lungs bilaterally. No focal consolidation is seen.  Pleura: No pleural effusions are seen. No gross pneumothorax is identified however the patient position is not noted on the exam.  Bony Structures: The visualized bony structures appear stable.    Impression:  1. The right pleural chest tube is in stable position.  2. The cardiomediastinal silhouette is within normal limits and stable after accounting for patient position, technique, and patient body habitus.  3. Compared to the prior study the lung findings appear relatively stable with increased reticular opacity in the upper lungs bilaterally. No focal consolidation is seen.  4. Details as above.  5. Details and other findings as noted above.      Danika Medina MD   09/17/2023

## 2023-09-18 PROCEDURE — 63600175 PHARM REV CODE 636 W HCPCS: Performed by: NURSE PRACTITIONER

## 2023-09-18 PROCEDURE — 25000003 PHARM REV CODE 250: Performed by: INTERNAL MEDICINE

## 2023-09-18 PROCEDURE — 25000242 PHARM REV CODE 250 ALT 637 W/ HCPCS: Performed by: INTERNAL MEDICINE

## 2023-09-18 PROCEDURE — 21400001 HC TELEMETRY ROOM

## 2023-09-18 PROCEDURE — 63600175 PHARM REV CODE 636 W HCPCS: Performed by: INTERNAL MEDICINE

## 2023-09-18 PROCEDURE — 99223 1ST HOSP IP/OBS HIGH 75: CPT | Mod: ,,, | Performed by: INTERNAL MEDICINE

## 2023-09-18 PROCEDURE — 99223 PR INITIAL HOSPITAL CARE,LEVL III: ICD-10-PCS | Mod: ,,, | Performed by: INTERNAL MEDICINE

## 2023-09-18 PROCEDURE — 25000003 PHARM REV CODE 250: Performed by: NURSE PRACTITIONER

## 2023-09-18 RX ORDER — OXYCODONE HYDROCHLORIDE 10 MG/1
30 TABLET ORAL
Status: DISCONTINUED | OUTPATIENT
Start: 2023-09-18 | End: 2023-09-19

## 2023-09-18 RX ADMIN — FLUTICASONE FUROATE AND VILANTEROL TRIFENATATE 1 PUFF: 100; 25 POWDER RESPIRATORY (INHALATION) at 09:09

## 2023-09-18 RX ADMIN — OXYCODONE HYDROCHLORIDE 30 MG: 10 TABLET ORAL at 12:09

## 2023-09-18 RX ADMIN — GUAIFENESIN AND DEXTROMETHORPHAN HYDROBROMIDE 1 TABLET: 30; 600 TABLET, EXTENDED RELEASE ORAL at 09:09

## 2023-09-18 RX ADMIN — PREDNISONE 40 MG: 20 TABLET ORAL at 09:09

## 2023-09-18 RX ADMIN — ENOXAPARIN SODIUM 40 MG: 40 INJECTION SUBCUTANEOUS at 04:09

## 2023-09-18 RX ADMIN — OXYCODONE HYDROCHLORIDE 30 MG: 10 TABLET ORAL at 06:09

## 2023-09-18 RX ADMIN — HYDROMORPHONE HYDROCHLORIDE 2 MG: 2 INJECTION INTRAMUSCULAR; INTRAVENOUS; SUBCUTANEOUS at 03:09

## 2023-09-18 RX ADMIN — UMECLIDINIUM 62.5 MCG: 62.5 AEROSOL, POWDER ORAL at 09:09

## 2023-09-18 RX ADMIN — OXYCODONE HYDROCHLORIDE 30 MG: 10 TABLET ORAL at 03:09

## 2023-09-18 RX ADMIN — POLYETHYLENE GLYCOL 3350 17 G: 17 POWDER, FOR SOLUTION ORAL at 09:09

## 2023-09-18 RX ADMIN — HYDROMORPHONE HYDROCHLORIDE 2 MG: 2 INJECTION INTRAMUSCULAR; INTRAVENOUS; SUBCUTANEOUS at 09:09

## 2023-09-18 RX ADMIN — HYDROMORPHONE HYDROCHLORIDE 2 MG: 2 INJECTION INTRAMUSCULAR; INTRAVENOUS; SUBCUTANEOUS at 01:09

## 2023-09-18 RX ADMIN — OXYCODONE HYDROCHLORIDE 30 MG: 10 TABLET ORAL at 09:09

## 2023-09-18 RX ADMIN — SERTRALINE HYDROCHLORIDE 50 MG: 50 TABLET ORAL at 09:09

## 2023-09-18 RX ADMIN — ZOLPIDEM TARTRATE 5 MG: 5 TABLET ORAL at 09:09

## 2023-09-18 NOTE — PROGRESS NOTES
[facility] Medical Telemetry  Pulmonary Critical Care Note    Patient Name: Balbir Rogers  MRN: [id number]  Admission Date: 9/15/2023  Hospital Length of Stay: 3 days  Code Status: DNR  Attending Provider: Brad Deluca MD  Primary Care Provider: Jose Johnson Jr., MD     Subjective:     HPI:   This is a 64 y/o patient of Dr. Vela, followed for severe COPD (FEV1 37) Chronic hypercapnic/hypoxemic Respiratory failure on continuous 02 and and trilogy with all sleep. He presented to [facility] on 9/15/2023 with complaints of worsening SOB over the last week. He was found to have right pneumothorax requiring chest tube insertion in the ER.  He was then transferred to our facility for further care. Pulmonary consulted for assistance and chest tube mgmt.       Hospital Course/Significant events:  9/16/2023- CT placed to suction     24 Hour Interval History:  Chest tube was clamped yesterday but then placed back to suction in the evening due to increasing chest pain. He remains on 5L oxymask this AM. CXR pending official read.     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       Past Surgical History:   Procedure Laterality Date    CERVICAL LAMINECTOMY WITH SPINAL FUSION      CHOLECYSTECTOMY      COLONOSCOPY      ESOPHAGOGASTRODUODENOSCOPY      gastrointestinal biopsy      graft to nose      inguinal herniotomy      LEFT HEART CATHETERIZATION Left 2/1/2023    Procedure: CATHETERIZATION, HEART, LEFT;  Surgeon: Dulce Orozco MD;  Location: [facility] CATH LAB;  Service: Cardiology;  Laterality:  Left;  via radial approach    mandible operation      POLYPECTOMY         Social History     Socioeconomic History    Marital status:    Tobacco Use    Smoking status: Former     Current packs/day: 0.00     Average packs/day: 2.0 packs/day for 40.0 years (80.0 ttl pk-yrs)     Types: Cigarettes     Start date: 1977     Quit date: 2017     Years since quittin.1    Smokeless tobacco: Never   Substance and Sexual Activity    Alcohol use: Never    Drug use: Yes     Types: Oxycodone    Sexual activity: Not Currently     Social Determinants of Health     Financial Resource Strain: Low Risk  (2023)    Overall Financial Resource Strain (CARDIA)     Difficulty of Paying Living Expenses: Not hard at all   Food Insecurity: No Food Insecurity (2023)    Hunger Vital Sign     Worried About Running Out of Food in the Last Year: Never true     Ran Out of Food in the Last Year: Never true   Transportation Needs: No Transportation Needs (2023)    PRAPARE - Transportation     Lack of Transportation (Medical): No     Lack of Transportation (Non-Medical): No   Physical Activity: Inactive (2023)    Exercise Vital Sign     Days of Exercise per Week: 0 days     Minutes of Exercise per Session: 0 min   Stress: Stress Concern Present (2023)    [employer] - Occupational Stress Questionnaire     Feeling of Stress : To some extent   Social Connections: Moderately Isolated (9/15/2023)    Social Connection and Isolation Panel [NHANES]     Frequency of Communication with Friends and Family: Twice a week     Frequency of Social Gatherings with Friends and Family: Twice a week     Attends Rastafarian Services: Never     Active Member of Clubs or Organizations: No     Attends Club or Organization Meetings: Never     Marital Status:    Housing Stability: Low Risk  (2023)    Housing Stability Vital Sign     Unable to Pay for Housing in the Last Year: No     Number of Places  Lived in the Last Year: 1     Unstable Housing in the Last Year: No           Current Outpatient Medications   Medication Instructions    albuterol-ipratropium (DUO-NEB) 2.5 mg-0.5 mg/3 mL nebulizer solution 3 mLs, Nebulization, Every 6 hours PRN    fluticasone-umeclidin-vilanter (TRELEGY ELLIPTA) 100-62.5-25 mcg DsDv 1 puff, Inhalation, Daily    LORazepam (ATIVAN) 1 mg, Oral, Every 12 hours PRN    medical supply, miscellaneous (O-2 SUSPENSORY MISC) 2 L, Misc.(Non-Drug; Combo Route)    OXcarbazepine (TRILEPTAL) 150 MG Tab 1 tab PO BID for 2 wk, then increase to 2 PO BID thereafter    oxyCODONE (ROXICODONE) 30 mg, Oral, Every 6 hours PRN    sertraline (ZOLOFT) 50 mg, Oral, Daily    temazepam (RESTORIL) 30 mg, Oral, Nightly       Current Inpatient Medications   dextromethorphan-guaiFENesin  mg  1 tablet Oral BID    enoxparin  40 mg Subcutaneous Q24H (prophylaxis, 1700)    fluticasone furoate-vilanteroL  1 puff Inhalation Daily    And    umeclidinium  1 capsule Inhalation Daily    predniSONE  40 mg Oral Daily    sertraline  50 mg Oral Daily    zolpidem  5 mg Oral QHS       Review of Systems   Constitutional: Negative.    HENT: Negative.     Cardiovascular:  Positive for chest pain.   Genitourinary: Negative.    Musculoskeletal: Negative.    Skin: Negative.           Objective:       Intake/Output Summary (Last 24 hours) at 9/18/2023 0816  Last data filed at 9/17/2023 2300  Gross per 24 hour   Intake --   Output 150 ml   Net -150 ml           Vital Signs (Most Recent):  Temp: 97.7 °F (36.5 °C) (09/18/23 0739)  Pulse: 71 (09/18/23 0739)  Resp: 18 (09/18/23 0630)  BP: 111/63 (09/18/23 0739)  SpO2: 97 % (09/18/23 0739)  Body mass index is 21.61 kg/m².  Weight: 62.6 kg (138 lb) Vital Signs (24h Range):  Temp:  [97.7 °F (36.5 °C)-98.2 °F (36.8 °C)] 97.7 °F (36.5 °C)  Pulse:  [54-71] 71  Resp:  [18] 18  SpO2:  [92 %-99 %] 97 %  BP: (111-143)/(57-77) 111/63     Physical Exam  Constitutional:       Appearance: Normal  "appearance.   HENT:      Head: Normocephalic and atraumatic.   Cardiovascular:      Rate and Rhythm: Normal rate and regular rhythm.      Heart sounds: Normal heart sounds.   Pulmonary:      Effort: Pulmonary effort is normal.      Comments: Chest tube to suction  Neurological:      General: No focal deficit present.      Mental Status: He is alert and oriented to person, place, and time.   Psychiatric:         Mood and Affect: Mood normal.         Behavior: Behavior normal.         Thought Content: Thought content normal.         Judgment: Judgment normal.           Lines/Drains/Airways       Drain  Duration                  Chest Tube 09/15/23 0000 Right Midaxillary 3 days              Peripheral Intravenous Line  Duration                  Midline Catheter Insertion/Assessment  - Single Lumen 01/04/23 1548 Left basilic vein (medial side of arm) other (see comments) 256 days                    Significant Labs:    Lab Results   Component Value Date    WBC 7.78 09/15/2023    HGB 12.6 (L) 09/15/2023    HCT 37.9 (L) 09/15/2023    MCV 95.9 (H) 09/15/2023     09/15/2023       BMP  Lab Results   Component Value Date     09/17/2023    K 4.2 09/17/2023    CHLORIDE 102 09/17/2023    CO2 30 09/17/2023    BUN 14.1 09/17/2023    CREATININE 0.73 09/17/2023    CALCIUM 9.5 09/17/2023    AGAP 8.0 09/17/2023    EGFRNONAA >60 12/15/2021       ABG  No results for input(s): "PH", "PO2", "PCO2", "HCO3", "POCBASEDEF" in the last 168 hours.      Significant Imaging:  CXR this AM pending.     Assessment/Plan:     Assessment  Severe COPD on 5 L nasal cannula oxygen at home   Previous tobacco use discontinued in 2017  Abnormal CT of the chest with scarring in the left apex and a 9 mm precarinal lymph node now resolved  Cardiomyopathy and reduced ejection fraction of 30%,  left heart catheterization 2/23 shows EF had increased to 50%  Acute on chronic hypercapnic respiratory failure on Trilogy with all sleep   Right Pneumothorax " s/p chest insertion     Plan  Continue chest tube to suction and will have MD review imaging to determine when another clamping or water seal trial is appropriate.   Continue prednisone (day 4) for COPD exacerbation and continue inhaled regimen.        KARIN Magallon  Pulmonary Critical Care Medicine  [facility]

## 2023-09-18 NOTE — PROGRESS NOTES
[facility] Hospital Medicine Progress Note        CHIEF COMPLAINT   SOB, pain at chest tube site     HISTORY OF PRESENT ILLNESS:   Mr. Rogers is a 64 yo male with PMH of COPD (on 4L home O2), CHF, anxiety, chronic pain, osteoporosis who presented to [facility] ED with c/o SOB, getting progressively worse over the last week.  He states that he usually uses O2 at night, but has been having to use it all of the time and had to increase to 6L.  He denies any chest pain, fever, chills.  He states that he felt like his COPD was flaring up.     ED vitals on arrival:  Temp 98.3° F, pulse 89, resp 40, /89, SpO2 93%.  ED lab work revealed CRP 16.13, flu/COVID negative.  CXR showed right pneumothorax, proximally 30% with lateral and basilar components.  A right lateral chest tube was placed in the ED. repeat CXR showed interval placement of a chest tube on the right with resolution of the pneumothorax.  He was transferred to [facility] and admitted to Hospital Medicine for management.        Todays information  Patient seen and examined at bedside, family at bedside   Overnight events patient continues to complain of worsening right-sided chest pain and chest tube had to be unclamped.    Patient complaining of left lower quadrant pain with a bulging mass when he coughs and sometimes goes into his left groin.    Examined the patient with nurse at bedside patient was an hernia, very exquisite to touch, reducible but very painful .  CT of the abdomen and pelvis reviewed and was reported normal.  Will consult general surgery for further input         Exam  GENERAL: Well developed, well nourished. In no acute distress, non-toxic appearing.   HEENT: normocephalic atraumatic   NECK: supple   LUNGS: Clear diminished bilaterally, no wheezing or rales, no accessory muscle use. Right lateral chest tube in place to water seal  CVS: Regular rate and rhythm, normal peripheral perfusion  ABD: Soft,  non-tender, non-distended, bowel sounds present  EXTREMITIES: no clubbing or cyanosis  SKIN: Warm, dry.   NEURO: alert and oriented, grossly without focal deficits   PSYCHIATRIC: Cooperative           ASSESSMENT & PLAN:     Right pneumothorax s/p chest tube placement  COPD exacerbation (chronic hypoxia on 4 L continuously, trilogy nightly)  Right sided chest pain  Left lower quadrant mass/painful ? Abd incisional hernia vs left inguinal hernia      History:  COPD, CHF, anxiety, chronic pain, osteoporosis    PLAN:  Chest tube management per pulmonology   Continue on prednisone 50 mg daily   Continue on oxygen as needed to maintain adequate saturations 88-92%   Pain control p.r.n., follow-up with palliative care for pain control   will discuss with pulm - use of trilogy and state of patients lungs- will this increase risk of rupture of blebs   Telemetry monitoring  pain control prn ; iv dilaudid 2 mg q4hr  Duonebs q 4 hours  Patient complaining of left lower quadrant pain with a bulging mass when he coughs and sometimes goes into his left groin.    He has an hernia, very exquisite to touch, reducible but very painful .    CT of the abdomen and pelvis reviewed and was reported normal.  Will consult general surgery for further input     Antihypertensives as needed       DVT prophylaxis: SCDs  Code status: DNR discussed at bedside        Dispo- TBD     VITAL SIGNS: 24 HRS MIN & MAX LAST   Temp  Min: 97.7 °F (36.5 °C)  Max: 98.3 °F (36.8 °C) 98.3 °F (36.8 °C)   BP  Min: 111/63  Max: 165/56 (!) 165/56   Pulse  Min: 54  Max: 71  63   Resp  Min: 18  Max: 18 18   SpO2  Min: 92 %  Max: 99 % 97 %     I have reviewed the following labs:  Recent Labs   Lab 09/14/23  0850 09/15/23  0356   WBC 8.93 7.78   RBC 4.44* 3.95*   HGB 13.8* 12.6*   HCT 43.0 37.9*   MCV 96.8* 95.9*   MCH 31.1* 31.9*   MCHC 32.1* 33.2   RDW 12.5 12.6    221   MPV 9.6 10.5*     Recent Labs   Lab 09/14/23  0850 09/15/23  0356 09/17/23  0438    140  140   K 4.5 5.2* 4.2   CO2 27 24 30   BUN 10.6 13.4 14.1   CREATININE 0.77 0.74 0.73   CALCIUM 10.3* 9.3 9.5   MG 2.10 2.10 2.20   ALBUMIN 4.3 3.7  --    ALKPHOS 115 108  --    ALT 16 13  --    AST 21 21  --    BILITOT 0.6 0.5  --      Microbiology Results (last 7 days)       ** No results found for the last 168 hours. **             See below for Radiology    Scheduled Med:   dextromethorphan-guaiFENesin  mg  1 tablet Oral BID    enoxparin  40 mg Subcutaneous Q24H (prophylaxis, 1700)    fluticasone furoate-vilanteroL  1 puff Inhalation Daily    And    umeclidinium  1 capsule Inhalation Daily    predniSONE  40 mg Oral Daily    sertraline  50 mg Oral Daily    zolpidem  5 mg Oral QHS      Continuous Infusions:     PRN Meds:  acetaminophen, albuterol-ipratropium, aluminum-magnesium hydroxide-simethicone, HYDROmorphone, LORazepam, melatonin, naloxone, ondansetron, oxyCODONE, polyethylene glycol, prochlorperazine, simethicone     Assessment/Plan:      VTE prophylaxis:     Patient condition:  Stable/Fair/Guarded/ Serious/ Critical    Anticipated discharge and Disposition:         All diagnosis and differential diagnosis have been reviewed; assessment and plan has been documented; I have personally reviewed the labs and test results that are presently available; I have reviewed the patients medication list; I have reviewed the consulting providers response and recommendations. I have reviewed or attempted to review medical records based upon their availability    All of the patient's questions have been  addressed and answered. Patient's is agreeable to the above stated plan. I will continue to monitor closely and make adjustments to medical management as needed.  _____________________________________________________________________    Nutrition Status:    Radiology:  I have personally reviewed the following imaging and agree with the radiologist.     X-Ray Chest 1 View  Narrative: EXAMINATION:  XR CHEST 1  VIEW    CPT 55997    CLINICAL HISTORY:  Resp failure;    COMPARISON:  September 17, 2023    FINDINGS:  Examination reveals cardiomediastinal silhouette and pleuroparenchymal changes to be essentially unchanged as compared with the previous exam.    The might be a small right apical pneumothorax    Support catheters remain in place  Impression: No significant change.    Small right apical pneumothorax    Electronically signed by: Vincent Yoder  Date:    09/18/2023  Time:    08:34      Danika Medina MD   09/18/2023

## 2023-09-18 NOTE — CONSULTS
Inpatient consult to Palliative Care  Consult performed by: Jay Story MD  Consult ordered by: Danika Medina MD      Patient Name: Balbir Rogers   MRN: [id number]   Admission Date: 9/15/2023   Hospital Length of Stay: 3   Attending Provider: Brad Deluca MD   Consulting Provider: Jay Story M.D.  Reason for Consult: Pain  Primary Care Physician: Jose Johnson Jr., MD     Principal Problem: <principal problem not specified>     Patient information was obtained from patient and ER records.      Final diagnoses:  [J44.9] COPD (chronic obstructive pulmonary disease)     Assessment/Plan:     I reviewed the patient's current clinical status with the nurse. I reviewed clinical documentation, labs and imaging.     Symptom management review:     Pain: Acute on chronic. Described as constant, aching and burning, primarily in his neck, lower back and left groin (pt states that he had hernia surgery in this location in the past and pain is chronic). Pain is better with pain narcotic pain meds, worse with any movement. Pain is 10/10 at present but improves to 7/10 after taking narcotic pain meds.     He has had chronic pain, managed by Dr. Nolasco for many years with Oxycodone. He is currently on 30 mg and is prescribed 120/month. I confirmed this in the online  after the visit. I also confirmed that he has taken buprenorphine 20mcg/week patches for some time as well. This was stopped on admit due to unavailability and is likely continuing to wear off. I will try to find out if his family can bring in home patches.        Recommendations:     Pain. I will try to see if his family can bring in home buprenorphine patches. IN the mean time I will d/c IV dilaudid and change oxycodone 30 mg to every 3 hours prn. I believe that he may have longer lasting relief from oral vs. IV meds.     Bowel regimen. Pt states that he was having diarrhea on admit, now resolved. He says that he has not had a BM in 4 days,  but he normally goes 4-5 days at home. He declined any changes to bowel regimen as he does not wish to go in the bed while chest tube is in place.      History of Present Illness:     64 y/o M h/o COPD, home O2, CHF, chronic pain admitted with acute on chronic respiratory failure, Rt pneumothorax with chest tube. He was complaining of severe pain and crying which prompted the hospitalist to consult use for pain management assistance.      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    Bacteremia 01/03/2023    Hyperkalemia 01/12/2023    Dilated cardiomyopathy 02/07/2023    Anxiety 07/25/2023     Resolved Ambulatory Problems     Diagnosis Date Noted    Acute hypercapnic respiratory failure 01/01/2023    Right middle lobe pneumonia 01/01/2023    Aspiration pneumonia 01/01/2023    Chronic hypercapnic respiratory failure 02/07/2023     Past Medical History:   Diagnosis Date    Acute clinical systolic heart failure     Cardiomyopathy     Collapse, lung     COPD with hypoxia     Depression     Disuse osteoporosis     Emphysema/COPD     Hepatitis C     Leukocytosis         Past Surgical History:   Procedure Laterality Date    CERVICAL LAMINECTOMY WITH SPINAL FUSION      CHOLECYSTECTOMY      COLONOSCOPY      ESOPHAGOGASTRODUODENOSCOPY      gastrointestinal biopsy      graft to nose      inguinal herniotomy      LEFT HEART CATHETERIZATION Left 2/1/2023    Procedure: CATHETERIZATION, HEART, LEFT;  Surgeon: Dulce Orozco MD;  Location: [facility] CATH LAB;  Service: Cardiology;  Laterality: Left;  via radial approach    mandible operation      POLYPECTOMY          Review of patient's allergies indicates:   Allergen Reactions    Penicillin Rash          Current Facility-Administered Medications:     acetaminophen tablet 650  mg, 650 mg, Oral, Q6H PRN, Kasey Padilla AGACNP-BC    albuterol-ipratropium 2.5 mg-0.5 mg/3 mL nebulizer solution 3 mL, 3 mL, Nebulization, Q4H PRN, Kasey Padilla AGACNP-BC    aluminum-magnesium hydroxide-simethicone 200-200-20 mg/5 mL suspension 30 mL, 30 mL, Oral, QID PRN, Kasey Padilla AGACNP-BC    dextromethorphan-guaiFENesin  mg per 12 hr tablet 1 tablet, 1 tablet, Oral, BID, Danika Medina MD, 1 tablet at 09/18/23 0935    enoxaparin injection 40 mg, 40 mg, Subcutaneous, Q24H (prophylaxis, 1700), Danika Medina MD, 40 mg at 09/17/23 1834    fluticasone furoate-vilanteroL 100-25 mcg/dose diskus inhaler 1 puff, 1 puff, Inhalation, Daily, 1 puff at 09/18/23 0935 **AND** umeclidinium 62.5 mcg/actuation inhalation capsule 62.5 mcg, 1 capsule, Inhalation, Daily, Brad Deluca MD, 62.5 mcg at 09/18/23 0935    LORazepam tablet 1 mg, 1 mg, Oral, Q12H PRN, Danika Medina MD    melatonin tablet 6 mg, 6 mg, Oral, Nightly PRN, Kasey Padilla AGACNP-BC    naloxone 0.4 mg/mL injection 0.02 mg, 0.02 mg, Intravenous, PRN, Kasey Padilla AGASHELIAP-BC    ondansetron injection 4 mg, 4 mg, Intravenous, Q4H PRN, Kasey Padilla AGACNP-BC    oxyCODONE immediate release tablet Tab 30 mg, 30 mg, Oral, Q3H PRN, Jay Story MD    polyethylene glycol packet 17 g, 17 g, Oral, BID PRN, Kasey Padilla AGACNP-BC    predniSONE tablet 40 mg, 40 mg, Oral, Daily, ValerieKasey cruz AGACNP-BC, 40 mg at 09/18/23 0935    prochlorperazine injection Soln 5 mg, 5 mg, Intravenous, Q6H PRN, Kasey Padilla AGACNP-BC    sertraline tablet 50 mg, 50 mg, Oral, Daily, Danika Medina MD, 50 mg at 09/18/23 0935    simethicone chewable tablet 80 mg, 1 tablet, Oral, QID PRN, Kasey Padilla AGACNP-BC    zolpidem tablet 5 mg, 5 mg, Oral, QHS, Danika Medina MD, 5 mg at 09/17/23 5652    Facility-Administered Medications Ordered in Other Encounters:     diazePAM tablet 10 mg, 10 mg, Oral, On Call Procedure, Pam,  "MD Dulce, 10 mg at 02/01/23 0758    diphenhydrAMINE capsule 50 mg, 50 mg, Oral, On Call Procedure, Dulce Orozco MD, 50 mg at 02/01/23 0759    iopamidoL (ISOVUE-370) injection, , , PRN, Dulce Orozco MD, 70 mL at 02/01/23 0901    sodium chloride 0.9% flush 10 mL, 10 mL, Intravenous, PRN, Dulce Orozco MD     acetaminophen, albuterol-ipratropium, aluminum-magnesium hydroxide-simethicone, LORazepam, melatonin, naloxone, ondansetron, oxyCODONE, polyethylene glycol, prochlorperazine, simethicone     Family History   Problem Relation Age of Onset    Cancer Mother     Heart disease Father     Hypertension Father         Review of Systems   Constitutional:  Positive for activity change. Negative for appetite change.   HENT:  Negative for trouble swallowing.    Respiratory:  Negative for shortness of breath.    Gastrointestinal:  Negative for constipation.   Musculoskeletal:  Positive for arthralgias and back pain.            Objective:   BP (!) 165/56   Pulse 63   Temp 98.3 °F (36.8 °C) (Oral)   Resp 18   Ht 5' 7" (1.702 m)   Wt 62.6 kg (138 lb)   SpO2 97%   BMI 21.61 kg/m²      Physical Exam  Vitals reviewed.   Constitutional:       Appearance: He is ill-appearing. He is not toxic-appearing.   HENT:      Head: Normocephalic.      Right Ear: External ear normal.      Left Ear: External ear normal.      Nose: Nose normal.      Mouth/Throat:      Mouth: Mucous membranes are moist.      Pharynx: Oropharynx is clear.   Eyes:      Extraocular Movements: Extraocular movements intact.      Conjunctiva/sclera: Conjunctivae normal.      Pupils: Pupils are equal, round, and reactive to light.   Cardiovascular:      Rate and Rhythm: Normal rate and regular rhythm.      Pulses: Normal pulses.      Heart sounds: Normal heart sounds.   Pulmonary:      Effort: Pulmonary effort is normal.      Breath sounds: Normal breath sounds.   Abdominal:      General: Abdomen is flat. Bowel sounds are normal.      " Palpations: Abdomen is soft.      Comments: Palpation in left inguinal region/ LLQ is with pain, no palpable mass, postive release of air or crepitus felt.   Musculoskeletal:      Right lower leg: No edema.      Left lower leg: No edema.   Skin:     Comments: Scar to left of midline pubic region   Neurological:      Mental Status: He is alert and oriented to person, place, and time.   Psychiatric:         Mood and Affect: Mood normal.         Behavior: Behavior normal.         Thought Content: Thought content normal.         Judgment: Judgment normal.            Review of Symptoms  Review of Symptoms      Symptom Assessment (ESAS 0-10 Scale)  Pain:  0  Dyspnea:  0  Anxiety:  0  Nausea:  0  Depression:  0  Anorexia:  0  Fatigue:  0  Insomnia:  0  Restlessness:  0  Agitation:  0     CAM / Delirium:  Negative  Constipation:  Negative    Constipation:  No constipation    Bowel Management Plan (BMP):  Yes      Pain Assessment:  OME in 24 hours:  340 mg  Location(s):      Modified Robbie Scale:  0    Performance Status:  50    Living Arrangements:  Lives with spouse and Lives in home    Psychosocial/Cultural:   See Palliative Psychosocial Note: No  Pt denies use of alcohol, illicit drugs  **Primary  to Follow**  Palliative Care  Consult: No      Advance Care Planning   Advance Directives:   Living Will: Yes        Copy on chart: Yes    Do Not Resuscitate Status: Yes    Medical Power of : No    Agent's Name:  Lupe Douglass   Agent's Contact Number:  [phone]    Decision Making:  Patient answered questions            Caregiver burden formerly assessed: Yes        > 50% of 75 min of encounter was spent in chart review, face to face discussion of goals of care, symptom assessment, coordination of care and emotional support.     Jay Story M.D.  Palliative Medicine  [facility]

## 2023-09-18 NOTE — NURSING
Nurses Note -- 4 Eyes      9/18/2023   12:01 PM      Skin assessed during: Admit      [x] No Altered Skin Integrity Present    []Prevention Measures Documented      [] Yes- Altered Skin Integrity Present or Discovered   [] LDA Added if Not in Epic (Describe Wound)   [] New Altered Skin Integrity was Present on Admit and Documented in LDA   [] Wound Image Taken    Wound Care Consulted? No    Attending Nurse:  Melissa Coppola RN/Staff Member:   Genesis Sofia RN

## 2023-09-18 NOTE — CONSULTS
Acute Care Surgery   Consult Note     Patient Name: Balbir Rogers  YOB: 1960  Date: 09/18/2023 1:31 PM  Date of Admission: 9/15/2023  HD#3  POD#* No surgery found *    PRESENTING HISTORY   Chief Complaint/Reason for Admission: left abdominal wall pain     History of Present Illness:  64yo M w/ hx of COPD on 4L home O2, CHF (EF 35-50%), anxiety, chronic pain who is currently admitted for COPD exacerbation and pneumothorax. He has improved in regards to his respiratory status throughout his stay and his pneumothorax has resolved after a chest tube. He had complained about a left lower quadrant bulge that has been present for years. He states he had a previous spine surgery and had a subsequent hernia in that location which was then fixed by a surgeon at an OSH but cannot remember if mesh was used or who the surgeon was. The bulge has not enlarged or become more painful than baseline and has never demonstrated any evidence of incarceration or strangulation. Bulge and pain is worst when he is moving from sitting to standing or after coughing. Pain occasionally radiates to left groin but ceases after a few minutes. Denies any constipation, diarrhea, fevers, chills, dysuria. Prior smoker (2PPD) and quit in 12/2022, denies alcohol, uses medical marijuana. CT imaging negative for any obvious abdominal wall defects. General surgery consulted to evaluate left lower quadrant abdominal wall bulge.     Review of Systems:  12 point ROS negative except as stated in HPI    PAST HISTORY:   Past medical history:  Past Medical History:   Diagnosis Date    Acute clinical systolic heart failure     Acute hypercapnic respiratory failure     Aspiration pneumonia 01/01/2023    Bacteremia     Cardiomyopathy     Chronic, continuous use of opioids     Collapse, lung     COPD with hypoxia     Degenerative cervical spinal stenosis     Depression     Disuse osteoporosis     Emphysema/COPD     Hepatitis C     Hyperkalemia      Leukocytosis     Myoclonus 2022       Past surgical history:  Past Surgical History:   Procedure Laterality Date    CERVICAL LAMINECTOMY WITH SPINAL FUSION      CHOLECYSTECTOMY      COLONOSCOPY      ESOPHAGOGASTRODUODENOSCOPY      gastrointestinal biopsy      graft to nose      inguinal herniotomy      LEFT HEART CATHETERIZATION Left 2023    Procedure: CATHETERIZATION, HEART, LEFT;  Surgeon: Dulce Orozco MD;  [facility] CATH LAB;  Service: Cardiology;  Laterality: Left;  via radial approach    mandible operation      POLYPECTOMY         Family history:  Family History   Problem Relation Age of Onset    Cancer Mother     Heart disease Father     Hypertension Father        Social history:  Social History     Socioeconomic History    Marital status:    Tobacco Use    Smoking status: Former     Current packs/day: 0.00     Average packs/day: 2.0 packs/day for 40.0 years (80.0 ttl pk-yrs)     Types: Cigarettes     Start date: 1977     Quit date: 2017     Years since quittin.1    Smokeless tobacco: Never   Substance and Sexual Activity    Alcohol use: Never    Drug use: Yes     Types: Oxycodone    Sexual activity: Not Currently     Social Determinants of Health     Financial Resource Strain: Low Risk  (2023)    Overall Financial Resource Strain (CARDIA)     Difficulty of Paying Living Expenses: Not hard at all   Food Insecurity: No Food Insecurity (2023)    Hunger Vital Sign     Worried About Running Out of Food in the Last Year: Never true     Ran Out of Food in the Last Year: Never true   Transportation Needs: No Transportation Needs (2023)    PRAPARE - Transportation     Lack of Transportation (Medical): No     Lack of Transportation (Non-Medical): No   Physical Activity: Inactive (2023)    Exercise Vital Sign     Days of Exercise per Week: 0 days     Minutes of Exercise per Session: 0 min   Stress: Stress Concern Present (2023)    [place] [employer]  [employer]upational [employer] - Occupational Stress Questionnaire     Feeling of Stress : To some extent   Social Connections: Moderately Isolated (9/15/2023)    Social Connection and Isolation Panel [NHANES]     Frequency of Communication with Friends and Family: Twice a week     Frequency of Social Gatherings with Friends and Family: Twice a week     Attends Spiritism Services: Never     Active Member of Clubs or Organizations: No     Attends Club or Organization Meetings: Never     Marital Status:    Housing Stability: Low Risk  (2023)    Housing Stability Vital Sign     Unable to Pay for Housing in the Last Year: No     Number of Places Lived in the Last Year: 1     Unstable Housing in the Last Year: No     Social History     Tobacco Use   Smoking Status Former    Current packs/day: 0.00    Average packs/day: 2.0 packs/day for 40.0 years (80.0 ttl pk-yrs)    Types: Cigarettes    Start date: 1977    Quit date: 2017    Years since quittin.1   Smokeless Tobacco Never      Social History     Substance and Sexual Activity   Alcohol Use Never        MEDICATIONS & ALLERGIES:     Current Facility-Administered Medications on File Prior to Encounter   Medication    diazePAM tablet 10 mg    diphenhydrAMINE capsule 50 mg    iopamidoL (ISOVUE-370) injection    sodium chloride 0.9% flush 10 mL     Current Outpatient Medications on File Prior to Encounter   Medication Sig    albuterol-ipratropium (DUO-NEB) 2.5 mg-0.5 mg/3 mL nebulizer solution Take 3 mLs by nebulization every 6 (six) hours as needed for Wheezing.    fluticasone-umeclidin-vilanter (TRELEGY ELLIPTA) 100-62.5-25 mcg DsDv Inhale 1 puff into the lungs once daily.    LORazepam (ATIVAN) 1 MG tablet Take 1 tablet (1 mg total) by mouth every 12 (twelve) hours as needed for Anxiety.    medical supply, miscellaneous (O-2 SUSPENSORY MISC) 2 L by Misc.(Non-Drug; Combo Route) route.    OXcarbazepine (TRILEPTAL) 150 MG Tab 1 tab PO BID for 2 wk, then increase to  "2 PO BID thereafter (Patient not taking: Reported on 9/15/2023)    oxyCODONE (ROXICODONE) 30 MG Tab Take 30 mg by mouth every 6 (six) hours as needed.    sertraline (ZOLOFT) 50 MG tablet Take 1 tablet (50 mg total) by mouth once daily.    temazepam (RESTORIL) 30 mg capsule Take 30 mg by mouth every evening.       Allergies:   Review of patient's allergies indicates:   Allergen Reactions    Penicillin Rash       Scheduled Meds:   dextromethorphan-guaiFENesin  mg  1 tablet Oral BID    enoxparin  40 mg Subcutaneous Q24H (prophylaxis, 1700)    fluticasone furoate-vilanteroL  1 puff Inhalation Daily    And    umeclidinium  1 capsule Inhalation Daily    predniSONE  40 mg Oral Daily    sertraline  50 mg Oral Daily    zolpidem  5 mg Oral QHS       Continuous Infusions:    PRN Meds:acetaminophen, albuterol-ipratropium, aluminum-magnesium hydroxide-simethicone, LORazepam, melatonin, naloxone, ondansetron, oxyCODONE, polyethylene glycol, prochlorperazine, simethicone    OBJECTIVE:   Vital Signs:  VITAL SIGNS: 24 HR MIN & MAX LAST   Temp  Min: 97.7 °F (36.5 °C)  Max: 98.3 °F (36.8 °C)  98.3 °F (36.8 °C)   BP  Min: 111/63  Max: 165/56  (!) 165/56    Pulse  Min: 54  Max: 71  63    Resp  Min: 18  Max: 18  18    SpO2  Min: 92 %  Max: 99 %  97 %      HT: 5' 7" (170.2 cm)  WT: 62.6 kg (138 lb)  BMI: 21.6     Intake/output:  Intake/Output - Last 3 Shifts         09/16 0700 09/17 0659 09/17 0700 09/18 0659 09/18 0700 09/19 0659    P.O. 120      Total Intake(mL/kg) 120 (1.9)      Urine (mL/kg/hr) 200 (0.1) 150 (0.1)     Stool 0      Chest Tube  0     Total Output 200 150     Net -80 -150                    Intake/Output Summary (Last 24 hours) at 9/18/2023 1331  Last data filed at 9/17/2023 2300  Gross per 24 hour   Intake --   Output 150 ml   Net -150 ml         Physical Exam:  General: Well developed, well nourished, no acute distress  HEENT: Normocephalic, atraumatic, PERRL  CV: RR  Resp: NWOB  GI:  Abdomen soft, non-tender, " "non-distended, no guarding, no rebound, normoactive bowel sounds, no masses  :  Deferred  MSK: No muscle atrophy, cyanosis, peripheral edema, moving all extremities spontaneously. Left lower quadrant with well healed incision and reducible bulge on coughing. Easily reducible without evidence of incarceration. Mildly TTP over that area with bulging of area. No palpable inguinal hernia.   Skin/wounds:  No rashes, ulcers, erythema  Neuro:  CNII-XII grossly intact, alert and oriented to person, place, and time    Labs:  Troponin:  No results for input(s): "TROPONINI" in the last 72 hours.  CBC:  No results for input(s): "WBC", "RBC", "HGB", "HCT", "PLT", "MCV", "MCH", "MCHC" in the last 72 hours.  CMP:  Recent Labs     09/17/23  0438   CALCIUM 9.5      K 4.2   CO2 30   BUN 14.1   CREATININE 0.73     Lactic Acid:  No results for input(s): "LACTATE" in the last 72 hours.  ETOH:  No results for input(s): "ETHANOL" in the last 72 hours.   Urine Drug Screen:  No results for input(s): "COCAINE", "OPIATE", "BARBITURATE", "AMPHETAMINE", "FENTANYL", "CANNABINOIDS", "MDMA" in the last 72 hours.    Invalid input(s): "BENZODIAZEPINE", "PHENCYCLIDINE"   ABG:  No results for input(s): "PH", "PO2", "PCO2", "HCO3", "BE" in the last 168 hours.     Diagnostic Results:  X-Ray Chest 1 View   Final Result      No significant change.      Small right apical pneumothorax         Electronically signed by: Vincent Yoder   Date:    09/18/2023   Time:    08:34      X-Ray Chest 1 View   Final Result      Questionable small right upper lobe pneumothorax.         Electronically signed by: Brandyn Nunes MD   Date:    09/17/2023   Time:    22:12      X-Ray Chest 1 View   Final Result         1. Similar position of the right-sided thoracostomy tube, and there is a questionable small right apical pneumothorax versus bulla/bleb.   2. Changes from emphysema/COPD.         Electronically signed by: Yehuda Jarquin MD   Date:    09/17/2023 "   Time:    16:17      CT Abdomen Pelvis  Without Contrast   Final Result      X-Ray Chest 1 View   Final Result      X-Ray Chest 1 View   Final Result      No significant interval change.         Electronically signed by: Raf Robertson   Date:    09/16/2023   Time:    09:01      X-Ray Chest 1 View   Final Result      No significant interval change.         Electronically signed by: Jones Porter MD   Date:    09/15/2023   Time:    09:11          ASSESSMENT & PLAN:    64 yo male with PMH of COPD (on 4L home O2), CHF, anxiety, chronic pain, osteoporosis  - Had discussion with patient that this area is either an easily reducible hernia or component of diastasis from his prior surgery. He does not require any surgical intervention at this time, especially in light of recent COPD exacerbation. Surgery would be high risk and patient is at increased risk of recurrence due to COPD. He understands this and does not want any surgery until his pulmonary status is improved  - encouraged patient to continue with pulmonary toilet and compliance with medication   - patient can follow up outpatient with general surgeon if he desires     Linda Mclaughlin MD   [employer] General Surgery PGY 4

## 2023-09-19 PROCEDURE — 63600175 PHARM REV CODE 636 W HCPCS: Performed by: INTERNAL MEDICINE

## 2023-09-19 PROCEDURE — 99497 PR ADVNCD CARE PLAN 30 MIN: ICD-10-PCS | Mod: ,,, | Performed by: INTERNAL MEDICINE

## 2023-09-19 PROCEDURE — 25000003 PHARM REV CODE 250: Performed by: INTERNAL MEDICINE

## 2023-09-19 PROCEDURE — 99233 PR SUBSEQUENT HOSPITAL CARE,LEVL III: ICD-10-PCS | Mod: ,,, | Performed by: INTERNAL MEDICINE

## 2023-09-19 PROCEDURE — 25000003 PHARM REV CODE 250: Performed by: NURSE PRACTITIONER

## 2023-09-19 PROCEDURE — 27000221 HC OXYGEN, UP TO 24 HOURS

## 2023-09-19 PROCEDURE — 21400001 HC TELEMETRY ROOM

## 2023-09-19 PROCEDURE — 99497 ADVNCD CARE PLAN 30 MIN: CPT | Mod: ,,, | Performed by: INTERNAL MEDICINE

## 2023-09-19 PROCEDURE — 25000242 PHARM REV CODE 250 ALT 637 W/ HCPCS: Performed by: INTERNAL MEDICINE

## 2023-09-19 PROCEDURE — 99233 SBSQ HOSP IP/OBS HIGH 50: CPT | Mod: ,,, | Performed by: INTERNAL MEDICINE

## 2023-09-19 PROCEDURE — 63600175 PHARM REV CODE 636 W HCPCS: Performed by: NURSE PRACTITIONER

## 2023-09-19 RX ADMIN — FLUTICASONE FUROATE AND VILANTEROL TRIFENATATE 1 PUFF: 100; 25 POWDER RESPIRATORY (INHALATION) at 09:09

## 2023-09-19 RX ADMIN — POLYETHYLENE GLYCOL 3350 17 G: 17 POWDER, FOR SOLUTION ORAL at 06:09

## 2023-09-19 RX ADMIN — ZOLPIDEM TARTRATE 5 MG: 5 TABLET ORAL at 09:09

## 2023-09-19 RX ADMIN — GUAIFENESIN AND DEXTROMETHORPHAN HYDROBROMIDE 1 TABLET: 30; 600 TABLET, EXTENDED RELEASE ORAL at 08:09

## 2023-09-19 RX ADMIN — OXYCODONE HYDROCHLORIDE 30 MG: 10 TABLET ORAL at 08:09

## 2023-09-19 RX ADMIN — OXYCODONE HYDROCHLORIDE 30 MG: 10 TABLET ORAL at 12:09

## 2023-09-19 RX ADMIN — OXYCODONE HYDROCHLORIDE 30 MG: 10 TABLET ORAL at 06:09

## 2023-09-19 RX ADMIN — OXYCODONE HYDROCHLORIDE 45 MG: 5 TABLET ORAL at 03:09

## 2023-09-19 RX ADMIN — OXYCODONE HYDROCHLORIDE 45 MG: 5 TABLET ORAL at 07:09

## 2023-09-19 RX ADMIN — PREDNISONE 40 MG: 20 TABLET ORAL at 08:09

## 2023-09-19 RX ADMIN — ENOXAPARIN SODIUM 40 MG: 40 INJECTION SUBCUTANEOUS at 06:09

## 2023-09-19 RX ADMIN — GUAIFENESIN AND DEXTROMETHORPHAN HYDROBROMIDE 1 TABLET: 30; 600 TABLET, EXTENDED RELEASE ORAL at 09:09

## 2023-09-19 RX ADMIN — UMECLIDINIUM 62.5 MCG: 62.5 AEROSOL, POWDER ORAL at 09:09

## 2023-09-19 RX ADMIN — SERTRALINE HYDROCHLORIDE 50 MG: 50 TABLET ORAL at 08:09

## 2023-09-19 RX ADMIN — OXYCODONE HYDROCHLORIDE 30 MG: 10 TABLET ORAL at 03:09

## 2023-09-19 NOTE — PROGRESS NOTES
Patient's insurance has assigned an RN Care Manager to assist with dc planning if needed.  Care Manager's name is Timmy: [phone] ext: [phone]  M-F 8a-4:30p CST.

## 2023-09-19 NOTE — PROGRESS NOTES
[facility] Hospital Medicine Progress Note           CHIEF COMPLAINT   SOB, pain at chest tube site     HISTORY OF PRESENT ILLNESS:   Mr. Rogers is a 64 yo male with PMH of COPD (on 4L home O2), CHF, anxiety, chronic pain, osteoporosis who presented to [facility] ED with c/o SOB, getting progressively worse over the last week.  He states that he usually uses O2 at night, but has been having to use it all of the time and had to increase to 6L.  He denies any chest pain, fever, chills.  He states that he felt like his COPD was flaring up.     ED vitals on arrival:  Temp 98.3° F, pulse 89, resp 40, /89, SpO2 93%.  ED lab work revealed CRP 16.13, flu/COVID negative.  CXR showed right pneumothorax, proximally 30% with lateral and basilar components.  A right lateral chest tube was placed in the ED. repeat CXR showed interval placement of a chest tube on the right with resolution of the pneumothorax.  He was transferred to [facility] and admitted to Hospital Medicine for management.   Examined the patient with nurse at bedside patient was an hernia, very exquisite to touch, reducible but very painful .  CT of the abdomen and pelvis reviewed and was reported normal.  Will consult general surgery for further input .  General surgery evaluated patient and decided to follow-up outpatient, patient not a great surgical candidate at this time.           Todays information  Patient seen and examined at bedside, family at bedside   Patient doing about the same   Pulmonology has clamped the chest tube for repeat chest x-ray in the morning and if stable will remove chest tube   General surgery evaluated patient and decided to follow-up outpatient, patient not a great surgical candidate at this time.        Exam  GENERAL: Well developed, well nourished. In no acute distress, non-toxic appearing.   HEENT: normocephalic atraumatic   NECK: supple   LUNGS: Clear diminished bilaterally, no wheezing  or rales, no accessory muscle use. Right lateral chest tube in place to water seal  CVS: Regular rate and rhythm, normal peripheral perfusion  ABD: Soft, non-tender, non-distended, bowel sounds present  EXTREMITIES: no clubbing or cyanosis  SKIN: Warm, dry.   NEURO: alert and oriented, grossly without focal deficits   PSYCHIATRIC: Cooperative           ASSESSMENT & PLAN:     Right pneumothorax s/p chest tube placement  COPD exacerbation (chronic hypoxia on 4 L continuously, trilogy nightly)  Right sided chest pain  Left lower quadrant mass/painful ? Abd incisional hernia vs left inguinal hernia      History:  COPD, CHF, anxiety, chronic pain, osteoporosis    PLAN:  Pulmonology has clamped the chest tube for repeat chest x-ray in the morning and if stable will remove chest tube   Chest tube management per pulmonology   Continue on prednisone 50 mg daily   Continue on oxygen as needed to maintain adequate saturations 88-92%   Pain control p.r.n., follow-up with palliative care for pain control   will discuss with pulm - use of trilogy and state of patients lungs- will this increase risk of rupture of blebs   Telemetry monitoring  pain control prn ; iv dilaudid 2 mg q4hr  Duonebs q 4 hours  Patient complaining of left lower quadrant pain with a bulging mass when he coughs and sometimes goes into his left groin.    General surgery evaluated patient and decided to follow-up outpatient, patient not a great surgical candidate at this time.   CT of the abdomen and pelvis reviewed and was reported normal.      Antihypertensives as needed       DVT prophylaxis: SCDs  Code status: DNR discussed at bedside        Dispo- TBD       VITAL SIGNS: 24 HRS MIN & MAX LAST   Temp  Min: 97.5 °F (36.4 °C)  Max: 98.9 °F (37.2 °C) 98.2 °F (36.8 °C)   BP  Min: 113/62  Max: 134/72 117/65   Pulse  Min: 55  Max: 70  66   Resp  Min: 16  Max: 20 20   SpO2  Min: 94 %  Max: 97 % 96 %     I have reviewed the following labs:  Recent Labs   Lab  09/14/23  0850 09/15/23  0356   WBC 8.93 7.78   RBC 4.44* 3.95*   HGB 13.8* 12.6*   HCT 43.0 37.9*   MCV 96.8* 95.9*   MCH 31.1* 31.9*   MCHC 32.1* 33.2   RDW 12.5 12.6    221   MPV 9.6 10.5*     Recent Labs   Lab 09/14/23  0850 09/15/23  0356 09/17/23  0438    140 140   K 4.5 5.2* 4.2   CO2 27 24 30   BUN 10.6 13.4 14.1   CREATININE 0.77 0.74 0.73   CALCIUM 10.3* 9.3 9.5   MG 2.10 2.10 2.20   ALBUMIN 4.3 3.7  --    ALKPHOS 115 108  --    ALT 16 13  --    AST 21 21  --    BILITOT 0.6 0.5  --      Microbiology Results (last 7 days)       ** No results found for the last 168 hours. **             See below for Radiology    Scheduled Med:   dextromethorphan-guaiFENesin  mg  1 tablet Oral BID    enoxparin  40 mg Subcutaneous Q24H (prophylaxis, 1700)    fluticasone furoate-vilanteroL  1 puff Inhalation Daily    And    umeclidinium  1 capsule Inhalation Daily    predniSONE  40 mg Oral Daily    sertraline  50 mg Oral Daily    zolpidem  5 mg Oral QHS      Continuous Infusions:     PRN Meds:  acetaminophen, albuterol-ipratropium, aluminum-magnesium hydroxide-simethicone, LORazepam, melatonin, naloxone, ondansetron, oxyCODONE, polyethylene glycol, prochlorperazine, simethicone     Assessment/Plan:      VTE prophylaxis:     Patient condition:  Stable/Fair/Guarded/ Serious/ Critical    Anticipated discharge and Disposition:         All diagnosis and differential diagnosis have been reviewed; assessment and plan has been documented; I have personally reviewed the labs and test results that are presently available; I have reviewed the patients medication list; I have reviewed the consulting providers response and recommendations. I have reviewed or attempted to review medical records based upon their availability    All of the patient's questions have been  addressed and answered. Patient's is agreeable to the above stated plan. I will continue to monitor closely and make adjustments to medical management as  needed.  _____________________________________________________________________    Nutrition Status:    Radiology:  I have personally reviewed the following imaging and agree with the radiologist.     X-Ray Chest 1 View  Narrative: EXAMINATION:  XR CHEST 1 VIEW    CPT 21887    CLINICAL HISTORY:  chest tube; pneumothorax;    COMPARISON:  September 19, 2023    FINDINGS:  Cardiomediastinal silhouette improved parenchymal changes are essentially unchanged as compared with the previous exam.    Right-sided chest tube remains in place  Impression: No significant change    Electronically signed by: Vincent Yoder  Date:    09/19/2023  Time:    10:34  X-Ray Chest 1 View  Narrative: EXAMINATION:  XR CHEST 1 VIEW    CLINICAL HISTORY:  respiratory failure;    TECHNIQUE:  Single view of the chest    COMPARISON:  09/18/2023    FINDINGS:  Right-sided thoracostomy tube remains in place with no gross pneumothorax.  Opacification of the right upper lung zone is grossly unchanged.  The left hemithorax appears unchanged.  Impression: As above.    Electronically signed by: Pio Espinoza  Date:    09/19/2023  Time:    07:49      Danika Medina MD   09/19/2023

## 2023-09-19 NOTE — CONSULTS
ConsultsPatient Name: Balbir Rogers   MRN: [id number]   Admission Date: 9/15/2023   Hospital Length of Stay: 4   Attending Provider: Danika Medina MD   Consulting Provider: Jay Story M.D.  Reason for Consult: Pain  Primary Care Physician: Jose Johnson Jr., MD     Principal Problem: <principal problem not specified>     Patient information was obtained from patient and ER records.      Final diagnoses:  [J44.9] COPD (chronic obstructive pulmonary disease)     Assessment/Plan:     I reviewed the patient's current clinical status with the nurse. I reviewed clinical documentation, labs and imaging.     Symptom management review:     Pain: Acute on chronic. Pain is not improved with current prn measures. He says that he could not sleep overnight due to the pain. His home buprenorphine patches were resumed this AM.        Recommendations:     Pain.I will increase oxycodone to 45 mg and change frequency to q4hr prn. This will increase his OME by 51 mg from its baseline. The patient understands that upon discharge his pain management doctor may not decide to continue this regimen. If his pain improves after chest tube removal, a reduction back to his original dose may be in order.    I had a long conversation with the patient, his wife and other family. I reviewed code status and completed LAPOST with patient. I explained to he and his wife that the current Living Will is not a DNR and that its contents indicate different decisions under different circumstances. LAPOST: A. DNR, B. Selective treatments, C. No artificial nutrition by tube.      History of Present Illness:     64 y/o M h/o COPD, home O2, CHF, chronic pain admitted with acute on chronic respiratory failure, Rt pneumothorax with chest tube. He was complaining of severe pain and crying which prompted the hospitalist to consult use for pain management assistance.      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    Bacteremia 01/03/2023    Hyperkalemia 01/12/2023    Dilated cardiomyopathy 02/07/2023    Anxiety 07/25/2023     Resolved Ambulatory Problems     Diagnosis Date Noted    Acute hypercapnic respiratory failure 01/01/2023    Right middle lobe pneumonia 01/01/2023    Aspiration pneumonia 01/01/2023    Chronic hypercapnic respiratory failure 02/07/2023     Past Medical History:   Diagnosis Date    Acute clinical systolic heart failure     Cardiomyopathy     Collapse, lung     COPD with hypoxia     Depression     Disuse osteoporosis     Emphysema/COPD     Hepatitis C     Leukocytosis         Past Surgical History:   Procedure Laterality Date    CERVICAL LAMINECTOMY WITH SPINAL FUSION      CHOLECYSTECTOMY      COLONOSCOPY      ESOPHAGOGASTRODUODENOSCOPY      gastrointestinal biopsy      graft to nose      inguinal herniotomy      LEFT HEART CATHETERIZATION Left 2/1/2023    Procedure: CATHETERIZATION, HEART, LEFT;  Surgeon: Dulce Orozco MD;  Location: [facility] CATH LAB;  Service: Cardiology;  Laterality: Left;  via radial approach    mandible operation      POLYPECTOMY          Review of patient's allergies indicates:   Allergen Reactions    Penicillin Rash          Current Facility-Administered Medications:     acetaminophen tablet 650 mg, 650 mg, Oral, Q6H PRN, Kasey Padilla AGACNP-BC    albuterol-ipratropium 2.5 mg-0.5 mg/3 mL nebulizer solution 3 mL, 3 mL, Nebulization, Q4H PRN, Kasey Padilla AGACNP-BC    aluminum-magnesium hydroxide-simethicone 200-200-20 mg/5 mL suspension 30 mL, 30 mL, Oral, QID PRN, Kasey Padilla AGACNP-BC    dextromethorphan-guaiFENesin  mg per 12 hr tablet 1 tablet, 1 tablet, Oral, BID, Danika Medina MD, 1 tablet at 09/19/23 0858    enoxaparin injection 40 mg, 40 mg, Subcutaneous, Q24H (prophylaxis,  1700), Danika Medina MD, 40 mg at 09/19/23 1804    fluticasone furoate-vilanteroL 100-25 mcg/dose diskus inhaler 1 puff, 1 puff, Inhalation, Daily, 1 puff at 09/19/23 0900 **AND** umeclidinium 62.5 mcg/actuation inhalation capsule 62.5 mcg, 1 capsule, Inhalation, Daily, Brad Deluca MD, 62.5 mcg at 09/19/23 0900    LORazepam tablet 1 mg, 1 mg, Oral, Q12H PRN, Danika Medina MD    melatonin tablet 6 mg, 6 mg, Oral, Nightly PRN, Kasey Padilla AGACNP-BC    naloxone 0.4 mg/mL injection 0.02 mg, 0.02 mg, Intravenous, PRN, Kasey Padilla AGACNP-BC    ondansetron injection 4 mg, 4 mg, Intravenous, Q4H PRN, Kasey Padilla AGACNP-BC    oxyCODONE immediate release tablet 45 mg, 45 mg, Oral, Q4H PRN, Jay Story MD, 45 mg at 09/19/23 1534    polyethylene glycol packet 17 g, 17 g, Oral, BID PRN, Kasey Padilla AGACNP-BC, 17 g at 09/19/23 1806    predniSONE tablet 40 mg, 40 mg, Oral, Daily, Kasey Padilla AGACNP-BC, 40 mg at 09/19/23 0858    prochlorperazine injection Soln 5 mg, 5 mg, Intravenous, Q6H PRN, Kasey Padilla AGACNP-BC    sertraline tablet 50 mg, 50 mg, Oral, Daily, Danika Medina MD, 50 mg at 09/19/23 0858    simethicone chewable tablet 80 mg, 1 tablet, Oral, QID PRN, Kasey Padilla AGACNP-BC    zolpidem tablet 5 mg, 5 mg, Oral, QHS, Danika Medina MD, 5 mg at 09/18/23 2116    Facility-Administered Medications Ordered in Other Encounters:     diazePAM tablet 10 mg, 10 mg, Oral, On Call Procedure, Dulce Orozco MD, 10 mg at 02/01/23 0758    diphenhydrAMINE capsule 50 mg, 50 mg, Oral, On Call Procedure, Dulce Orozco MD, 50 mg at 02/01/23 0759    iopamidoL (ISOVUE-370) injection, , , PRN, Dulce Orozco MD, 70 mL at 02/01/23 0901    sodium chloride 0.9% flush 10 mL, 10 mL, Intravenous, PRN, Dulce Orozco MD     acetaminophen, albuterol-ipratropium, aluminum-magnesium hydroxide-simethicone, LORazepam, melatonin, naloxone, ondansetron, oxyCODONE, polyethylene  "glycol, prochlorperazine, simethicone     Family History   Problem Relation Age of Onset    Cancer Mother     Heart disease Father     Hypertension Father         Review of Systems   Constitutional:  Positive for activity change. Negative for appetite change.   HENT:  Negative for trouble swallowing.    Respiratory:  Negative for shortness of breath.    Gastrointestinal:  Negative for constipation.   Musculoskeletal:  Positive for arthralgias and back pain.            Objective:   BP (!) 160/64   Pulse (!) 59   Temp 98.6 °F (37 °C) (Oral)   Resp 19   Ht 5' 7" (1.702 m)   Wt 62.6 kg (138 lb)   SpO2 98%   BMI 21.61 kg/m²      Physical Exam  Vitals reviewed.   Constitutional:       Appearance: He is not ill-appearing or toxic-appearing.   HENT:      Head: Normocephalic.      Right Ear: External ear normal.      Left Ear: External ear normal.      Nose: Nose normal.      Mouth/Throat:      Mouth: Mucous membranes are moist.      Pharynx: Oropharynx is clear.   Eyes:      Extraocular Movements: Extraocular movements intact.      Conjunctiva/sclera: Conjunctivae normal.      Pupils: Pupils are equal, round, and reactive to light.   Cardiovascular:      Rate and Rhythm: Normal rate and regular rhythm.      Pulses: Normal pulses.      Heart sounds: Normal heart sounds.   Pulmonary:      Effort: Pulmonary effort is normal.      Breath sounds: Normal breath sounds.   Abdominal:      General: Abdomen is flat. Bowel sounds are normal.      Palpations: Abdomen is soft.      Comments: Palpation in left inguinal region/ LLQ is with pain, no palpable mass, postive release of air or crepitus felt.   Musculoskeletal:      Right lower leg: No edema.      Left lower leg: No edema.   Skin:     Comments: Scar to left of midline pubic region   Neurological:      Mental Status: He is alert and oriented to person, place, and time.   Psychiatric:         Mood and Affect: Mood normal.         Behavior: Behavior normal.         Thought " Content: Thought content normal.         Judgment: Judgment normal.            Review of Symptoms  Review of Symptoms      Symptom Assessment (ESAS 0-10 Scale)  Pain:  0  Dyspnea:  0  Anxiety:  0  Nausea:  0  Depression:  0  Anorexia:  0  Fatigue:  0  Insomnia:  0  Restlessness:  0  Agitation:  0     CAM / Delirium:  Negative  Constipation:  Negative    Constipation:  No constipation    Bowel Management Plan (BMP):  Yes      Pain Assessment:  OME in 24 hours:  340 mg  Location(s):      Modified Robbie Scale:  0    Performance Status:  50    Living Arrangements:  Lives with spouse and Lives in home    Psychosocial/Cultural:   See Palliative Psychosocial Note: No  Pt denies use of alcohol, illicit drugs  **Primary  to Follow**  Palliative Care  Consult: No      Advance Care Planning   Advance Directives:   Living Will: Yes        Copy on chart: Yes    [place]: Yes    Do Not Resuscitate Status: Yes    Medical Power of : No    Agent's Name:  Lupe Douglass   Agent's Contact Number:  [phone]    Decision Making:  Patient answered questions and Family answered questions            Caregiver burden formerly assessed: Yes        > 50% of 35 min of encounter was spent in chart review, face to face discussion of goals of care, symptom assessment, coordination of care and emotional support.     Additional time spent in voluntary discussion of ACP for 25 min    Jay Story M.D.  Palliative Medicine  [facility]

## 2023-09-19 NOTE — PROGRESS NOTES
[facility] - [facility] Medical Telemetry  Pulmonary Critical Care Note    Patient Name: Balbir Rogers  MRN: [id number]  Admission Date: 9/15/2023  Hospital Length of Stay: 4 days  Code Status: DNR  Attending Provider: Brad Deluca MD  Primary Care Provider: Jose Johnson Jr., MD     Subjective:     HPI:   This is a 64 y/o patient of Dr. Vela, followed for severe COPD (FEV1 37) Chronic hypercapnic/hypoxemic Respiratory failure on continuous 02 and and trilogy with all sleep. He presented to [facility] on 9/15/2023 with complaints of worsening SOB over the last week. He was found to have right pneumothorax requiring chest tube insertion in the ER.  He was then transferred to our facility for further care. Pulmonary consulted for assistance and chest tube mgmt.       Hospital Course/Significant events:  9/16/2023- CT placed to suction     24 Hour Interval History:  He remains on 3L oxymask this AM. He was placed on water-seal yesterday afternoon. Surgery consulted yesterday due to hernia, no surgical intervention recommended at this time and he can follow up as outpatient. Palliative care was consulted to help manage patients chronic pain.    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       Past Surgical History:   Procedure Laterality Date    CERVICAL LAMINECTOMY WITH SPINAL FUSION      CHOLECYSTECTOMY      COLONOSCOPY      ESOPHAGOGASTRODUODENOSCOPY      gastrointestinal biopsy      graft to nose      inguinal herniotomy      LEFT HEART CATHETERIZATION Left 2/1/2023    Procedure: CATHETERIZATION,  HEART, LEFT;  Surgeon: Dulce Orozco MD;  Location: [facility] CATH LAB;  Service: Cardiology;  Laterality: Left;  via radial approach    mandible operation      POLYPECTOMY         Social History     Socioeconomic History    Marital status:    Tobacco Use    Smoking status: Former     Current packs/day: 0.00     Average packs/day: 2.0 packs/day for 40.0 years (80.0 ttl pk-yrs)     Types: Cigarettes     Start date: 1977     Quit date: 2017     Years since quittin.1    Smokeless tobacco: Never   Substance and Sexual Activity    Alcohol use: Never    Drug use: Yes     Types: Oxycodone    Sexual activity: Not Currently     Social Determinants of Health     Financial Resource Strain: Low Risk  (2023)    Overall Financial Resource Strain (CARDIA)     Difficulty of Paying Living Expenses: Not hard at all   Food Insecurity: No Food Insecurity (2023)    Hunger Vital Sign     Worried About Running Out of Food in the Last Year: Never true     Ran Out of Food in the Last Year: Never true   Transportation Needs: No Transportation Needs (2023)    PRAPARE - Transportation     Lack of Transportation (Medical): No     Lack of Transportation (Non-Medical): No   Physical Activity: Inactive (2023)    Exercise Vital Sign     Days of Exercise per Week: 0 days     Minutes of Exercise per Session: 0 min   Stress: Stress Concern Present (2023)    [employer] - Occupational Stress Questionnaire     Feeling of Stress : To some extent   Social Connections: Moderately Isolated (9/15/2023)    Social Connection and Isolation Panel [NHANES]     Frequency of Communication with Friends and Family: Twice a week     Frequency of Social Gatherings with Friends and Family: Twice a week     Attends Mu-ism Services: Never     Active Member of Clubs or Organizations: No     Attends Club or Organization Meetings: Never     Marital Status:    Housing Stability: Low Risk  (2023)     Housing Stability Vital Sign     Unable to Pay for Housing in the Last Year: No     Number of Places Lived in the Last Year: 1     Unstable Housing in the Last Year: No           Current Outpatient Medications   Medication Instructions    albuterol-ipratropium (DUO-NEB) 2.5 mg-0.5 mg/3 mL nebulizer solution 3 mLs, Nebulization, Every 6 hours PRN    fluticasone-umeclidin-vilanter (TRELEGY ELLIPTA) 100-62.5-25 mcg DsDv 1 puff, Inhalation, Daily    LORazepam (ATIVAN) 1 mg, Oral, Every 12 hours PRN    medical supply, miscellaneous (O-2 SUSPENSORY MISC) 2 L, Misc.(Non-Drug; Combo Route)    OXcarbazepine (TRILEPTAL) 150 MG Tab 1 tab PO BID for 2 wk, then increase to 2 PO BID thereafter    oxyCODONE (ROXICODONE) 30 mg, Oral, Every 6 hours PRN    sertraline (ZOLOFT) 50 mg, Oral, Daily    temazepam (RESTORIL) 30 mg, Oral, Nightly       Current Inpatient Medications   dextromethorphan-guaiFENesin  mg  1 tablet Oral BID    enoxparin  40 mg Subcutaneous Q24H (prophylaxis, 1700)    fluticasone furoate-vilanteroL  1 puff Inhalation Daily    And    umeclidinium  1 capsule Inhalation Daily    predniSONE  40 mg Oral Daily    sertraline  50 mg Oral Daily    zolpidem  5 mg Oral QHS       Review of Systems   Constitutional: Negative.    HENT: Negative.     Cardiovascular:  Positive for chest pain.   Genitourinary: Negative.    Musculoskeletal: Negative.    Skin: Negative.           Objective:       Intake/Output Summary (Last 24 hours) at 9/19/2023 0637  Last data filed at 9/18/2023 1400  Gross per 24 hour   Intake 600 ml   Output 400 ml   Net 200 ml           Vital Signs (Most Recent):  Temp: 98.1 °F (36.7 °C) (09/19/23 0432)  Pulse: 60 (09/19/23 0432)  Resp: 18 (09/19/23 0627)  BP: 113/62 (09/19/23 0432)  SpO2: 96 % (09/19/23 0432)  Body mass index is 21.61 kg/m².  Weight: 62.6 kg (138 lb) Vital Signs (24h Range):  Temp:  [97.7 °F (36.5 °C)-98.9 °F (37.2 °C)] 98.1 °F (36.7 °C)  Pulse:  [55-71] 60  Resp:  [18] 18  SpO2:  [94  "%-97 %] 96 %  BP: (111-165)/(54-69) 113/62     Physical Exam  Constitutional:       Appearance: Normal appearance.   HENT:      Head: Normocephalic and atraumatic.   Cardiovascular:      Rate and Rhythm: Normal rate and regular rhythm.      Heart sounds: Normal heart sounds.   Pulmonary:      Effort: Pulmonary effort is normal.      Comments: Chest tube to suction  Neurological:      General: No focal deficit present.      Mental Status: He is alert and oriented to person, place, and time.   Psychiatric:         Mood and Affect: Mood normal.         Behavior: Behavior normal.         Thought Content: Thought content normal.         Judgment: Judgment normal.           Lines/Drains/Airways       Drain  Duration                  Chest Tube 09/15/23 0000 Right Midaxillary 4 days              Peripheral Intravenous Line  Duration                  Midline Catheter Insertion/Assessment  - Single Lumen 01/04/23 1548 Left basilic vein (medial side of arm) other (see comments) 257 days                    Significant Labs:    Lab Results   Component Value Date    WBC 7.78 09/15/2023    HGB 12.6 (L) 09/15/2023    HCT 37.9 (L) 09/15/2023    MCV 95.9 (H) 09/15/2023     09/15/2023       BMP  Lab Results   Component Value Date     09/17/2023    K 4.2 09/17/2023    CHLORIDE 102 09/17/2023    CO2 30 09/17/2023    BUN 14.1 09/17/2023    CREATININE 0.73 09/17/2023    CALCIUM 9.5 09/17/2023    AGAP 8.0 09/17/2023    EGFRNONAA >60 12/15/2021       ABG  No results for input(s): "PH", "PO2", "PCO2", "HCO3", "POCBASEDEF" in the last 168 hours.      Significant Imaging:  CXR this AM pending.     Assessment/Plan:     Assessment  Severe COPD on 5 L nasal cannula oxygen at home   Previous tobacco use discontinued in 2017  Abnormal CT of the chest with scarring in the left apex and a 9 mm precarinal lymph node now resolved  Cardiomyopathy and reduced ejection fraction of 30%,  left heart catheterization 2/23 shows EF had increased " to 50%  Acute on chronic hypercapnic respiratory failure on Trilogy with all sleep   Right Pneumothorax s/p chest insertion     Plan  Clamped chest tube. Will repeat CXR in 2 hours. Hopefully can remove today.  Continue prednisone (day 5) for COPD exacerbation and continue inhaled regimen.   Palliative care following.       KARIN Hickey  Pulmonary Critical Care Medicine  [facility] - [address] [place] [address]

## 2023-09-19 NOTE — PLAN OF CARE
Problem: Adult Inpatient Plan of Care  Goal: Plan of Care Review  Outcome: Ongoing, Progressing  Goal: Patient-Specific Goal (Individualized)  Outcome: Ongoing, Progressing  Goal: Absence of Hospital-Acquired Illness or Injury  Outcome: Ongoing, Progressing  Goal: Optimal Comfort and Wellbeing  Outcome: Ongoing, Progressing  Goal: Readiness for Transition of Care  Outcome: Ongoing, Progressing     Problem: Infection  Goal: Absence of Infection Signs and Symptoms  Outcome: Ongoing, Progressing     Problem: Impaired Wound Healing  Goal: Optimal Wound Healing  Outcome: Ongoing, Progressing     Problem: Coping Ineffective  Goal: Effective Coping  Outcome: Ongoing, Progressing      - - -

## 2023-09-20 VITALS
OXYGEN SATURATION: 97 % | SYSTOLIC BLOOD PRESSURE: 112 MMHG | DIASTOLIC BLOOD PRESSURE: 63 MMHG | TEMPERATURE: 99 F | RESPIRATION RATE: 17 BRPM | WEIGHT: 138 LBS | BODY MASS INDEX: 21.66 KG/M2 | HEART RATE: 66 BPM | HEIGHT: 67 IN

## 2023-09-20 PROBLEM — J93.9 PNEUMOTHORAX, RIGHT: Status: ACTIVE | Noted: 2023-09-20

## 2023-09-20 PROCEDURE — 99233 SBSQ HOSP IP/OBS HIGH 50: CPT | Mod: ,,, | Performed by: INTERNAL MEDICINE

## 2023-09-20 PROCEDURE — 99233 PR SUBSEQUENT HOSPITAL CARE,LEVL III: ICD-10-PCS | Mod: ,,, | Performed by: INTERNAL MEDICINE

## 2023-09-20 PROCEDURE — 25000003 PHARM REV CODE 250: Performed by: INTERNAL MEDICINE

## 2023-09-20 PROCEDURE — 25000242 PHARM REV CODE 250 ALT 637 W/ HCPCS: Performed by: INTERNAL MEDICINE

## 2023-09-20 PROCEDURE — 27000221 HC OXYGEN, UP TO 24 HOURS

## 2023-09-20 RX ADMIN — SERTRALINE HYDROCHLORIDE 50 MG: 50 TABLET ORAL at 08:09

## 2023-09-20 RX ADMIN — GUAIFENESIN AND DEXTROMETHORPHAN HYDROBROMIDE 1 TABLET: 30; 600 TABLET, EXTENDED RELEASE ORAL at 08:09

## 2023-09-20 RX ADMIN — OXYCODONE HYDROCHLORIDE 45 MG: 5 TABLET ORAL at 12:09

## 2023-09-20 RX ADMIN — UMECLIDINIUM 62.5 MCG: 62.5 AEROSOL, POWDER ORAL at 09:09

## 2023-09-20 RX ADMIN — OXYCODONE HYDROCHLORIDE 45 MG: 5 TABLET ORAL at 04:09

## 2023-09-20 RX ADMIN — FLUTICASONE FUROATE AND VILANTEROL TRIFENATATE 1 PUFF: 100; 25 POWDER RESPIRATORY (INHALATION) at 09:09

## 2023-09-20 RX ADMIN — OXYCODONE HYDROCHLORIDE 45 MG: 5 TABLET ORAL at 09:09

## 2023-09-20 NOTE — CONSULTS
"ConsultsPatient Name: Balbir Rogers   MRN: [id number]   Admission Date: 9/15/2023   Hospital Length of Stay: 5   Attending Provider: Danika Medina MD   Consulting Provider: Jay Story M.D.  Reason for Consult: Pain  Primary Care Physician: Jose Johnson Jr., MD     Principal Problem: <principal problem not specified>     Patient information was obtained from patient and ER records.      Final diagnoses:  [J44.9] COPD (chronic obstructive pulmonary disease)     Assessment/Plan:     I reviewed the patient's current clinical status with the nurse. I reviewed clinical documentation, labs and imaging.     Symptom management review:     Pain: Acute on chronic. Pain is not improved with current prn measures, according to the patient. He says that pain is 10/10 but improves to 7/10 for 1 hour after medication. This is similar to before my consult. He mainly complains of right chest burning pain at the site of the chest tube. He will have chest tube out today, perhaps this will provide him with some better pain tolerance.     I discussed with the patient and his wife the option for setting up home hospice. The patient qualifies due to chronic hypoxic respiratory failure on home trilogy at  and 4-6L O2 NC during the day. He has advanced COPD and is cachectic. He also had chronic CHF and chronic pain. He could benefit from home hospice from a symptom management standpoint. I reviewed his wife's concerns. She had a bad experience with hospice with her mother and father. She says, "hospice came in and gave them drugs and they  shortly after."     I explained that hospice may provide symptom management at end of life that can make it appear as though meds are the cause of death rather than the disease. I explained the usually goal of symptom management on an individual basis and a plan based on the physician's understanding of the patient's clinical status. She is open to review hospice options.   I will " consult CM to review with her.     Recommendations:     Pain.I will continue current pain management plan for now, pending removal of chest tube, I expect some better pain tolerance.    History of Present Illness:     62 y/o M h/o COPD, home O2, CHF, chronic pain admitted with acute on chronic respiratory failure, Rt pneumothorax with chest tube. He was complaining of severe pain and crying which prompted the hospitalist to consult use for pain management assistance.      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    Bacteremia 01/03/2023    Hyperkalemia 01/12/2023    Dilated cardiomyopathy 02/07/2023    Anxiety 07/25/2023     Resolved Ambulatory Problems     Diagnosis Date Noted    Acute hypercapnic respiratory failure 01/01/2023    Right middle lobe pneumonia 01/01/2023    Aspiration pneumonia 01/01/2023    Chronic hypercapnic respiratory failure 02/07/2023     Past Medical History:   Diagnosis Date    Acute clinical systolic heart failure     Cardiomyopathy     Collapse, lung     COPD with hypoxia     Depression     Disuse osteoporosis     Emphysema/COPD     Hepatitis C     Leukocytosis         Past Surgical History:   Procedure Laterality Date    CERVICAL LAMINECTOMY WITH SPINAL FUSION      CHOLECYSTECTOMY      COLONOSCOPY      ESOPHAGOGASTRODUODENOSCOPY      gastrointestinal biopsy      graft to nose      inguinal herniotomy      LEFT HEART CATHETERIZATION Left 2/1/2023    Procedure: CATHETERIZATION, HEART, LEFT;  Surgeon: Dulce Orozco MD;  Location: [facility] CATH LAB;  Service: Cardiology;  Laterality: Left;  via radial approach    mandible operation      POLYPECTOMY          Review of patient's allergies indicates:   Allergen Reactions    Penicillin Rash          Current Facility-Administered  Medications:     acetaminophen tablet 650 mg, 650 mg, Oral, Q6H PRN, Kasey Padilla AGACNP-BC    albuterol-ipratropium 2.5 mg-0.5 mg/3 mL nebulizer solution 3 mL, 3 mL, Nebulization, Q4H PRN, Kasey Padilla AGACNP-BC    aluminum-magnesium hydroxide-simethicone 200-200-20 mg/5 mL suspension 30 mL, 30 mL, Oral, QID PRN, Kasey Padilla AGACNP-BC    dextromethorphan-guaiFENesin  mg per 12 hr tablet 1 tablet, 1 tablet, Oral, BID, Danika Medina MD, 1 tablet at 09/20/23 0847    enoxaparin injection 40 mg, 40 mg, Subcutaneous, Q24H (prophylaxis, 1700), Danika Medina MD, 40 mg at 09/19/23 1804    fluticasone furoate-vilanteroL 100-25 mcg/dose diskus inhaler 1 puff, 1 puff, Inhalation, Daily, 1 puff at 09/20/23 0900 **AND** umeclidinium 62.5 mcg/actuation inhalation capsule 62.5 mcg, 1 capsule, Inhalation, Daily, Brad Deluca MD, 62.5 mcg at 09/20/23 0900    LORazepam tablet 1 mg, 1 mg, Oral, Q12H PRN, Danika Medina MD    melatonin tablet 6 mg, 6 mg, Oral, Nightly PRN, Kasey Padilla AGACNP-BC    naloxone 0.4 mg/mL injection 0.02 mg, 0.02 mg, Intravenous, PRN, Kasey Padilla AGACNP-BC    ondansetron injection 4 mg, 4 mg, Intravenous, Q4H PRN, Kasey Padilla AGACNP-BC    oxyCODONE immediate release tablet 45 mg, 45 mg, Oral, Q4H PRN, Jay Story MD, 45 mg at 09/20/23 0910    polyethylene glycol packet 17 g, 17 g, Oral, BID PRN, Kasey Padilla AGACNP-BC, 17 g at 09/19/23 1806    prochlorperazine injection Soln 5 mg, 5 mg, Intravenous, Q6H PRN, Kasey Padilla [facility]    sertraline tablet 50 mg, 50 mg, Oral, Daily, Danika Medina MD, 50 mg at 09/20/23 0847    simethicone chewable tablet 80 mg, 1 tablet, Oral, QID PRN, Kasey Padilla [facility]    zolpidem tablet 5 mg, 5 mg, Oral, QHS, Danika Medina MD, 5 mg at 09/19/23 2141    Facility-Administered Medications Ordered in Other Encounters:     diazePAM tablet 10 mg, 10 mg, Oral, On Call Procedure, Dulce Orozco MD, 10  "mg at 02/01/23 0758    diphenhydrAMINE capsule 50 mg, 50 mg, Oral, On Call Procedure, uDlce Orozco MD, 50 mg at 02/01/23 0759    iopamidoL (ISOVUE-370) injection, , , PRN, Dulce Orozco MD, 70 mL at 02/01/23 0901    sodium chloride 0.9% flush 10 mL, 10 mL, Intravenous, PRN, Dulce Orozco MD     acetaminophen, albuterol-ipratropium, aluminum-magnesium hydroxide-simethicone, LORazepam, melatonin, naloxone, ondansetron, oxyCODONE, polyethylene glycol, prochlorperazine, simethicone     Family History   Problem Relation Age of Onset    Cancer Mother     Heart disease Father     Hypertension Father         Review of Systems   Constitutional:  Positive for activity change. Negative for appetite change.   HENT:  Negative for trouble swallowing.    Respiratory:  Negative for shortness of breath.    Gastrointestinal:  Negative for constipation.   Musculoskeletal:  Positive for arthralgias, back pain and myalgias.            Objective:   BP (!) 141/64   Pulse 71   Temp 97.5 °F (36.4 °C) (Oral)   Resp 18   Ht 5' 7" (1.702 m)   Wt 62.6 kg (138 lb)   SpO2 98%   BMI 21.61 kg/m²      Physical Exam  Vitals reviewed.   Constitutional:       Appearance: He is not ill-appearing or toxic-appearing.   HENT:      Head: Normocephalic.      Right Ear: External ear normal.      Left Ear: External ear normal.      Nose: Nose normal.      Mouth/Throat:      Mouth: Mucous membranes are moist.      Pharynx: Oropharynx is clear.   Eyes:      Extraocular Movements: Extraocular movements intact.      Conjunctiva/sclera: Conjunctivae normal.      Pupils: Pupils are equal, round, and reactive to light.   Cardiovascular:      Rate and Rhythm: Normal rate and regular rhythm.      Pulses: Normal pulses.      Heart sounds: Normal heart sounds.   Pulmonary:      Effort: Pulmonary effort is normal.      Breath sounds: Normal breath sounds.   Abdominal:      General: Abdomen is flat. Bowel sounds are normal.      Palpations: " Abdomen is soft.      Comments: Palpation in left inguinal region/ LLQ is with pain, no palpable mass, postive release of air or crepitus felt.   Musculoskeletal:      Right lower leg: No edema.      Left lower leg: No edema.   Skin:     Comments: Scar to left of midline pubic region   Neurological:      Mental Status: He is alert and oriented to person, place, and time.   Psychiatric:         Mood and Affect: Mood normal.         Behavior: Behavior normal.         Thought Content: Thought content normal.         Judgment: Judgment normal.            Review of Symptoms  Review of Symptoms      Symptom Assessment (ESAS 0-10 Scale)  Pain:  0  Dyspnea:  0  Anxiety:  0  Nausea:  0  Depression:  0  Anorexia:  0  Fatigue:  0  Insomnia:  0  Restlessness:  0  Agitation:  0     CAM / Delirium:  Negative  Constipation:  Negative    Constipation:  No constipation    Bowel Management Plan (BMP):  Yes      Pain Assessment:  OME in 24 hours:  340 mg  Location(s):      Modified Robbie Scale:  0    Performance Status:  50    Living Arrangements:  Lives with spouse and Lives in home    Psychosocial/Cultural:   See Palliative Psychosocial Note: No  Pt denies use of alcohol, illicit drugs  **Primary  to Follow**  Palliative Care  Consult: No     Time-Based Charting:  Yes    Total Time Spent: 0 minutes      Advance Care Planning   Advance Directives:   Living Will: Yes        Copy on chart: Yes    [place]: Yes    Do Not Resuscitate Status: Yes    Medical Power of : No    Agent's Name:  Lupe Douglass   Agent's Contact Number:  [phone]    Decision Making:  Patient answered questions and Family answered questions            Caregiver burden formerly assessed: Yes        > 50% of 42 min of encounter was spent in chart review, face to face discussion of goals of care, symptom assessment, coordination of care and emotional support.     Jay Story M.D.  Palliative Medicine  [facility] -  Observation Unit

## 2023-09-20 NOTE — PLAN OF CARE
Received a case management consult from Dr. Jay Story (Palliative) to provide patient & wife with in-home hospice options. I explained that the hospice agency would be in contact to explain in-home hospice care and obtain consents as per agency protocol when they are ready to select an agency. The wife states that she needs more time to discuss hospice care with her family. I provided the patient and family with a list of Hospice providers through [facility] for review at their convenience. The family was thankful for the information and needs more time to think about in-home hospice care.

## 2023-09-20 NOTE — PROGRESS NOTES
[facility] Hospital Medicine Progress Note        CHIEF COMPLAINT   SOB, pain at chest tube site     HISTORY OF PRESENT ILLNESS:   Mr. Rogers is a 62 yo male with PMH of COPD (on 4L home O2), CHF, anxiety, chronic pain, osteoporosis who presented to [facility] ED with c/o SOB, getting progressively worse over the last week.  He states that he usually uses O2 at night, but has been having to use it all of the time and had to increase to 6L.  He denies any chest pain, fever, chills.  He states that he felt like his COPD was flaring up.     ED vitals on arrival:  Temp 98.3° F, pulse 89, resp 40, /89, SpO2 93%.  ED lab work revealed CRP 16.13, flu/COVID negative.  CXR showed right pneumothorax, proximally 30% with lateral and basilar components.  A right lateral chest tube was placed in the ED. repeat CXR showed interval placement of a chest tube on the right with resolution of the pneumothorax.  He was transferred to [facility] and admitted to Hospital Medicine for management.   Examined the patient with nurse at bedside patient was an hernia, very exquisite to touch, reducible but very painful .  CT of the abdomen and pelvis reviewed and was reported normal.  Will consult general surgery for further input .  General surgery evaluated patient and decided to follow-up outpatient, patient not a great surgical candidate at this time.           Todays information  Patient seen and examined at bedside, no family at bedside   Patient still complaining of right-sided chest pain at site of chest tube insertion   Repeat chest x-ray stable no pneumothorax., chest tube remains clamped  Pulmonology may discontinue chest tube today follow-up with recommendations           Exam  GENERAL: Well developed, well nourished. In no acute distress, non-toxic appearing.   HEENT: normocephalic atraumatic   NECK: supple   LUNGS: Clear diminished bilaterally, no wheezing or rales, no accessory muscle use.  Right lateral chest tube in place to water seal  CVS: Regular rate and rhythm, normal peripheral perfusion  ABD: Soft, non-tender, non-distended, bowel sounds present  EXTREMITIES: no clubbing or cyanosis  SKIN: Warm, dry.   NEURO: alert and oriented, grossly without focal deficits   PSYCHIATRIC: Cooperative           ASSESSMENT & PLAN:     Right pneumothorax s/p chest tube placement  COPD exacerbation (chronic hypoxia on 4 L continuously, trilogy nightly)  Right sided chest pain  Left lower quadrant mass/painful ? Abd incisional hernia vs left inguinal hernia      History:  COPD, CHF, anxiety, chronic pain, osteoporosis    PLAN:  Patient still complaining of right-sided chest pain at site of chest tube insertion   Repeat chest x-ray stable no pneumothorax., chest tube remains clamped  Pulmonology may discontinue chest tube today follow-up with recommendations  Chest tube management per pulmonology   Has completed 5  days of po prednisone, discontinue   Continue on oxygen as needed to maintain adequate saturations 88-92%   Pain control p.r.n., follow-up with palliative care for pain control   will discuss with pulm - use of trilogy and state of patients lungs- will this increase risk of rupture of blebs   Telemetry monitoring  pain control prn ; iv dilaudid 2 mg q4hr  Patient complaining of left lower quadrant pain with a bulging mass when he coughs and sometimes goes into his left groin.    General surgery evaluated patient and decided to follow-up outpatient, patient not a great surgical candidate at this time.   CT of the abdomen and pelvis reviewed and was reported normal.      Antihypertensives as needed       DVT prophylaxis: SCDs  Code status: DNR discussed at bedside        Dispo- per pulmonology         VITAL SIGNS: 24 HRS MIN & MAX LAST   Temp  Min: 97.5 °F (36.4 °C)  Max: 98.6 °F (37 °C) 97.5 °F (36.4 °C)   BP  Min: 117/65  Max: 160/64 (!) 141/64   Pulse  Min: 59  Max: 73  71   Resp  Min: 18  Max: 20 18    SpO2  Min: 98 %  Max: 100 % 98 %     I have reviewed the following labs:  Recent Labs   Lab 09/14/23  0850 09/15/23  0356   WBC 8.93 7.78   RBC 4.44* 3.95*   HGB 13.8* 12.6*   HCT 43.0 37.9*   MCV 96.8* 95.9*   MCH 31.1* 31.9*   MCHC 32.1* 33.2   RDW 12.5 12.6    221   MPV 9.6 10.5*     Recent Labs   Lab 09/14/23  0850 09/15/23  0356 09/17/23  0438    140 140   K 4.5 5.2* 4.2   CO2 27 24 30   BUN 10.6 13.4 14.1   CREATININE 0.77 0.74 0.73   CALCIUM 10.3* 9.3 9.5   MG 2.10 2.10 2.20   ALBUMIN 4.3 3.7  --    ALKPHOS 115 108  --    ALT 16 13  --    AST 21 21  --    BILITOT 0.6 0.5  --      Microbiology Results (last 7 days)       ** No results found for the last 168 hours. **             See below for Radiology    Scheduled Med:   dextromethorphan-guaiFENesin  mg  1 tablet Oral BID    enoxparin  40 mg Subcutaneous Q24H (prophylaxis, 1700)    fluticasone furoate-vilanteroL  1 puff Inhalation Daily    And    umeclidinium  1 capsule Inhalation Daily    sertraline  50 mg Oral Daily    zolpidem  5 mg Oral QHS      Continuous Infusions:     PRN Meds:  acetaminophen, albuterol-ipratropium, aluminum-magnesium hydroxide-simethicone, LORazepam, melatonin, naloxone, ondansetron, oxyCODONE, polyethylene glycol, prochlorperazine, simethicone     Assessment/Plan:      VTE prophylaxis:     Patient condition:  Stable/Fair/Guarded/ Serious/ Critical    Anticipated discharge and Disposition:         All diagnosis and differential diagnosis have been reviewed; assessment and plan has been documented; I have personally reviewed the labs and test results that are presently available; I have reviewed the patients medication list; I have reviewed the consulting providers response and recommendations. I have reviewed or attempted to review medical records based upon their availability    All of the patient's questions have been  addressed and answered. Patient's is agreeable to the above stated plan. I will continue to  monitor closely and make adjustments to medical management as needed.  _____________________________________________________________________    Nutrition Status:    Radiology:  I have personally reviewed the following imaging and agree with the radiologist.     X-Ray Chest 1 View  Narrative: EXAMINATION:  XR CHEST 1 VIEW    CLINICAL HISTORY:  other;    TECHNIQUE:  Single frontal view of the chest was performed.    COMPARISON:  09/19/2023    FINDINGS:  LINES AND TUBES: Right chest tube in place.  EKG/telemetry leads overlie the chest.    MEDIASTINUM AND JEANNINE: The cardiac silhouette is normal.    LUNGS: The lungs are hyperexpanded.  There is biapical, right greater than left scarring.    PLEURA:No pleural effusion.No appreciable residual deep a thorax.    OTHER: No acute osseous abnormality.  Impression: Right chest tube in place without appreciable residual pneumothorax.    Hyperexpanded lungs with biapical scarring.    Electronically signed by: Sharon Johnson  Date:    09/20/2023  Time:    06:36      Danika Medina MD   09/20/2023

## 2023-09-20 NOTE — PLAN OF CARE
09/20/23 1521   Final Note   Assessment Type Final Discharge Note   Anticipated Discharge Disposition Home-Health  (FOC: New referral for () services will be through [facility])  as patient was a prior patient with this agency in the past. Clinical updates/notes; AVS and D/C Summary were sent via [employer] for review.)   Hospital Resources/Appts/Education Provided Appointments scheduled and added to AVS   Post-Acute Status   Post-Acute Authorization Home Health   Home Health Status Set-up Complete/Auth obtained   Coverage Payor:  [facility] ALL OUT OF STATE   Patient choice form signed by patient/caregiver List with quality metrics by geographic area provided   Discharge Delays None known at this time     Patient will be discharged to his private residence with () services through [facility]) as he was a prior patient with this agency.Clinical updates/notes and () packet & orders and AVS and D/C summary were uploaded and sent via [employer] for review. The family will provide for transportation from [facility] to his private residence via family's private vehicle.

## 2023-09-20 NOTE — PROGRESS NOTES
[facility] Tele[facility]  Pulmonary Critical Care Note    Patient Name: Balbir Rogers  MRN: [id number]  Admission Date: 9/15/2023  Hospital Length of Stay: 5 days  Code Status: DNR  Attending Provider: Danika Medina MD  Primary Care Provider: Jose Johnson Jr., MD     Subjective:     HPI:   This is a 64 y/o patient of Dr. Vela, followed for severe COPD (FEV1 37) Chronic hypercapnic/hypoxemic Respiratory failure on continuous 02 and and trilogy with all sleep. He presented to [facility] on 9/15/2023 with complaints of worsening SOB over the last week. He was found to have right pneumothorax requiring chest tube insertion in the ER.  He was then transferred to our facility for further care. Pulmonary consulted for assistance and chest tube mgmt.       Hospital Course/Significant events:  9/16/2023- CT placed to suction     24 Hour Interval History:  He remains on 3L oxymask this AM. Chest tube is clamped this AM and CXR per radiology read with no appreciable pneumothorax. He is sitting up in chair, still with ongoing chest discomfort around chest tube site.     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       Past Surgical History:   Procedure Laterality Date    CERVICAL LAMINECTOMY WITH SPINAL FUSION      CHOLECYSTECTOMY      COLONOSCOPY      ESOPHAGOGASTRODUODENOSCOPY      gastrointestinal biopsy      graft to nose      inguinal herniotomy      LEFT HEART CATHETERIZATION Left 2/1/2023    Procedure: CATHETERIZATION, HEART, LEFT;  Surgeon: Dulce Orozco MD;  Location: [facility]  CATH LAB;  Service: Cardiology;  Laterality: Left;  via radial approach    mandible operation      POLYPECTOMY         Social History     Socioeconomic History    Marital status:    Tobacco Use    Smoking status: Former     Current packs/day: 0.00     Average packs/day: 2.0 packs/day for 40.0 years (80.0 ttl pk-yrs)     Types: Cigarettes     Start date: 1977     Quit date: 2017     Years since quittin.1    Smokeless tobacco: Never   Substance and Sexual Activity    Alcohol use: Never    Drug use: Yes     Types: Oxycodone    Sexual activity: Not Currently     Social Determinants of Health     Financial Resource Strain: Low Risk  (2023)    Overall Financial Resource Strain (CARDIA)     Difficulty of Paying Living Expenses: Not hard at all   Food Insecurity: No Food Insecurity (2023)    Hunger Vital Sign     Worried About Running Out of Food in the Last Year: Never true     Ran Out of Food in the Last Year: Never true   Transportation Needs: No Transportation Needs (2023)    PRAPARE - Transportation     Lack of Transportation (Medical): No     Lack of Transportation (Non-Medical): No   Physical Activity: Inactive (2023)    Exercise Vital Sign     Days of Exercise per Week: 0 days     Minutes of Exercise per Session: 0 min   Stress: Stress Concern Present (2023)    [employer] [facility] [employer] [facility] - Occupational Stress Questionnaire     Feeling of Stress : To some extent   Social Connections: Moderately Isolated (9/15/2023)    Social Connection and Isolation Panel [NHANES]     Frequency of Communication with Friends and Family: Twice a week     Frequency of Social Gatherings with Friends and Family: Twice a week     Attends Rastafarian Services: Never     Active Member of Clubs or Organizations: No     Attends Club or Organization Meetings: Never     Marital Status:    Housing Stability: Low Risk  (2023)    Housing Stability Vital Sign     Unable to Pay for Housing  in the Last Year: No     Number of Places Lived in the Last Year: 1     Unstable Housing in the Last Year: No           Current Outpatient Medications   Medication Instructions    albuterol-ipratropium (DUO-NEB) 2.5 mg-0.5 mg/3 mL nebulizer solution 3 mLs, Nebulization, Every 6 hours PRN    fluticasone-umeclidin-vilanter (TRELEGY ELLIPTA) 100-62.5-25 mcg DsDv 1 puff, Inhalation, Daily    LORazepam (ATIVAN) 1 mg, Oral, Every 12 hours PRN    medical supply, miscellaneous (O-2 SUSPENSORY MISC) 2 L, Misc.(Non-Drug; Combo Route)    OXcarbazepine (TRILEPTAL) 150 MG Tab 1 tab PO BID for 2 wk, then increase to 2 PO BID thereafter    oxyCODONE (ROXICODONE) 30 mg, Oral, Every 6 hours PRN    sertraline (ZOLOFT) 50 mg, Oral, Daily    temazepam (RESTORIL) 30 mg, Oral, Nightly       Current Inpatient Medications   dextromethorphan-guaiFENesin  mg  1 tablet Oral BID    enoxparin  40 mg Subcutaneous Q24H (prophylaxis, 1700)    fluticasone furoate-vilanteroL  1 puff Inhalation Daily    And    umeclidinium  1 capsule Inhalation Daily    predniSONE  40 mg Oral Daily    sertraline  50 mg Oral Daily    zolpidem  5 mg Oral QHS       Review of Systems   Constitutional: Negative.    HENT: Negative.     Cardiovascular:  Positive for chest pain.   Genitourinary: Negative.    Musculoskeletal: Negative.    Skin: Negative.           Objective:       Intake/Output Summary (Last 24 hours) at 9/20/2023 0814  Last data filed at 9/19/2023 1448  Gross per 24 hour   Intake 240 ml   Output --   Net 240 ml           Vital Signs (Most Recent):  Temp: 97.5 °F (36.4 °C) (09/20/23 0708)  Pulse: 60 (09/20/23 0708)  Resp: 18 (09/20/23 0708)  BP: (!) 141/64 (09/20/23 0708)  SpO2: 98 % (09/20/23 0708)  Body mass index is 21.61 kg/m².  Weight: 62.6 kg (138 lb) Vital Signs (24h Range):  Temp:  [97.5 °F (36.4 °C)-98.6 °F (37 °C)] 97.5 °F (36.4 °C)  Pulse:  [59-73] 60  Resp:  [16-20] 18  SpO2:  [98 %-100 %] 98 %  BP: (117-160)/(64-88) 141/64     Physical  "Exam  Constitutional:       Appearance: Normal appearance.   HENT:      Head: Normocephalic and atraumatic.   Cardiovascular:      Rate and Rhythm: Normal rate and regular rhythm.      Heart sounds: Normal heart sounds.   Pulmonary:      Effort: Pulmonary effort is normal.      Comments: Chest tube clamped  Neurological:      General: No focal deficit present.      Mental Status: He is alert and oriented to person, place, and time.   Psychiatric:         Mood and Affect: Mood normal.         Behavior: Behavior normal.         Thought Content: Thought content normal.         Judgment: Judgment normal.           Lines/Drains/Airways       Drain  Duration                  Chest Tube 09/15/23 0000 Right Midaxillary 5 days              Peripheral Intravenous Line  Duration                  Midline Catheter Insertion/Assessment  - Single Lumen 01/04/23 1548 Left basilic vein (medial side of arm) other (see comments) 258 days                    Significant Labs:    Lab Results   Component Value Date    WBC 7.78 09/15/2023    HGB 12.6 (L) 09/15/2023    HCT 37.9 (L) 09/15/2023    MCV 95.9 (H) 09/15/2023     09/15/2023       BMP  Lab Results   Component Value Date     09/17/2023    K 4.2 09/17/2023    CHLORIDE 102 09/17/2023    CO2 30 09/17/2023    BUN 14.1 09/17/2023    CREATININE 0.73 09/17/2023    CALCIUM 9.5 09/17/2023    AGAP 8.0 09/17/2023    EGFRNONAA >60 12/15/2021       ABG  No results for input(s): "PH", "PO2", "PCO2", "HCO3", "POCBASEDEF" in the last 168 hours.      Significant Imaging:  X-Ray Chest 1 View  Narrative: EXAMINATION:  XR CHEST 1 VIEW    CLINICAL HISTORY:  other;    TECHNIQUE:  Single frontal view of the chest was performed.    COMPARISON:  09/19/2023    FINDINGS:  LINES AND TUBES: Right chest tube in place.  EKG/telemetry leads overlie the chest.    MEDIASTINUM AND JEANNINE: The cardiac silhouette is normal.    LUNGS: The lungs are hyperexpanded.  There is biapical, right greater than left " scarring.    PLEURA:No pleural effusion.No appreciable residual deep a thorax.    OTHER: No acute osseous abnormality.  Impression: Right chest tube in place without appreciable residual pneumothorax.    Hyperexpanded lungs with biapical scarring.    Electronically signed by: Sharon Johnson  Date:    09/20/2023  Time:    06:36        Assessment/Plan:     Assessment  Severe COPD on 5 L nasal cannula oxygen at home   Previous tobacco use discontinued in 2017  Abnormal CT of the chest with scarring in the left apex and a 9 mm precarinal lymph node now resolved  Cardiomyopathy and reduced ejection fraction of 30%,  left heart catheterization 2/23 shows EF had increased to 50%  Acute on chronic hypercapnic respiratory failure on Trilogy with all sleep   Right Pneumothorax s/p chest insertion     Plan  Hopefully chest tube can be removed today, will discuss imaging with MD on rounds.   DC steroids, completed 5 days and no evidence of bronchospasm and continue inhaled regimen.   Palliative care following.       KARIN Magallon  Pulmonary Critical Care Medicine  [facility]

## 2023-10-06 NOTE — DISCHARGE SUMMARY
[facility] Hospital Medicine Discharge Summary    Admit Date: 9/15/2023  Discharge Date and Time: 9/20/2023, 5:57 PM  Admitting Physician:  Team  Discharging Physician: Danika Medina MD.  Primary Care Physician: Jose Johnson Jr., MD  Consults: Hospital Medicine and Pulmonary/Intensive care    Discharge Diagnoses:  Right pneumothorax s/p chest tube placement  COPD exacerbation (chronic hypoxia on 4 L continuously, trilogy nightly)  Right sided chest pain  Left lower quadrant mass/painful ? Abd incisional hernia vs left inguinal hernia      History:  COPD, CHF, anxiety, chronic pain, osteoporosis    Hospital Course:   CHIEF COMPLAINT   SOB, pain at chest tube site     HISTORY OF PRESENT ILLNESS:   Mr. Rogers is a 64 yo male with PMH of COPD (on 4L home O2), CHF, anxiety, chronic pain, osteoporosis who presented to [facility] ED with c/o SOB, getting progressively worse over the last week.  He states that he usually uses O2 at night, but has been having to use it all of the time and had to increase to 6L.  He denies any chest pain, fever, chills.  He states that he felt like his COPD was flaring up.     ED vitals on arrival:  Temp 98.3° F, pulse 89, resp 40, /89, SpO2 93%.  ED lab work revealed CRP 16.13, flu/COVID negative.  CXR showed right pneumothorax, proximally 30% with lateral and basilar components.  A right lateral chest tube was placed in the ED. repeat CXR showed interval placement of a chest tube on the right with resolution of the pneumothorax.  He was transferred to [facility] and admitted to Hospital Medicine for management.   Examined the patient with nurse at bedside patient was an hernia, very exquisite to touch, reducible but very painful .  CT of the abdomen and pelvis reviewed and was reported normal.  Will consult general surgery for further input .  General surgery evaluated patient and decided to follow-up outpatient, patient not a great surgical  "candidate at this time.           Todays information  Patient seen and examined at bedside, no family at bedside   Patient still complaining of right-sided chest pain at site of chest tube insertion   Repeat chest x-ray stable no pneumothorax., chest tube remains clamped  Pulmonology discontinued chest tube today -recs noted- okay for discharge            Exam  GENERAL: Well developed, well nourished. In no acute distress, non-toxic appearing.   HEENT: normocephalic atraumatic   NECK: supple   LUNGS: Clear diminished bilaterally, no wheezing or rales, no accessory muscle use. Right lateral chest tube in place to water seal  CVS: Regular rate and rhythm, normal peripheral perfusion  ABD: Soft, non-tender, non-distended, bowel sounds present  EXTREMITIES: no clubbing or cyanosis  SKIN: Warm, dry.   NEURO: alert and oriented, grossly without focal deficits   PSYCHIATRIC: Cooperative                Pt was seen and examined on the day of discharge  Vitals:  VITAL SIGNS: 24 HRS MIN & MAX LAST   No data recorded 98.7 °F (37.1 °C)   No data recorded 112/63   No data recorded  66   No data recorded 17   No data recorded 97 %           Procedures Performed: No admission procedures for hospital encounter.     Significant Diagnostic Studies: See Full reports for all details    No results for input(s): "WBC", "RBC", "HGB", "HCT", "MCV", "MCH", "MCHC", "RDW", "PLT", "MPV", "GRAN", "LYMPH", "MONO", "BASO", "NRBC" in the last 168 hours.    No results for input(s): "NA", "K", "CL", "CO2", "ANIONGAP", "BUN", "CREATININE", "GLU", "CALCIUM", "PH", "MG", "ALBUMIN", "PROT", "ALKPHOS", "ALT", "AST", "BILITOT" in the last 168 hours.     Microbiology Results (last 7 days)       ** No results found for the last 168 hours. **             X-Ray Chest 1 View  Narrative: EXAMINATION:  XR CHEST 1 VIEW    CLINICAL HISTORY:  other;    TECHNIQUE:  Single frontal view of the chest was performed.    COMPARISON:  09/19/2023    FINDINGS:  LINES AND " TUBES: Right chest tube in place.  EKG/telemetry leads overlie the chest.    MEDIASTINUM AND JEANNINE: The cardiac silhouette is normal.    LUNGS: The lungs are hyperexpanded.  There is biapical, right greater than left scarring.    PLEURA:No pleural effusion.No appreciable residual deep a thorax.    OTHER: No acute osseous abnormality.  Impression: Right chest tube in place without appreciable residual pneumothorax.    Hyperexpanded lungs with biapical scarring.    Electronically signed by: Sharon Johnson  Date:    09/20/2023  Time:    06:36         Medication List        CONTINUE taking these medications      albuterol-ipratropium 2.5 mg-0.5 mg/3 mL nebulizer solution  Commonly known as: DUO-NEB  Take 3 mLs by nebulization every 6 (six) hours as needed for Wheezing.     fluticasone-umeclidin-vilanter 100-62.5-25 mcg Dsdv  Commonly known as: TRELEGY ELLIPTA     LORazepam 1 MG tablet  Commonly known as: ATIVAN  Take 1 tablet (1 mg total) by mouth every 12 (twelve) hours as needed for Anxiety.     O-2 SUSPENSORY MISC     oxyCODONE 30 MG Tab  Commonly known as: ROXICODONE     sertraline 50 MG tablet  Commonly known as: ZOLOFT  Take 1 tablet (50 mg total) by mouth once daily.     temazepam 30 mg capsule  Commonly known as: RESTORIL            STOP taking these medications      OXcarbazepine 150 MG Tab  Commonly known as: TRILEPTAL               Explained in detail to the patient about the discharge plan, medications, and follow-up visits. Pt understands and agrees with the treatment plan  Discharge Disposition: Home-Health Care [facility]   Discharged Condition: stable  Diet-    Medications Per [place] med rec  Activities as tolerated   Contact information for follow-up providers       Jose Johnson Jr., MD. Schedule an appointment as soon as possible for a visit on 9/27/2023.    Specialty: Family Medicine  Contact information:  [address] [place] [address] A  [facility] [place] [address]  [phone]               Rob Vela MD.  Go on 9/26/2023.    Specialty: Pulmonary Disease  Contact information:  [address] [place] [address]  [address]  [facility] [address]  [phone]                       Contact information for after-discharge care       Home Medical Care       Steven Hunt .    Service: Home Health Services  Contact information:  [facility]  [address]  [facility] [address]  [phone]                                   For further questions contact hospitalist office    Discharge time 33 minutes    For worsening symptoms, chest pain, shortness of breath, increased abdominal pain, high grade fever, stroke or stroke like symptoms, immediately go to the nearest Emergency Room or call 911 as soon as possible.      Danika Carrera M.D, on 9/20/2023, . at 5:57 PM.

## 2023-11-14 ENCOUNTER — LAB VISIT (OUTPATIENT)
Dept: LAB | Facility: HOSPITAL | Age: 63
End: 2023-11-14
Attending: NURSE PRACTITIONER
Payer: COMMERCIAL

## 2023-11-14 DIAGNOSIS — E87.5 HYPERPOTASSEMIA: Primary | ICD-10-CM

## 2023-11-14 LAB — POTASSIUM SERPL-SCNC: 5.6 MMOL/L (ref 3.5–5.1)

## 2023-11-14 PROCEDURE — 36415 COLL VENOUS BLD VENIPUNCTURE: CPT

## 2023-11-14 PROCEDURE — 84132 ASSAY OF SERUM POTASSIUM: CPT

## 2023-11-20 ENCOUNTER — LAB VISIT (OUTPATIENT)
Dept: LAB | Facility: HOSPITAL | Age: 63
End: 2023-11-20
Attending: NURSE PRACTITIONER
Payer: COMMERCIAL

## 2023-11-20 DIAGNOSIS — E87.5 HYPERPOTASSEMIA: Primary | ICD-10-CM

## 2023-11-20 LAB
ANION GAP SERPL CALC-SCNC: 10 MEQ/L
BUN SERPL-MCNC: 21.7 MG/DL (ref 8.4–25.7)
CALCIUM SERPL-MCNC: 9.7 MG/DL (ref 8.8–10)
CHLORIDE SERPL-SCNC: 107 MMOL/L (ref 98–107)
CO2 SERPL-SCNC: 22 MMOL/L (ref 23–31)
CREAT SERPL-MCNC: 0.77 MG/DL (ref 0.73–1.18)
CREAT/UREA NIT SERPL: 28
GFR SERPLBLD CREATININE-BSD FMLA CKD-EPI: >60 MLS/MIN/1.73/M2
GLUCOSE SERPL-MCNC: 91 MG/DL (ref 82–115)
POTASSIUM SERPL-SCNC: 4.8 MMOL/L (ref 3.5–5.1)
SODIUM SERPL-SCNC: 139 MMOL/L (ref 136–145)

## 2023-11-20 PROCEDURE — 80048 BASIC METABOLIC PNL TOTAL CA: CPT

## 2023-11-20 PROCEDURE — 36415 COLL VENOUS BLD VENIPUNCTURE: CPT

## 2023-11-24 ENCOUNTER — HOSPITAL ENCOUNTER (EMERGENCY)
Facility: HOSPITAL | Age: 63
Discharge: HOME | End: 2023-11-24
Attending: EMERGENCY MEDICINE
Payer: COMMERCIAL

## 2023-11-24 VITALS
HEIGHT: 67 IN | RESPIRATION RATE: 20 BRPM | BODY MASS INDEX: 19.46 KG/M2 | DIASTOLIC BLOOD PRESSURE: 82 MMHG | WEIGHT: 124 LBS | TEMPERATURE: 98 F | SYSTOLIC BLOOD PRESSURE: 142 MMHG | OXYGEN SATURATION: 100 % | HEART RATE: 70 BPM

## 2023-11-24 DIAGNOSIS — R07.9 CHEST PAIN: ICD-10-CM

## 2023-11-24 DIAGNOSIS — R07.89 CHEST WALL PAIN: Primary | ICD-10-CM

## 2023-11-24 LAB
BASOPHILS # BLD AUTO: 0.03 X10(3)/MCL
BASOPHILS NFR BLD AUTO: 0.3 %
EOSINOPHIL # BLD AUTO: 0.2 X10(3)/MCL (ref 0–0.9)
EOSINOPHIL NFR BLD AUTO: 1.7 %
ERYTHROCYTE [DISTWIDTH] IN BLOOD BY AUTOMATED COUNT: 13.4 % (ref 11.5–17)
HCT VFR BLD AUTO: 38 % (ref 42–52)
HGB BLD-MCNC: 12.1 G/DL (ref 14–18)
IMM GRANULOCYTES # BLD AUTO: 0.01 X10(3)/MCL (ref 0–0.04)
IMM GRANULOCYTES NFR BLD AUTO: 0.1 %
LYMPHOCYTES # BLD AUTO: 2.77 X10(3)/MCL (ref 0.6–4.6)
LYMPHOCYTES NFR BLD AUTO: 23.4 %
MCH RBC QN AUTO: 32.5 PG (ref 27–31)
MCHC RBC AUTO-ENTMCNC: 31.8 G/DL (ref 33–36)
MCV RBC AUTO: 102.2 FL (ref 80–94)
MONOCYTES # BLD AUTO: 0.96 X10(3)/MCL (ref 0.1–1.3)
MONOCYTES NFR BLD AUTO: 8.1 %
NEUTROPHILS # BLD AUTO: 7.86 X10(3)/MCL (ref 2.1–9.2)
NEUTROPHILS NFR BLD AUTO: 66.4 %
PLATELET # BLD AUTO: 305 X10(3)/MCL (ref 130–400)
PMV BLD AUTO: 9.4 FL (ref 7.4–10.4)
POC CARDIAC TROPONIN I: 0.01 NG/ML (ref 0–0.08)
RBC # BLD AUTO: 3.72 X10(6)/MCL (ref 4.7–6.1)
SAMPLE: NORMAL
WBC # SPEC AUTO: 11.83 X10(3)/MCL (ref 4.5–11.5)

## 2023-11-24 PROCEDURE — 84484 ASSAY OF TROPONIN QUANT: CPT

## 2023-11-24 PROCEDURE — 85025 COMPLETE CBC W/AUTO DIFF WBC: CPT | Performed by: EMERGENCY MEDICINE

## 2023-11-24 PROCEDURE — 93010 EKG 12-LEAD: ICD-10-PCS | Mod: ,,, | Performed by: INTERNAL MEDICINE

## 2023-11-24 PROCEDURE — 93005 ELECTROCARDIOGRAM TRACING: CPT

## 2023-11-24 PROCEDURE — 93010 ELECTROCARDIOGRAM REPORT: CPT | Mod: ,,, | Performed by: INTERNAL MEDICINE

## 2023-11-24 PROCEDURE — 99285 EMERGENCY DEPT VISIT HI MDM: CPT | Mod: 25

## 2023-11-24 NOTE — ED PROVIDER NOTES
"Encounter Date: 2023       History     Chief Complaint   Patient presents with    Chest Pain     Pt c/o epigastric pain that radiates up to his chest x 1 week; states it feels like a "burning" feeling. Pt also c/o SOB and productive cough.      This 63-year-old man presents with complaints of epigastric pain that radiates up to his chest.  It started a week ago he describes it as a burning feeling.  He states that he recently had a collapsed lung.  In addition he has had a cough and shortness of breath.       Review of patient's allergies indicates:   Allergen Reactions    Penicillin Rash     Past Medical History:   Diagnosis Date    Acute clinical systolic heart failure     Acute hypercapnic respiratory failure     Aspiration pneumonia 2023    Bacteremia     Cardiomyopathy     Chronic, continuous use of opioids     Collapse, lung     COPD with hypoxia     Degenerative cervical spinal stenosis     Depression     Disuse osteoporosis     Emphysema/COPD     Hepatitis C     Hyperkalemia     Leukocytosis     Myoclonus 2022     Past Surgical History:   Procedure Laterality Date    CERVICAL LAMINECTOMY WITH SPINAL FUSION      CHOLECYSTECTOMY      COLONOSCOPY      ESOPHAGOGASTRODUODENOSCOPY      gastrointestinal biopsy      graft to nose      inguinal herniotomy      LEFT HEART CATHETERIZATION Left 2023    Procedure: CATHETERIZATION, HEART, LEFT;  Surgeon: Dulce Orozco MD;  Location: [facility] CATH LAB;  Service: Cardiology;  Laterality: Left;  via radial approach    mandible operation      POLYPECTOMY       Family History   Problem Relation Age of Onset    Cancer Mother     Heart disease Father     Hypertension Father      Social History     Tobacco Use    Smoking status: Former     Current packs/day: 0.00     Average packs/day: 2.0 packs/day for 40.0 years (80.0 ttl pk-yrs)     Types: Cigarettes     Start date: 1977     Quit date: 2017     Years since quittin.3    Smokeless tobacco: " Never   Substance Use Topics    Alcohol use: Never    Drug use: Yes     Types: Oxycodone     Review of Systems   Constitutional:  Negative for fever.   HENT:  Negative for sore throat.    Respiratory:  Positive for cough and shortness of breath.    Cardiovascular:  Positive for chest pain.   Gastrointestinal:  Negative for nausea.   Genitourinary:  Negative for dysuria.   Musculoskeletal:  Negative for back pain.   Skin:  Negative for rash.   Neurological:  Negative for weakness.   Hematological:  Does not bruise/bleed easily.       Physical Exam     Initial Vitals [11/24/23 1644]   BP Pulse Resp Temp SpO2   133/86 81 18 98.2 °F (36.8 °C) 98 %      MAP       --         Physical Exam    Nursing note and vitals reviewed.  Constitutional: He appears well-developed and well-nourished.   HENT:   Head: Normocephalic and atraumatic.   Mouth/Throat: Mucous membranes are normal.   Eyes: EOM are normal. Pupils are equal, round, and reactive to light.   Neck: Neck supple.   Normal range of motion.  Cardiovascular:  Normal rate, regular rhythm, normal heart sounds and intact distal pulses.           Pulmonary/Chest: Breath sounds normal.   Abdominal: Abdomen is soft. Bowel sounds are normal.   Musculoskeletal:         General: Normal range of motion.      Cervical back: Normal range of motion and neck supple.     Neurological: He is alert and oriented to person, place, and time. He has normal strength.   Skin: Skin is warm and dry. Capillary refill takes less than 2 seconds.   Psychiatric: He has a normal mood and affect. His behavior is normal. Judgment and thought content normal.         ED Course   Procedures  Labs Reviewed   CBC WITH DIFFERENTIAL - Abnormal; Notable for the following components:       Result Value    WBC 11.83 (*)     RBC 3.72 (*)     Hgb 12.1 (*)     Hct 38.0 (*)     .2 (*)     MCH 32.5 (*)     MCHC 31.8 (*)     All other components within normal limits   CBC W/ AUTO DIFFERENTIAL    Narrative:      The following orders were created for panel order CBC auto differential.  Procedure                               Abnormality         Status                     ---------                               -----------         ------                     CBC with Differential[[id number]]       Abnormal            Final result                 Please view results for these tests on the individual orders.   TROPONIN ISTAT   POCT TROPONIN     EKG Readings: (Independently Interpreted)   Heart Rate: 80. Ectopy: No Ectopy. Conduction: Normal. ST Segments: Normal ST Segments. T Waves: Normal. Axis: Normal.   11/24/23 1641       Imaging Results              X-Ray Chest PA And Lateral (Final result)  Result time 11/24/23 17:30:14      Final result by Sharon Johnson MD (11/24/23 17:30:14)                   Impression:      The lungs are hyperexpanded.      Electronically signed by: Sharon Johnson  Date:    11/24/2023  Time:    17:30               Narrative:    EXAMINATION:  XR CHEST PA AND LATERAL    CLINICAL HISTORY:  Chest pain, unspecified    TECHNIQUE:  Two views of the chest    COMPARISON:  09/20/2023    FINDINGS:  LINES AND TUBES: EKG/telemetry leads overlie the chest.    MEDIASTINUM AND JEANNINE: The cardiac silhouette is normal.    LUNGS: The lungs are hyperexpanded.  There is biapical scarring.  No appreciable acute superimposed airspace opacity.    PLEURA:No pleural effusion. No pneumothorax.    BONES: No acute osseous abnormality.                                       Medications - No data to display  Medical Decision Making  Amount and/or Complexity of Data Reviewed  Labs: ordered.  Radiology: ordered.                                   Clinical Impression:  Final diagnoses:  [R07.9] Chest pain  [R07.89] Chest wall pain (Primary)          ED Disposition Condition    Discharge Stable          ED Prescriptions    None       Follow-up Information       Follow up With Specialties Details Why Contact Liliana Johnson  Jose GREENE Jr., MD Family Medicine Schedule an appointment as soon as possible for a visit   [address] [facility] [address]  [facility] [address]  [phone]               Yash Shah MD  11/24/23 4635

## 2023-12-05 ENCOUNTER — OFFICE VISIT (OUTPATIENT)
Dept: NEUROLOGY | Facility: CLINIC | Age: 63
End: 2023-12-05
Payer: COMMERCIAL

## 2023-12-05 VITALS
WEIGHT: 136 LBS | HEART RATE: 73 BPM | SYSTOLIC BLOOD PRESSURE: 99 MMHG | HEIGHT: 67 IN | BODY MASS INDEX: 21.35 KG/M2 | DIASTOLIC BLOOD PRESSURE: 61 MMHG

## 2023-12-05 DIAGNOSIS — G25.3 MYOCLONUS: Primary | ICD-10-CM

## 2023-12-05 DIAGNOSIS — F41.9 ANXIETY: ICD-10-CM

## 2023-12-05 PROCEDURE — 1101F PT FALLS ASSESS-DOCD LE1/YR: CPT | Mod: CPTII,S$GLB,, | Performed by: PSYCHIATRY & NEUROLOGY

## 2023-12-05 PROCEDURE — 3074F PR MOST RECENT SYSTOLIC BLOOD PRESSURE < 130 MM HG: ICD-10-PCS | Mod: CPTII,S$GLB,, | Performed by: PSYCHIATRY & NEUROLOGY

## 2023-12-05 PROCEDURE — 1101F PR PT FALLS ASSESS DOC 0-1 FALLS W/OUT INJ PAST YR: ICD-10-PCS | Mod: CPTII,S$GLB,, | Performed by: PSYCHIATRY & NEUROLOGY

## 2023-12-05 PROCEDURE — 99213 PR OFFICE/OUTPT VISIT, EST, LEVL III, 20-29 MIN: ICD-10-PCS | Mod: S$GLB,,, | Performed by: PSYCHIATRY & NEUROLOGY

## 2023-12-05 PROCEDURE — 1157F ADVNC CARE PLAN IN RCRD: CPT | Mod: CPTII,S$GLB,, | Performed by: PSYCHIATRY & NEUROLOGY

## 2023-12-05 PROCEDURE — 1157F PR ADVANCE CARE PLAN OR EQUIV PRESENT IN MEDICAL RECORD: ICD-10-PCS | Mod: CPTII,S$GLB,, | Performed by: PSYCHIATRY & NEUROLOGY

## 2023-12-05 PROCEDURE — 3008F BODY MASS INDEX DOCD: CPT | Mod: CPTII,S$GLB,, | Performed by: PSYCHIATRY & NEUROLOGY

## 2023-12-05 PROCEDURE — 3078F DIAST BP <80 MM HG: CPT | Mod: CPTII,S$GLB,, | Performed by: PSYCHIATRY & NEUROLOGY

## 2023-12-05 PROCEDURE — 1159F PR MEDICATION LIST DOCUMENTED IN MEDICAL RECORD: ICD-10-PCS | Mod: CPTII,S$GLB,, | Performed by: PSYCHIATRY & NEUROLOGY

## 2023-12-05 PROCEDURE — 1159F MED LIST DOCD IN RCRD: CPT | Mod: CPTII,S$GLB,, | Performed by: PSYCHIATRY & NEUROLOGY

## 2023-12-05 PROCEDURE — 99999 PR PBB SHADOW E&M-EST. PATIENT-LVL III: CPT | Mod: PBBFAC,,, | Performed by: PSYCHIATRY & NEUROLOGY

## 2023-12-05 PROCEDURE — 1160F RVW MEDS BY RX/DR IN RCRD: CPT | Mod: CPTII,S$GLB,, | Performed by: PSYCHIATRY & NEUROLOGY

## 2023-12-05 PROCEDURE — 99213 OFFICE O/P EST LOW 20 MIN: CPT | Mod: S$GLB,,, | Performed by: PSYCHIATRY & NEUROLOGY

## 2023-12-05 PROCEDURE — 3288F PR FALLS RISK ASSESSMENT DOCUMENTED: ICD-10-PCS | Mod: CPTII,S$GLB,, | Performed by: PSYCHIATRY & NEUROLOGY

## 2023-12-05 PROCEDURE — 1160F PR REVIEW ALL MEDS BY PRESCRIBER/CLIN PHARMACIST DOCUMENTED: ICD-10-PCS | Mod: CPTII,S$GLB,, | Performed by: PSYCHIATRY & NEUROLOGY

## 2023-12-05 PROCEDURE — 3008F PR BODY MASS INDEX (BMI) DOCUMENTED: ICD-10-PCS | Mod: CPTII,S$GLB,, | Performed by: PSYCHIATRY & NEUROLOGY

## 2023-12-05 PROCEDURE — 3078F PR MOST RECENT DIASTOLIC BLOOD PRESSURE < 80 MM HG: ICD-10-PCS | Mod: CPTII,S$GLB,, | Performed by: PSYCHIATRY & NEUROLOGY

## 2023-12-05 PROCEDURE — 4010F ACE/ARB THERAPY RXD/TAKEN: CPT | Mod: CPTII,S$GLB,, | Performed by: PSYCHIATRY & NEUROLOGY

## 2023-12-05 PROCEDURE — 3288F FALL RISK ASSESSMENT DOCD: CPT | Mod: CPTII,S$GLB,, | Performed by: PSYCHIATRY & NEUROLOGY

## 2023-12-05 PROCEDURE — 4010F PR ACE/ARB THEARPY RXD/TAKEN: ICD-10-PCS | Mod: CPTII,S$GLB,, | Performed by: PSYCHIATRY & NEUROLOGY

## 2023-12-05 PROCEDURE — 99999 PR PBB SHADOW E&M-EST. PATIENT-LVL III: ICD-10-PCS | Mod: PBBFAC,,, | Performed by: PSYCHIATRY & NEUROLOGY

## 2023-12-05 PROCEDURE — 3074F SYST BP LT 130 MM HG: CPT | Mod: CPTII,S$GLB,, | Performed by: PSYCHIATRY & NEUROLOGY

## 2023-12-05 RX ORDER — LORAZEPAM 1 MG/1
1 TABLET ORAL EVERY 12 HOURS PRN
Qty: 10 TABLET | Refills: 5 | Status: SHIPPED | OUTPATIENT
Start: 2023-12-05

## 2023-12-05 NOTE — PROGRESS NOTES
Chief Complaint   Patient presents with    Myoclonus     Pt's wife states he has not had a severe episode of shaking in a while when episodes do occur they are at least once a week and are not as severe with the help of ativan prescription         This is a 63 y.o. male here for follow up for myoclonus.  Overall myoclonus has been better than it was.  Once in a while patient gets more severe episodes of jerking and will take the Ativan which helps.  He is not currently on an AED.  He does try to use the Ativan judiciously.  Recall patient is on chronic pain medication managed by Dr. Altman.  Ammonia level normal after last visit.    Medication List with Changes/Refills   Current Medications    ALBUTEROL-IPRATROPIUM (DUO-NEB) 2.5 MG-0.5 MG/3 ML NEBULIZER SOLUTION    Take 3 mLs by nebulization every 6 (six) hours as needed for Wheezing.    FLUTICASONE-UMECLIDIN-VILANTER (TRELEGY ELLIPTA) 100-62.5-25 MCG DSDV    Inhale 1 puff into the lungs once daily.    GABAPENTIN (NEURONTIN) 300 MG CAPSULE    Take 300 mg by mouth as needed.    MEDICAL SUPPLY, MISCELLANEOUS (O-2 SUSPENSORY MISC)    2 L by Misc.(Non-Drug; Combo Route) route.    OXYCODONE (ROXICODONE) 30 MG TAB    Take 30 mg by mouth every 6 (six) hours as needed.    SERTRALINE (ZOLOFT) 50 MG TABLET    Take 1 tablet (50 mg total) by mouth once daily.    TEMAZEPAM (RESTORIL) 30 MG CAPSULE    Take 30 mg by mouth every evening.   Changed and/or Refilled Medications    Modified Medication Previous Medication    LORAZEPAM (ATIVAN) 1 MG TABLET LORazepam (ATIVAN) 1 MG tablet       Take 1 tablet (1 mg total) by mouth every 12 (twelve) hours as needed for Anxiety.    Take 1 tablet (1 mg total) by mouth every 12 (twelve) hours as needed for Anxiety.        Vitals:    12/05/23 0842   BP: 99/61   Pulse: 73        General: alert and oriented, no acute distress, no audible wheezes, pulse intact, no edema    Cognition and Comprehension  Speech and language intact  Follows  "commands  Speech fluent  Attention intact  Memory for recent events intact from history taking  Affect pleasant  Fund of knowledge adequate    Cranial nerves  II. Optic: Visual fields full to confrontation both eyes  III, IV, VI. Oculomotor: Intact, Pupils equal, round and reactive to light, no nystagmus  V. Trigeminal: sensation to light touch normal  VII. No facial asymmetry or facial weakness  VIII. Hearing intact to spoken voice  IX/X. Glossopharyngeal/Vagus: Voice normal, palate rises symmetrically  XI. Axillary: Shoulder shrug normal  XII. Hypoglossal: Intact    Muscle Strength and Tone  Normal upper extremity tone  Normal lower extremity tone  Normal upper extremity strength  Normal lower extremity strength  BUE intention tremor symmetric      Coordination and Gait  Finger to nose normal  Gait normal     1. Myoclonus  Overview:  Initially evaluated inpatient in 2019 for myoclonic jerks.  According to original consult note: Patient with "history of chronic pain on narcotics for 10+ years, COPD who comes in with jerking movements. He has had 2 prior episodes of hospital admissions for the same movements and work-up was unremarkable. Symptoms eventually resolved with Ativan."  Jerking movements occurred in mouth, arms, legs, and trunk without any loss of consciousness or impairment in consciousness.  His myoclonic jerking was felt to be result of metabolic etiology with a combination of opioid use an elevated CO2 as well as hyperammonemia.  During his hospitalization in 2019, he was started on Keppra 500 mg b.i.d. which improved the myoclonic activity.  He underwent lab testing with ceruloplasmin, CK, TSH, ferritin which were all normal.  MRI brain was overall unrevealing apart from mild chronic microvascular ischemia.  His EEG at that time just showed generalized slowing consistent with metabolic abnormalities.        2. Anxiety  -     LORazepam (ATIVAN) 1 MG tablet; Take 1 tablet (1 mg total) by mouth every 12 " (twelve) hours as needed for Anxiety.  Dispense: 10 tablet; Refill: 5       Continue ativan as needed for myoclonus

## 2023-12-09 ENCOUNTER — HOSPITAL ENCOUNTER (INPATIENT)
Facility: HOSPITAL | Age: 63
LOS: 3 days | Discharge: HOME | DRG: 202 | End: 2023-12-14
Attending: EMERGENCY MEDICINE | Admitting: STUDENT IN AN ORGANIZED HEALTH CARE EDUCATION/TRAINING PROGRAM
Payer: COMMERCIAL

## 2023-12-09 DIAGNOSIS — J20.5 RSV BRONCHITIS: Primary | ICD-10-CM

## 2023-12-09 DIAGNOSIS — Z87.09 HISTORY OF COPD: ICD-10-CM

## 2023-12-09 DIAGNOSIS — R09.02 HYPOXIA: ICD-10-CM

## 2023-12-09 DIAGNOSIS — I49.8 ATRIAL ARRHYTHMIA: ICD-10-CM

## 2023-12-09 DIAGNOSIS — R07.9 CHEST PAIN: ICD-10-CM

## 2023-12-09 DIAGNOSIS — R06.02 SOB (SHORTNESS OF BREATH): ICD-10-CM

## 2023-12-09 DIAGNOSIS — I49.9 ARRHYTHMIA: ICD-10-CM

## 2023-12-09 LAB
ALBUMIN SERPL-MCNC: 2.6 G/DL (ref 3.4–4.8)
ALBUMIN/GLOB SERPL: 0.7 RATIO (ref 1.1–2)
ALP SERPL-CCNC: 65 UNIT/L (ref 40–150)
ALT SERPL-CCNC: 20 UNIT/L (ref 0–55)
AST SERPL-CCNC: 29 UNIT/L (ref 5–34)
BASOPHILS # BLD AUTO: 0.01 X10(3)/MCL
BASOPHILS NFR BLD AUTO: 0.1 %
BILIRUB SERPL-MCNC: 0.4 MG/DL
BNP BLD-MCNC: 59.7 PG/ML
BUN SERPL-MCNC: 11.4 MG/DL (ref 8.4–25.7)
CALCIUM SERPL-MCNC: 9.5 MG/DL (ref 8.8–10)
CHLORIDE SERPL-SCNC: 97 MMOL/L (ref 98–107)
CO2 SERPL-SCNC: 26 MMOL/L (ref 23–31)
CREAT SERPL-MCNC: 0.63 MG/DL (ref 0.73–1.18)
EOSINOPHIL # BLD AUTO: 0 X10(3)/MCL (ref 0–0.9)
EOSINOPHIL NFR BLD AUTO: 0 %
ERYTHROCYTE [DISTWIDTH] IN BLOOD BY AUTOMATED COUNT: 13.9 % (ref 11.5–17)
FLUAV AG UPPER RESP QL IA.RAPID: NOT DETECTED
FLUBV AG UPPER RESP QL IA.RAPID: NOT DETECTED
GFR SERPLBLD CREATININE-BSD FMLA CKD-EPI: >60 MLS/MIN/1.73/M2
GLOBULIN SER-MCNC: 3.9 GM/DL (ref 2.4–3.5)
GLUCOSE SERPL-MCNC: 111 MG/DL (ref 82–115)
HCT VFR BLD AUTO: 37.4 % (ref 42–52)
HGB BLD-MCNC: 12.1 G/DL (ref 14–18)
IMM GRANULOCYTES # BLD AUTO: 0.02 X10(3)/MCL (ref 0–0.04)
IMM GRANULOCYTES NFR BLD AUTO: 0.2 %
LYMPHOCYTES # BLD AUTO: 0.58 X10(3)/MCL (ref 0.6–4.6)
LYMPHOCYTES NFR BLD AUTO: 5.9 %
MCH RBC QN AUTO: 32.4 PG (ref 27–31)
MCHC RBC AUTO-ENTMCNC: 32.4 G/DL (ref 33–36)
MCV RBC AUTO: 100 FL (ref 80–94)
MONOCYTES # BLD AUTO: 0.41 X10(3)/MCL (ref 0.1–1.3)
MONOCYTES NFR BLD AUTO: 4.2 %
NEUTROPHILS # BLD AUTO: 8.76 X10(3)/MCL (ref 2.1–9.2)
NEUTROPHILS NFR BLD AUTO: 89.6 %
PLATELET # BLD AUTO: 187 X10(3)/MCL (ref 130–400)
PMV BLD AUTO: 9.6 FL (ref 7.4–10.4)
POTASSIUM SERPL-SCNC: 4.1 MMOL/L (ref 3.5–5.1)
PROT SERPL-MCNC: 6.5 GM/DL (ref 5.8–7.6)
RBC # BLD AUTO: 3.74 X10(6)/MCL (ref 4.7–6.1)
RSV A 5' UTR RNA NPH QL NAA+PROBE: DETECTED
SARS-COV-2 RNA RESP QL NAA+PROBE: NOT DETECTED
SODIUM SERPL-SCNC: 132 MMOL/L (ref 136–145)
STREP A PCR (OHS): NOT DETECTED
WBC # SPEC AUTO: 9.78 X10(3)/MCL (ref 4.5–11.5)

## 2023-12-09 PROCEDURE — 25000003 PHARM REV CODE 250: Performed by: STUDENT IN AN ORGANIZED HEALTH CARE EDUCATION/TRAINING PROGRAM

## 2023-12-09 PROCEDURE — 94640 AIRWAY INHALATION TREATMENT: CPT

## 2023-12-09 PROCEDURE — 85025 COMPLETE CBC W/AUTO DIFF WBC: CPT | Performed by: EMERGENCY MEDICINE

## 2023-12-09 PROCEDURE — 99285 EMERGENCY DEPT VISIT HI MDM: CPT | Mod: 25

## 2023-12-09 PROCEDURE — 0241U COVID/RSV/FLU A&B PCR: CPT | Performed by: EMERGENCY MEDICINE

## 2023-12-09 PROCEDURE — 80053 COMPREHEN METABOLIC PANEL: CPT | Performed by: EMERGENCY MEDICINE

## 2023-12-09 PROCEDURE — 25000242 PHARM REV CODE 250 ALT 637 W/ HCPCS: Performed by: SPECIALIST

## 2023-12-09 PROCEDURE — 93005 ELECTROCARDIOGRAM TRACING: CPT

## 2023-12-09 PROCEDURE — 93010 ELECTROCARDIOGRAM REPORT: CPT | Mod: ,,, | Performed by: STUDENT IN AN ORGANIZED HEALTH CARE EDUCATION/TRAINING PROGRAM

## 2023-12-09 PROCEDURE — G0378 HOSPITAL OBSERVATION PER HR: HCPCS

## 2023-12-09 PROCEDURE — 93010 EKG 12-LEAD: ICD-10-PCS | Mod: ,,, | Performed by: STUDENT IN AN ORGANIZED HEALTH CARE EDUCATION/TRAINING PROGRAM

## 2023-12-09 PROCEDURE — 96374 THER/PROPH/DIAG INJ IV PUSH: CPT | Mod: 59

## 2023-12-09 PROCEDURE — 87651 STREP A DNA AMP PROBE: CPT | Performed by: EMERGENCY MEDICINE

## 2023-12-09 PROCEDURE — 83880 ASSAY OF NATRIURETIC PEPTIDE: CPT | Performed by: EMERGENCY MEDICINE

## 2023-12-09 PROCEDURE — 63600175 PHARM REV CODE 636 W HCPCS: Performed by: SPECIALIST

## 2023-12-09 PROCEDURE — 96375 TX/PRO/DX INJ NEW DRUG ADDON: CPT

## 2023-12-09 PROCEDURE — 25000003 PHARM REV CODE 250: Performed by: SPECIALIST

## 2023-12-09 RX ORDER — MORPHINE SULFATE 4 MG/ML
4 INJECTION, SOLUTION INTRAMUSCULAR; INTRAVENOUS EVERY 4 HOURS PRN
Status: DISCONTINUED | OUTPATIENT
Start: 2023-12-09 | End: 2023-12-13

## 2023-12-09 RX ORDER — FAMOTIDINE 20 MG/1
20 TABLET, FILM COATED ORAL 2 TIMES DAILY
Status: DISCONTINUED | OUTPATIENT
Start: 2023-12-09 | End: 2023-12-14 | Stop reason: HOSPADM

## 2023-12-09 RX ORDER — IPRATROPIUM BROMIDE AND ALBUTEROL SULFATE 2.5; .5 MG/3ML; MG/3ML
3 SOLUTION RESPIRATORY (INHALATION)
Status: COMPLETED | OUTPATIENT
Start: 2023-12-09 | End: 2023-12-09

## 2023-12-09 RX ORDER — TALC
6 POWDER (GRAM) TOPICAL NIGHTLY PRN
Status: DISCONTINUED | OUTPATIENT
Start: 2023-12-09 | End: 2023-12-10

## 2023-12-09 RX ORDER — DEXAMETHASONE SODIUM PHOSPHATE 4 MG/ML
6 INJECTION, SOLUTION INTRA-ARTICULAR; INTRALESIONAL; INTRAMUSCULAR; INTRAVENOUS; SOFT TISSUE
Status: COMPLETED | OUTPATIENT
Start: 2023-12-09 | End: 2023-12-09

## 2023-12-09 RX ORDER — IPRATROPIUM BROMIDE AND ALBUTEROL SULFATE 2.5; .5 MG/3ML; MG/3ML
3 SOLUTION RESPIRATORY (INHALATION) EVERY 6 HOURS
Status: DISCONTINUED | OUTPATIENT
Start: 2023-12-10 | End: 2023-12-10

## 2023-12-09 RX ORDER — HYDROCODONE BITARTRATE AND ACETAMINOPHEN 5; 325 MG/1; MG/1
1 TABLET ORAL EVERY 4 HOURS PRN
Status: DISCONTINUED | OUTPATIENT
Start: 2023-12-09 | End: 2023-12-10

## 2023-12-09 RX ORDER — SODIUM CHLORIDE 0.9 % (FLUSH) 0.9 %
2 SYRINGE (ML) INJECTION EVERY 6 HOURS PRN
Status: DISCONTINUED | OUTPATIENT
Start: 2023-12-09 | End: 2023-12-10

## 2023-12-09 RX ORDER — ONDANSETRON 4 MG/1
8 TABLET, ORALLY DISINTEGRATING ORAL EVERY 8 HOURS PRN
Status: DISCONTINUED | OUTPATIENT
Start: 2023-12-09 | End: 2023-12-10

## 2023-12-09 RX ORDER — ACETAMINOPHEN 325 MG/1
650 TABLET ORAL EVERY 8 HOURS PRN
Status: DISCONTINUED | OUTPATIENT
Start: 2023-12-09 | End: 2023-12-10

## 2023-12-09 RX ORDER — OXYCODONE HYDROCHLORIDE 10 MG/1
30 TABLET ORAL EVERY 6 HOURS PRN
Status: DISCONTINUED | OUTPATIENT
Start: 2023-12-09 | End: 2023-12-10

## 2023-12-09 RX ADMIN — IPRATROPIUM BROMIDE AND ALBUTEROL SULFATE 3 ML: .5; 3 SOLUTION RESPIRATORY (INHALATION) at 07:12

## 2023-12-09 RX ADMIN — OXYCODONE HYDROCHLORIDE 30 MG: 10 TABLET ORAL at 09:12

## 2023-12-09 RX ADMIN — FAMOTIDINE 20 MG: 20 TABLET ORAL at 09:12

## 2023-12-09 RX ADMIN — DEXAMETHASONE SODIUM PHOSPHATE 6 MG: 4 INJECTION, SOLUTION INTRA-ARTICULAR; INTRALESIONAL; INTRAMUSCULAR; INTRAVENOUS; SOFT TISSUE at 07:12

## 2023-12-09 NOTE — ED PROVIDER NOTES
Encounter Date: 2023       History     Chief Complaint   Patient presents with    Shortness of Breath     SOB x 1 week with headache and flu like symptoms. RA sat 87%. Usually on 2l oxygen at home.      This 63-year-old man with cardiomyopathy, COPD, CHF presents with complaints of cough, body aches, and worsening shortness of breath for the past week.  He also complains of headache and flu-like symptoms.  He is on 2 L of oxygen at home.  On arrival his O2 saturation is 87%.         Review of patient's allergies indicates:   Allergen Reactions    Penicillin Rash     Past Medical History:   Diagnosis Date    Acute clinical systolic heart failure     Acute hypercapnic respiratory failure     Aspiration pneumonia 2023    Bacteremia     Cardiomyopathy     Chronic, continuous use of opioids     Collapse, lung     Collapsed lung     COPD with hypoxia     Degenerative cervical spinal stenosis     Depression     Disuse osteoporosis     Emphysema/COPD     Hepatitis C     Hyperkalemia     Leukocytosis     Myoclonus 2022     Past Surgical History:   Procedure Laterality Date    CERVICAL LAMINECTOMY WITH SPINAL FUSION      CHOLECYSTECTOMY      COLONOSCOPY      ESOPHAGOGASTRODUODENOSCOPY      gastrointestinal biopsy      graft to nose      inguinal herniotomy      LEFT HEART CATHETERIZATION Left 2023    Procedure: CATHETERIZATION, HEART, LEFT;  Surgeon: Dulce Orozco MD;  Location: [facility] CATH LAB;  Service: Cardiology;  Laterality: Left;  via radial approach    mandible operation      POLYPECTOMY       Family History   Problem Relation Age of Onset    Cancer Mother     Heart disease Father     Hypertension Father      Social History     Tobacco Use    Smoking status: Former     Current packs/day: 0.00     Average packs/day: 2.0 packs/day for 40.0 years (80.0 ttl pk-yrs)     Types: Cigarettes     Start date: 1977     Quit date: 2017     Years since quittin.3    Smokeless tobacco: Never    Substance Use Topics    Alcohol use: Never    Drug use: Yes     Types: Oxycodone     Review of Systems   Constitutional:  Positive for activity change and chills. Negative for fever.   HENT:  Positive for sore throat.    Respiratory:  Positive for cough and shortness of breath.    Cardiovascular:  Negative for chest pain.   Gastrointestinal:  Negative for nausea.   Genitourinary:  Negative for dysuria.   Musculoskeletal:  Negative for back pain.   Skin:  Negative for rash.   Neurological:  Positive for headaches. Negative for weakness.   Hematological:  Does not bruise/bleed easily.       Physical Exam     Initial Vitals [12/09/23 1728]   BP Pulse Resp Temp SpO2   (!) 148/91 (!) 114 (!) 22 98.9 °F (37.2 °C) (!) 87 %      MAP       --         Physical Exam    Constitutional: He appears well-developed and well-nourished.   HENT:   Head: Normocephalic and atraumatic.   Mouth/Throat: Mucous membranes are normal.   Eyes: EOM are normal. Pupils are equal, round, and reactive to light.   Neck: Neck supple.   Normal range of motion.  Cardiovascular:  Regular rhythm, normal heart sounds and intact distal pulses.   Tachycardia present.         Pulmonary/Chest: Breath sounds normal.   Abdominal: Abdomen is soft. Bowel sounds are normal.   Musculoskeletal:         General: Normal range of motion.      Cervical back: Normal range of motion and neck supple.     Neurological: He is alert and oriented to person, place, and time. He has normal strength.   Skin: Skin is warm and dry. Capillary refill takes less than 2 seconds.   Psychiatric: He has a normal mood and affect. His behavior is normal. Judgment and thought content normal.         ED Course   Procedures  Labs Reviewed   COVID/RSV/FLU A&B PCR - Abnormal; Notable for the following components:       Result Value    Respiratory Syncytial Virus PCR Detected (*)     All other components within normal limits    Narrative:     The Xpert Xpress SARS-CoV-2/FLU/RSV plus is a rapid,  multiplexed real-time PCR test intended for the simultaneous qualitative detection and differentiation of SARS-CoV-2, Influenza A, Influenza B, and respiratory syncytial virus (RSV) viral RNA in either nasopharyngeal swab or nasal swab specimens.         COMPREHENSIVE METABOLIC PANEL - Abnormal; Notable for the following components:    Sodium Level 132 (*)     Chloride 97 (*)     Creatinine 0.63 (*)     Albumin Level 2.6 (*)     Globulin 3.9 (*)     Albumin/Globulin Ratio 0.7 (*)     All other components within normal limits   CBC WITH DIFFERENTIAL - Abnormal; Notable for the following components:    RBC 3.74 (*)     Hgb 12.1 (*)     Hct 37.4 (*)     .0 (*)     MCH 32.4 (*)     MCHC 32.4 (*)     Lymph # 0.58 (*)     All other components within normal limits   STREP GROUP A BY PCR - Normal    Narrative:     The Xpert Xpress Strep A test is a rapid, qualitative in vitro diagnostic test for the detection of Streptococcus pyogenes (Group A ß-hemolytic Streptococcus, Strep A) in throat swab specimens from patients with signs and symptoms of pharyngitis.     B-TYPE NATRIURETIC PEPTIDE - Normal   CBC W/ AUTO DIFFERENTIAL    Narrative:     The following orders were created for panel order CBC auto differential.  Procedure                               Abnormality         Status                     ---------                               -----------         ------                     CBC with Differential[[id number]]       Abnormal            Final result                 Please view results for these tests on the individual orders.     EKG Readings: (Independently Interpreted)   Rhythm: Sinus Tachycardia. Heart Rate: 115. Conduction: Normal. ST Segments: Normal ST Segments. T Waves: Normal.   PACs       Imaging Results              X-Ray Chest 1 View (Final result)  Result time 12/09/23 18:35:27      Final result by Sharon Johnson MD (12/09/23 18:35:27)                   Impression:      New bilateral  interstitial opacities within the lungs may be related to atypical infection superimposed on background emphysema.      Electronically signed by: Sharon Johnson  Date:    12/09/2023  Time:    18:35               Narrative:    EXAMINATION:  XR CHEST 1 VIEW    CLINICAL HISTORY:  Shortness of breath    TECHNIQUE:  Single frontal view of the chest was performed.    COMPARISON:  11/24/2023    FINDINGS:  LINES AND TUBES: None    MEDIASTINUM AND JEANNINE: The cardiac silhouette is normal.    LUNGS: The lungs are hyperexpanded with biapical scarring and emphysematous change.  There are superimposed bilateral interstitial opacities, new from previous.    PLEURA:No pleural effusion. No pneumothorax.    OTHER: No acute osseous abnormality.                                       Medications   sodium chloride 0.9% flush 2 mL (has no administration in time range)   melatonin tablet 6 mg (has no administration in time range)   acetaminophen tablet 650 mg (has no administration in time range)   HYDROcodone-acetaminophen 5-325 mg per tablet 1 tablet (has no administration in time range)   morphine injection 4 mg (has no administration in time range)   famotidine tablet 20 mg (has no administration in time range)   ondansetron disintegrating tablet 8 mg (has no administration in time range)   acetaminophen tablet 650 mg (has no administration in time range)   albuterol-ipratropium 2.5 mg-0.5 mg/3 mL nebulizer solution 3 mL (has no administration in time range)   albuterol-ipratropium 2.5 mg-0.5 mg/3 mL nebulizer solution 3 mL (3 mLs Nebulization Given 12/9/23 1943)   dexAMETHasone injection 6 mg (6 mg Intravenous Given 12/9/23 1923)     Medical Decision Making  This 63-year-old man with cardiomyopathy, COPD, CHF presents with complaints of cough, body aches, and worsening shortness of breath for the past week.  He also complains of headache and flu-like symptoms.  He is on 2 L of oxygen at home.  On arrival his O2 saturation is 87%.      DIFFERENTIAL DIAGNOSIS- RSV BRONCHITIS, PNEUMONIA, PLEURAL EFFUSION, COPD EXACERBATION    I, Dr. Robledo, assumed care of this patient at 6:00 p.m. from Dr. Shah; patient states he feels bad and still with cough; his RSV came back positive so he was given a DuoNeb and Decadron 6 mg IV    Amount and/or Complexity of Data Reviewed  External Data Reviewed: labs and notes.  Labs: ordered. Decision-making details documented in ED Course.  Radiology: ordered. Decision-making details documented in ED Course.  Discussion of management or test interpretation with external provider(s): I reviewed patient's case with Dr. Reyes hospitalist; I reviewed HPI, past medical history, physical exam, lab findings, x-ray findings, treatment and response to treatment; patient will be admitted to the observation unit    Risk  OTC drugs.  Prescription drug management.  Risk Details: Patient admitted following Decadron 6 mg iv and DuoNeb; will order DuoNeb q.6 hours               ED Course as of 12/09/23 2046   Sat Dec 09, 2023   1850 RSV Ag by Molecular Method(!): Detected [DD]      ED Course User Index  [DD] Yehuda Robledo MD          Patient Vitals for the past 24 hrs:   BP Temp Temp src Pulse Resp SpO2 Weight   12/09/23 2032 121/73 -- -- 102 -- (!) 93 % --   12/09/23 2019 -- 98.9 °F (37.2 °C) Oral 92 (!) 24 (!) 94 % --   12/09/23 1943 -- -- -- 94 (!) 107 (!) 92 % --   12/09/23 1828 125/65 -- -- 107 -- (!) 93 % --   12/09/23 1746 129/66 -- -- (!) 115 19 (!) 93 % --   12/09/23 1730 -- -- -- -- -- (!) 93 % --   12/09/23 1729 -- -- -- -- -- -- 63.5 kg (140 lb)   12/09/23 1728 (!) 148/91 98.9 °F (37.2 °C) Oral (!) 114 (!) 22 (!) 87 % --                      Clinical Impression:  Final diagnoses:  [R06.02] SOB (shortness of breath)  [J20.5] RSV bronchitis (Primary)  [R09.02] Hypoxia  [Z87.09] History of COPD          ED Disposition Condition    Observation Stable                Yehuda Robledo MD  12/09/23 2031       Yehuda Robledo,  MD  12/09/23 2040

## 2023-12-10 PROBLEM — J20.5 RSV BRONCHITIS: Status: ACTIVE | Noted: 2023-12-10

## 2023-12-10 LAB — BSA FOR ECHO PROCEDURE: 1.73 M2

## 2023-12-10 PROCEDURE — 25000242 PHARM REV CODE 250 ALT 637 W/ HCPCS: Performed by: SPECIALIST

## 2023-12-10 PROCEDURE — G0378 HOSPITAL OBSERVATION PER HR: HCPCS

## 2023-12-10 PROCEDURE — 93010 EKG 12-LEAD: ICD-10-PCS | Mod: ,,, | Performed by: STUDENT IN AN ORGANIZED HEALTH CARE EDUCATION/TRAINING PROGRAM

## 2023-12-10 PROCEDURE — 93010 ELECTROCARDIOGRAM REPORT: CPT | Mod: ,,, | Performed by: STUDENT IN AN ORGANIZED HEALTH CARE EDUCATION/TRAINING PROGRAM

## 2023-12-10 PROCEDURE — 25000003 PHARM REV CODE 250: Performed by: STUDENT IN AN ORGANIZED HEALTH CARE EDUCATION/TRAINING PROGRAM

## 2023-12-10 PROCEDURE — 63600175 PHARM REV CODE 636 W HCPCS: Performed by: STUDENT IN AN ORGANIZED HEALTH CARE EDUCATION/TRAINING PROGRAM

## 2023-12-10 PROCEDURE — 96365 THER/PROPH/DIAG IV INF INIT: CPT

## 2023-12-10 PROCEDURE — 25000242 PHARM REV CODE 250 ALT 637 W/ HCPCS: Performed by: STUDENT IN AN ORGANIZED HEALTH CARE EDUCATION/TRAINING PROGRAM

## 2023-12-10 PROCEDURE — C1751 CATH, INF, PER/CENT/MIDLINE: HCPCS

## 2023-12-10 PROCEDURE — 36410 VNPNXR 3YR/> PHY/QHP DX/THER: CPT

## 2023-12-10 PROCEDURE — 96372 THER/PROPH/DIAG INJ SC/IM: CPT | Performed by: STUDENT IN AN ORGANIZED HEALTH CARE EDUCATION/TRAINING PROGRAM

## 2023-12-10 PROCEDURE — 99900031 HC PATIENT EDUCATION (STAT)

## 2023-12-10 PROCEDURE — 93005 ELECTROCARDIOGRAM TRACING: CPT

## 2023-12-10 PROCEDURE — 94760 N-INVAS EAR/PLS OXIMETRY 1: CPT

## 2023-12-10 PROCEDURE — A4216 STERILE WATER/SALINE, 10 ML: HCPCS | Performed by: STUDENT IN AN ORGANIZED HEALTH CARE EDUCATION/TRAINING PROGRAM

## 2023-12-10 PROCEDURE — 76937 US GUIDE VASCULAR ACCESS: CPT

## 2023-12-10 PROCEDURE — 94640 AIRWAY INHALATION TREATMENT: CPT | Mod: XB

## 2023-12-10 PROCEDURE — 96366 THER/PROPH/DIAG IV INF ADDON: CPT

## 2023-12-10 PROCEDURE — 96375 TX/PRO/DX INJ NEW DRUG ADDON: CPT

## 2023-12-10 PROCEDURE — 27000221 HC OXYGEN, UP TO 24 HOURS

## 2023-12-10 RX ORDER — METOPROLOL TARTRATE 25 MG/1
25 TABLET, FILM COATED ORAL 2 TIMES DAILY
Status: DISCONTINUED | OUTPATIENT
Start: 2023-12-10 | End: 2023-12-12

## 2023-12-10 RX ORDER — PREDNISONE 20 MG/1
40 TABLET ORAL DAILY
Status: DISCONTINUED | OUTPATIENT
Start: 2023-12-10 | End: 2023-12-14 | Stop reason: HOSPADM

## 2023-12-10 RX ORDER — ENOXAPARIN SODIUM 100 MG/ML
1 INJECTION SUBCUTANEOUS EVERY 12 HOURS
Status: DISCONTINUED | OUTPATIENT
Start: 2023-12-10 | End: 2023-12-11

## 2023-12-10 RX ORDER — SIMETHICONE 80 MG
1 TABLET,CHEWABLE ORAL 4 TIMES DAILY PRN
Status: DISCONTINUED | OUTPATIENT
Start: 2023-12-10 | End: 2023-12-14 | Stop reason: HOSPADM

## 2023-12-10 RX ORDER — ONDANSETRON 4 MG/1
8 TABLET, ORALLY DISINTEGRATING ORAL EVERY 8 HOURS PRN
Status: DISCONTINUED | OUTPATIENT
Start: 2023-12-10 | End: 2023-12-14 | Stop reason: HOSPADM

## 2023-12-10 RX ORDER — ONDANSETRON 2 MG/ML
4 INJECTION INTRAMUSCULAR; INTRAVENOUS EVERY 8 HOURS PRN
Status: DISCONTINUED | OUTPATIENT
Start: 2023-12-10 | End: 2023-12-14 | Stop reason: HOSPADM

## 2023-12-10 RX ORDER — ACETAMINOPHEN 325 MG/1
650 TABLET ORAL EVERY 4 HOURS PRN
Status: DISCONTINUED | OUTPATIENT
Start: 2023-12-10 | End: 2023-12-14 | Stop reason: HOSPADM

## 2023-12-10 RX ORDER — MAG HYDROX/ALUMINUM HYD/SIMETH 200-200-20
30 SUSPENSION, ORAL (FINAL DOSE FORM) ORAL 4 TIMES DAILY PRN
Status: DISCONTINUED | OUTPATIENT
Start: 2023-12-10 | End: 2023-12-14 | Stop reason: HOSPADM

## 2023-12-10 RX ORDER — NALOXONE HCL 0.4 MG/ML
0.02 VIAL (ML) INJECTION
Status: DISCONTINUED | OUTPATIENT
Start: 2023-12-10 | End: 2023-12-14 | Stop reason: HOSPADM

## 2023-12-10 RX ORDER — BISACODYL 10 MG
10 SUPPOSITORY, RECTAL RECTAL DAILY PRN
Status: DISCONTINUED | OUTPATIENT
Start: 2023-12-10 | End: 2023-12-14 | Stop reason: HOSPADM

## 2023-12-10 RX ORDER — TALC
9 POWDER (GRAM) TOPICAL NIGHTLY PRN
Status: DISCONTINUED | OUTPATIENT
Start: 2023-12-10 | End: 2023-12-14 | Stop reason: HOSPADM

## 2023-12-10 RX ORDER — IPRATROPIUM BROMIDE AND ALBUTEROL SULFATE 2.5; .5 MG/3ML; MG/3ML
3 SOLUTION RESPIRATORY (INHALATION) EVERY 6 HOURS PRN
Status: DISCONTINUED | OUTPATIENT
Start: 2023-12-10 | End: 2023-12-14 | Stop reason: HOSPADM

## 2023-12-10 RX ORDER — SODIUM CHLORIDE 0.9 % (FLUSH) 0.9 %
10 SYRINGE (ML) INJECTION
Status: DISCONTINUED | OUTPATIENT
Start: 2023-12-10 | End: 2023-12-14 | Stop reason: HOSPADM

## 2023-12-10 RX ORDER — POLYETHYLENE GLYCOL 3350 17 G/17G
17 POWDER, FOR SOLUTION ORAL 3 TIMES DAILY PRN
Status: DISCONTINUED | OUTPATIENT
Start: 2023-12-10 | End: 2023-12-14 | Stop reason: HOSPADM

## 2023-12-10 RX ORDER — OXYCODONE HYDROCHLORIDE 10 MG/1
30 TABLET ORAL EVERY 6 HOURS PRN
Status: DISCONTINUED | OUTPATIENT
Start: 2023-12-10 | End: 2023-12-14 | Stop reason: HOSPADM

## 2023-12-10 RX ORDER — SERTRALINE HYDROCHLORIDE 50 MG/1
50 TABLET, FILM COATED ORAL DAILY
Status: DISCONTINUED | OUTPATIENT
Start: 2023-12-10 | End: 2023-12-14 | Stop reason: HOSPADM

## 2023-12-10 RX ORDER — SODIUM CHLORIDE 0.9 % (FLUSH) 0.9 %
10 SYRINGE (ML) INJECTION EVERY 6 HOURS
Status: DISCONTINUED | OUTPATIENT
Start: 2023-12-10 | End: 2023-12-14 | Stop reason: HOSPADM

## 2023-12-10 RX ORDER — METOPROLOL TARTRATE 1 MG/ML
10 INJECTION, SOLUTION INTRAVENOUS ONCE
Status: COMPLETED | OUTPATIENT
Start: 2023-12-10 | End: 2023-12-10

## 2023-12-10 RX ORDER — LEVALBUTEROL INHALATION SOLUTION 1.25 MG/3ML
1.25 SOLUTION RESPIRATORY (INHALATION) EVERY 4 HOURS
Status: DISCONTINUED | OUTPATIENT
Start: 2023-12-10 | End: 2023-12-11

## 2023-12-10 RX ORDER — ENOXAPARIN SODIUM 100 MG/ML
40 INJECTION SUBCUTANEOUS EVERY 24 HOURS
Status: DISCONTINUED | OUTPATIENT
Start: 2023-12-10 | End: 2023-12-10

## 2023-12-10 RX ORDER — GABAPENTIN 300 MG/1
300 CAPSULE ORAL
Status: DISCONTINUED | OUTPATIENT
Start: 2023-12-10 | End: 2023-12-14 | Stop reason: HOSPADM

## 2023-12-10 RX ADMIN — FAMOTIDINE 20 MG: 20 TABLET ORAL at 08:12

## 2023-12-10 RX ADMIN — ACETAMINOPHEN 650 MG: 325 TABLET ORAL at 08:12

## 2023-12-10 RX ADMIN — ENOXAPARIN SODIUM 60 MG: 60 INJECTION SUBCUTANEOUS at 01:12

## 2023-12-10 RX ADMIN — METOPROLOL TARTRATE 25 MG: 25 TABLET, FILM COATED ORAL at 08:12

## 2023-12-10 RX ADMIN — SERTRALINE HYDROCHLORIDE 50 MG: 50 TABLET ORAL at 08:12

## 2023-12-10 RX ADMIN — IPRATROPIUM BROMIDE AND ALBUTEROL SULFATE 3 ML: .5; 3 SOLUTION RESPIRATORY (INHALATION) at 12:12

## 2023-12-10 RX ADMIN — LEVALBUTEROL HYDROCHLORIDE 1.25 MG: 1.25 SOLUTION RESPIRATORY (INHALATION) at 09:12

## 2023-12-10 RX ADMIN — METOPROLOL TARTRATE 25 MG: 25 TABLET, FILM COATED ORAL at 11:12

## 2023-12-10 RX ADMIN — OXYCODONE HYDROCHLORIDE 30 MG: 10 TABLET ORAL at 04:12

## 2023-12-10 RX ADMIN — LEVALBUTEROL HYDROCHLORIDE 1.25 MG: 1.25 SOLUTION RESPIRATORY (INHALATION) at 01:12

## 2023-12-10 RX ADMIN — OXYCODONE HYDROCHLORIDE 30 MG: 10 TABLET ORAL at 06:12

## 2023-12-10 RX ADMIN — PREDNISONE 40 MG: 20 TABLET ORAL at 11:12

## 2023-12-10 RX ADMIN — OXYCODONE HYDROCHLORIDE 30 MG: 10 TABLET ORAL at 12:12

## 2023-12-10 RX ADMIN — IPRATROPIUM BROMIDE AND ALBUTEROL SULFATE 3 ML: .5; 3 SOLUTION RESPIRATORY (INHALATION) at 07:12

## 2023-12-10 RX ADMIN — DILTIAZEM HYDROCHLORIDE 5 MG/HR: 5 INJECTION INTRAVENOUS at 04:12

## 2023-12-10 RX ADMIN — SODIUM CHLORIDE, PRESERVATIVE FREE 10 ML: 5 INJECTION INTRAVENOUS at 06:12

## 2023-12-10 RX ADMIN — METOPROLOL TARTRATE 10 MG: 1 INJECTION, SOLUTION INTRAVENOUS at 09:12

## 2023-12-10 NOTE — PROCEDURES
"Balbir Rogers is a 63 y.o. male patient.    Temp: 97.5 °F (36.4 °C) (12/10/23 1351)  Pulse: 89 (12/10/23 1357)  Resp: 20 (12/10/23 1357)  BP: 99/64 (12/10/23 1351)  SpO2: (!) 94 % (12/10/23 1357)  Weight: 60.6 kg (133 lb 9.6 oz) (12/09/23 2100)  Height: 5' 10" (177.8 cm) (12/09/23 2100)    PICC  Date/Time: 12/10/2023 4:19 PM  Performed by: Della Goncalves RN  Consent Done: Yes  Time out: Immediately prior to procedure a time out was called to verify the correct patient, procedure, equipment, support staff and site/side marked as required  Indications: med administration and vascular access  Anesthesia: local infiltration  Local anesthetic: lidocaine 1% without epinephrine    Preparation: skin prepped with ChloraPrep  Skin prep agent dried: skin prep agent completely dried prior to procedure  Sterile barriers: all five maximum sterile barriers used - cap, mask, sterile gown, sterile gloves, and large sterile sheet  Hand hygiene: hand hygiene performed prior to central venous catheter insertion  Location details: right brachial  Catheter type: single lumen  Catheter size: 4 Fr  Catheter Length: 17cm    Ultrasound guidance: yes  Vessel Caliber: patent, compressibility normal  Needle advanced into vessel with real time Ultrasound guidance.  Guidewire confirmed in vessel.  Sterile sheath used.  Number of attempts: 1  Post-procedure: blood return through all ports, chlorhexidine patch and sterile dressing applied            Name Della Goncalves RN   12/10/2023    "

## 2023-12-10 NOTE — H&P
[facility] - Medical Surgical Unit  [facility] MEDICINE - H&P ADMISSION NOTE    Patient Name: Balbir Rogers  MRN: [id number]  Patient Class: OP- Observation   Admission Date: 12/9/2023   Admitting Physician: ANIRUDH Service   Attending Physician: Thairy G Reyes, DO  Primary Care Provider: Jose Johnson Jr., MD  Face-to-Face encounter date: 12/10/2023      CHIEF COMPLAINT     Chief Complaint   Patient presents with    Shortness of Breath     SOB x 1 week with headache and flu like symptoms. RA sat 87%. Usually on 2l oxygen at home.      HISTORY OF PRESENTING ILLNESS   63-year-old male with a past medical history of emphysema, myoclonus, chronic pain, COPD, CHF who presents with complaint of headache and malaise.  In the ED patient tachypneic, tachycardic and hypoxemic on room air.  However per record review he is on 5 L nasal cannula at baseline at home per his pulmonologist Dr. Rob mendenhall.  In the ED tested positive for RSV.  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       PAST SURGICAL HISTORY     Past Surgical History:   Procedure Laterality Date    CERVICAL LAMINECTOMY WITH SPINAL FUSION      CHOLECYSTECTOMY      COLONOSCOPY      ESOPHAGOGASTRODUODENOSCOPY      gastrointestinal biopsy      graft to nose      inguinal herniotomy      LEFT HEART CATHETERIZATION Left 2/1/2023    Procedure: CATHETERIZATION, HEART, LEFT;  Surgeon: Dulce Orozco MD;  Location: [facility] CATH LAB;  Service: Cardiology;  Laterality: Left;  via radial approach    mandible operation       POLYPECTOMY         FAMILY HISTORY   Reviewed and noncontributory to this case    SOCIAL HISTORY     Social History     Socioeconomic History    Marital status:    Tobacco Use    Smoking status: Former     Current packs/day: 0.00     Average packs/day: 2.0 packs/day for 40.0 years (80.0 ttl pk-yrs)     Types: Cigarettes     Start date: 1977     Quit date: 2017     Years since quittin.3    Smokeless tobacco: Never   Substance and Sexual Activity    Alcohol use: Never    Drug use: Yes     Types: Oxycodone    Sexual activity: Not Currently     Social Determinants of Health     Financial Resource Strain: Low Risk  (2023)    Overall Financial Resource Strain (CARDIA)     Difficulty of Paying Living Expenses: Not hard at all   Food Insecurity: No Food Insecurity (2023)    Hunger Vital Sign     Worried About Running Out of Food in the Last Year: Never true     Ran Out of Food in the Last Year: Never true   Transportation Needs: No Transportation Needs (2023)    PRAPARE - Transportation     Lack of Transportation (Medical): No     Lack of Transportation (Non-Medical): No   Physical Activity: Inactive (2023)    Exercise Vital Sign     Days of Exercise per Week: 0 days     Minutes of Exercise per Session: 0 min   Stress: Stress Concern Present (2023)    [place] [place] [employer] - Occupational Stress Questionnaire     Feeling of Stress : To some extent   Social Connections: Moderately Isolated (9/15/2023)    Social Connection and Isolation Panel [NHANES]     Frequency of Communication with Friends and Family: Twice a week     Frequency of Social Gatherings with Friends and Family: Twice a week     Attends Latter-day Services: Never     Active Member of Clubs or Organizations: No     Attends Club or Organization Meetings: Never     Marital Status:    Housing Stability: Low Risk  (2023)    Housing Stability Vital Sign     Unable to Pay for Housing in the Last Year:  No     Number of Places Lived in the Last Year: 1     Unstable Housing in the Last Year: No       HOME MEDICATIONS     Prior to Admission medications    Medication Sig Start Date End Date Taking? Authorizing Provider   albuterol-ipratropium (DUO-NEB) 2.5 mg-0.5 mg/3 mL nebulizer solution Take 3 mLs by nebulization every 6 (six) hours as needed for Wheezing. 1/12/23 12/9/23 Yes Reyes, Thairy G, DO   fluticasone-umeclidin-vilanter (TRELEGY ELLIPTA) 100-62.5-25 mcg DsDv Inhale 1 puff into the lungs once daily.   Yes Provider, Historical   gabapentin (NEURONTIN) 300 MG capsule Take 300 mg by mouth as needed. 6/12/23  Yes Provider, Historical   LORazepam (ATIVAN) 1 MG tablet Take 1 tablet (1 mg total) by mouth every 12 (twelve) hours as needed for Anxiety. 12/5/23  Yes Tory Figueroa MD   NON FORMULARY MEDICATION Place 20 mcg onto the skin every 7 days. Buprenorphine trasdermal system patch apply one patch q7days   Yes Provider, Historical   oxyCODONE (ROXICODONE) 30 MG Tab Take 30 mg by mouth every 6 (six) hours as needed. 7/7/23  Yes Provider, Historical   sertraline (ZOLOFT) 50 MG tablet Take 1 tablet (50 mg total) by mouth once daily. 1/13/23 12/9/23 Yes Reyes, Thairy G, DO   temazepam (RESTORIL) 30 mg capsule Take 30 mg by mouth every evening. 1/24/23  Yes Provider, Historical   medical supply, miscellaneous (O-2 SUSPENSORY MISC) 2 L by Misc.(Non-Drug; Combo Route) route.    Provider, Historical       ALLERGIES   Penicillin  REVIEW OF SYSTEMS   Except as documented above, all other systems reviewed and negative    PHYSICAL EXAM     Vitals:    12/10/23 0940   BP: 106/70   Pulse: (!) 117   Resp: 17   Temp:       General:  Cachectic, frail  Head and neck:  Atraumatic, normocephalic, moist mucous membranes, supple neck  Chest:  Clear to auscultation bilaterally  Heart:  S1, S2, no appreciable murmur, tachycardic  Abdomen:  Soft, nontender, BS +  MSK:  Warm, no lower extremity edema, no clubbing or cyanosis  Neuro:   Alert and oriented x4, moving all extremities with good strength  Integumentary:  No obvious skin rash  Psychiatry:  Appropriate mood and affect  ASSESSMENT AND PLAN   Acute on chronic hypoxemic respiratory failure  RSV +  Emphysema/COPD  -has triology at home as well as trelegy  -qhs cpap  -nebs, prednisone 40mg for 5 days     CHF?  -record review reveals patient was discharged with a LifeVest at 1 point however underwent subsequent left heart catheterization that revealed recovered EF (30-->50%)  -follows with Dr. Orozco     Sinus Tachycardia  -likely 2/2 hypoxemia however pt is on 3L which is an improvement from baseline  -metoprolol    Myoclonus/anxiety   -follows with Neurology, Dr. Waller   -continue Ativan 1 mg q.12 for myoclonus and anxiety    Chronic pain  -continue home dose of oxycodone   -patient with a buprenorphine patch that he replace his weekly, will discuss with pharmacy tomorrow    Pulmonary cachexia   Protein calorie malnutrition  -dietary consult      DVT prophylaxis:  Lovenox   Admit to observation status under my care  Critical care time 35 minutes for sinus tachycardia requiring diltiazem IV push  __________________________________________________________________  LABS/MICRO/MEDS/DIAGNOSTICS       LABS  Recent Labs     12/09/23  1810   *   K 4.1   CHLORIDE 97*   CO2 26   BUN 11.4   CREATININE 0.63*   GLUCOSE 111   CALCIUM 9.5   ALKPHOS 65   AST 29   ALT 20   ALBUMIN 2.6*     Recent Labs     12/09/23  1810   WBC 9.78   RBC 3.74*   HCT 37.4*   .0*          MICROBIOLOGY  Microbiology Results (last 7 days)       ** No results found for the last 168 hours. **            MEDICATIONS   albuterol-ipratropium  3 mL Nebulization Q6H    famotidine  20 mg Oral BID    metoprolol tartrate  25 mg Oral BID    sertraline  50 mg Oral Daily      INFUSIONS      DIAGNOSTIC TESTS  X-Ray Chest 1 View   Final Result      New bilateral interstitial opacities within the lungs may be related to  atypical infection superimposed on background emphysema.         Electronically signed by: Sharon Johnson   Date:    12/09/2023   Time:    18:35             Patient information was obtained from patient, patient's family, past medical records and ER records.   All diagnosis and differential diagnosis have been reviewed; assessment and plan has been documented. I have personally reviewed the labs and test results that are presently available; I have reviewed the patients medication list. I have reviewed the consulting providers response and recommendations. I have reviewed or attempted to review medical records based upon their availability.  All of the patient's questions have been addressed and answered. Patient's is agreeable to the above stated plan. I will continue to monitor closely and make adjustments to medical management as needed.  This note was created using [employer] voice recognition software that occasionally misinterpreted phrases or words.  Please contact me if any questions may rise regarding documentation to clarify verbiage.        Thairy G Reyes,    Internal Medicine  Department of Hospital Medicine [facility] - [facility] Surgical Unit

## 2023-12-11 LAB
ABS NEUT CALC (OHS): 7.81 X10(3)/MCL (ref 2.1–9.2)
ANION GAP SERPL CALC-SCNC: 10 MEQ/L
BUN SERPL-MCNC: 22 MG/DL (ref 8.4–25.7)
CALCIUM SERPL-MCNC: 8.9 MG/DL (ref 8.8–10)
CHLORIDE SERPL-SCNC: 100 MMOL/L (ref 98–107)
CO2 SERPL-SCNC: 28 MMOL/L (ref 23–31)
CREAT SERPL-MCNC: 0.64 MG/DL (ref 0.73–1.18)
CREAT/UREA NIT SERPL: 34
ERYTHROCYTE [DISTWIDTH] IN BLOOD BY AUTOMATED COUNT: 14 % (ref 11.5–17)
GFR SERPLBLD CREATININE-BSD FMLA CKD-EPI: >60 MLS/MIN/1.73/M2
GLUCOSE SERPL-MCNC: 122 MG/DL (ref 82–115)
HCT VFR BLD AUTO: 37.3 % (ref 42–52)
HEMATOLOGIST REVIEW: NORMAL
HGB BLD-MCNC: 11.8 G/DL (ref 14–18)
LYMPH ABN # BLD MANUAL: 9 %
LYMPHOCYTES NFR BLD MANUAL: 0.67 X10(3)/MCL
LYMPHOCYTES NFR BLD MANUAL: 7 % (ref 13–40)
MAGNESIUM SERPL-MCNC: 2.3 MG/DL (ref 1.6–2.6)
MCH RBC QN AUTO: 32.1 PG (ref 27–31)
MCHC RBC AUTO-ENTMCNC: 31.6 G/DL (ref 33–36)
MCV RBC AUTO: 101.4 FL (ref 80–94)
MONOCYTES NFR BLD MANUAL: 0.19 X10(3)/MCL (ref 0.1–1.3)
MONOCYTES NFR BLD MANUAL: 2 % (ref 2–11)
NEUTROPHILS NFR BLD MANUAL: 82 % (ref 47–80)
PLATELET # BLD AUTO: 244 X10(3)/MCL (ref 130–400)
PLATELET # BLD EST: NORMAL 10*3/UL
PMV BLD AUTO: 10.2 FL (ref 7.4–10.4)
POTASSIUM SERPL-SCNC: 4.2 MMOL/L (ref 3.5–5.1)
RBC # BLD AUTO: 3.68 X10(6)/MCL (ref 4.7–6.1)
RBC MORPH BLD: NORMAL
SODIUM SERPL-SCNC: 138 MMOL/L (ref 136–145)
T4 FREE SERPL-MCNC: 1.07 NG/DL (ref 0.7–1.48)
TSH SERPL-ACNC: 0.04 UIU/ML (ref 0.35–4.94)
WBC # SPEC AUTO: 9.52 X10(3)/MCL (ref 4.5–11.5)

## 2023-12-11 PROCEDURE — 27000221 HC OXYGEN, UP TO 24 HOURS

## 2023-12-11 PROCEDURE — 83735 ASSAY OF MAGNESIUM: CPT | Performed by: STUDENT IN AN ORGANIZED HEALTH CARE EDUCATION/TRAINING PROGRAM

## 2023-12-11 PROCEDURE — 25000003 PHARM REV CODE 250: Performed by: STUDENT IN AN ORGANIZED HEALTH CARE EDUCATION/TRAINING PROGRAM

## 2023-12-11 PROCEDURE — 99900035 HC TECH TIME PER 15 MIN (STAT)

## 2023-12-11 PROCEDURE — 84439 ASSAY OF FREE THYROXINE: CPT | Performed by: STUDENT IN AN ORGANIZED HEALTH CARE EDUCATION/TRAINING PROGRAM

## 2023-12-11 PROCEDURE — A4216 STERILE WATER/SALINE, 10 ML: HCPCS | Performed by: STUDENT IN AN ORGANIZED HEALTH CARE EDUCATION/TRAINING PROGRAM

## 2023-12-11 PROCEDURE — 80048 BASIC METABOLIC PNL TOTAL CA: CPT | Performed by: STUDENT IN AN ORGANIZED HEALTH CARE EDUCATION/TRAINING PROGRAM

## 2023-12-11 PROCEDURE — 97166 OT EVAL MOD COMPLEX 45 MIN: CPT

## 2023-12-11 PROCEDURE — 96366 THER/PROPH/DIAG IV INF ADDON: CPT

## 2023-12-11 PROCEDURE — C1751 CATH, INF, PER/CENT/MIDLINE: HCPCS

## 2023-12-11 PROCEDURE — 27000207 HC ISOLATION

## 2023-12-11 PROCEDURE — 63600175 PHARM REV CODE 636 W HCPCS: Performed by: STUDENT IN AN ORGANIZED HEALTH CARE EDUCATION/TRAINING PROGRAM

## 2023-12-11 PROCEDURE — 94640 AIRWAY INHALATION TREATMENT: CPT

## 2023-12-11 PROCEDURE — 96372 THER/PROPH/DIAG INJ SC/IM: CPT | Performed by: STUDENT IN AN ORGANIZED HEALTH CARE EDUCATION/TRAINING PROGRAM

## 2023-12-11 PROCEDURE — 97161 PT EVAL LOW COMPLEX 20 MIN: CPT

## 2023-12-11 PROCEDURE — 11000001 HC ACUTE MED/SURG PRIVATE ROOM

## 2023-12-11 PROCEDURE — 94760 N-INVAS EAR/PLS OXIMETRY 1: CPT

## 2023-12-11 PROCEDURE — 85027 COMPLETE CBC AUTOMATED: CPT | Performed by: STUDENT IN AN ORGANIZED HEALTH CARE EDUCATION/TRAINING PROGRAM

## 2023-12-11 PROCEDURE — 84443 ASSAY THYROID STIM HORMONE: CPT | Performed by: STUDENT IN AN ORGANIZED HEALTH CARE EDUCATION/TRAINING PROGRAM

## 2023-12-11 PROCEDURE — 25000242 PHARM REV CODE 250 ALT 637 W/ HCPCS: Performed by: STUDENT IN AN ORGANIZED HEALTH CARE EDUCATION/TRAINING PROGRAM

## 2023-12-11 RX ORDER — BUPRENORPHINE 20 UG/H
20 PATCH TRANSDERMAL WEEKLY
Status: DISCONTINUED | OUTPATIENT
Start: 2023-12-11 | End: 2023-12-14 | Stop reason: HOSPADM

## 2023-12-11 RX ORDER — LEVALBUTEROL INHALATION SOLUTION 1.25 MG/3ML
1.25 SOLUTION RESPIRATORY (INHALATION) EVERY 4 HOURS
Status: DISCONTINUED | OUTPATIENT
Start: 2023-12-11 | End: 2023-12-14 | Stop reason: HOSPADM

## 2023-12-11 RX ORDER — BUPRENORPHINE 20 UG/H
20 PATCH TRANSDERMAL WEEKLY
Status: DISCONTINUED | OUTPATIENT
Start: 2023-12-11 | End: 2023-12-11

## 2023-12-11 RX ADMIN — LEVALBUTEROL HYDROCHLORIDE 1.25 MG: 1.25 SOLUTION RESPIRATORY (INHALATION) at 11:12

## 2023-12-11 RX ADMIN — APIXABAN 5 MG: 5 TABLET, FILM COATED ORAL at 08:12

## 2023-12-11 RX ADMIN — OXYCODONE HYDROCHLORIDE 30 MG: 10 TABLET ORAL at 01:12

## 2023-12-11 RX ADMIN — OXYCODONE HYDROCHLORIDE 30 MG: 10 TABLET ORAL at 08:12

## 2023-12-11 RX ADMIN — APIXABAN 5 MG: 5 TABLET, FILM COATED ORAL at 12:12

## 2023-12-11 RX ADMIN — LEVALBUTEROL HYDROCHLORIDE 1.25 MG: 1.25 SOLUTION RESPIRATORY (INHALATION) at 03:12

## 2023-12-11 RX ADMIN — OXYCODONE HYDROCHLORIDE 30 MG: 10 TABLET ORAL at 11:12

## 2023-12-11 RX ADMIN — FAMOTIDINE 20 MG: 20 TABLET ORAL at 08:12

## 2023-12-11 RX ADMIN — OXYCODONE HYDROCHLORIDE 30 MG: 10 TABLET ORAL at 04:12

## 2023-12-11 RX ADMIN — MORPHINE SULFATE 4 MG: 4 INJECTION, SOLUTION INTRAMUSCULAR; INTRAVENOUS at 01:12

## 2023-12-11 RX ADMIN — SODIUM CHLORIDE, PRESERVATIVE FREE 10 ML: 5 INJECTION INTRAVENOUS at 12:12

## 2023-12-11 RX ADMIN — PREDNISONE 40 MG: 20 TABLET ORAL at 08:12

## 2023-12-11 RX ADMIN — SERTRALINE HYDROCHLORIDE 50 MG: 50 TABLET ORAL at 08:12

## 2023-12-11 RX ADMIN — LEVALBUTEROL HYDROCHLORIDE 1.25 MG: 1.25 SOLUTION RESPIRATORY (INHALATION) at 07:12

## 2023-12-11 RX ADMIN — MORPHINE SULFATE 4 MG: 4 INJECTION, SOLUTION INTRAMUSCULAR; INTRAVENOUS at 07:12

## 2023-12-11 RX ADMIN — LEVALBUTEROL HYDROCHLORIDE 1.25 MG: 1.25 SOLUTION RESPIRATORY (INHALATION) at 01:12

## 2023-12-11 RX ADMIN — METOPROLOL TARTRATE 25 MG: 25 TABLET, FILM COATED ORAL at 08:12

## 2023-12-11 RX ADMIN — ENOXAPARIN SODIUM 60 MG: 60 INJECTION SUBCUTANEOUS at 01:12

## 2023-12-11 RX ADMIN — BUPRENORPHINE 20 MCG: 20 PATCH TRANSDERMAL at 02:12

## 2023-12-11 RX ADMIN — LEVALBUTEROL HYDROCHLORIDE 1.25 MG: 1.25 SOLUTION RESPIRATORY (INHALATION) at 05:12

## 2023-12-11 NOTE — PLAN OF CARE
Problem: Adult Inpatient Plan of Care  Goal: Plan of Care Review  Outcome: Ongoing, Progressing  Flowsheets (Taken 12/10/2023 1811)  Plan of Care Reviewed With:   patient   family  Goal: Patient-Specific Goal (Individualized)  Outcome: Ongoing, Progressing  Flowsheets (Taken 12/10/2023 1811)  Anxieties, Fears or Concerns: c/o pain all over, heart rate elevated  Individualized Care Needs: metoprolol IV given and cardizem infusion initiated, monitor heart rate, admin IV meds, pain manaagement  Goal: Absence of Hospital-Acquired Illness or Injury  Outcome: Ongoing, Progressing  Intervention: Identify and Manage Fall Risk  Flowsheets (Taken 12/10/2023 1811)  Safety Promotion/Fall Prevention:   assistive device/personal item within reach   bed alarm set   nonskid shoes/socks when out of bed   room near unit station   instructed to call staff for mobility  Intervention: Prevent Skin Injury  Flowsheets (Taken 12/10/2023 1811)  Body Position:   position changed independently   sitting up in bed   weight shifting  Skin Protection:   adhesive use limited   tubing/devices free from skin contact  Intervention: Prevent and Manage VTE (Venous Thromboembolism) Risk  Flowsheets (Taken 12/10/2023 1811)  Activity Management:   Sitting at edge of bed - L2   Rolling - L1   Arm raise - L1  VTE Prevention/Management:   bleeding precations maintained   bleeding risk assessed  Range of Motion: active ROM (range of motion) encouraged  Intervention: Prevent Infection  Flowsheets (Taken 12/10/2023 1811)  Infection Prevention:   hand hygiene promoted   single patient room provided   equipment surfaces disinfected   environmental surveillance performed   personal protective equipment utilized   rest/sleep promoted  Goal: Optimal Comfort and Wellbeing  Outcome: Ongoing, Progressing  Intervention: Monitor Pain and Promote Comfort  Flowsheets (Taken 12/10/2023 1811)  Pain Management Interventions:   care clustered   relaxation techniques  promoted   quiet environment facilitated   pillow support provided  Intervention: Provide Person-Centered Care  Flowsheets (Taken 12/10/2023 1811)  Trust Relationship/Rapport:   care explained   choices provided   questions answered  Goal: Readiness for Transition of Care  Outcome: Ongoing, Progressing     Problem: Infection  Goal: Absence of Infection Signs and Symptoms  Outcome: Ongoing, Progressing     Problem: Impaired Wound Healing  Goal: Optimal Wound Healing  Outcome: Ongoing, Progressing

## 2023-12-11 NOTE — PT/OT/SLP EVAL
Occupational Therapy   Evaluation    Name: Balbir Rogers  MRN: [id number]  Admitting Diagnosis:  RSV bronchitis      History:   Balbir Rogers is a 63 y.o. male with a medical diagnosis of RSV bronchitis.    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         Past Surgical History:   Procedure Laterality Date    CERVICAL LAMINECTOMY WITH SPINAL FUSION      CHOLECYSTECTOMY      COLONOSCOPY      ESOPHAGOGASTRODUODENOSCOPY      gastrointestinal biopsy      graft to nose      inguinal herniotomy      LEFT HEART CATHETERIZATION Left 2/1/2023    Procedure: CATHETERIZATION, HEART, LEFT;  Surgeon: Dulce Orozco MD;  Location: [facility] CATH LAB;  Service: Cardiology;  Laterality: Left;  via radial approach    mandible operation      POLYPECTOMY         Subjective     Chief Complaint: not being able to get out of bed  Patient/Family Comments/goals: return home to [facility]    Occupational Profile:  Lives with: wife  Home Environment: [facility], [address] (B handrails)  Previous level of function: Ind-SBA for bADL tasks; Pt reported wife occassionally assists c getting in and out of tub  Equipment Used at Home:  shower chair, oxygen, cane, straight      Pain/Comfort:  Pain Rating 1: 0/10    Blood Pressure:     Objective:     Communicated with: NSG and physical therapy prior to session.  Patient found HOB elevated with blood pressure cuff, oxygen, peripheral IV, PICC line, pulse ox (continuous), telemetry upon OT entry to room.    General Precautions: Standard, droplet   Orthopedic Precautions:N/A   Braces: N/A  Respiratory Status:  Nasal cannula, flow 4 L/min    Occupational Performance:    Mobility  Assist level Comments    Bed mobility independent    Transfer independent    Functional mobility independent    Sit to stand transitions independent    Other:          Activities of Daily Living Assist level Comments    Feeding independent    Grooming/hygiene independent    Bathing contact guard Occassional assistance needed for shower t/fs (baseline)   Upper body dressing independent    Lower body dressing independent    Toileting independent    Toilet transfer independent    Adaptive equipment training     Other:       Physical Exam:  Upper Extremity Range of Motion:     -       Right Upper Extremity: WFL  -       Left Upper Extremity: WFL  Upper Extremity Strength:    -       Right Upper Extremity: WFL  -       Left Upper Extremity: WFL    Cognitive/Visual Perceptual:  Cognitive/Psychosocial Skills:     -       Oriented to: Person, Place, Time, and Situation       Patient left HOB elevated with all lines intact, call button in reach, and bed alarm on    Assessment:     Pt is a 63-year-old male with a PMHx of emphysema, myoclonus, chronic pain, COPD, CHF. Pt on 5L home O2. Currently on 4L nasal canula at time of eval. Pt performing all bADLs at baseline functional performance level. Skilled OT services are not warranted at this time. Please re-consult if change in status occurs.       Plan:     Patient does not warrant OT services at this time due to: Pt at baseline occupational performance level.  Plan of Care Reviewed with: patient, son      Recommendations:     Discharge Recommendations:  Home c HH PT  Discharge Equipment Recommendations:  none      Time Tracking:     OT Date of Treatment: 12/11/23  OT Start Time: 0845  OT Stop Time: 0920  OT Total Time (min): 35 min    Billable Minutes:Evaluation 35    12/11/2023

## 2023-12-11 NOTE — CONSULTS
Inpatient consult to Cardiology  Consult performed by: Caryl Iniguez FNP  Consult ordered by: Reyes, Thairy G, DO  Reason for consult: New onset Afib RVR      [facility]    Cardiology  Consult Note    Patient Name: Balbir Rogers  MRN: [id number]  Admission Date: 12/9/2023  Hospital Length of Stay: 0 days  Code Status: DNR   Attending Provider: Reyes, Thairy G, DO   Consulting Provider: KARIN Lopez  Primary Care Physician: Jose Johnson Jr., MD  Principal Problem:RSV bronchitis    Patient information was obtained from patient, past medical records, ER records, and primary team.     Subjective:     Chief Complaint:  Reason for consult: new onset Afib with RVR     HPI: Patient is a 63 year old male known to [facility], Dr. Orozco with PMHx cardiomyopathy, COPD-emphysema, CHF, myoclonus, chronic pain.  He presented to ED on 12/9/2023 with SOB x 1 week, headache and flu-like symptoms. He is maintained at home on O2 5L NC and in ED his sat was 87% on RA.  He was diagnosed with RSV. EKG from 12/10/2023 revealed some Afib which is new for patient. Metoprolol tartrate 25 mg po BID was started as well as cardizem gtt.  Patient is currently on enoxaparin 60 mg SC q 12 hours.  [facility] has been consulted for new onset Afib with RVR.      PMH: Cardiomyopathy, CHF, COPD/emphysema, myoclonus, chronic pain  PSH: cervical laminectomy with spinal fusion, cholecystectomy, colonoscopy, EGD, graft to nose, inguinal herniotomy, [facility] (2/2023), polypectomy  Family History: Non-contributory  Social History: Former tobacco user; currently managed with oxycodone    Previous Cardiac Diagnostics:   Echocardiogram 12/10/2023:  Left Ventricle: The left ventricle is normal in size. Normal wall thickness. Normal wall motion. There is normal systolic function with a visually estimated ejection fraction of 55 - 60%.  Right Ventricle: Normal right ventricular cavity size. Wall thickness is normal. Systolic  function is normal.  Aortic Valve: The aortic valve is a trileaflet valve.    Carotid US 8/16/2023:  The study quality is average.   40-59% stenosis in the proximal right internal carotid artery based on Bluth Criteria.   60-79% stenosis in the proximal left internal carotid artery based on Bluth Criteria. However, the ICA/CCA ratio is 1.6 which is consistent with 40-59% stenosis.   Antegrade right vertebral artery flow.   Antegrade left vertebral artery flow.     [facility] 2/1/2023:  Moderate nonobstructive CAD.  Coronary findings:   Dominance: right   Left main:  Patent vessel that bifurcates into a LAD and circumflex system.  Left anterior descending artery:  Calcified patent type 2 vessel.  Medium-sized patent D1 in the very large bifurcating patent D2 share a common ostial and arise at the mid segment just distal to the large septal.  Circumflex artery:  Large non dominant vessel that gives rise to a moderate sized bifurcating OM2.  The distal circumflex demonstrates serial 50-60% stenosis prior to the terminal large OM3.  The OM1 is small.  Right coronary artery:  Large dominant vessel with diffuse luminal irregularities.  The RCA terminates in a large PDA and a moderate PL segment providing 2 small to medium sized PL branches   Left ventriculography:  The ejection fraction was 50% with EDP of 12 mmHg.    Discontinue LifeVest        Review of patient's allergies indicates:   Allergen Reactions    Penicillin Rash       Current Facility-Administered Medications on File Prior to Encounter   Medication    diazePAM tablet 10 mg    diphenhydrAMINE capsule 50 mg    iopamidoL (ISOVUE-370) injection    sodium chloride 0.9% flush 10 mL     Current Outpatient Medications on File Prior to Encounter   Medication Sig    albuterol-ipratropium (DUO-NEB) 2.5 mg-0.5 mg/3 mL nebulizer solution Take 3 mLs by nebulization every 6 (six) hours as needed for Wheezing.    fluticasone-umeclidin-vilanter (TRELEGY ELLIPTA) 100-62.5-25 mcg DsDv  Inhale 1 puff into the lungs once daily.    gabapentin (NEURONTIN) 300 MG capsule Take 300 mg by mouth as needed.    LORazepam (ATIVAN) 1 MG tablet Take 1 tablet (1 mg total) by mouth every 12 (twelve) hours as needed for Anxiety.    NON FORMULARY MEDICATION Place 20 mcg onto the skin every 7 days. Buprenorphine trasdermal system patch apply one patch q7days    oxyCODONE (ROXICODONE) 30 MG Tab Take 30 mg by mouth every 6 (six) hours as needed.    sertraline (ZOLOFT) 50 MG tablet Take 1 tablet (50 mg total) by mouth once daily.    temazepam (RESTORIL) 30 mg capsule Take 30 mg by mouth every evening.    medical supply, miscellaneous (O-2 SUSPENSORY MISC) 2 L by Misc.(Non-Drug; Combo Route) route.       Review of Systems   Respiratory:  Positive for shortness of breath.    All other systems reviewed and are negative.      Objective:     Vital Signs (Most Recent):  Temp: 97.5 °F (36.4 °C) (12/10/23 1351)  Pulse: 84 (12/11/23 0514)  Resp: 20 (12/11/23 0514)  BP: 110/67 (12/10/23 1930)  SpO2: (!) 94 % (12/11/23 0514) Vital Signs (24h Range):  Temp:  [97.5 °F (36.4 °C)-97.8 °F (36.6 °C)] 97.5 °F (36.4 °C)  Pulse:  [] 84  Resp:  [15-24] 20  SpO2:  [90 %-96 %] 94 %  BP: ()/(64-89) 110/67     Weight: 60.6 kg (133 lb 9.6 oz)  Body mass index is 19.17 kg/m².    SpO2: (!) 94 %         Intake/Output Summary (Last 24 hours) at 12/11/2023 0656  Last data filed at 12/11/2023 0511  Gross per 24 hour   Intake 710 ml   Output 1700 ml   Net -990 ml       Lines/Drains/Airways       Peripheral Intravenous Line  Duration                  Midline Catheter Insertion/Assessment  - Single Lumen 01/04/23 1548 Left basilic vein (medial side of arm) other (see comments) 340 days         Peripheral IV - Single Lumen 12/09/23 1919 22 G Anterior;Left Forearm 1 day         Midline Catheter Insertion/Assessment  - Single Lumen 12/10/23 1619 Right brachial vein <1 day                    Significant Labs:  Recent Results (from the past 72  hour(s))   COVID/RSV/FLU A&B PCR    Collection Time: 12/09/23  5:27 PM   Result Value Ref Range    Influenza A PCR Not Detected Not Detected    Influenza B PCR Not Detected Not Detected    Respiratory Syncytial Virus PCR Detected (A) Not Detected    SARS-CoV-2 PCR Not Detected Not Detected, Negative   Strep Group A by PCR    Collection Time: 12/09/23  5:27 PM   Result Value Ref Range    STREP A PCR ([facility]) Not Detected Not Detected   Comprehensive metabolic panel    Collection Time: 12/09/23  6:10 PM   Result Value Ref Range    Sodium Level 132 (L) 136 - 145 mmol/L    Potassium Level 4.1 3.5 - 5.1 mmol/L    Chloride 97 (L) 98 - 107 mmol/L    Carbon Dioxide 26 23 - 31 mmol/L    Glucose Level 111 82 - 115 mg/dL    Blood Urea Nitrogen 11.4 8.4 - 25.7 mg/dL    Creatinine 0.63 (L) 0.73 - 1.18 mg/dL    Calcium Level Total 9.5 8.8 - 10.0 mg/dL    Protein Total 6.5 5.8 - 7.6 gm/dL    Albumin Level 2.6 (L) 3.4 - 4.8 g/dL    Globulin 3.9 (H) 2.4 - 3.5 gm/dL    Albumin/Globulin Ratio 0.7 (L) 1.1 - 2.0 ratio    Bilirubin Total 0.4 <=1.5 mg/dL    Alkaline Phosphatase 65 40 - 150 unit/L    Alanine Aminotransferase 20 0 - 55 unit/L    Aspartate Aminotransferase 29 5 - 34 unit/L    eGFR >60 mls/min/1.73/m2   Brain natriuretic peptide    Collection Time: 12/09/23  6:10 PM   Result Value Ref Range    Natriuretic Peptide 59.7 <=100.0 pg/mL   CBC with Differential    Collection Time: 12/09/23  6:10 PM   Result Value Ref Range    WBC 9.78 4.50 - 11.50 x10(3)/mcL    RBC 3.74 (L) 4.70 - 6.10 x10(6)/mcL    Hgb 12.1 (L) 14.0 - 18.0 g/dL    Hct 37.4 (L) 42.0 - 52.0 %    .0 (H) 80.0 - 94.0 fL    MCH 32.4 (H) 27.0 - 31.0 pg    MCHC 32.4 (L) 33.0 - 36.0 g/dL    RDW 13.9 11.5 - 17.0 %    Platelet 187 130 - 400 x10(3)/mcL    MPV 9.6 7.4 - 10.4 fL    Neut % 89.6 %    Lymph % 5.9 %    Mono % 4.2 %    Eos % 0.0 %    Basophil % 0.1 %    Lymph # 0.58 (L) 0.6 - 4.6 x10(3)/mcL    Neut # 8.76 2.1 - 9.2 x10(3)/mcL    Mono # 0.41 0.1 - 1.3 x10(3)/mcL     Eos # 0.00 0 - 0.9 x10(3)/mcL    Baso # 0.01 <=0.2 x10(3)/mcL    IG# 0.02 0 - 0.04 x10(3)/mcL    IG% 0.2 %   Echo    Collection Time: 12/10/23  5:28 PM   Result Value Ref Range    BSA 1.73 m2   TSH    Collection Time: 12/11/23  3:32 AM   Result Value Ref Range    TSH 0.041 (L) 0.350 - 4.940 uIU/mL   Basic Metabolic Panel    Collection Time: 12/11/23  3:32 AM   Result Value Ref Range    Sodium Level 138 136 - 145 mmol/L    Potassium Level 4.2 3.5 - 5.1 mmol/L    Chloride 100 98 - 107 mmol/L    Carbon Dioxide 28 23 - 31 mmol/L    Glucose Level 122 (H) 82 - 115 mg/dL    Blood Urea Nitrogen 22.0 8.4 - 25.7 mg/dL    Creatinine 0.64 (L) 0.73 - 1.18 mg/dL    BUN/Creatinine Ratio 34     Calcium Level Total 8.9 8.8 - 10.0 mg/dL    Anion Gap 10.0 mEq/L    eGFR >60 mls/min/1.73/m2   Magnesium    Collection Time: 12/11/23  3:32 AM   Result Value Ref Range    Magnesium Level 2.30 1.60 - 2.60 mg/dL   CBC with Differential    Collection Time: 12/11/23  3:32 AM   Result Value Ref Range    WBC 9.52 4.50 - 11.50 x10(3)/mcL    RBC 3.68 (L) 4.70 - 6.10 x10(6)/mcL    Hgb 11.8 (L) 14.0 - 18.0 g/dL    Hct 37.3 (L) 42.0 - 52.0 %    .4 (H) 80.0 - 94.0 fL    MCH 32.1 (H) 27.0 - 31.0 pg    MCHC 31.6 (L) 33.0 - 36.0 g/dL    RDW 14.0 11.5 - 17.0 %    Platelet 244 130 - 400 x10(3)/mcL    MPV 10.2 7.4 - 10.4 fL   Manual Differential    Collection Time: 12/11/23  3:32 AM   Result Value Ref Range    Neutrophils % 82 (H) 47 - 80 %    Lymphs % 7 (L) 13 - 40 %    Monocytes % 2 2 - 11 %    Abnormal Lymphs % 9 %    Neutrophils Abs Calc 7.8064 2.1 - 9.2 x10(3)/mcL    Lymphs Abs 0.6664 0.6 - 4.6 x10(3)/mcL    Monocytes Abs 0.1904 0.1 - 1.3 x10(3)/mcL    Platelets Normal Normal, Adequate    RBC Morph Normal Normal   T4, Free    Collection Time: 12/11/23  3:32 AM   Result Value Ref Range    Thyroxine Free 1.07 0.70 - 1.48 ng/dL       Significant Imaging:  Imaging Results              X-Ray Chest 1 View (Final result)  Result time 12/09/23 18:35:27       Final result by Sharon Johnson MD (12/09/23 18:35:27)                   Impression:      New bilateral interstitial opacities within the lungs may be related to atypical infection superimposed on background emphysema.      Electronically signed by: Sharon Johnson  Date:    12/09/2023  Time:    18:35               Narrative:    EXAMINATION:  XR CHEST 1 VIEW    CLINICAL HISTORY:  Shortness of breath    TECHNIQUE:  Single frontal view of the chest was performed.    COMPARISON:  11/24/2023    FINDINGS:  LINES AND TUBES: None    MEDIASTINUM AND JEANNINE: The cardiac silhouette is normal.    LUNGS: The lungs are hyperexpanded with biapical scarring and emphysematous change.  There are superimposed bilateral interstitial opacities, new from previous.    PLEURA:No pleural effusion. No pneumothorax.    OTHER: No acute osseous abnormality.                                      EKG:      Results for orders placed or performed during the hospital encounter of 12/09/23   EKG 12-lead    Narrative    Test Reason : I49.9,    Vent. Rate : 113 BPM     Atrial Rate : 147 BPM     P-R Int : 000 ms          QRS Dur : 076 ms      QT Int : 276 ms       P-R-T Axes : 000 079 073 degrees     QTc Int : 378 ms    Atrial fibrillation with rapid ventricular response  Abnormal ECG  No previous ECGs available    Referred By: [email or web address]   SELF           Confirmed By:        Telemetry:  Afib 85 bpm    Physical Exam  Vitals reviewed.   Constitutional:       Appearance: He is cachectic.   HENT:      Head: Normocephalic and atraumatic.      Mouth/Throat:      Mouth: Mucous membranes are moist.      Pharynx: Oropharynx is clear.   Eyes:      Conjunctiva/sclera: Conjunctivae normal.      Pupils: Pupils are equal, round, and reactive to light.   Cardiovascular:      Rate and Rhythm: Normal rate. Rhythm irregularly irregular.   Pulmonary:      Effort: Pulmonary effort is normal.      Breath sounds: Normal breath sounds.      Comments: O2 @ 3L NC with  O2 sat 94%  Musculoskeletal:      Cervical back: Normal range of motion and neck supple.      Right lower leg: No edema.      Left lower leg: No edema.   Neurological:      Mental Status: He is alert.         Home Medications:   Current Facility-Administered Medications on File Prior to Encounter   Medication Dose Route Frequency Provider Last Rate Last Admin    diazePAM tablet 10 mg  10 mg Oral On Call Procedure Dulce Orozco MD   10 mg at 02/01/23 0758    diphenhydrAMINE capsule 50 mg  50 mg Oral On Call Procedure Dulce Orozco MD   50 mg at 02/01/23 0759    iopamidoL (ISOVUE-370) injection    PRN Dulce Orozco MD   70 mL at 02/01/23 0901    sodium chloride 0.9% flush 10 mL  10 mL Intravenous PRN Dulce Orozco MD         Current Outpatient Medications on File Prior to Encounter   Medication Sig Dispense Refill    albuterol-ipratropium (DUO-NEB) 2.5 mg-0.5 mg/3 mL nebulizer solution Take 3 mLs by nebulization every 6 (six) hours as needed for Wheezing. 75 mL 0    fluticasone-umeclidin-vilanter (TRELEGY ELLIPTA) 100-62.5-25 mcg DsDv Inhale 1 puff into the lungs once daily.      gabapentin (NEURONTIN) 300 MG capsule Take 300 mg by mouth as needed.      LORazepam (ATIVAN) 1 MG tablet Take 1 tablet (1 mg total) by mouth every 12 (twelve) hours as needed for Anxiety. 10 tablet 5    NON FORMULARY MEDICATION Place 20 mcg onto the skin every 7 days. Buprenorphine trasdermal system patch apply one patch q7days      oxyCODONE (ROXICODONE) 30 MG Tab Take 30 mg by mouth every 6 (six) hours as needed.      sertraline (ZOLOFT) 50 MG tablet Take 1 tablet (50 mg total) by mouth once daily. 30 tablet 0    temazepam (RESTORIL) 30 mg capsule Take 30 mg by mouth every evening.      medical supply, miscellaneous (O-2 SUSPENSORY MISC) 2 L by Misc.(Non-Drug; Combo Route) route.         Current Inpatient Medications:    Current Facility-Administered Medications:     acetaminophen tablet 650 mg, 650 mg, Oral, Q4H  PRN, Reyes, Thairy G, DO, 650 mg at 12/10/23 0859    albuterol-ipratropium 2.5 mg-0.5 mg/3 mL nebulizer solution 3 mL, 3 mL, Nebulization, Q6H PRN, Reyes, Thairy G, DO    aluminum-magnesium hydroxide-simethicone 200-200-20 mg/5 mL suspension 30 mL, 30 mL, Oral, QID PRN, Reyes, Thairy G, DO    bisacodyL suppository 10 mg, 10 mg, Rectal, Daily PRN, Reyes, Thairy G, DO    diltiaZEM (CARDIZEM) 125 mg in dextrose 5 % (D5W) 125 mL infusion, 5 mg/hr, Intravenous, Continuous, Reyes, Thairy G, DO, Last Rate: 5 mL/hr at 12/10/23 1651, 5 mg/hr at 12/10/23 1651    enoxaparin injection 60 mg, 1 mg/kg, Subcutaneous, Q12H (treatment, non-standard time), Reyes, Thairy G, DO, 60 mg at 12/11/23 0153    famotidine tablet 20 mg, 20 mg, Oral, BID, Reyes, Thairy G, DO, 20 mg at 12/10/23 2034    gabapentin capsule 300 mg, 300 mg, Oral, PRN, Reyes, Thairy G,     levalbuterol nebulizer solution 1.25 mg, 1.25 mg, Nebulization, Q4H, Reyes, Thairy G, DO    melatonin tablet 9 mg, 9 mg, Oral, Nightly PRN, Reyes, Thairy G, DO    metoprolol tartrate (LOPRESSOR) tablet 25 mg, 25 mg, Oral, BID, Reyes, Thairy G, DO, 25 mg at 12/10/23 2034    morphine injection 4 mg, 4 mg, Intravenous, Q4H PRN, Reyes, Thairy G,     naloxone 0.4 mg/mL injection 0.02 mg, 0.02 mg, Intravenous, PRN, Reyes, Thairy G,     ondansetron disintegrating tablet 8 mg, 8 mg, Oral, Q8H PRN, Reyes, Thairy G,     ondansetron injection 4 mg, 4 mg, Intravenous, Q8H PRN, Reyes, Thairy G, DO    oxyCODONE immediate release tablet Tab 30 mg, 30 mg, Oral, Q6H PRN, Reyes, Thairy G, , 30 mg at 12/11/23 0150    polyethylene glycol packet 17 g, 17 g, Oral, TID PRN, Reyes, Thairy G,     predniSONE tablet 40 mg, 40 mg, Oral, Daily, Reyes, Thairy G, , 40 mg at 12/10/23 1141    sertraline tablet 50 mg, 50 mg, Oral, Daily, Reyes, Thairy G, , 50 mg at 12/10/23 0859    simethicone chewable tablet 80 mg, 1 tablet, Oral, QID PRN, Reyes, Thairy G, DO    sodium chloride 0.9% flush 10 mL, 10  mL, Intravenous, PRN, Reyes, Thairy G, DO    Flushing PICC/Midline Protocol, , , Until Discontinued **AND** sodium chloride 0.9% flush 10 mL, 10 mL, Intravenous, Q6H, 10 mL at 12/10/23 1826 **AND** sodium chloride 0.9% flush 10 mL, 10 mL, Intravenous, PRN, Reyes, Thairy G, DO    trazodone split tablet 25 mg, 25 mg, Oral, Nightly PRN, Reyes, Thairy G, DO    Facility-Administered Medications Ordered in Other Encounters:     diazePAM tablet 10 mg, 10 mg, Oral, On Call Procedure, Dulce Orozco MD, 10 mg at 02/01/23 0758    diphenhydrAMINE capsule 50 mg, 50 mg, Oral, On Call Procedure, Dulce Orozco MD, 50 mg at 02/01/23 0759    iopamidoL (ISOVUE-370) injection, , , PRN, Dulce Orozco MD, 70 mL at 02/01/23 0901    sodium chloride 0.9% flush 10 mL, 10 mL, Intravenous, PRN, Dulce Orozco MD         VTE Risk Mitigation (From admission, onward)           Ordered     enoxaparin injection 60 mg  Every 12 hours         12/10/23 1309     Place DARION hose  Until discontinued         12/09/23 2019     IP VTE LOW RISK PATIENT  Once         12/09/23 2019                    Assessment:   New onset Afib RVR  --rate is now controlled  --on cardizem gtt  --[id number] VASc = 3 points  Hx of CHF  --Echo (12/10/2023)--EF 55-60%  RSV + with acute on chronic hypoxemic respiratory failure  COPD/emphysema  Myoclonus  --followed by neurologist, Dr. Waller  Chronic pain syndrome    No hx of GI bleed    Plan:   OK to discontinue cardizem gtt  Continue metoprolol tartrate 25 mg po BID  Discontinue Lovenox  Start Eliquis 5 mg po BID  Follow-up with Dr Orozco in 5-7 days post discharge  Will follow from [facility], if further assistance is needed in patient's cardiac care, please contact [facility].    Above recs discussed with Dr. Reyes      Thank you for your consult.     Caryl Iniguez, DANYAP  Cardiology  [facility]  12/11/2023 6:56 AM

## 2023-12-11 NOTE — NURSING
Heart rate elevated, metoprolol IV dose x1 given in am, midline placed in rt upper arm and cardizem infusion started @1651, vs stable, patient resting comfortably in bed, pain med given as requested for generalized pain.

## 2023-12-11 NOTE — PT/OT/SLP EVAL
Physical Therapy Evaluation      Patient Name: Balbir Rogers   MRN: [id number]  Recent Surgery: * No surgery found *      Recommendations:     Discharge Recommendations: Low Intensity Therapy   Discharge Equipment Recommendations: none   Barriers to discharge: Ongoing medical treatment    Assessment:     Balbir Rogers is a 63 y.o. male admitted with a medical diagnosis of RSV bronchitis. Patient's PMHx includes but is not limited to: Emphysema, myoclonus, chronic pain, CHF. Patient tolerated PT eval well and son at bedside. He presents with the following impairments/functional limitations: weakness, impaired functional mobility, decreased safety awareness, impaired cardiopulmonary response to activity, impaired endurance, gait instability, decreased lower extremity function. Patient currently demonstrates a need for Moderate Intensity therapy on a daily basis secondary to a decline in functional status due to illness.     Rehab Prognosis: Good; patient would benefit from acute PT services to address these deficits and reach maximum level of function.    Plan:     During this hospitalization, patient to be seen  (5-6x/week (QD/BID)) to address the above listed problems via gait training, therapeutic activities, therapeutic exercises    Plan of Care Expires: 12/18/23    Subjective     Chief Complaint: not being able to get OOB  Patient Comments/Goals: Pt eager to go home  Pain/Comfort:  Location - Side 1: Bilateral  Location 1: calf (described as muscle sorenss)  Pain Addressed 1: Nurse notified    Social History:  Living Environment: Patient lives with their spouse in a single story home with number of outside stair(s): 5 with Bilateral handrails; reports his son lives next door  Prior Level of Function: Prior to admission, patient was modified independent with ADLs using no AD for mobility and ambulated household distances using no AD; reports his wife occasionally assist with getting in and out of bathtub  Equipment  Used at Home: oxygen, CPAP, cane, straight  DME owned (not currently used): single point cane  Assistance Upon Discharge: family    Objective:     Communicated with nursing prior to session. Patient found HOB elevated with pulse ox (continuous), telemetry, oxygen, PICC line, peripheral IV upon PT entry to room.    General Precautions: Standard, droplet   Orthopedic Precautions: N/A   Braces: N/A    Respiratory Status: Nasal cannula, flow 4 L/min    Exams:  Cognition: Patient is oriented to Person, Place, Time, Situation  RLE ROM: WFL  RLE Strength: WFL  LLE ROM: WFL  LLE Strength: WFL    Functional Mobility:  Gait belt applied - Yes  Bed Mobility  Supine to Sit: independence for LE management and trunk management  Sit to Supine: independence for LE management and trunk management  Transfers  Sit to Stand: independence with no AD  Gait  Patient ambulated 30ft around patient room with no AD and independence. Patient demonstrates steady gait and decreased arm swing. All lines remained intact throughout ambulation trail, portable Supplemental O2 4L utilized, and portable monitor utilized.  Balance  Sitting: independence  Standing: independence    Therapeutic Activities and Exercises:   Patient educated on role of acute care PT and PT POC, safety while in hospital including calling nurse for mobility, and call light usage  Patient educated about importance of OOB mobility and remaining up in chair most of the day.    AM-PAC 6 CLICK MOBILITY  Total Score:     Patient left HOB elevated with all lines intact, call button in reach, RN notified, bed alarm on, and family present.    GOALS:   Multidisciplinary Problems       Physical Therapy Goals          Problem: Physical Therapy    Goal Priority Disciplines Outcome Goal Variances Interventions   Physical Therapy Goal     PT, PT/OT Ongoing, Progressing     Description: Goals to be met by: Discharge    Patient will increase functional independence with mobility by  performin. Sit to stand transfer with [place]  2. Gait  x 150 feet with [place] using No Assistive Devices LRAD.   3. Ascend/descend 4 stair with bilateral Handrails Modified [place] using No Assistive Device.     *all goals to be met with oxygen staying >92% SpO2*                         History:     Past Medical History:   Diagnosis Date    Acute clinical systolic heart failure     Acute hypercapnic respiratory failure     Aspiration pneumonia 2023    Bacteremia     Cardiomyopathy     Chronic, continuous use of opioids     Collapse, lung     Collapsed lung     COPD with hypoxia     Degenerative cervical spinal stenosis     Depression     Disuse osteoporosis     Emphysema/COPD     Hepatitis C     Hyperkalemia     Leukocytosis     Myoclonus 2022       Past Surgical History:   Procedure Laterality Date    CERVICAL LAMINECTOMY WITH SPINAL FUSION      CHOLECYSTECTOMY      COLONOSCOPY      ESOPHAGOGASTRODUODENOSCOPY      gastrointestinal biopsy      graft to nose      inguinal herniotomy      LEFT HEART CATHETERIZATION Left 2023    Procedure: CATHETERIZATION, HEART, LEFT;  Surgeon: Dulce Orozco MD;  Location: [facility] CATH LAB;  Service: Cardiology;  Laterality: Left;  via radial approach    mandible operation      POLYPECTOMY         Time Tracking:     PT Received On: 23  PT Start Time: 45  PT Stop Time: 920  PT Total Time (min): 35 min     Billable Minutes: Evaluation MIN complexity    2023

## 2023-12-11 NOTE — PLAN OF CARE
Problem: Adult Inpatient Plan of Care  Goal: Plan of Care Review  Outcome: Ongoing, Progressing  Flowsheets (Taken 12/11/2023 1741)  Plan of Care Reviewed With:   patient   son  Goal: Patient-Specific Goal (Individualized)  Outcome: Ongoing, Progressing  Flowsheets (Taken 12/11/2023 1741)  Anxieties, Fears or Concerns: Concerned about not sleeping  Individualized Care Needs: Monitor BP and Heart Rate, pain management  Goal: Absence of Hospital-Acquired Illness or Injury  Outcome: Ongoing, Progressing  Intervention: Identify and Manage Fall Risk  Flowsheets (Taken 12/11/2023 1741)  Safety Promotion/Fall Prevention:   assistive device/personal item within reach   bed alarm set   nonskid shoes/socks when out of bed   instructed to call staff for mobility  Intervention: Prevent Skin Injury  Flowsheets (Taken 12/11/2023 1741)  Body Position: sitting up in bed  Skin Protection:   adhesive use limited   pulse oximeter probe site changed   tubing/devices free from skin contact  Intervention: Prevent and Manage VTE (Venous Thromboembolism) Risk  Flowsheets (Taken 12/11/2023 1741)  Activity Management:   Ambulated to bathroom - L4   Walk with assistive devise and /or staff member - L3  VTE Prevention/Management:   ambulation promoted   bleeding precations maintained   bleeding risk assessed  Range of Motion: active ROM (range of motion) encouraged  Intervention: Prevent Infection  Flowsheets (Taken 12/11/2023 1741)  Infection Prevention:   cohorting utilized   environmental surveillance performed   equipment surfaces disinfected   hand hygiene promoted   personal protective equipment utilized   rest/sleep promoted   single patient room provided  Goal: Optimal Comfort and Wellbeing  Outcome: Ongoing, Progressing  Goal: Readiness for Transition of Care  Outcome: Ongoing, Progressing     Problem: Infection  Goal: Absence of Infection Signs and Symptoms  Outcome: Ongoing, Progressing     Problem: Impaired Wound Healing  Goal:  Optimal Wound Healing  Outcome: Ongoing, Progressing

## 2023-12-11 NOTE — PLAN OF CARE
Problem: Adult Inpatient Plan of Care  Goal: Plan of Care Review  Outcome: Ongoing, Progressing  Goal: Patient-Specific Goal (Individualized)  Outcome: Ongoing, Progressing  Flowsheets (Taken 12/10/2023 2203)  Anxieties, Fears or Concerns: concerned about not sleeping  Individualized Care Needs: contact md to order him something to sleep, monitor bp and heart rate while on cardizem gtt, pain managment  Goal: Absence of Hospital-Acquired Illness or Injury  Outcome: Ongoing, Progressing  Intervention: Prevent Skin Injury  Flowsheets (Taken 12/10/2023 2000)  Body Position:   position changed independently   position maintained  Skin Protection:   adhesive use limited   pulse oximeter probe site changed

## 2023-12-11 NOTE — PROGRESS NOTES
Name: Balbir Rogers    : 1960 (63 y.o.)  MRN: [id number]           Patient Unavailable      Patient unable to be seen at this time secondary to: unable to see for PM session d/t RT going into patient's room at this time. Will continue POC tomorrow.

## 2023-12-11 NOTE — CONSULTS
Inpatient Nutrition Evaluation    Admit Date: 12/9/2023   Total duration of encounter: 2 days   Patient Age: 63 y.o.    Nutrition Recommendation/Prescription     1.Continue diabetic diet. 2. Add boost glucose control all meals.     Nutrition Assessment     Chart Review    Reason Seen: continuous nutrition monitoring and length of stay    Malnutrition Screening Tool Results   Have you recently lost weight without trying?: Unsure  Have you been eating poorly because of a decreased appetite?: No   MST Score: 2   Diagnosis:  Acute or chronic hypoxemic respiratory failure  +RSV, Emphysema/COPD,   HFpEF,   New onset Afib with RVR  Myoclonus/anxiety  Chronic pain  Pulmonary cachexia  Protein calorie malnutrition  Subclinical hypothyroidism    Relevant Medical History: Aspiration pneumonia  07/27/2022  Myoclonus  Date Unknown  Acute clinical systolic heart failure  Date Unknown  Acute hypercapnic respiratory failure  Date Unknown  Bacteremia  Date Unknown  Cardiomyopathy  Date Unknown  Chronic, continuous use of opioids  Date Unknown  Collapse, lung  Date Unknown  Collapsed lung  Date Unknown  COPD with hypoxia  Date Unknown  Degenerative cervical spinal stenosis  Date Unknown  Depression  Date Unknown  Disuse osteoporosis  Date Unknown  Emphysema/COPD  Date Unknown  Hepatitis C  Date Unknown  Hyperkalemia  Date Unknown  Leukocytosis    Scheduled Medications:  apixaban, 5 mg, BID  famotidine, 20 mg, BID  levalbuterol, 1.25 mg, Q4H  metoprolol tartrate, 25 mg, BID  predniSONE, 40 mg, Daily  sertraline, 50 mg, Daily  sodium chloride 0.9%, 10 mL, Q6H    Continuous Infusions:   PRN Medications: acetaminophen, albuterol-ipratropium, aluminum-magnesium hydroxide-simethicone, bisacodyL, gabapentin, melatonin, morphine, naloxone, ondansetron, ondansetron, oxyCODONE, polyethylene glycol, simethicone, sodium chloride 0.9%, Flushing PICC/Midline Protocol **AND** sodium chloride 0.9% **AND** sodium chloride 0.9%, traZODone    Recent  "Labs   Lab 12/09/23  1810 12/11/23  0332   * 138   K 4.1 4.2   CALCIUM 9.5 8.9   MG  --  2.30   CHLORIDE 97* 100   CO2 26 28   BUN 11.4 22.0   CREATININE 0.63* 0.64*   EGFRNORACEVR >60 >60   GLUCOSE 111 122*   BILITOT 0.4  --    ALKPHOS 65  --    ALT 20  --    AST 29  --    ALBUMIN 2.6*  --    WBC 9.78 9.52   HGB 12.1* 11.8*   HCT 37.4* 37.3*     Nutrition Orders:  Diet diabetic      Appetite/Oral Intake: poor/0-25% of meals  Factors Affecting Nutritional Intake: decreased appetite  Food/Ad[employer]ist/Cultural Preferences:  vanilla boost glucose control   Food Allergies: none reported  Last Bowel Movement: 12/09/23  Wound(s):      Comments    Pt in droplet isolation. Limiting amount of people in room. Spoke with son over the phone. Pt reports-decrease intake. Would like to try boost glucose control. Discussed food preferences. Marked Menus. Will monitor.     Anthropometrics    Height: 5' 10" (177.8 cm),    Last Weight: 60.6 kg (133 lb 9.6 oz) (12/11/23 0500), Weight Method: Bed Scale  BMI (Calculated): 19.2  BMI Classification: normal (BMI 18.5-24.9)        Ideal Body Weight (IBW), Male: 166 lb     % Ideal Body Weight, Male (lb): 80.48 %                          Usual Weight Provided By: unable to obtain usual weight    Wt Readings from Last 5 Encounters:   12/11/23 60.6 kg (133 lb 9.6 oz)   12/05/23 61.7 kg (136 lb)   11/24/23 56.2 kg (124 lb)   09/26/23 57.2 kg (126 lb)   09/15/23 62.6 kg (138 lb)     Weight Change(s) Since Admission:   Wt Readings from Last 1 Encounters:   12/11/23 0500 60.6 kg (133 lb 9.6 oz)   12/09/23 2100 60.6 kg (133 lb 9.6 oz)   12/09/23 1729 63.5 kg (140 lb)   Admit Weight: 63.5 kg (140 lb) (12/09/23 1729), Weight Method: Bed Scale    Estimated Needs                        Enteral Nutrition    Patient not receiving enteral nutrition at this time.    Parenteral Nutrition    Patient not receiving parenteral nutrition support at this time.    Patient Education     Not " applicable.    Nutrition Goals & Monitoring     Dietitian will monitor: food and beverage intake, weight, weight change, electrolyte/renal panel, glucose/endocrine profile, and gastrointestinal profile    Nutrition Risk/Follow-Up: low (follow-up in 5-7 days)  Patients assigned 'low nutrition risk' status do not qualify for a full nutritional assessment but will be monitored and re-evaluated in a 5-7 day time period. Please consult if re-evaluation needed sooner.

## 2023-12-11 NOTE — PROGRESS NOTES
[facility] - Medical Surgical Unit  [facility] MEDICINE ~ PROGRESS NOTE    CHIEF COMPLAINT   Hospital follow up    HOSPITAL COURSE   63-year-old male with a past medical history of emphysema, myoclonus, chronic pain, COPD, CHF who presents with complaint of headache and malaise.  In the ED patient tachypneic, tachycardic and hypoxemic on room air.  However per record review he is on 5 L nasal cannula at baseline at home per his pulmonologist Dr. Rob Vela.  In the ED tested positive for RSV.     Today  Doing well only complaining of poor sleep overnight.  OBJECTIVE/PHYSICAL EXAM     VITAL SIGNS (MOST RECENT):  Temp: 98.4 °F (36.9 °C) (12/11/23 0700)  Pulse: 84 (12/11/23 0844)  Resp: 20 (12/11/23 0843)  BP: 104/64 (12/11/23 0844)  SpO2: (!) 94 % (12/11/23 0700) VITAL SIGNS (24 HOUR RANGE):  Temp:  [97.5 °F (36.4 °C)-98.4 °F (36.9 °C)] 98.4 °F (36.9 °C)  Pulse:  [] 84  Resp:  [15-24] 20  SpO2:  [90 %-96 %] 94 %  BP: ()/(59-89) 104/64   GENERAL:  Frail, cachectic  HEENT:  PERRLA  CHEST: Clear to auscultation bilaterally  HEART: S1, S2, no appreciable murmur, irregularly irregular ABDOMEN: Soft, nontender, BS +  MSK: Warm, no lower extremity edema, no clubbing or cyanosis  NEUROLOGIC: Alert and oriented x4, moving all extremities with good strength   ASSESSMENT/PLAN   Acute on chronic hypoxemic respiratory failure  RSV +  Emphysema/COPD  -has trilogy at home as well as trelegy  -qhs cpap  -nebs, prednisone 40mg for 5 days      HFpEF  -record review reveals patient was discharged with a LifeVest at 1 point however underwent subsequent left heart catheterization that revealed recovered EF (30-->50%)  -Repeat echocardiogram showed recovered EF of 55-60% no significant valvulopathy  -follows with Dr. rOozco      New onset AFib with RVR  -chads Vasc = 3  -tolerated Cardizem drip, continue with metoprolol 25 b.i.d., up titrate as condition requires  -transitioned to Eliquis 12/11     Myoclonus/anxiety    -follows with Neurology, Dr. Figueroa   -continue Ativan 1 mg q.12 for myoclonus and anxiety     Chronic pain  -continue home dose of oxycodone   -patient with a buprenorphine patch at home     Pulmonary cachexia   Protein calorie malnutrition  -dietary following     Subclinical hypothyroidism   -no indication for treatment at this time     DVT prophylaxis:  Lovenox   Critical care time 35 minutes new onset atrial fibrillation requiring Cardizem drip  DVT prophylaxis:  Eliquis for new onset AFib  Anticipated discharge and disposition:  Home with home health care when medically stable __________________________________________________________________________    LABS/MICRO/MEDS/DIAGNOSTICS       LABS  Recent Labs     12/09/23  1810 12/11/23  0332   * 138   K 4.1 4.2   CHLORIDE 97* 100   CO2 26 28   BUN 11.4 22.0   CREATININE 0.63* 0.64*   GLUCOSE 111 122*   CALCIUM 9.5 8.9   ALKPHOS 65  --    AST 29  --    ALT 20  --    ALBUMIN 2.6*  --      Recent Labs     12/11/23  0332   WBC 9.52   RBC 3.68*   HCT 37.3*   .4*          MICROBIOLOGY  Microbiology Results (last 7 days)       ** No results found for the last 168 hours. **               MEDICATIONS   apixaban  5 mg Oral BID    famotidine  20 mg Oral BID    levalbuterol  1.25 mg Nebulization Q4H    metoprolol tartrate  25 mg Oral BID    predniSONE  40 mg Oral Daily    sertraline  50 mg Oral Daily    sodium chloride 0.9%  10 mL Intravenous Q6H         INFUSIONS         DIAGNOSTIC TESTS  X-Ray Chest 1 View   Final Result      New bilateral interstitial opacities within the lungs may be related to atypical infection superimposed on background emphysema.         Electronically signed by: Sharon Johnson   Date:    12/09/2023   Time:    18:35           EF   Date Value Ref Range Status   01/03/2023 30 % Final          NUTRITION STATUS  Patient meets ASPEN criteria for   malnutrition of   per RD assessment as evidenced by:                       A minimum  of two characteristics is recommended for diagnosis of either severe or non-severe malnutrition.       Case related differential diagnoses have been reviewed; assessment and plan has been documented. I have personally reviewed the labs and test results that are currently available; I have reviewed the patients medication list. I have reviewed the consulting providers recommendations. I have reviewed or attempted to review medical records based upon their availability.  All of the patient's and/or family's questions have been addressed and answered to the best of my ability.  I will continue to monitor closely and make adjustments to medical management as needed.  This document was created using M*Modal Fluency Direct.  Transcription errors may have been made.  Please contact me if any questions may rise regarding documentation to clarify transcription.        Thairy G Reyes, DO   Internal Medicine  Department of Hospital Medicine [facility] Surgical Unit

## 2023-12-11 NOTE — PLAN OF CARE
Problem: Physical Therapy  Goal: Physical Therapy Goal  Description: Goals to be met by: Discharge    Patient will increase functional independence with mobility by performin. Sit to stand transfer with [place]  2. Gait  x 150 feet with [place] using No Assistive Devices LRAD.   3. Ascend/descend 4 stair with bilateral Handrails Modified [place] using No Assistive Device.     *all goals to be met with oxygen staying >92% SpO2*    Outcome: Ongoing, Progressing

## 2023-12-12 LAB
ABS NEUT CALC (OHS): 8.14 X10(3)/MCL (ref 2.1–9.2)
ANISOCYTOSIS BLD QL SMEAR: SLIGHT
ERYTHROCYTE [DISTWIDTH] IN BLOOD BY AUTOMATED COUNT: 14.4 % (ref 11.5–17)
HCT VFR BLD AUTO: 38.5 % (ref 42–52)
HGB BLD-MCNC: 12 G/DL (ref 14–18)
LYMPH ABN # BLD MANUAL: 6 %
LYMPHOCYTES NFR BLD MANUAL: 0.63 X10(3)/MCL
LYMPHOCYTES NFR BLD MANUAL: 6 % (ref 13–40)
MCH RBC QN AUTO: 32.1 PG (ref 27–31)
MCHC RBC AUTO-ENTMCNC: 31.2 G/DL (ref 33–36)
MCV RBC AUTO: 102.9 FL (ref 80–94)
MONOCYTES NFR BLD MANUAL: 1.04 X10(3)/MCL (ref 0.1–1.3)
MONOCYTES NFR BLD MANUAL: 10 % (ref 2–11)
NEUTROPHILS NFR BLD MANUAL: 77 % (ref 47–80)
NEUTS BAND NFR BLD MANUAL: 1 % (ref 0–11)
PLATELET # BLD AUTO: 316 X10(3)/MCL (ref 130–400)
PLATELET # BLD EST: ADEQUATE 10*3/UL
PMV BLD AUTO: 9.7 FL (ref 7.4–10.4)
POIKILOCYTOSIS BLD QL SMEAR: SLIGHT
RBC # BLD AUTO: 3.74 X10(6)/MCL (ref 4.7–6.1)
WBC # SPEC AUTO: 10.43 X10(3)/MCL (ref 4.5–11.5)

## 2023-12-12 PROCEDURE — 99900035 HC TECH TIME PER 15 MIN (STAT)

## 2023-12-12 PROCEDURE — 25000003 PHARM REV CODE 250: Performed by: STUDENT IN AN ORGANIZED HEALTH CARE EDUCATION/TRAINING PROGRAM

## 2023-12-12 PROCEDURE — 94640 AIRWAY INHALATION TREATMENT: CPT

## 2023-12-12 PROCEDURE — A4216 STERILE WATER/SALINE, 10 ML: HCPCS | Performed by: STUDENT IN AN ORGANIZED HEALTH CARE EDUCATION/TRAINING PROGRAM

## 2023-12-12 PROCEDURE — 85027 COMPLETE CBC AUTOMATED: CPT | Performed by: STUDENT IN AN ORGANIZED HEALTH CARE EDUCATION/TRAINING PROGRAM

## 2023-12-12 PROCEDURE — 27000207 HC ISOLATION

## 2023-12-12 PROCEDURE — 63600175 PHARM REV CODE 636 W HCPCS: Performed by: STUDENT IN AN ORGANIZED HEALTH CARE EDUCATION/TRAINING PROGRAM

## 2023-12-12 PROCEDURE — 97116 GAIT TRAINING THERAPY: CPT

## 2023-12-12 PROCEDURE — 27000221 HC OXYGEN, UP TO 24 HOURS

## 2023-12-12 PROCEDURE — 25000242 PHARM REV CODE 250 ALT 637 W/ HCPCS: Performed by: STUDENT IN AN ORGANIZED HEALTH CARE EDUCATION/TRAINING PROGRAM

## 2023-12-12 PROCEDURE — 11000001 HC ACUTE MED/SURG PRIVATE ROOM

## 2023-12-12 RX ORDER — METOPROLOL TARTRATE 50 MG/1
50 TABLET ORAL 2 TIMES DAILY
Status: DISCONTINUED | OUTPATIENT
Start: 2023-12-12 | End: 2023-12-14 | Stop reason: HOSPADM

## 2023-12-12 RX ORDER — TEMAZEPAM 30 MG/1
30 CAPSULE ORAL NIGHTLY PRN
Status: DISCONTINUED | OUTPATIENT
Start: 2023-12-12 | End: 2023-12-14 | Stop reason: HOSPADM

## 2023-12-12 RX ADMIN — SERTRALINE HYDROCHLORIDE 50 MG: 50 TABLET ORAL at 09:12

## 2023-12-12 RX ADMIN — FAMOTIDINE 20 MG: 20 TABLET ORAL at 09:12

## 2023-12-12 RX ADMIN — OXYCODONE HYDROCHLORIDE 30 MG: 10 TABLET ORAL at 05:12

## 2023-12-12 RX ADMIN — FLUTICASONE FUROATE, UMECLIDINIUM BROMIDE AND VILANTEROL TRIFENATATE 1 PUFF: 100; 62.5; 25 POWDER RESPIRATORY (INHALATION) at 10:12

## 2023-12-12 RX ADMIN — MORPHINE SULFATE 4 MG: 4 INJECTION, SOLUTION INTRAMUSCULAR; INTRAVENOUS at 08:12

## 2023-12-12 RX ADMIN — FAMOTIDINE 20 MG: 20 TABLET ORAL at 08:12

## 2023-12-12 RX ADMIN — APIXABAN 5 MG: 5 TABLET, FILM COATED ORAL at 08:12

## 2023-12-12 RX ADMIN — SODIUM CHLORIDE, PRESERVATIVE FREE 10 ML: 5 INJECTION INTRAVENOUS at 12:12

## 2023-12-12 RX ADMIN — METOPROLOL TARTRATE 50 MG: 50 TABLET, FILM COATED ORAL at 08:12

## 2023-12-12 RX ADMIN — LEVALBUTEROL HYDROCHLORIDE 1.25 MG: 1.25 SOLUTION RESPIRATORY (INHALATION) at 11:12

## 2023-12-12 RX ADMIN — METOPROLOL TARTRATE 25 MG: 25 TABLET, FILM COATED ORAL at 09:12

## 2023-12-12 RX ADMIN — SODIUM CHLORIDE, PRESERVATIVE FREE 10 ML: 5 INJECTION INTRAVENOUS at 06:12

## 2023-12-12 RX ADMIN — OXYCODONE HYDROCHLORIDE 30 MG: 10 TABLET ORAL at 04:12

## 2023-12-12 RX ADMIN — MORPHINE SULFATE 4 MG: 4 INJECTION, SOLUTION INTRAMUSCULAR; INTRAVENOUS at 02:12

## 2023-12-12 RX ADMIN — SODIUM CHLORIDE, PRESERVATIVE FREE 10 ML: 5 INJECTION INTRAVENOUS at 11:12

## 2023-12-12 RX ADMIN — TEMAZEPAM 30 MG: 30 CAPSULE ORAL at 10:12

## 2023-12-12 RX ADMIN — PREDNISONE 40 MG: 20 TABLET ORAL at 09:12

## 2023-12-12 RX ADMIN — MORPHINE SULFATE 4 MG: 4 INJECTION, SOLUTION INTRAMUSCULAR; INTRAVENOUS at 07:12

## 2023-12-12 RX ADMIN — OXYCODONE HYDROCHLORIDE 30 MG: 10 TABLET ORAL at 10:12

## 2023-12-12 RX ADMIN — APIXABAN 5 MG: 5 TABLET, FILM COATED ORAL at 09:12

## 2023-12-12 RX ADMIN — LEVALBUTEROL HYDROCHLORIDE 1.25 MG: 1.25 SOLUTION RESPIRATORY (INHALATION) at 04:12

## 2023-12-12 RX ADMIN — IPRATROPIUM BROMIDE AND ALBUTEROL SULFATE 3 ML: .5; 3 SOLUTION RESPIRATORY (INHALATION) at 07:12

## 2023-12-12 NOTE — PT/OT/SLP PROGRESS
Physical Therapy Treatment Note           Name: Balbir Rogers    : 1960 (63 y.o.)  MRN: [id number]           TREATMENT SUMMARY AND RECOMMENDATIONS:    PT Received On: 23  PT Start Time: 1307     PT Stop Time: 1319  PT Total Time (min): 12 min     Subjective Assessment:   No complaints  Lethargic   x Awake, alert, cooperative  Uncooperative    Agitated  c/o pain    Appropriate  c/o fatigue    Confused  Treated at bedside     Emotionally labile  Treated in gym/dept.    Impulsive  Other:    Flat affect       Therapy Precautions:    Cognitive deficits  Spinal precautions    Collar - hard  Sternal precautions    Collar - soft   TLSO    Fall risk  LSO    Hip precautions - posterior  Knee immobilizer    Hip precautions - anterior  WBAT    Impaired communication  Partial weightbearing   x Oxygen   TTWB    PEG tube  NWB    Visual deficits  Other:    Hearing deficits          Treatment Objectives:     Mobility Training:   Assist level Comments    Bed mobility IND Supine > sit   Transfer     Gait SBA [id number] with one standing rest break; pt able to manage O2 tank   Sit to stand transitions MOD I    Sitting balance     Standing balance      Wheelchair mobility     Car transfer     Other:          Therapeutic Exercise:   Exercise Sets Reps Comments                               Additional Comments:  Patient is on droplet precautions - he is to wear mask while walking in the hallway    Assessment: Patient tolerated session well. Patient able to increase gait distance this PM while managing oxygen tank. Oxygen dropped to 87-88% SpO2 with complaints of SOB. Patient reports using a cane at times at home. His wife at bedside states that she will bring his cane to practice with tomorrow.    PT Plan: continue POC  Revisions made to plan of care: No    GOALS:   Multidisciplinary Problems       Physical Therapy Goals          Problem: Physical Therapy    Goal Priority Disciplines Outcome Goal Variances Interventions    Physical Therapy Goal     PT, PT/OT Ongoing, Progressing     Description: Goals to be met by: Discharge    Patient will increase functional independence with mobility by performin. Sit to stand transfer with [place]  2. Gait  x 150 feet with [place] using No Assistive Devices LRAD.   3. Ascend/descend 4 stair with bilateral Handrails Modified [place] using No Assistive Device.     *all goals to be met with oxygen staying >92% SpO2*                         Skilled PT Minutes Provided: 12 minutes   Communication with Treatment Team:     Equipment recommendations:       At end of treatment, patient remained:   Up in chair     Up in wheelchair in room   x In bed    With alarm activated   x Bed rails up   x Call bell in reach    x Family/friends present    Restraints secured properly    In bathroom with CNA/RN notified    Nurse aware    In gym with therapist/tech    Other:

## 2023-12-12 NOTE — PT/OT/SLP PROGRESS
Physical Therapy Treatment Note           Name: Balbir Rogers    : 1960 (63 y.o.)  MRN: [id number]           TREATMENT SUMMARY AND RECOMMENDATIONS:    PT Received On: 23  PT Start Time: 910     PT Stop Time: 927  PT Total Time (min): 17 min     Subjective Assessment:   No complaints  Lethargic   x Awake, alert, cooperative  Uncooperative    Agitated  c/o pain    Appropriate  c/o fatigue    Confused x Treated at bedside     Emotionally labile  Treated in gym/dept.    Impulsive  Other:    Flat affect       Therapy Precautions:    Cognitive deficits  Spinal precautions    Collar - hard  Sternal precautions    Collar - soft   TLSO    Fall risk  LSO    Hip precautions - posterior  Knee immobilizer    Hip precautions - anterior  WBAT    Impaired communication  Partial weightbearing   x Oxygen --4L  TTWB    PEG tube  NWB    Visual deficits  Other:    Hearing deficits          Treatment Objectives:     Mobility Training:   Assist level Comments    Bed mobility IND Supine > sit   Transfer     Gait MOD I [id number] around pt room without AD and without LOB   Sit to stand transitions MOD I From EOB and chair levels   Sitting balance     Standing balance      Wheelchair mobility     Car transfer     Other:          Therapeutic Exercise:   Exercise Sets Reps Comments                               Additional Comments:      Assessment: Patient tolerated session well. Patient with c/o SOB throughout mobility, limiting gait distance. O2 dropping into low-mid 80s with quick recovery with rest and pursed lip breathing.    PT Plan: continue POC  Revisions made to plan of care: No    GOALS:   Multidisciplinary Problems       Physical Therapy Goals          Problem: Physical Therapy    Goal Priority Disciplines Outcome Goal Variances Interventions   Physical Therapy Goal     PT, PT/OT Ongoing, Progressing     Description: Goals to be met by: Discharge    Patient will increase functional independence with mobility by  performin. Sit to stand transfer with [place]  2. Gait  x 150 feet with [place] using No Assistive Devices LRAD.   3. Ascend/descend 4 stair with bilateral Handrails Modified [place] using No Assistive Device.     *all goals to be met with oxygen staying >92% SpO2*                         Skilled PT Minutes Provided: 17 minutes   Communication with Treatment Team:     Equipment recommendations:       At end of treatment, patient remained:  x Up in chair     Up in wheelchair in room    In bed    With alarm activated    Bed rails up   x Call bell in reach     Family/friends present    Restraints secured properly    In bathroom with CNA/RN notified   x Nurse aware    In gym with therapist/tech    Other:

## 2023-12-12 NOTE — PLAN OF CARE
Problem: Adult Inpatient Plan of Care  Goal: Plan of Care Review  Outcome: Ongoing, Progressing  Flowsheets (Taken 12/11/2023 1830)  Plan of Care Reviewed With:   patient   son  Goal: Patient-Specific Goal (Individualized)  Outcome: Ongoing, Progressing  Flowsheets (Taken 12/11/2023 1830)  Anxieties, Fears or Concerns: Concerned about not sleeping  Individualized Care Needs: Monitor BP and Heart Rate, pain management  Goal: Absence of Hospital-Acquired Illness or Injury  Outcome: Ongoing, Progressing  Intervention: Identify and Manage Fall Risk  Flowsheets (Taken 12/11/2023 1830)  Safety Promotion/Fall Prevention:   assistive device/personal item within reach   bed alarm set   nonskid shoes/socks when out of bed   instructed to call staff for mobility  Intervention: Prevent Skin Injury  Flowsheets (Taken 12/11/2023 1830)  Body Position: sitting up in bed  Skin Protection:   adhesive use limited   pulse oximeter probe site changed   tubing/devices free from skin contact  Intervention: Prevent and Manage VTE (Venous Thromboembolism) Risk  Flowsheets (Taken 12/11/2023 1830)  Activity Management:   Ambulated to bathroom - L4   Walk with assistive devise and /or staff member - L3  VTE Prevention/Management:   ambulation promoted   bleeding precations maintained   bleeding risk assessed  Range of Motion: active ROM (range of motion) encouraged  Intervention: Prevent Infection  Flowsheets (Taken 12/11/2023 1830)  Infection Prevention:   cohorting utilized   environmental surveillance performed   equipment surfaces disinfected   hand hygiene promoted   personal protective equipment utilized   rest/sleep promoted   single patient room provided  Goal: Optimal Comfort and Wellbeing  Outcome: Ongoing, Progressing  Goal: Readiness for Transition of Care  Outcome: Ongoing, Progressing     Problem: Infection  Goal: Absence of Infection Signs and Symptoms  Outcome: Ongoing, Progressing     Problem: Impaired Wound Healing  Goal:  Optimal Wound Healing  Outcome: Ongoing, Progressing

## 2023-12-12 NOTE — PROGRESS NOTES
[facility] - Medical Surgical Unit  [facility] MEDICINE ~ PROGRESS NOTE    CHIEF COMPLAINT   Hospital follow up    HOSPITAL COURSE   63-year-old male with a past medical history of emphysema, myoclonus, chronic pain, COPD, CHF who presents with complaint of headache and malaise.  In the ED patient tachypneic, tachycardic and hypoxemic on room air.  However per record review he is on 5 L nasal cannula at baseline at home per his pulmonologist Dr. Rob Vela.  In the ED tested positive for RSV.     Today  Doing well, increase metoprolol today for improved heart rate control.  At baseline oxygen requirement however reports increased fatigue with bed mobility which is abnormal for him.  OBJECTIVE/PHYSICAL EXAM     VITAL SIGNS (MOST RECENT):  Temp: 97.7 °F (36.5 °C) (12/12/23 0342)  Pulse: 90 (12/12/23 1117)  Resp: 18 (12/12/23 1117)  BP: (!) 144/84 (12/12/23 0914)  SpO2: (!) 93 % (12/12/23 1117) VITAL SIGNS (24 HOUR RANGE):  Temp:  [97.7 °F (36.5 °C)-98.3 °F (36.8 °C)] 97.7 °F (36.5 °C)  Pulse:  [] 90  Resp:  [15-22] 18  SpO2:  [86 %-94 %] 93 %  BP: (110-144)/(67-84) 144/84   GENERAL:  Frail, cachectic  HEENT:  PERRLA  CHEST: Clear to auscultation bilaterally  HEART: S1, S2, no appreciable murmur, irregularly irregular ABDOMEN: Soft, nontender, BS +  MSK: Warm, no lower extremity edema, no clubbing or cyanosis  NEUROLOGIC: Alert and oriented x4, moving all extremities with good strength   ASSESSMENT/PLAN   Acute on chronic hypoxemic respiratory failure  RSV +  Emphysema/COPD  -has trilogy at home as well as trelegy  -qhs cpap  -nebs, prednisone 40mg for 5 days      HFpEF  -record review reveals patient was discharged with a LifeVest at 1 point however underwent subsequent left heart catheterization that revealed recovered EF (30-->50%)  -Repeat echocardiogram showed recovered EF of 55-60% no significant valvulopathy  -follows with Dr. Ingraldi      New onset AFib with RVR  -chads Vasc = 3  -Metoprolol 50  b.I.d.  -transitioned to Eliquis 12/11     Myoclonus/anxiety   -follows with Neurology, Dr. Figueroa   -continue Ativan 1 mg q.12 for myoclonus and anxiety     Chronic pain  -continue home dose of oxycodone   -patient with a buprenorphine patch at home, administer here if he brings from home     Pulmonary cachexia   Protein calorie malnutrition  -dietary following     Subclinical hypothyroidism   -no indication for treatment at this time     DVT prophylaxis:  Eliquis for new onset AFib  Anticipated discharge and disposition:  Home with home health care when medically stable __________________________________________________________________________    LABS/MICRO/MEDS/DIAGNOSTICS       LABS  Recent Labs     12/09/23  1810 12/11/23  0332   * 138   K 4.1 4.2   CHLORIDE 97* 100   CO2 26 28   BUN 11.4 22.0   CREATININE 0.63* 0.64*   GLUCOSE 111 122*   CALCIUM 9.5 8.9   ALKPHOS 65  --    AST 29  --    ALT 20  --    ALBUMIN 2.6*  --      Recent Labs     12/12/23  0403   WBC 10.43   RBC 3.74*   HCT 38.5*   .9*          MICROBIOLOGY  Microbiology Results (last 7 days)       ** No results found for the last 168 hours. **               MEDICATIONS   apixaban  5 mg Oral BID    buprenorphine  20 mcg Transdermal Weekly    famotidine  20 mg Oral BID    fluticasone-umeclidin-vilanter  1 puff Inhalation Daily    levalbuterol  1.25 mg Nebulization Q4H    metoprolol tartrate  50 mg Oral BID    predniSONE  40 mg Oral Daily    sertraline  50 mg Oral Daily    sodium chloride 0.9%  10 mL Intravenous Q6H         INFUSIONS         DIAGNOSTIC TESTS  X-Ray Chest 1 View   Final Result      New bilateral interstitial opacities within the lungs may be related to atypical infection superimposed on background emphysema.         Electronically signed by: Sharon Johnson   Date:    12/09/2023   Time:    18:35           EF   Date Value Ref Range Status   01/03/2023 30 % Final          NUTRITION STATUS  Patient meets ASPEN criteria  for   malnutrition of   per RD assessment as evidenced by:                       A minimum of two characteristics is recommended for diagnosis of either severe or non-severe malnutrition.       Case related differential diagnoses have been reviewed; assessment and plan has been documented. I have personally reviewed the labs and test results that are currently available; I have reviewed the patients medication list. I have reviewed the consulting providers recommendations. I have reviewed or attempted to review medical records based upon their availability.  All of the patient's and/or family's questions have been addressed and answered to the best of my ability.  I will continue to monitor closely and make adjustments to medical management as needed.  This document was created using M*Modal Fluency Direct.  Transcription errors may have been made.  Please contact me if any questions may rise regarding documentation to clarify transcription.        Thairy G Reyes, DO   Internal Medicine  Department of Hospital Medicine  [facility] - Medical Surgical Unit

## 2023-12-12 NOTE — PLAN OF CARE
Problem: Adult Inpatient Plan of Care  Goal: Plan of Care Review  Outcome: Ongoing, Progressing  Flowsheets (Taken 12/12/2023 1451)  Plan of Care Reviewed With:   patient   son   spouse  Goal: Patient-Specific Goal (Individualized)  Outcome: Ongoing, Progressing  Flowsheets (Taken 12/12/2023 1451)  Anxieties, Fears or Concerns: Concerned about not sleeping  Individualized Care Needs: Monitor BP, Heart Rate, and O2 Sat, pain management  Goal: Absence of Hospital-Acquired Illness or Injury  Outcome: Ongoing, Progressing  Intervention: Identify and Manage Fall Risk  Flowsheets (Taken 12/12/2023 1451)  Safety Promotion/Fall Prevention:   assistive device/personal item within reach   bed alarm set   instructed to call staff for mobility   nonskid shoes/socks when out of bed  Intervention: Prevent Skin Injury  Flowsheets (Taken 12/12/2023 1451)  Body Position: sitting up in bed  Skin Protection: adhesive use limited  Intervention: Prevent and Manage VTE (Venous Thromboembolism) Risk  Flowsheets (Taken 12/12/2023 1451)  Activity Management:   Ambulated -L4   Ambulated to bathroom - L4   Ambulated in room - L4   Ambulated in matt - L4   Walk with assistive devise and /or staff member - L3  VTE Prevention/Management:   bleeding risk assessed   ambulation promoted   bleeding precations maintained  Intervention: Prevent Infection  Flowsheets (Taken 12/12/2023 1451)  Infection Prevention:   cohorting utilized   environmental surveillance performed   equipment surfaces disinfected   hand hygiene promoted   personal protective equipment utilized   rest/sleep promoted   single patient room provided  Goal: Optimal Comfort and Wellbeing  Outcome: Ongoing, Progressing  Intervention: Monitor Pain and Promote Comfort  Flowsheets (Taken 12/12/2023 1451)  Pain Management Interventions:   care clustered   medication offered   quiet environment facilitated  Intervention: Provide Person-Centered Care  Flowsheets (Taken 12/12/2023  1451)  Trust Relationship/Rapport:   care explained   choices provided   emotional support provided   empathic listening provided   questions answered   questions encouraged   reassurance provided   thoughts/feelings acknowledged  Goal: Readiness for Transition of Care  Outcome: Ongoing, Progressing  Intervention: Mutually Develop Transition Plan  Flowsheets (Taken 12/12/2023 1451)  Who are your caregiver(s) and their phone number(s)?: Lupe valentin   Communicated JACY with patient/caregiver: Date not available/Unable to determine  Do you expect to return to your current living situation?: Yes  Do you have help at home or someone to help you manage your care at home?: Yes  Readmission within 30 days?: No  Do you currently have service(s) that help you manage your care at home?: No     Problem: Infection  Goal: Absence of Infection Signs and Symptoms  Outcome: Ongoing, Progressing  Intervention: Prevent or Manage Infection  Flowsheets (Taken 12/12/2023 1451)  Fever Reduction/Comfort Measures:   lightweight bedding   lightweight clothing  Infection Management: aseptic technique maintained  Isolation Precautions:   precautions maintained   contact     Problem: Impaired Wound Healing  Goal: Optimal Wound Healing  Outcome: Ongoing, Progressing

## 2023-12-12 NOTE — PLAN OF CARE
[facility] - Medical Surgical Unit  Initial Discharge Assessment       Primary Care Provider: Jose Johnson Jr., MD    Admission Diagnosis: SOB (shortness of breath) [R06.02]  Hypoxia [R09.02]  History of COPD [Z87.09]  RSV bronchitis [J20.5]    Admission Date: 12/9/2023  Expected Discharge Date:     Transition of Care Barriers: None    Payor: BLUE CROSS BLUE SHIELD / Plan: BCBS ALL OUT OF STATE / Product Type: PPO /     Extended Emergency Contact Information  Primary Emergency Contact: Lupe Rogers  Address: [address] [place] [address] [id number] [place] [address]  Home Phone: [phone]  Mobile Phone: [phone]  Relation: Spouse  Preferred language: English   needed? No    Discharge Plan A: Home with family, Home Health         [facility] - [place], [place] - [address]  [address] [facility] [place] [address]  Phone: [phone] Fax: [phone]    [facility] - [place], [place] - [address] [facility]  [address] [facility] [address]  Phone: [phone] Fax: [phone]      Initial Assessment (most recent)       Adult Discharge Assessment - 12/11/23 1904          Discharge Assessment    Assessment Type Discharge Planning Assessment     Confirmed/corrected address, phone number and insurance Yes     Confirmed Demographics Correct on Facesheet     Source of Information family     If unable to respond/provide information was family/caregiver contacted? Yes     Contact Name/Number Lupe valentin      Communicated JACY with patient/caregiver Date not available/Unable to determine     Reason For Admission CHF     People in Home spouse     Facility Arrived From: home     Do you expect to return to your current living situation? Yes     Do you have help at home or someone to help you manage your care at home? Yes     Who are your caregiver(s) and their phone number(s)? Lupe Burris [phone]     Prior to  hospitilization cognitive status: Alert/Oriented     Current cognitive status: Not Oriented to Time     Walking or Climbing Stairs Difficulty no     Dressing/Bathing Difficulty no     Home Accessibility wheelchair accessible     Home Layout Able to live on 1st floor     Equipment Currently Used at Home bedside commode;walker, standard     Readmission within 30 days? No     Patient currently being followed by outpatient case management? No     Do you currently have service(s) that help you manage your care at home? No     Do you take prescription medications? No     Do you have prescription coverage? Yes     Coverage BCBS     Do you have any problems affording any of your prescribed medications? TBD     Is the patient taking medications as prescribed? yes     Who is going to help you get home at discharge? Lupe valentin      How do you get to doctors appointments? family or friend will provide     Are you on dialysis? No     Do you take coumadin? No     Discharge Plan A Home with family;Home Health     DME Needed Upon Discharge  none     Discharge Plan discussed with: Spouse/sig other     Name(s) and Number(s) Lupe valentin      Transition of Care Barriers None        Physical Activity    On average, how many days per week do you engage in moderate to strenuous exercise (like a brisk walk)? 0 days     On average, how many minutes do you engage in exercise at this level? 0 min        Financial Resource Strain    How hard is it for you to pay for the very basics like food, housing, medical care, and heating? Not hard at all        Housing Stability    In the last 12 months, was there a time when you were not able to pay the mortgage or rent on time? No     In the last 12 months, how many places have you lived? 1     In the last 12 months, was there a time when you did not have a steady place to sleep or slept in a shelter (including now)? No        Transportation Needs    In the past 12 months, has lack  of transportation kept you from medical appointments or from getting medications? No     In the past 12 months, has lack of transportation kept you from meetings, work, or from getting things needed for daily living? No        Food Insecurity    Within the past 12 months, you worried that your food would run out before you got the money to buy more. Never true     Within the past 12 months, the food you bought just didn't last and you didn't have money to get more. Never true        Stress    Do you feel stress - tense, restless, nervous, or anxious, or unable to sleep at night because your mind is troubled all the time - these days? Only a little        Social Connections    In a typical week, how many times do you talk on the phone with family, friends, or neighbors? More than three times a week     How often do you get together with friends or relatives? More than three times a week     How often do you attend Congregation or Druze services? 1 to 4 times per year     Do you belong to any clubs or organizations such as Congregation groups, unions, fraternal or athletic groups, or school groups? No     How often do you attend meetings of the clubs or organizations you belong to? 1 to 4 times per year     Are you , , , , never , or living with a partner?         Alcohol Use    Q1: How often do you have a drink containing alcohol? Never     Q2: How many drinks containing alcohol do you have on a typical day when you are drinking? Patient does not drink     Q3: How often do you have six or more drinks on one occasion? Never        OTHER    Name(s) of People in Home Lupe wife

## 2023-12-13 PROCEDURE — A4216 STERILE WATER/SALINE, 10 ML: HCPCS | Performed by: STUDENT IN AN ORGANIZED HEALTH CARE EDUCATION/TRAINING PROGRAM

## 2023-12-13 PROCEDURE — 94640 AIRWAY INHALATION TREATMENT: CPT

## 2023-12-13 PROCEDURE — 11000001 HC ACUTE MED/SURG PRIVATE ROOM

## 2023-12-13 PROCEDURE — 25000003 PHARM REV CODE 250: Performed by: STUDENT IN AN ORGANIZED HEALTH CARE EDUCATION/TRAINING PROGRAM

## 2023-12-13 PROCEDURE — 25000242 PHARM REV CODE 250 ALT 637 W/ HCPCS: Performed by: STUDENT IN AN ORGANIZED HEALTH CARE EDUCATION/TRAINING PROGRAM

## 2023-12-13 PROCEDURE — 94760 N-INVAS EAR/PLS OXIMETRY 1: CPT

## 2023-12-13 PROCEDURE — 27000221 HC OXYGEN, UP TO 24 HOURS

## 2023-12-13 PROCEDURE — 63600175 PHARM REV CODE 636 W HCPCS: Performed by: STUDENT IN AN ORGANIZED HEALTH CARE EDUCATION/TRAINING PROGRAM

## 2023-12-13 PROCEDURE — 27000207 HC ISOLATION

## 2023-12-13 PROCEDURE — 99900035 HC TECH TIME PER 15 MIN (STAT)

## 2023-12-13 PROCEDURE — 97116 GAIT TRAINING THERAPY: CPT

## 2023-12-13 RX ADMIN — FAMOTIDINE 20 MG: 20 TABLET ORAL at 08:12

## 2023-12-13 RX ADMIN — PREDNISONE 40 MG: 20 TABLET ORAL at 08:12

## 2023-12-13 RX ADMIN — SODIUM CHLORIDE, PRESERVATIVE FREE 10 ML: 5 INJECTION INTRAVENOUS at 06:12

## 2023-12-13 RX ADMIN — SODIUM CHLORIDE, PRESERVATIVE FREE 10 ML: 5 INJECTION INTRAVENOUS at 12:12

## 2023-12-13 RX ADMIN — LEVALBUTEROL HYDROCHLORIDE 1.25 MG: 1.25 SOLUTION RESPIRATORY (INHALATION) at 08:12

## 2023-12-13 RX ADMIN — FLUTICASONE FUROATE, UMECLIDINIUM BROMIDE AND VILANTEROL TRIFENATATE 1 PUFF: 100; 62.5; 25 POWDER RESPIRATORY (INHALATION) at 08:12

## 2023-12-13 RX ADMIN — MORPHINE SULFATE 4 MG: 4 INJECTION, SOLUTION INTRAMUSCULAR; INTRAVENOUS at 06:12

## 2023-12-13 RX ADMIN — MORPHINE SULFATE 4 MG: 4 INJECTION, SOLUTION INTRAMUSCULAR; INTRAVENOUS at 11:12

## 2023-12-13 RX ADMIN — METOPROLOL TARTRATE 50 MG: 50 TABLET, FILM COATED ORAL at 08:12

## 2023-12-13 RX ADMIN — SODIUM CHLORIDE, PRESERVATIVE FREE 10 ML: 5 INJECTION INTRAVENOUS at 11:12

## 2023-12-13 RX ADMIN — OXYCODONE HYDROCHLORIDE 30 MG: 10 TABLET ORAL at 10:12

## 2023-12-13 RX ADMIN — OXYCODONE HYDROCHLORIDE 30 MG: 10 TABLET ORAL at 03:12

## 2023-12-13 RX ADMIN — SERTRALINE HYDROCHLORIDE 50 MG: 50 TABLET ORAL at 08:12

## 2023-12-13 RX ADMIN — OXYCODONE HYDROCHLORIDE 30 MG: 10 TABLET ORAL at 08:12

## 2023-12-13 RX ADMIN — OXYCODONE HYDROCHLORIDE 30 MG: 10 TABLET ORAL at 02:12

## 2023-12-13 RX ADMIN — APIXABAN 5 MG: 5 TABLET, FILM COATED ORAL at 08:12

## 2023-12-13 RX ADMIN — SODIUM CHLORIDE, PRESERVATIVE FREE 10 ML: 5 INJECTION INTRAVENOUS at 05:12

## 2023-12-13 NOTE — PROGRESS NOTES
Name: Balbir Rogers    : 1960 (63 y.o.)  MRN: [id number]           Patient Unavailable      Patient unable to be seen at this time secondary to: patient waiting on his wife to bring his clothes and cane. Will f/u later this AM as schedule permits.

## 2023-12-13 NOTE — PROGRESS NOTES
Inpatient Nutrition Evaluation    Admit Date: 12/9/2023   Total duration of encounter: 4 days   Patient Age: 63 y.o.    Nutrition Recommendation/Prescription     .Continue diabetic diet. 2. Continue boost glucose control all meals.      Nutrition Assessment     Chart Review    Reason Seen: continuous nutrition monitoring, length of stay, and follow-up    Malnutrition Screening Tool Results   Have you recently lost weight without trying?: Unsure  Have you been eating poorly because of a decreased appetite?: No   MST Score: 2   Diagnosis:  Acute or chronic hypoxemic respiratory failure  +RSV, Emphysema/COPD,   HFpEF,   New onset Afib with RVR  Myoclonus/anxiety  Chronic pain  Pulmonary cachexia  Protein calorie malnutrition  Subclinical hypothyroidism    Relevant Medical History: Aspiration pneumonia  07/27/2022  Myoclonus  Date Unknown  Acute clinical systolic heart failure  Date Unknown  Acute hypercapnic respiratory failure  Date Unknown  Bacteremia  Date Unknown  Cardiomyopathy  Date Unknown  Chronic, continuous use of opioids  Date Unknown  Collapse, lung  Date Unknown  Collapsed lung  Date Unknown  COPD with hypoxia  Date Unknown  Degenerative cervical spinal stenosis  Date Unknown  Depression  Date Unknown  Disuse osteoporosis  Date Unknown  Emphysema/COPD  Date Unknown  Hepatitis C  Date Unknown  Hyperkalemia  Date Unknown  Leukocytosis    Scheduled Medications:  apixaban, 5 mg, BID  buprenorphine, 20 mcg, Weekly  famotidine, 20 mg, BID  fluticasone-umeclidin-vilanter, 1 puff, Daily  levalbuterol, 1.25 mg, Q4H  metoprolol tartrate, 50 mg, BID  predniSONE, 40 mg, Daily  sertraline, 50 mg, Daily  sodium chloride 0.9%, 10 mL, Q6H    Continuous Infusions:   PRN Medications: acetaminophen, albuterol-ipratropium, aluminum-magnesium hydroxide-simethicone, bisacodyL, gabapentin, melatonin, naloxone, ondansetron, ondansetron, oxyCODONE, polyethylene glycol, simethicone, sodium chloride 0.9%, Flushing PICC/Midline  "Protocol **AND** sodium chloride 0.9% **AND** sodium chloride 0.9%, temazepam, traZODone    Recent Labs   Lab 12/09/23  1810 12/11/23  0332 12/12/23  0403   * 138  --    K 4.1 4.2  --    CALCIUM 9.5 8.9  --    MG  --  2.30  --    CHLORIDE 97* 100  --    CO2 26 28  --    BUN 11.4 22.0  --    CREATININE 0.63* 0.64*  --    EGFRNORACEVR >60 >60  --    GLUCOSE 111 122*  --    BILITOT 0.4  --   --    ALKPHOS 65  --   --    ALT 20  --   --    AST 29  --   --    ALBUMIN 2.6*  --   --    WBC 9.78 9.52 10.43   HGB 12.1* 11.8* 12.0*   HCT 37.4* 37.3* 38.5*     Nutrition Orders:  Diet diabetic  Dietary nutrition supplements Boost Glucose Control - Any flavor; All Meals    Appetite/Oral Intake: good/% of meals  Factors Affecting Nutritional Intake: none identified  Food/Mormon/Cultural Preferences: ketchup  Food Allergies: none reported  Last Bowel Movement: 12/12/23  Wound(s):      Comments    Pt intake reviewed, 100%. Pt likes ketchup with meals/chocolate milk. Will monitor.     Anthropometrics    Height: 5' 10" (177.8 cm),    Last Weight: 60.6 kg (133 lb 9.6 oz) (12/11/23 0500), Weight Method: Bed Scale  BMI (Calculated): 19.2  BMI Classification: normal (BMI 18.5-24.9)        Ideal Body Weight (IBW), Male: 166 lb     % Ideal Body Weight, Male (lb): 80.48 %                          Usual Weight Provided By: unable to obtain usual weight    Wt Readings from Last 5 Encounters:   12/11/23 60.6 kg (133 lb 9.6 oz)   12/05/23 61.7 kg (136 lb)   11/24/23 56.2 kg (124 lb)   09/26/23 57.2 kg (126 lb)   09/15/23 62.6 kg (138 lb)     Weight Change(s) Since Admission: no new wt recorded  Wt Readings from Last 1 Encounters:   12/11/23 0500 60.6 kg (133 lb 9.6 oz)   12/09/23 2100 60.6 kg (133 lb 9.6 oz)   12/09/23 1729 63.5 kg (140 lb)   Admit Weight: 63.5 kg (140 lb) (12/09/23 1729), Weight Method: Bed Scale    Patient Education     Not applicable.    Nutrition Goals & Monitoring     Dietitian will monitor: food and " beverage intake, weight, weight change, electrolyte/renal panel, glucose/endocrine profile, and gastrointestinal profile    Nutrition Risk/Follow-Up: low (follow-up in 5-7 days)  Patients assigned 'low nutrition risk' status do not qualify for a full nutritional assessment but will be monitored and re-evaluated in a 5-7 day time period. Please consult if re-evaluation needed sooner.

## 2023-12-13 NOTE — PLAN OF CARE
Problem: Adult Inpatient Plan of Care  Goal: Plan of Care Review  Outcome: Ongoing, Progressing  Flowsheets (Taken 12/12/2023 1830)  Plan of Care Reviewed With:   patient   son   spouse  Goal: Patient-Specific Goal (Individualized)  Outcome: Ongoing, Progressing  Flowsheets (Taken 12/12/2023 1830)  Anxieties, Fears or Concerns: Concerned about not sleeping  Individualized Care Needs: Monitor BP, Heart Rate, and O2 Sat, pain management  Goal: Absence of Hospital-Acquired Illness or Injury  Outcome: Ongoing, Progressing  Intervention: Identify and Manage Fall Risk  Flowsheets (Taken 12/12/2023 1830)  Safety Promotion/Fall Prevention:   assistive device/personal item within reach   bed alarm set   instructed to call staff for mobility   nonskid shoes/socks when out of bed  Intervention: Prevent Skin Injury  Flowsheets (Taken 12/12/2023 1830)  Body Position: sitting up in bed  Skin Protection: adhesive use limited  Intervention: Prevent and Manage VTE (Venous Thromboembolism) Risk  Flowsheets (Taken 12/12/2023 1830)  Activity Management:   Ambulated -L4   Ambulated to bathroom - L4   Ambulated in room - L4   Ambulated in matt - L4   Walk with assistive devise and /or staff member - L3  VTE Prevention/Management:   bleeding risk assessed   ambulation promoted   bleeding precations maintained  Intervention: Prevent Infection  Flowsheets (Taken 12/12/2023 1830)  Infection Prevention:   cohorting utilized   environmental surveillance performed   equipment surfaces disinfected   hand hygiene promoted   personal protective equipment utilized   rest/sleep promoted   single patient room provided  Goal: Optimal Comfort and Wellbeing  Outcome: Ongoing, Progressing  Intervention: Monitor Pain and Promote Comfort  Flowsheets (Taken 12/12/2023 1830)  Pain Management Interventions:   care clustered   medication offered   quiet environment facilitated  Intervention: Provide Person-Centered Care  Flowsheets (Taken 12/12/2023  1830)  Trust Relationship/Rapport:   care explained   choices provided   emotional support provided   empathic listening provided   questions answered   questions encouraged   reassurance provided   thoughts/feelings acknowledged  Goal: Readiness for Transition of Care  Outcome: Ongoing, Progressing  Intervention: Mutually Develop Transition Plan  Flowsheets (Taken 12/12/2023 1830)  Who are your caregiver(s) and their phone number(s)?: Lupe valentin   Communicated JACY with patient/caregiver: Date not available/Unable to determine  Do you expect to return to your current living situation?: Yes  Do you have help at home or someone to help you manage your care at home?: Yes  Readmission within 30 days?: No  Do you currently have service(s) that help you manage your care at home?: No     Problem: Infection  Goal: Absence of Infection Signs and Symptoms  Outcome: Ongoing, Progressing  Intervention: Prevent or Manage Infection  Flowsheets (Taken 12/12/2023 1830)  Fever Reduction/Comfort Measures:   lightweight bedding   lightweight clothing  Infection Management: aseptic technique maintained  Isolation Precautions:   precautions maintained   contact     Problem: Impaired Wound Healing  Goal: Optimal Wound Healing  Outcome: Ongoing, Progressing

## 2023-12-13 NOTE — PROGRESS NOTES
[facility]  [facility] MEDICINE ~ PROGRESS NOTE    CHIEF COMPLAINT   Hospital follow up    HOSPITAL COURSE   63-year-old male with a past medical history of emphysema, myoclonus, chronic pain, COPD, CHF who presents with complaint of headache and malaise.  In the ED patient tachypneic, tachycardic and hypoxemic on room air.  However per record review he is on 5 L nasal cannula at baseline at home per his pulmonologist Dr. Rob Vela.  In the ED tested positive for RSV.     Today   No complaints, patient appears to be at baseline however he reports he is more dyspneic than his baseline  OBJECTIVE/PHYSICAL EXAM     VITAL SIGNS (MOST RECENT):  Temp: 97.7 °F (36.5 °C) (12/13/23 0750)  Pulse: 91 (12/13/23 1055)  Resp: 19 (12/13/23 1106)  BP: 106/65 (12/13/23 1055)  SpO2: (!) 92 % (12/13/23 1055) VITAL SIGNS (24 HOUR RANGE):  Temp:  [97.3 °F (36.3 °C)-98.1 °F (36.7 °C)] 97.7 °F (36.5 °C)  Pulse:  [] 91  Resp:  [18-20] 19  SpO2:  [88 %-98 %] 92 %  BP: (105-135)/(65-85) 106/65   GENERAL:  Frail, cachectic  HEENT:  PERRLA  CHEST: Clear to auscultation bilaterally  HEART: S1, S2, no appreciable murmur, irregularly irregular ABDOMEN: Soft, nontender, BS +  MSK: Warm, no lower extremity edema, no clubbing or cyanosis  NEUROLOGIC: Alert and oriented x4, moving all extremities with good strength   ASSESSMENT/PLAN   Acute on chronic hypoxemic respiratory failure  RSV +  Emphysema/COPD  -has trilogy at home as well as trelegy  -qhs cpap  -nebs, prednisone 40mg for 5 days      HFpEF  -record review reveals patient was discharged with a LifeVest at 1 point however underwent subsequent left heart catheterization that revealed recovered EF (30-->50%)  -Repeat echocardiogram showed recovered EF of 55-60% no significant valvulopathy  -follows with Dr. Orozco      New onset AFib with RVR  -chads Vasc = 3  -Metoprolol 50 b.I.d.  -transitioned to Eliquis 12/11     Myoclonus/anxiety   -follows with  Neurology, Dr. Figueroa   -continue Ativan 1 mg q.12 for myoclonus and anxiety     Chronic pain  -continue home dose of oxycodone   -patient with a buprenorphine patch at home, administer here      Pulmonary cachexia   Protein calorie malnutrition  -dietary following     Subclinical hypothyroidism   -no indication for treatment at this time     DVT prophylaxis:  Eliquis for new onset AFib  Anticipated discharge and disposition:  Home with home health care when medically stable __________________________________________________________________________    LABS/MICRO/MEDS/DIAGNOSTICS       LABS  Recent Labs     12/11/23  0332      K 4.2   CHLORIDE 100   CO2 28   BUN 22.0   CREATININE 0.64*   GLUCOSE 122*   CALCIUM 8.9     Recent Labs     12/12/23  0403   WBC 10.43   RBC 3.74*   HCT 38.5*   .9*          MICROBIOLOGY  Microbiology Results (last 7 days)       ** No results found for the last 168 hours. **               MEDICATIONS   apixaban  5 mg Oral BID    buprenorphine  20 mcg Transdermal Weekly    famotidine  20 mg Oral BID    fluticasone-umeclidin-vilanter  1 puff Inhalation Daily    levalbuterol  1.25 mg Nebulization Q4H    metoprolol tartrate  50 mg Oral BID    predniSONE  40 mg Oral Daily    sertraline  50 mg Oral Daily    sodium chloride 0.9%  10 mL Intravenous Q6H         INFUSIONS         DIAGNOSTIC TESTS  X-Ray Chest 1 View   Final Result      New bilateral interstitial opacities within the lungs may be related to atypical infection superimposed on background emphysema.         Electronically signed by: Sharon Johnson   Date:    12/09/2023   Time:    18:35           EF   Date Value Ref Range Status   01/03/2023 30 % Final          NUTRITION STATUS  Patient meets ASPEN criteria for   malnutrition of   per RD assessment as evidenced by:                       A minimum of two characteristics is recommended for diagnosis of either severe or non-severe malnutrition.       Case related  differential diagnoses have been reviewed; assessment and plan has been documented. I have personally reviewed the labs and test results that are currently available; I have reviewed the patients medication list. I have reviewed the consulting providers recommendations. I have reviewed or attempted to review medical records based upon their availability.  All of the patient's and/or family's questions have been addressed and answered to the best of my ability.  I will continue to monitor closely and make adjustments to medical management as needed.  This document was created using M*Modal Fluency Direct.  Transcription errors may have been made.  Please contact me if any questions may rise regarding documentation to clarify transcription.        Thairy G Reyes, DO   Internal Medicine  Department of Hospital Medicine  [facility] Surgical [facility]

## 2023-12-13 NOTE — PROGRESS NOTES
Name: Balbir Rogers    : 1960 (63 y.o.)  MRN: [id number]           Patient Unavailable      Patient unable to be seen at this time secondary to: patient refusing PM session 2/2 neck and back pain. Will f/u tomorrow with POC

## 2023-12-13 NOTE — PLAN OF CARE
Problem: Adult Inpatient Plan of Care  Goal: Plan of Care Review  Outcome: Ongoing, Progressing  Flowsheets (Taken 12/13/2023 0924)  Plan of Care Reviewed With:   patient   family  Goal: Patient-Specific Goal (Individualized)  Outcome: Ongoing, Progressing  Flowsheets (Taken 12/13/2023 0924)  Anxieties, Fears or Concerns: pain control  Individualized Care Needs: pain management, monitor heart rhythm, vs  Goal: Absence of Hospital-Acquired Illness or Injury  Outcome: Ongoing, Progressing  Intervention: Identify and Manage Fall Risk  Flowsheets (Taken 12/13/2023 0924)  Safety Promotion/Fall Prevention:   assistive device/personal item within reach   bed alarm set   medications reviewed   nonskid shoes/socks when out of bed   upper side rails raised x 2, lower siderails raised x 1 (Peds only)  Intervention: Prevent Skin Injury  Flowsheets (Taken 12/13/2023 0924)  Body Position:   position changed independently   weight shifting   sitting up in bed  Skin Protection:   adhesive use limited   tubing/devices free from skin contact  Intervention: Prevent and Manage VTE (Venous Thromboembolism) Risk  Flowsheets (Taken 12/13/2023 0924)  Activity Management:   Ambulated in room - L4   Rolling - L1   Arm raise - L1   Walk with assistive devise and /or staff member - L3  VTE Prevention/Management:   ambulation promoted   bleeding precations maintained   bleeding risk assessed   dorsiflexion/plantar flexion performed  Range of Motion: active ROM (range of motion) encouraged  Intervention: Prevent Infection  Flowsheets (Taken 12/13/2023 0924)  Infection Prevention:   hand hygiene promoted   environmental surveillance performed   equipment surfaces disinfected   personal protective equipment utilized   rest/sleep promoted   single patient room provided  Goal: Optimal Comfort and Wellbeing  Outcome: Ongoing, Progressing  Intervention: Monitor Pain and Promote Comfort  Flowsheets (Taken 12/13/2023 0924)  Pain Management  Interventions:   care clustered   pillow support provided   quiet environment facilitated   relaxation techniques promoted   medication offered   pain management plan reviewed with patient/caregiver  Intervention: Provide Person-Centered Care  Flowsheets (Taken 12/13/2023 5185)  Trust Relationship/Rapport:   care explained   choices provided   reassurance provided   questions encouraged  Goal: Readiness for Transition of Care  Outcome: Ongoing, Progressing     Problem: Infection  Goal: Absence of Infection Signs and Symptoms  Outcome: Ongoing, Progressing     Problem: Impaired Wound Healing  Goal: Optimal Wound Healing  Outcome: Ongoing, Progressing

## 2023-12-13 NOTE — PT/OT/SLP PROGRESS
Physical Therapy Treatment Note           Name: Balbir Rogers    : 1960 (63 y.o.)  MRN: [id number]           TREATMENT SUMMARY AND RECOMMENDATIONS:    PT Received On: 23  PT Start Time: 1100     PT Stop Time: 1122  PT Total Time (min): 22 min     Subjective Assessment:   No complaints  Lethargic   x Awake, alert, cooperative  Uncooperative    Agitated  c/o pain    Appropriate  c/o fatigue    Confused  Treated at bedside     Emotionally labile  Treated in gym/dept.    Impulsive  Other:    Flat affect       Therapy Precautions:    Cognitive deficits  Spinal precautions    Collar - hard  Sternal precautions    Collar - soft   TLSO    Fall risk  LSO    Hip precautions - posterior  Knee immobilizer    Hip precautions - anterior  WBAT    Impaired communication  Partial weightbearing   x Oxygen --4L  TTWB    PEG tube  NWB    Visual deficits x Other: (+) RSV    Hearing deficits          Treatment Objectives:     Mobility Training:   Assist level Comments    Bed mobility IND Supine > sit   Transfer     Gait MOD I 150ft + 250ft using cane in RUE; seated rest break in between   Sit to stand transitions MOD I From various levels during session   Sitting balance     Standing balance      Wheelchair mobility     Car transfer     Other:          Therapeutic Exercise:   Exercise Sets Reps Comments                               Additional Comments:      Assessment: Patient tolerated session well. Patient able to increase his gait distance today with use of a cane. Oxygen remained >92% SpO2 while on 4L.     PT Plan: continue POC  Revisions made to plan of care: No    GOALS:   Multidisciplinary Problems       Physical Therapy Goals          Problem: Physical Therapy    Goal Priority Disciplines Outcome Goal Variances Interventions   Physical Therapy Goal     PT, PT/OT Ongoing, Progressing     Description: Goals to be met by: Discharge    Patient will increase functional independence with mobility by  performin. Sit to stand transfer with [place]  2. Gait  x 150 feet with [place] using No Assistive Devices LRAD.   3. Ascend/descend 4 stair with bilateral Handrails Modified [place] using No Assistive Device.     *all goals to be met with oxygen staying >92% SpO2*                         Skilled PT Minutes Provided: 22 minutes   Communication with Treatment Team:     Equipment recommendations:       At end of treatment, patient remained:   Up in chair     Up in wheelchair in room   x In bed    With alarm activated    Bed rails up   x Call bell in reach    x Family/friends present    Restraints secured properly    In bathroom with CNA/RN notified    Nurse aware    In gym with therapist/tech    Other:

## 2023-12-14 VITALS
HEIGHT: 70 IN | RESPIRATION RATE: 19 BRPM | BODY MASS INDEX: 19.13 KG/M2 | SYSTOLIC BLOOD PRESSURE: 114 MMHG | TEMPERATURE: 98 F | HEART RATE: 101 BPM | DIASTOLIC BLOOD PRESSURE: 71 MMHG | OXYGEN SATURATION: 94 % | WEIGHT: 133.63 LBS

## 2023-12-14 PROCEDURE — 27000221 HC OXYGEN, UP TO 24 HOURS

## 2023-12-14 PROCEDURE — A4216 STERILE WATER/SALINE, 10 ML: HCPCS | Performed by: STUDENT IN AN ORGANIZED HEALTH CARE EDUCATION/TRAINING PROGRAM

## 2023-12-14 PROCEDURE — 94760 N-INVAS EAR/PLS OXIMETRY 1: CPT

## 2023-12-14 PROCEDURE — 25000003 PHARM REV CODE 250: Performed by: STUDENT IN AN ORGANIZED HEALTH CARE EDUCATION/TRAINING PROGRAM

## 2023-12-14 PROCEDURE — 99900035 HC TECH TIME PER 15 MIN (STAT)

## 2023-12-14 RX ORDER — PREDNISONE 20 MG/1
40 TABLET ORAL DAILY
Qty: 2 TABLET | Refills: 0 | Status: SHIPPED | OUTPATIENT
Start: 2023-12-14 | End: 2023-12-15

## 2023-12-14 RX ADMIN — OXYCODONE HYDROCHLORIDE 30 MG: 10 TABLET ORAL at 04:12

## 2023-12-14 RX ADMIN — SODIUM CHLORIDE, PRESERVATIVE FREE 10 ML: 5 INJECTION INTRAVENOUS at 12:12

## 2023-12-14 RX ADMIN — SODIUM CHLORIDE, PRESERVATIVE FREE 10 ML: 5 INJECTION INTRAVENOUS at 05:12

## 2023-12-14 RX ADMIN — OXYCODONE HYDROCHLORIDE 30 MG: 10 TABLET ORAL at 08:12

## 2023-12-14 NOTE — DISCHARGE SUMMARY
[facility]  HOSPITAL MEDICINE - DISCHARGE SUMMARY    Patient Name: Balbir Rogers  MRN: [id number]  Admission Date: 12/9/2023  Discharge Date: 12/14/2023  Hospital Length of Stay: 3 days  Discharge Provider: Hannah Steward MD  Primary Care Provider: Jose Johnson Jr., MD      HOSPITAL COURSE     63-year-old male with a past medical history of emphysema, myoclonus, chronic pain, COPD, CHF who presents with complaint of headache and malaise.  In the ED patient tachypneic, tachycardic and hypoxemic on room air.  However per record review he is on 5 L nasal cannula at baseline at home per his pulmonologist Dr. Rob Vela.  In the ED tested positive for RSV.        Received 4 days of steroids we will send him home with 1 more dose for today.  Can discharge home.  As far as his pain medications he will have to continue his home oxycodone.  And he will have to follow up with his chronic pain management doctor.      PHYSICAL EXAM     Most Recent Vital Signs:  Temp: 97.5 °F (36.4 °C) (12/14/23 0700)  Pulse: 101 (12/14/23 0749)  Resp: 19 (12/14/23 0836)  BP: 114/71 (12/14/23 0700)  SpO2: (!) 94 % (12/14/23 0749)     GENERAL:  Frail, cachectic  HEENT:  PERRLA  CHEST: Clear to auscultation bilaterally  HEART: S1, S2, no appreciable murmur, irregularly irregular ABDOMEN: Soft, nontender, BS +  MSK: Warm, no lower extremity edema, no clubbing or cyanosis  NEUROLOGIC: Alert and oriented x4, moving all extremities with good strength         DISCHARGE DIAGNOSIS     Active Hospital Problems    Diagnosis  POA    *RSV bronchitis [J20.5]  Yes      Resolved Hospital Problems   No resolved problems to display.            _____________________________________________________________________________      DISCHARGE MED REC     Current Discharge Medication List        START taking these medications    Details   predniSONE (DELTASONE) 20 MG tablet Take 2 tablets (40 mg total) by mouth once daily. for 1  dose  Qty: 2 tablet, Refills: 0           CONTINUE these medications which have NOT CHANGED    Details   albuterol-ipratropium (DUO-NEB) 2.5 mg-0.5 mg/3 mL nebulizer solution Take 3 mLs by nebulization every 6 (six) hours as needed for Wheezing.  Qty: 75 mL, Refills: 0      fluticasone-umeclidin-vilanter (TRELEGY ELLIPTA) 100-62.5-25 mcg DsDv Inhale 1 puff into the lungs once daily.      gabapentin (NEURONTIN) 300 MG capsule Take 300 mg by mouth as needed.      LORazepam (ATIVAN) 1 MG tablet Take 1 tablet (1 mg total) by mouth every 12 (twelve) hours as needed for Anxiety.  Qty: 10 tablet, Refills: 5    Comments: Do NOT fill earlier than 30 days from last refill  Associated Diagnoses: Anxiety      NON FORMULARY MEDICATION Place 20 mcg onto the skin every 7 days. Buprenorphine trasdermal system patch apply one patch q7days      oxyCODONE (ROXICODONE) 30 MG Tab Take 30 mg by mouth every 6 (six) hours as needed.      sertraline (ZOLOFT) 50 MG tablet Take 1 tablet (50 mg total) by mouth once daily.  Qty: 30 tablet, Refills: 0      temazepam (RESTORIL) 30 mg capsule Take 30 mg by mouth every evening.      medical supply, miscellaneous (O-2 SUSPENSORY MISC) 2 L by Misc.(Non-Drug; Combo Route) route.                CONSULTS     Consults (From admission, onward)          Status Ordering Provider     Inpatient consult to Cardiology  Once        Provider:  Dulce Orozco MD    Completed REYES, THAIRY G     Inpatient consult to Midline team  Once        Provider:  (Not yet assigned)    Acknowledged REYES, THAIRY G     Inpatient consult to Registered Dietitian/Nutritionist  Once        Provider:  (Not yet assigned)    Completed REYES, THAIRY G              FOLLOW UP      Follow-up Information       Jose Johnson Jr., MD Follow up.    Specialty: Family Medicine  Contact information:  [address] [place] [address] A  [facility] [address]  [phone]               Tonja Wahl, KARIN Follow up.    Specialty: Nurse  Practitioner  Why: 6/12/24 1000  Contact information:  [address]  [place] [address]  [phone]               [facility] - Follow up.    Specialties: Home Health Services, Physical Therapy  Contact information:  [facility]  [address]  [place] [address]  [phone]                                 DISCHARGE INSTRUCTIONS     Explained in detail to the patient about the discharge plan, medications, and follow-up visits. Pt understands and agrees with the treatment plan.  Discharged Condition: stable  Diet as tolerated  Activities as tolerated  Discharge to: Home or Self Care    TIME SPENT ON DISCHARGE   35 minutes        Hannah Steward MD  Internal Medicine  Department of Hospital Medicine [facility]      This document was created using electronic dictation services.  Please excuse any errors that may have been made.  Contact me if any questions regarding documentation to clarify verbiage.

## 2023-12-14 NOTE — PLAN OF CARE
Spoke with Wife, Lupe, who stated patient has home oxygen and he does not want home health. They would like for him to be discharged today. Will notify physician

## 2023-12-14 NOTE — NURSING
"Pt. Refused AM meds. Importance of medication given to pt. By nurse. Pt. Verbalize understanding and stated "I have an appointment with my heart doctor and PCP. They will put me on whatever they want". Nursing care continued.   "

## 2023-12-14 NOTE — PLAN OF CARE
Problem: Physical Therapy  Goal: Physical Therapy Goal  Description: Goals to be met by: Discharge    Patient will increase functional independence with mobility by performin. Sit to stand transfer with [place]  2. Gait  x 150 feet with [place] using No Assistive Devices LRAD.   3. Ascend/descend 4 stair with bilateral Handrails Modified [place] using No Assistive Device.     *all goals to be met with oxygen staying >92% SpO2*    Outcome: Adequate for Care Transition

## 2023-12-14 NOTE — PLAN OF CARE
Problem: Adult Inpatient Plan of Care  Goal: Plan of Care Review  Outcome: Ongoing, Progressing  Flowsheets (Taken 12/13/2023 1910)  Plan of Care Reviewed With:   patient   spouse  Goal: Patient-Specific Goal (Individualized)  Outcome: Ongoing, Progressing  Flowsheets (Taken 12/13/2023 1910)  Anxieties, Fears or Concerns: Pt wants to go home  Individualized Care Needs: Convince patient to stay overnight for wellbeing, re-direction when frustration occurs, Monitor heart rhythm, Monitor SpO2  Goal: Absence of Hospital-Acquired Illness or Injury  Outcome: Ongoing, Progressing  Intervention: Identify and Manage Fall Risk  Flowsheets (Taken 12/13/2023 1910)  Safety Promotion/Fall Prevention:   assistive device/personal item within reach   bed alarm set   lighting adjusted   medications reviewed   muscle strengthening facilitated   nonskid shoes/socks when out of bed   gait belt with ambulation   room near unit station  Intervention: Prevent Skin Injury  Flowsheets (Taken 12/13/2023 1910)  Body Position: sitting up in bed  Skin Protection:   adhesive use limited   incontinence pads utilized   skin-to-skin areas padded   transparent dressing maintained   skin-to-device areas padded   tubing/devices free from skin contact  Intervention: Prevent and Manage VTE (Venous Thromboembolism) Risk  Flowsheets (Taken 12/13/2023 1910)  Activity Management: Walk with assistive devise and /or staff member - L3  VTE Prevention/Management:   bleeding risk assessed   bleeding precations maintained   fluids promoted   ROM (active) performed  Range of Motion: ROM (range of motion) performed  Intervention: Prevent Infection  Flowsheets (Taken 12/13/2023 1910)  Infection Prevention:   cohorting utilized   hand hygiene promoted   single patient room provided  Goal: Optimal Comfort and Wellbeing  Outcome: Ongoing, Progressing  Intervention: Monitor Pain and Promote Comfort  Flowsheets (Taken 12/13/2023 1910)  Pain Management Interventions:    quiet environment facilitated   pillow support provided   position adjusted  Intervention: Provide Person-Centered Care  Flowsheets (Taken 12/13/2023 1910)  Trust Relationship/Rapport:   care explained   choices provided   questions answered   thoughts/feelings acknowledged     Problem: Infection  Goal: Absence of Infection Signs and Symptoms  Outcome: Ongoing, Progressing  Intervention: Prevent or Manage Infection  Flowsheets (Taken 12/13/2023 1910)  Fever Reduction/Comfort Measures:   lightweight bedding   lightweight clothing  Infection Management: aseptic technique maintained  Isolation Precautions:   droplet   precautions maintained     Problem: Impaired Wound Healing  Goal: Optimal Wound Healing  Outcome: Ongoing, Progressing  Intervention: Promote Wound Healing  Flowsheets (Taken 12/13/2023 1910)  Oral Nutrition Promotion: safe use of adaptive equipment encouraged  Sleep/Rest Enhancement:   consistent schedule promoted   regular sleep/rest pattern promoted   therapeutic touch utilized  Activity Management: Walk with assistive devise and /or staff member - L3  Pain Management Interventions:   quiet environment facilitated   pillow support provided   position adjusted

## 2023-12-14 NOTE — NURSING
Patient and son educated on discharge instructions and verbalized understanding at this time. All scheduled follow up appointments reviewed with patient and son and verbalized understanding.  Midline discontinued at this time.  Home medications returned to patient at discharge. Patient discharged to home with home health services.  Patient left unit via wheelchair and private vehicle.

## 2023-12-14 NOTE — PLAN OF CARE
Problem: Adult Inpatient Plan of Care  Goal: Plan of Care Review  Outcome: Met  Goal: Patient-Specific Goal (Individualized)  Outcome: Met  Goal: Absence of Hospital-Acquired Illness or Injury  Outcome: Met  Goal: Optimal Comfort and Wellbeing  Outcome: Met  Goal: Readiness for Transition of Care  Outcome: Met     Problem: Infection  Goal: Absence of Infection Signs and Symptoms  Outcome: Met     Problem: Impaired Wound Healing  Goal: Optimal Wound Healing  Outcome: Met     Problem: Fall Injury Risk  Goal: Absence of Fall and Fall-Related Injury  Outcome: Met

## 2023-12-15 ENCOUNTER — PATIENT OUTREACH (OUTPATIENT)
Dept: ADMINISTRATIVE | Facility: CLINIC | Age: 63
End: 2023-12-15
Payer: COMMERCIAL

## 2023-12-15 NOTE — PROGRESS NOTES
C3 nurse attempted to contact Balbir Rogers  for a TCC post hospital discharge follow up call. No answer. The patient does not have a scheduled HOSFU appointment, and the pt does not have an [facility] PCP.

## 2023-12-15 NOTE — PHYSICIAN QUERY
PT Name: Balbir Rogers  MR #: [id number]     DOCUMENTATION CLARIFICATION     CDS/: Joann Hines RN BSN              Contact information: [email or web address]  This form is a permanent document in the medical record.     Query Date: December 15, 2023    By submitting this query, we are merely seeking further clarification of documentation.  Please utilize your independent clinical judgment when addressing the question(s) below.  The Medical Record contains the following   Indicators   Supporting Clinical Findings Location in Medical Record   x Documentation of Respiratory Failure, ARDS Acute on chronic hypoxemic respiratory failure H&P, 12/10       x Subjective Respiratory Signs/Symptoms SOB x 1 week    SOB throughout mobility  H&P, 12/10    PT, PN, 12/12     x Objective Respiratory Signs/Symptoms cough    ED, 12/9   x RR     O2 sat     O2 use 12/9 SPO2 87%, RR 22 on RA  12/9 SPO2 93%, on 5L oxymask  12/10 SPO2 93%, RR 17 on 3L NC    12/11 86 % - had O2 off, taking trilogy on and off, forgets to place O2 back on-RRT    Resp:  [15-24] 20  SpO2:  [90 %-96 %] 94 %    Resp:  [15-22] 18  SpO2:  [86 %-94 %] 93 %    O2 dropping into low-mid 80s with quick recovery with rest and pursed lip breathing.     Resp:  [18-20] 19  SpO2:  [88 %-98 %] 92 %    Resp: 19   SpO2: 94 %  Vital Signs            HM, PN, 12/11      HM, PN, 12/12      PT, PN, 12/12    HM, PN, 12/13      DCS, 12/14          Blood Gas (ABG or VBG)     x Hypoxia/Hypercapnia hypoxemia however pt is on 3L which is an improvement from baseline    H&P, 12/10    BiPAP/Intubation/Mechanical Ventilation     x Home O2, Oxygen Dependence he is on 5 L nasal cannula at baseline at home      H&P, 12/10   x Acute/Chronic Illness RSV +  Emphysema/COPD  Myoclonus/anxiety   Chronic pain   Pulmonary cachexia   Protein calorie malnutrition    past medical history of emphysema, myoclonus, chronic pain, COPD, CHF     HFpEF   New onset AFib with RVR   Subclinical hypothyroidism     H&P, 12/10                  HM, PN, 12/12   x Treatment -has triology at home as well as trele  -qhs cpap  -nebs, prednisone 40mg for 5 days     albuterol-ipratropium 2.5 mg-0.5 mg/3 mL nebulizer solution 3 mL      levalbuterol nebulizer solution 1.25 mg    fluticasone-umeclidin-vilanter 100-62.5-25 mcg DsDv 1 puff H&P, 12/10        MAR, 12/9, 12/10, 12/12    MAR, 12/10-11    MAR, 12/12-13     x Other Breath sounds normal.    ED, 12/9       The clinical guidelines noted are only a system guideline. It does not replace the providers clinical judgment.    [facility] Approved Diagnostic Criteria      Acute Respiratory Failure    Hypoxic: ABG pO2<60 mmHg or O2 sat of <91% on RA   AND/OR   Hypercapnic: ABG pCO2>50 mmHg with pH <7.35   AND   Respiratory symptoms documented (Subjective: SOB; Objective: Tachypnea, respiratory distress, increased work of breathing, unable to speak in complete sentences, labored breathing, use of accessory muscles, RR>26, cyanosis, dyspnea, wheezing, stridor, lethargy)      Chronic Respiratory Failure   Hypoxic: Continuous home oxygen    AND/OR   Hypercapnic: Normal pH with high CO2 (ex. COPD)      Acute on Chronic Respiratory Failure   Hypoxic: ABG pO2>10mmHg below baseline OR ABG pO2<60 mmHg OR SpO2<91% on usual home O2  OR O2>2L/min over baseline home O2   AND/OR   Hypercapnic: ABG pCO2>50 mmHg OR pCO2>10mmHg over baseline and pH <7.35   AND   Respiratory symptoms documented      Acute Respiratory Distress Syndrome (ARDS) - an acute, diffuse, inflammatory form of lung injury. Suspect with progressive dyspnea, hypoxemic respiratory failure, and bilateral alveolar infiltrates on chest imaging within 6 to 72 hours of an inciting event.   Acute Respiratory Distress - Generally describes less severe respiratory symptoms (tachypnea, in respiratory distress, increased work of breathing, unable to speak in complete sentences, labored breathing, use of accessory muscles, RR> 24,  cyanosis, dyspnea, wheezing, stridor, lethargy) without sufficient measurements (pO2, SpO2, pH, and pCO2) to meet criteria for respiratory failure)   Acute Respiratory Insufficiency - Generally describes less severe respiratory symptoms and measurements (pO2, SpO2, pH, and pCO2) not meeting criteria for respiratory failure         Due to the conflicting clinical picture, please clinically validate the diagnosis of Acute hypoxemic respiratory failure.  If validated, please provide additional clinical support for the diagnosis.     [ x   ] Acute Respiratory Failure with Hypoxia is not confirmed and/or it has been ruled out       [    ] Acute Respiratory Failure with Hypoxia is not confirmed and/or it has been ruled out, other diagnosis ruled in (please specify):_______________     [    ] Acute and (on) Chronic Respiratory Failure with Hypoxia diagnosis is confirmed and additional clinical support/decision-making indicators for the diagnosis include (please specify): _______________________________________________     [    ] Other clarification (please specify): ___________________           Please document in your progress notes daily for the duration of treatment until resolved and include in your discharge summary.     Reference:    NAVJOT Sal MD. (2020, March 13). Acute respiratory distress syndrome: Clinical features, diagnosis, and complications in adults ([id number] KRISSY Gomez MD & [id number] RENATA Jon MD, Eds.). Retrieved November 13, 2020, from [email or web address]-[email or web address]-distress-syndrome-[email or web address]-features-diagnosis-and-complications-[email or web address]-adults?search=ards&source=search_result&selectedTitle=1~150&usage_type=default&display_rank=1  Form No. [id number]

## 2023-12-18 NOTE — PROGRESS NOTES
C3 nurse made second attempt to contact Balbir Rogers for a TCC post hospital discharge follow up call.  The patient is unable to conduct call at this time, requested a call back later.

## 2023-12-19 NOTE — PROGRESS NOTES
C3 nurse made third attempt to contact Balbir Rogers for a TCC post hospital discharge follow up call. No answer, Left VM.

## 2023-12-27 NOTE — PT/OT/SLP DISCHARGE
Physical Therapy Discharge Summary    Name: Balbir Rogers  MRN: [id number]   Principal Problem: RSV bronchitis     Patient Discharged from acute Physical Therapy on 2023.  Please refer to prior PT noted date on 2023 for functional status.     Assessment:     Patient has met all goals and is not appropriate for therapy.    Objective:     GOALS:   Multidisciplinary Problems       Physical Therapy Goals       Not on file              Multidisciplinary Problems (Resolved)          Problem: Physical Therapy    Goal Priority Disciplines Outcome Goal Variances Interventions   Physical Therapy Goal   (Resolved)     PT, PT/OT Met     Description: Goals to be met by: Discharge    Patient will increase functional independence with mobility by performin. Sit to stand transfer with [place]  2. Gait  x 150 feet with [place] using No Assistive Devices LRAD.   3. Ascend/descend 4 stair with bilateral Handrails Modified [place] using No Assistive Device.     *all goals to be met with oxygen staying >92% SpO2*                         Reasons for Discontinuation of Therapy Services  Satisfactory goal achievement.      Plan:     Patient Discharged to: Home no PT services needed.      2023

## 2023-12-27 NOTE — PLAN OF CARE
Problem: Physical Therapy  Goal: Physical Therapy Goal  Description: Goals to be met by: Discharge    Patient will increase functional independence with mobility by performin. Sit to stand transfer with [place]  2. Gait  x 150 feet with [place] using No Assistive Devices LRAD.   3. Ascend/descend 4 stair with bilateral Handrails Modified [place] using No Assistive Device.     *all goals to be met with oxygen staying >92% SpO2*    Outcome: Met

## 2024-01-09 ENCOUNTER — HOSPITAL ENCOUNTER (OUTPATIENT)
Dept: RADIOLOGY | Facility: HOSPITAL | Age: 64
Discharge: HOME | End: 2024-01-09
Attending: NURSE PRACTITIONER
Payer: MEDICARE

## 2024-01-09 DIAGNOSIS — J44.9 CHRONIC OBSTRUCTIVE PULMONARY DISEASE, UNSPECIFIED COPD TYPE: ICD-10-CM

## 2024-01-09 PROBLEM — Z87.891 PERSONAL HISTORY OF TOBACCO USE, PRESENTING HAZARDS TO HEALTH: Status: ACTIVE | Noted: 2024-01-09

## 2024-01-09 PROCEDURE — 71046 X-RAY EXAM CHEST 2 VIEWS: CPT | Mod: TC

## 2024-01-22 ENCOUNTER — HOSPITAL ENCOUNTER (EMERGENCY)
Facility: HOSPITAL | Age: 64
Discharge: HOME | End: 2024-01-22
Attending: FAMILY MEDICINE
Payer: MEDICARE

## 2024-01-22 VITALS
SYSTOLIC BLOOD PRESSURE: 121 MMHG | HEART RATE: 91 BPM | TEMPERATURE: 99 F | RESPIRATION RATE: 20 BRPM | DIASTOLIC BLOOD PRESSURE: 75 MMHG | OXYGEN SATURATION: 95 %

## 2024-01-22 DIAGNOSIS — S80.01XA CONTUSION OF RIGHT KNEE, INITIAL ENCOUNTER: ICD-10-CM
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
## 2024-01-22 DIAGNOSIS — S70.01XA CONTUSION OF RIGHT HIP, INITIAL ENCOUNTER: Primary | ICD-10-CM

## 2024-01-22 PROCEDURE — 99284 EMERGENCY DEPT VISIT MOD MDM: CPT

## 2024-01-22 PROCEDURE — 25000003 PHARM REV CODE 250: Performed by: FAMILY MEDICINE

## 2024-01-22 RX ORDER — NAPROXEN 375 MG/1
375 TABLET ORAL 2 TIMES DAILY WITH MEALS
Qty: 14 TABLET | Refills: 0 | Status: ON HOLD | OUTPATIENT
Start: 2024-01-22 | End: 2024-04-11

## 2024-01-22 RX ORDER — HYDROCODONE BITARTRATE AND ACETAMINOPHEN 5; 325 MG/1; MG/1
1 TABLET ORAL
Status: COMPLETED | OUTPATIENT
Start: 2024-01-22 | End: 2024-01-22

## 2024-01-22 RX ADMIN — HYDROCODONE BITARTRATE AND ACETAMINOPHEN 1 TABLET: 5; 325 TABLET ORAL at 08:01

## 2024-01-22 NOTE — ED PROVIDER NOTES
Encounter Date: 1/22/2024       History     Chief Complaint   Patient presents with    Hip Pain     Fell yesterday. Injury to right hip and knee.       64-year-old was the cage in the home last night with his grandkids he fell says he injured his right hip and right knee was able to ambulate home woke up this morning the pain here requesting x-rays the right hip and right knee denies any other injuries no other pain no other symptoms on initial exam he does have full range of motion of the hip and the knee no swelling of the knee some tenderness over the hip no obvious deformities noted with the patient initially arrived computer was down so all charting was started on paper charts now has returned scribing into the computer        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     Past Surgical History:   Procedure Laterality Date    CERVICAL LAMINECTOMY WITH SPINAL FUSION      CHOLECYSTECTOMY      COLONOSCOPY      ESOPHAGOGASTRODUODENOSCOPY      gastrointestinal biopsy      graft to nose      inguinal herniotomy      LEFT HEART CATHETERIZATION Left 2/1/2023    Procedure: CATHETERIZATION, HEART, LEFT;  Surgeon: Dulce Orozco MD;  Location: [facility] CATH LAB;  Service: Cardiology;  Laterality: Left;  via radial approach    mandible operation      POLYPECTOMY       Family History   Problem Relation Age of Onset    Cancer Mother     Heart disease Father     Hypertension Father      Social History     Tobacco Use    Smoking  status: Former     Current packs/day: 0.00     Average packs/day: 2.0 packs/day for 40.0 years (80.0 ttl pk-yrs)     Types: Cigarettes     Start date: 1977     Quit date: 2017     Years since quittin.4    Smokeless tobacco: Never   Substance Use Topics    Alcohol use: Never    Drug use: Yes     Types: Oxycodone     Review of Systems   Constitutional:  Negative for fever.   HENT:  Negative for sore throat.    Respiratory:  Negative for shortness of breath.    Cardiovascular:  Negative for chest pain.   Gastrointestinal:  Negative for nausea.   Genitourinary:  Negative for dysuria.   Musculoskeletal:  Negative for back pain.         Right knee right hip pain   Skin:  Negative for rash.   Neurological:  Negative for weakness.   Hematological:  Does not bruise/bleed easily.   All other systems reviewed and are negative.      Physical Exam     Initial Vitals [24 0705]   BP Pulse Resp Temp SpO2   121/75 91 20 98.8 °F (37.1 °C) 95 %      MAP       --         Physical Exam    Nursing note and vitals reviewed.  Constitutional: He appears well-developed and well-nourished. He is active.   HENT:   Head: Normocephalic and atraumatic.   Eyes: Conjunctivae, EOM and lids are normal. Pupils are equal, round, and reactive to light.   Neck: Trachea normal and phonation normal. Neck supple. No thyroid mass present.   Normal range of motion.  Cardiovascular:  Normal rate, regular rhythm, normal heart sounds and normal pulses.           Pulmonary/Chest: Breath sounds normal.   Abdominal: Abdomen is soft. Bowel sounds are normal.   Musculoskeletal:         General: Tenderness present.      Cervical back: Normal range of motion and neck supple.     Neurological: He is alert and oriented to person, place, and time. He has normal strength and normal reflexes.   Skin: Skin is warm and intact.   Psychiatric: He has a normal mood and affect. His speech is normal and behavior is normal. Judgment and thought content normal.  Cognition and memory are normal.         ED Course   Procedures  Labs Reviewed - No data to display       Imaging Results              X-Ray Hip 2 or 3 views Right (with Pelvis when performed) (Final result)  Result time 01/22/24 08:57:11      Final result by Vincent Yoder MD (01/22/24 08:57:11)                   Impression:      Degenerative changes.      Electronically signed by: Vincent Yoder  Date:    01/22/2024  Time:    08:57               Narrative:    EXAMINATION:  XR HIP WITH PELVIS WHEN PERFORMED, 2 OR 3  VIEWS RIGHT    CLINICAL HISTORY:  Unspecified fall, initial encounter    COMPARISON:  None.    FINDINGS:  No acute displaced fractures or dislocations.    There is some narrowing of the inferior medial aspect of both hip joints surgical changes are identified in the lumbosacral spine there is some narrowing of the sacroiliac joints articular spaces are otherwise preserved with smooth articular surfaces    No blastic or lytic lesions.    Soft tissues within normal limits.                                       X-Ray Knee 3 View Right (Final result)  Result time 01/22/24 08:43:42      Final result by Pio Espinoza MD (01/22/24 08:43:42)                   Impression:      No displaced fracture.  Mild degenerative changes as above.      Electronically signed by: Pio Espinoza  Date:    01/22/2024  Time:    08:43               Narrative:    EXAMINATION:  XR KNEE 3 VIEW RIGHT    CLINICAL HISTORY:  Unspecified fall, initial encounter    TECHNIQUE:  AP, lateral, and Merchant views of the right knee were performed.    COMPARISON:  None    FINDINGS:  No displaced fracture.  No gross soft tissue abnormality.  No effusion.  Degenerative changes with chondrocalcinosis.                                       Medications   HYDROcodone-acetaminophen 5-325 mg per tablet 1 tablet (1 tablet Oral Given 1/22/24 0827)     Medical Decision Making   64-year-old was the cage in the home last night with his grandkids  he fell says he injured his right hip and right knee was able to ambulate home woke up this morning the pain here requesting x-rays the right hip and right knee denies any other injuries no other pain no other symptoms on initial exam he does have full range of motion of the hip and the knee no swelling of the knee some tenderness over the hip no obvious deformities noted with the patient initially arrived computer was down so all charting was started on paper charts now has returned scribing into the computer       X-ray reviewed no acute fractures noted agree with Radiology interpretation impression is contusion of the hip and knee discussed findings with patient and plan he is in agreement patient is already on chronic narcotic pain medications recommend follow up with his PCP    Amount and/or Complexity of Data Reviewed  Radiology: ordered and independent interpretation performed.    Risk  Prescription drug management.  Risk Details:  Differential diagnosis hip fracture versus hip contusion knee contusion versus knee fracture                                      Clinical Impression:  Final diagnoses:  [W19.XXXA] Fall  [S70.01XA] Contusion of right hip, initial encounter (Primary)  [W19.XXXA] Fall, initial encounter  [S80.01XA] Contusion of right knee, initial encounter          ED Disposition Condition    Discharge Stable          ED Prescriptions       Medication Sig Dispense Start Date End Date Auth. Provider    naproxen (NAPROSYN) 375 MG tablet Take 1 tablet (375 mg total) by mouth 2 (two) times daily with meals. 14 tablet 1/22/2024 -- Alberto Fragoso MD          Follow-up Information       Follow up With Specialties Details Why Contact Info    Jose Johnson Jr., MD Family Medicine In 2 days  [address]  [address]  [facility] [address]  [phone]               Alberto Fragoso MD  01/22/24 0956

## 2024-02-03 NOTE — PLAN OF CARE
[facility]  Discharge Final Note    Primary Care Provider: Jose Johnson Jr., MD    Expected Discharge Date: 12/14/2023    Final Discharge Note (most recent)       Final Note - 02/03/24 1344          Final Note    Assessment Type Final Discharge Note     Anticipated Discharge Disposition Home or Self Care     What phone number can be called within the next 1-3 days to see how you are doing after discharge? [phone]     Hospital Resources/Appts/Education Provided Provided patient/caregiver with written discharge plan information        Post-Acute Status    Discharge Delays None known at this time                     Important Message from Medicare             Contact Info       Jose Johnson Jr., MD   Specialty: Family Medicine   Relationship: PCP - General    [address] [place] [address]   Phone: [phone]       Next Steps: Go on 12/27/2023    Instructions: Patient has an appointment on December 27, 2023 at 2:15 pm    Tonja Wahl FNP   Specialty: Nurse Practitioner    [address] [address]   Phone: [phone]       Next Steps: Go on 2/6/2024    Instructions: Patient has an appointment February 6, 2024 at 9:00 am with Dulce Phelps MD   Specialty: Cardiovascular Disease, Cardiology    [address] [place] [address]   Phone: [phone]       Next Steps: Go on 12/22/2023    Instructions: Patient has an appointment with Patricia RICHARDS with [facility] on December 22, 2024 at 10:30 am

## 2024-03-26 ENCOUNTER — HOSPITAL ENCOUNTER (OUTPATIENT)
Dept: RADIOLOGY | Facility: HOSPITAL | Age: 64
Discharge: HOME | End: 2024-03-26
Attending: HOSPITALIST
Payer: MEDICARE

## 2024-03-26 DIAGNOSIS — Z87.891 PERSONAL HISTORY OF TOBACCO USE, PRESENTING HAZARDS TO HEALTH: ICD-10-CM

## 2024-03-26 PROCEDURE — 71271 CT THORAX LUNG CANCER SCR C-: CPT | Mod: TC

## 2024-04-11 ENCOUNTER — HOSPITAL ENCOUNTER (OUTPATIENT)
Facility: HOSPITAL | Age: 64
Discharge: HOME | End: 2024-04-11
Attending: STUDENT IN AN ORGANIZED HEALTH CARE EDUCATION/TRAINING PROGRAM | Admitting: INTERNAL MEDICINE
Payer: MEDICARE

## 2024-04-11 VITALS
SYSTOLIC BLOOD PRESSURE: 142 MMHG | OXYGEN SATURATION: 97 % | RESPIRATION RATE: 20 BRPM | TEMPERATURE: 98 F | BODY MASS INDEX: 20.39 KG/M2 | HEIGHT: 70 IN | HEART RATE: 105 BPM | WEIGHT: 142.44 LBS | DIASTOLIC BLOOD PRESSURE: 94 MMHG

## 2024-04-11 DIAGNOSIS — R07.9 CHEST PAIN: ICD-10-CM

## 2024-04-11 DIAGNOSIS — R06.02 SOB (SHORTNESS OF BREATH): Primary | ICD-10-CM

## 2024-04-11 DIAGNOSIS — F41.9 ANXIETY: ICD-10-CM

## 2024-04-11 DIAGNOSIS — J44.1 COPD EXACERBATION: ICD-10-CM

## 2024-04-11 DIAGNOSIS — G89.29 CHRONIC BACK PAIN, UNSPECIFIED BACK LOCATION, UNSPECIFIED BACK PAIN LATERALITY: ICD-10-CM

## 2024-04-11 DIAGNOSIS — M54.9 CHRONIC BACK PAIN, UNSPECIFIED BACK LOCATION, UNSPECIFIED BACK PAIN LATERALITY: ICD-10-CM

## 2024-04-11 DIAGNOSIS — J44.9 CHRONIC OBSTRUCTIVE PULMONARY DISEASE, UNSPECIFIED COPD TYPE: ICD-10-CM
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
## 2024-04-11 LAB
ALBUMIN SERPL-MCNC: 4.2 G/DL (ref 3.4–4.8)
ALBUMIN/GLOB SERPL: 1.4 RATIO (ref 1.1–2)
ALP SERPL-CCNC: 118 UNIT/L (ref 40–150)
ALT SERPL-CCNC: 15 UNIT/L (ref 0–55)
AST SERPL-CCNC: 16 UNIT/L (ref 5–34)
BASOPHILS # BLD AUTO: 0.03 X10(3)/MCL
BASOPHILS NFR BLD AUTO: 0.4 %
BILIRUB SERPL-MCNC: 0.7 MG/DL
BNP BLD-MCNC: 19.5 PG/ML
BUN SERPL-MCNC: 19.6 MG/DL (ref 8.4–25.7)
CALCIUM SERPL-MCNC: 10.1 MG/DL (ref 8.8–10)
CHLORIDE SERPL-SCNC: 109 MMOL/L (ref 98–107)
CO2 SERPL-SCNC: 21 MMOL/L (ref 23–31)
CREAT SERPL-MCNC: 0.8 MG/DL (ref 0.73–1.18)
EOSINOPHIL # BLD AUTO: 0.13 X10(3)/MCL (ref 0–0.9)
EOSINOPHIL NFR BLD AUTO: 1.7 %
ERYTHROCYTE [DISTWIDTH] IN BLOOD BY AUTOMATED COUNT: 12.7 % (ref 11.5–17)
GFR SERPLBLD CREATININE-BSD FMLA CKD-EPI: >60 MLS/MIN/1.73/M2
GLOBULIN SER-MCNC: 3.1 GM/DL (ref 2.4–3.5)
GLUCOSE SERPL-MCNC: 110 MG/DL (ref 82–115)
HCO3 UR-SCNC: 18.3 MMOL/L (ref 24–28)
HCT VFR BLD AUTO: 42.1 % (ref 42–52)
HGB BLD-MCNC: 13.9 G/DL (ref 14–18)
IMM GRANULOCYTES # BLD AUTO: 0.01 X10(3)/MCL (ref 0–0.04)
IMM GRANULOCYTES NFR BLD AUTO: 0.1 %
LYMPHOCYTES # BLD AUTO: 1.44 X10(3)/MCL (ref 0.6–4.6)
LYMPHOCYTES NFR BLD AUTO: 19.2 %
MCH RBC QN AUTO: 32 PG (ref 27–31)
MCHC RBC AUTO-ENTMCNC: 33 G/DL (ref 33–36)
MCV RBC AUTO: 96.8 FL (ref 80–94)
MONOCYTES # BLD AUTO: 0.59 X10(3)/MCL (ref 0.1–1.3)
MONOCYTES NFR BLD AUTO: 7.9 %
NEUTROPHILS # BLD AUTO: 5.29 X10(3)/MCL (ref 2.1–9.2)
NEUTROPHILS NFR BLD AUTO: 70.7 %
PCO2 BLDA: 28.6 MMHG (ref 35–45)
PH SMN: 7.41 [PH] (ref 7.35–7.45)
PLATELET # BLD AUTO: 268 X10(3)/MCL (ref 130–400)
PMV BLD AUTO: 8.9 FL (ref 7.4–10.4)
PO2 BLDA: 93 MMHG (ref 80–100)
POC BE: -6 MMOL/L
POC SATURATED O2: 98 % (ref 95–100)
POC TCO2: 19 MMOL/L (ref 23–27)
POTASSIUM SERPL-SCNC: 3.7 MMOL/L (ref 3.5–5.1)
PROT SERPL-MCNC: 7.3 GM/DL (ref 5.8–7.6)
RBC # BLD AUTO: 4.35 X10(6)/MCL (ref 4.7–6.1)
SAMPLE: ABNORMAL
SODIUM SERPL-SCNC: 139 MMOL/L (ref 136–145)
TROPONIN I SERPL-MCNC: <0.01 NG/ML (ref 0–0.04)
WBC # SPEC AUTO: 7.49 X10(3)/MCL (ref 4.5–11.5)

## 2024-04-11 PROCEDURE — 25000003 PHARM REV CODE 250: Performed by: STUDENT IN AN ORGANIZED HEALTH CARE EDUCATION/TRAINING PROGRAM

## 2024-04-11 PROCEDURE — 82803 BLOOD GASES ANY COMBINATION: CPT

## 2024-04-11 PROCEDURE — 25000242 PHARM REV CODE 250 ALT 637 W/ HCPCS: Performed by: STUDENT IN AN ORGANIZED HEALTH CARE EDUCATION/TRAINING PROGRAM

## 2024-04-11 PROCEDURE — 80053 COMPREHEN METABOLIC PANEL: CPT | Performed by: STUDENT IN AN ORGANIZED HEALTH CARE EDUCATION/TRAINING PROGRAM

## 2024-04-11 PROCEDURE — 96375 TX/PRO/DX INJ NEW DRUG ADDON: CPT

## 2024-04-11 PROCEDURE — 94644 CONT INHLJ TX 1ST HOUR: CPT

## 2024-04-11 PROCEDURE — 84484 ASSAY OF TROPONIN QUANT: CPT | Performed by: STUDENT IN AN ORGANIZED HEALTH CARE EDUCATION/TRAINING PROGRAM

## 2024-04-11 PROCEDURE — 93010 ELECTROCARDIOGRAM REPORT: CPT | Mod: ,,, | Performed by: INTERNAL MEDICINE

## 2024-04-11 PROCEDURE — 27000221 HC OXYGEN, UP TO 24 HOURS

## 2024-04-11 PROCEDURE — 99900035 HC TECH TIME PER 15 MIN (STAT)

## 2024-04-11 PROCEDURE — 83880 ASSAY OF NATRIURETIC PEPTIDE: CPT | Performed by: STUDENT IN AN ORGANIZED HEALTH CARE EDUCATION/TRAINING PROGRAM

## 2024-04-11 PROCEDURE — 85025 COMPLETE CBC W/AUTO DIFF WBC: CPT | Performed by: STUDENT IN AN ORGANIZED HEALTH CARE EDUCATION/TRAINING PROGRAM

## 2024-04-11 PROCEDURE — 96374 THER/PROPH/DIAG INJ IV PUSH: CPT

## 2024-04-11 PROCEDURE — 94760 N-INVAS EAR/PLS OXIMETRY 1: CPT | Mod: XB

## 2024-04-11 PROCEDURE — 94761 N-INVAS EAR/PLS OXIMETRY MLT: CPT

## 2024-04-11 PROCEDURE — 63600175 PHARM REV CODE 636 W HCPCS: Mod: JZ,TB | Performed by: STUDENT IN AN ORGANIZED HEALTH CARE EDUCATION/TRAINING PROGRAM

## 2024-04-11 PROCEDURE — G0378 HOSPITAL OBSERVATION PER HR: HCPCS

## 2024-04-11 PROCEDURE — 99285 EMERGENCY DEPT VISIT HI MDM: CPT | Mod: 25

## 2024-04-11 PROCEDURE — 93005 ELECTROCARDIOGRAM TRACING: CPT

## 2024-04-11 PROCEDURE — 36600 WITHDRAWAL OF ARTERIAL BLOOD: CPT

## 2024-04-11 RX ORDER — FLUTICASONE FUROATE, UMECLIDINIUM BROMIDE AND VILANTEROL TRIFENATATE 200; 62.5; 25 UG/1; UG/1; UG/1
1 POWDER RESPIRATORY (INHALATION) DAILY
Qty: 60 EACH | Refills: 0 | Status: SHIPPED | OUTPATIENT
Start: 2024-04-11 | End: 2024-05-11

## 2024-04-11 RX ORDER — DEXAMETHASONE SODIUM PHOSPHATE 4 MG/ML
6 INJECTION, SOLUTION INTRA-ARTICULAR; INTRALESIONAL; INTRAMUSCULAR; INTRAVENOUS; SOFT TISSUE
Status: COMPLETED | OUTPATIENT
Start: 2024-04-11 | End: 2024-04-11

## 2024-04-11 RX ORDER — TALC
9 POWDER (GRAM) TOPICAL NIGHTLY PRN
Status: DISCONTINUED | OUTPATIENT
Start: 2024-04-11 | End: 2024-04-11 | Stop reason: HOSPADM

## 2024-04-11 RX ORDER — NALOXONE HCL 0.4 MG/ML
0.02 VIAL (ML) INJECTION
Status: DISCONTINUED | OUTPATIENT
Start: 2024-04-11 | End: 2024-04-11 | Stop reason: HOSPADM

## 2024-04-11 RX ORDER — ALUMINUM HYDROXIDE, MAGNESIUM HYDROXIDE, AND SIMETHICONE 1200; 120; 1200 MG/30ML; MG/30ML; MG/30ML
30 SUSPENSION ORAL 4 TIMES DAILY PRN
Status: DISCONTINUED | OUTPATIENT
Start: 2024-04-11 | End: 2024-04-11 | Stop reason: HOSPADM

## 2024-04-11 RX ORDER — SODIUM CHLORIDE 9 MG/ML
INJECTION, SOLUTION INTRAVENOUS
Status: DISCONTINUED | OUTPATIENT
Start: 2024-04-11 | End: 2024-04-11 | Stop reason: HOSPADM

## 2024-04-11 RX ORDER — MORPHINE SULFATE 4 MG/ML
6 INJECTION, SOLUTION INTRAMUSCULAR; INTRAVENOUS
Status: COMPLETED | OUTPATIENT
Start: 2024-04-11 | End: 2024-04-11

## 2024-04-11 RX ORDER — IPRATROPIUM BROMIDE AND ALBUTEROL SULFATE 2.5; .5 MG/3ML; MG/3ML
3 SOLUTION RESPIRATORY (INHALATION) EVERY 6 HOURS PRN
Status: DISCONTINUED | OUTPATIENT
Start: 2024-04-11 | End: 2024-04-11

## 2024-04-11 RX ORDER — ONDANSETRON HYDROCHLORIDE 2 MG/ML
4 INJECTION, SOLUTION INTRAVENOUS
Status: COMPLETED | OUTPATIENT
Start: 2024-04-11 | End: 2024-04-11

## 2024-04-11 RX ORDER — POLYETHYLENE GLYCOL 3350 17 G/17G
17 POWDER, FOR SOLUTION ORAL 3 TIMES DAILY PRN
Status: DISCONTINUED | OUTPATIENT
Start: 2024-04-11 | End: 2024-04-11 | Stop reason: HOSPADM

## 2024-04-11 RX ORDER — ASPIRIN 81 MG/1
81 TABLET ORAL DAILY
Qty: 30 TABLET | Refills: 0 | Status: SHIPPED | OUTPATIENT
Start: 2024-04-11 | End: 2024-05-11

## 2024-04-11 RX ORDER — DEXAMETHASONE SODIUM PHOSPHATE 4 MG/ML
8 INJECTION, SOLUTION INTRA-ARTICULAR; INTRALESIONAL; INTRAMUSCULAR; INTRAVENOUS; SOFT TISSUE
Status: DISCONTINUED | OUTPATIENT
Start: 2024-04-11 | End: 2024-04-11

## 2024-04-11 RX ORDER — SODIUM CHLORIDE 0.9 % (FLUSH) 0.9 %
10 SYRINGE (ML) INJECTION
Status: DISCONTINUED | OUTPATIENT
Start: 2024-04-11 | End: 2024-04-11

## 2024-04-11 RX ORDER — IPRATROPIUM BROMIDE AND ALBUTEROL SULFATE 2.5; .5 MG/3ML; MG/3ML
3 SOLUTION RESPIRATORY (INHALATION) EVERY 4 HOURS PRN
Status: DISCONTINUED | OUTPATIENT
Start: 2024-04-11 | End: 2024-04-11 | Stop reason: HOSPADM

## 2024-04-11 RX ORDER — ONDANSETRON HYDROCHLORIDE 2 MG/ML
4 INJECTION, SOLUTION INTRAVENOUS EVERY 8 HOURS PRN
Status: DISCONTINUED | OUTPATIENT
Start: 2024-04-11 | End: 2024-04-11 | Stop reason: HOSPADM

## 2024-04-11 RX ORDER — LORAZEPAM 1 MG/1
1 TABLET ORAL EVERY 12 HOURS PRN
Qty: 10 TABLET | Refills: 0 | Status: SHIPPED | OUTPATIENT
Start: 2024-04-11 | End: 2024-04-16

## 2024-04-11 RX ORDER — SODIUM CHLORIDE 0.9 % (FLUSH) 0.9 %
10 SYRINGE (ML) INJECTION
Status: DISCONTINUED | OUTPATIENT
Start: 2024-04-11 | End: 2024-04-11 | Stop reason: HOSPADM

## 2024-04-11 RX ORDER — IPRATROPIUM BROMIDE AND ALBUTEROL SULFATE 2.5; .5 MG/3ML; MG/3ML
9 SOLUTION RESPIRATORY (INHALATION)
Status: COMPLETED | OUTPATIENT
Start: 2024-04-11 | End: 2024-04-11

## 2024-04-11 RX ORDER — IPRATROPIUM BROMIDE AND ALBUTEROL SULFATE 2.5; .5 MG/3ML; MG/3ML
3 SOLUTION RESPIRATORY (INHALATION) EVERY 6 HOURS PRN
Qty: 360 ML | Refills: 0 | Status: SHIPPED | OUTPATIENT
Start: 2024-04-11 | End: 2024-05-11

## 2024-04-11 RX ORDER — OXYCODONE HYDROCHLORIDE 10 MG/1
30 TABLET ORAL EVERY 6 HOURS PRN
Status: DISCONTINUED | OUTPATIENT
Start: 2024-04-11 | End: 2024-04-11 | Stop reason: HOSPADM

## 2024-04-11 RX ORDER — SERTRALINE HYDROCHLORIDE 50 MG/1
50 TABLET, FILM COATED ORAL DAILY
Status: DISCONTINUED | OUTPATIENT
Start: 2024-04-11 | End: 2024-04-11 | Stop reason: HOSPADM

## 2024-04-11 RX ORDER — MORPHINE SULFATE 4 MG/ML
4 INJECTION, SOLUTION INTRAMUSCULAR; INTRAVENOUS EVERY 4 HOURS PRN
Status: DISCONTINUED | OUTPATIENT
Start: 2024-04-11 | End: 2024-04-11

## 2024-04-11 RX ORDER — GABAPENTIN 300 MG/1
300 CAPSULE ORAL
Qty: 30 CAPSULE | Refills: 0 | Status: SHIPPED | OUTPATIENT
Start: 2024-04-11 | End: 2024-05-11

## 2024-04-11 RX ORDER — ASPIRIN 81 MG/1
81 TABLET ORAL DAILY
Status: DISCONTINUED | OUTPATIENT
Start: 2024-04-11 | End: 2024-04-11 | Stop reason: HOSPADM

## 2024-04-11 RX ORDER — NAPROXEN 500 MG/1
500 TABLET ORAL 2 TIMES DAILY WITH MEALS
Status: DISCONTINUED | OUTPATIENT
Start: 2024-04-11 | End: 2024-04-11 | Stop reason: HOSPADM

## 2024-04-11 RX ORDER — BISACODYL 10 MG/1
10 SUPPOSITORY RECTAL DAILY PRN
Status: DISCONTINUED | OUTPATIENT
Start: 2024-04-11 | End: 2024-04-11 | Stop reason: HOSPADM

## 2024-04-11 RX ORDER — SIMETHICONE 80 MG
1 TABLET,CHEWABLE ORAL 4 TIMES DAILY PRN
Status: DISCONTINUED | OUTPATIENT
Start: 2024-04-11 | End: 2024-04-11 | Stop reason: HOSPADM

## 2024-04-11 RX ORDER — GABAPENTIN 300 MG/1
300 CAPSULE ORAL
Status: DISCONTINUED | OUTPATIENT
Start: 2024-04-11 | End: 2024-04-11 | Stop reason: HOSPADM

## 2024-04-11 RX ORDER — HYDROCODONE BITARTRATE AND ACETAMINOPHEN 5; 325 MG/1; MG/1
1 TABLET ORAL EVERY 6 HOURS PRN
Status: DISCONTINUED | OUTPATIENT
Start: 2024-04-11 | End: 2024-04-11 | Stop reason: HOSPADM

## 2024-04-11 RX ORDER — ACETAMINOPHEN 325 MG/1
650 TABLET ORAL EVERY 4 HOURS PRN
Status: DISCONTINUED | OUTPATIENT
Start: 2024-04-11 | End: 2024-04-11 | Stop reason: HOSPADM

## 2024-04-11 RX ORDER — TEMAZEPAM 30 MG/1
30 CAPSULE ORAL NIGHTLY
Qty: 1 CAPSULE | Refills: 0 | Status: SHIPPED | OUTPATIENT
Start: 2024-04-11 | End: 2024-04-12

## 2024-04-11 RX ORDER — MORPHINE SULFATE 4 MG/ML
2 INJECTION, SOLUTION INTRAMUSCULAR; INTRAVENOUS EVERY 6 HOURS PRN
Status: DISCONTINUED | OUTPATIENT
Start: 2024-04-11 | End: 2024-04-11

## 2024-04-11 RX ORDER — NAPROXEN 375 MG/1
375 TABLET ORAL 2 TIMES DAILY WITH MEALS
Qty: 60 TABLET | Refills: 0 | Status: SHIPPED | OUTPATIENT
Start: 2024-04-11 | End: 2024-05-05 | Stop reason: ALTCHOICE

## 2024-04-11 RX ORDER — ONDANSETRON 4 MG/1
8 TABLET, ORALLY DISINTEGRATING ORAL EVERY 8 HOURS PRN
Status: DISCONTINUED | OUTPATIENT
Start: 2024-04-11 | End: 2024-04-11 | Stop reason: HOSPADM

## 2024-04-11 RX ORDER — NAPROXEN 375 MG/1
375 TABLET ORAL 2 TIMES DAILY WITH MEALS
Status: DISCONTINUED | OUTPATIENT
Start: 2024-04-11 | End: 2024-04-11

## 2024-04-11 RX ORDER — LORAZEPAM 0.5 MG/1
1 TABLET ORAL EVERY 12 HOURS PRN
Status: DISCONTINUED | OUTPATIENT
Start: 2024-04-11 | End: 2024-04-11 | Stop reason: HOSPADM

## 2024-04-11 RX ORDER — OXYCODONE HYDROCHLORIDE 10 MG/1
10 TABLET ORAL EVERY 6 HOURS PRN
Status: DISCONTINUED | OUTPATIENT
Start: 2024-04-11 | End: 2024-04-11

## 2024-04-11 RX ORDER — OXYCODONE HYDROCHLORIDE 30 MG/1
30 TABLET ORAL EVERY 6 HOURS PRN
Qty: 4 TABLET | Refills: 0 | Status: SHIPPED | OUTPATIENT
Start: 2024-04-11 | End: 2024-04-12

## 2024-04-11 RX ORDER — TALC
6 POWDER (GRAM) TOPICAL NIGHTLY PRN
Status: DISCONTINUED | OUTPATIENT
Start: 2024-04-11 | End: 2024-04-11

## 2024-04-11 RX ORDER — OXYCODONE HYDROCHLORIDE 10 MG/1
10 TABLET ORAL
Status: COMPLETED | OUTPATIENT
Start: 2024-04-11 | End: 2024-04-11

## 2024-04-11 RX ORDER — ENOXAPARIN SODIUM 100 MG/ML
40 INJECTION SUBCUTANEOUS EVERY 24 HOURS
Status: DISCONTINUED | OUTPATIENT
Start: 2024-04-11 | End: 2024-04-11 | Stop reason: HOSPADM

## 2024-04-11 RX ORDER — SERTRALINE HYDROCHLORIDE 50 MG/1
50 TABLET, FILM COATED ORAL DAILY
Qty: 30 TABLET | Refills: 0 | Status: SHIPPED | OUTPATIENT
Start: 2024-04-11 | End: 2024-05-11

## 2024-04-11 RX ORDER — ONDANSETRON HYDROCHLORIDE 2 MG/ML
4 INJECTION, SOLUTION INTRAVENOUS EVERY 8 HOURS PRN
Status: DISCONTINUED | OUTPATIENT
Start: 2024-04-11 | End: 2024-04-11 | Stop reason: SDUPTHER

## 2024-04-11 RX ADMIN — MORPHINE SULFATE 6 MG: 4 INJECTION, SOLUTION INTRAMUSCULAR; INTRAVENOUS at 04:04

## 2024-04-11 RX ADMIN — IPRATROPIUM BROMIDE AND ALBUTEROL SULFATE 9 ML: .5; 3 SOLUTION RESPIRATORY (INHALATION) at 03:04

## 2024-04-11 RX ADMIN — OXYCODONE HYDROCHLORIDE 10 MG: 10 TABLET ORAL at 06:04

## 2024-04-11 RX ADMIN — SERTRALINE HYDROCHLORIDE 50 MG: 50 TABLET ORAL at 10:04

## 2024-04-11 RX ADMIN — ONDANSETRON 4 MG: 2 INJECTION INTRAMUSCULAR; INTRAVENOUS at 04:04

## 2024-04-11 RX ADMIN — ASPIRIN 81 MG: 81 TABLET, COATED ORAL at 10:04

## 2024-04-11 RX ADMIN — DEXAMETHASONE SODIUM PHOSPHATE 6 MG: 4 INJECTION, SOLUTION INTRA-ARTICULAR; INTRALESIONAL; INTRAMUSCULAR; INTRAVENOUS; SOFT TISSUE at 04:04

## 2024-04-11 RX ADMIN — OXYCODONE HYDROCHLORIDE 30 MG: 10 TABLET ORAL at 10:04
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
## 2024-04-11 NOTE — NURSING
Nurses Note -- 4 Eyes      4/11/2024   6:49 AM      Skin assessed during: Admit      [x] No Altered Skin Integrity Present    []Prevention Measures Documented      [] Yes- Altered Skin Integrity Present or Discovered   [] LDA Added if Not in Epic (Describe Wound)   [] New Altered Skin Integrity was Present on Admit and Documented in LDA   [] Wound Image Taken    Wound Care Consulted? No    Attending Nurse:  Damaris Coppola RN/Staff Member:   [place]
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
## 2024-04-11 NOTE — NURSING
Patient and family educated on discharge instructions and verbalized understanding at this time.  All patient belongings returned to patient at discharge. IV and telemetry monitoring discontinued this time. Patient left unit via wheelchair and private vehicle. Patient discharged to home independently.

## 2024-04-12 ENCOUNTER — PATIENT OUTREACH (OUTPATIENT)
Dept: ADMINISTRATIVE | Facility: CLINIC | Age: 64
End: 2024-04-12
Payer: COMMERCIAL

## 2024-04-12 NOTE — PROGRESS NOTES
2nd attempt-C3 nurse attempted to contact Balbir Rogers for a TCC post hospital discharge follow up call. No answer.  Voicemail left.  The patient does not have a scheduled HOSFU appointment. Message sent to PCP staff for assistance with scheduling visit with patient.

## 2024-04-12 NOTE — PROGRESS NOTES
C3 nurse attempted to contact Balbir Rogers for a TCC post hospital discharge follow up call. Spoke with wife, Lupe who stated she was currently being discharged from another hospital and she would be with the patient later this afternoon and requested a call back at that time.  Wife stated the patient could not be reached at any other number.  The patient does not have a scheduled HOSFU appointment. Message sent to PCP staff for assistance with scheduling visit with patient.

## 2024-04-12 NOTE — PLAN OF CARE
[facility]  Discharge Final Note    Primary Care Provider: Jose Johnson Jr., MD    Expected Discharge Date: 4/11/2024    Final Discharge Note (most recent)       Final Note - 04/12/24 1143          Final Note    Assessment Type Final Discharge Note     Anticipated Discharge Disposition Home or Self Care     What phone number can be called within the next 1-3 days to see how you are doing after discharge? [phone]     Hospital Resources/Appts/Education Provided Provided patient/caregiver with written discharge plan information;Provided education on problems/symptoms using teachback;Appointments scheduled and added to AVS        Post-Acute Status    Discharge Delays None known at this time                     Important Message from Medicare             Contact Info       [facility]    [address]  [place] [address]   Phone: [phone]       Next Steps: Follow up    Jose Johnson Jr., MD   Specialty: Family Medicine   Relationship: PCP - General    [address] [place] [address] A  [place] [address]   Phone: [phone]       Next Steps: Follow up

## 2024-04-15 PROBLEM — J96.12 CHRONIC HYPERCAPNIC RESPIRATORY FAILURE: Status: RESOLVED | Noted: 2023-02-07 | Resolved: 2024-04-15

## 2024-04-15 LAB
OHS QRS DURATION: 78 MS
OHS QTC CALCULATION: 458 MS

## 2024-04-15 NOTE — PROGRESS NOTES
3rd attempt-C3 nurse attempted to contact Balbir Rogers for a TCC post hospital discharge follow up call. Spoke with wife, Lupe who stated she was not at home and requested a call back this afternoon. The patient does not have a scheduled HOSFU appointment. Message sent to PCP staff for assistance with scheduling visit with patient.

## 2024-04-15 NOTE — PROGRESS NOTES
C3 nurse spoke with Balbir Rogers and wife, Lupe for a TCC post hospital discharge follow up call. The patient does not have a scheduled HOSFU appointment with Jose Johnson Jr., MD within 5-7 days post hospital discharge date 4/11/24. C3 nurse was unable to schedule HOSFU appointment in [facility].    Message sent to PCP staff requesting they contact patient and schedule follow up appointment.  Wife stated the patient is also taking Oxycodone 30mg every 6 hours.  Medication unable to be reconciled as it is not on the patient's medication list.

## 2024-04-16 ENCOUNTER — OFFICE VISIT (OUTPATIENT)
Dept: NEUROLOGY | Facility: CLINIC | Age: 64
End: 2024-04-16
Payer: MEDICARE

## 2024-04-16 VITALS
BODY MASS INDEX: 23.54 KG/M2 | WEIGHT: 150 LBS | HEART RATE: 68 BPM | HEIGHT: 67 IN | DIASTOLIC BLOOD PRESSURE: 74 MMHG | SYSTOLIC BLOOD PRESSURE: 116 MMHG

## 2024-04-16 DIAGNOSIS — G25.3 MYOCLONUS: Primary | ICD-10-CM

## 2024-04-16 PROCEDURE — 1160F RVW MEDS BY RX/DR IN RCRD: CPT | Mod: CPTII,S$GLB,, | Performed by: NURSE PRACTITIONER

## 2024-04-16 PROCEDURE — 3078F DIAST BP <80 MM HG: CPT | Mod: CPTII,S$GLB,, | Performed by: NURSE PRACTITIONER

## 2024-04-16 PROCEDURE — 1159F MED LIST DOCD IN RCRD: CPT | Mod: CPTII,S$GLB,, | Performed by: NURSE PRACTITIONER

## 2024-04-16 PROCEDURE — 99214 OFFICE O/P EST MOD 30 MIN: CPT | Mod: S$GLB,,, | Performed by: NURSE PRACTITIONER

## 2024-04-16 PROCEDURE — 3008F BODY MASS INDEX DOCD: CPT | Mod: CPTII,S$GLB,, | Performed by: NURSE PRACTITIONER

## 2024-04-16 PROCEDURE — 1157F ADVNC CARE PLAN IN RCRD: CPT | Mod: CPTII,S$GLB,, | Performed by: NURSE PRACTITIONER

## 2024-04-16 PROCEDURE — 99999 PR PBB SHADOW E&M-EST. PATIENT-LVL III: CPT | Mod: PBBFAC,,, | Performed by: NURSE PRACTITIONER

## 2024-04-16 PROCEDURE — 1125F AMNT PAIN NOTED PAIN PRSNT: CPT | Mod: CPTII,S$GLB,, | Performed by: NURSE PRACTITIONER

## 2024-04-16 PROCEDURE — 3288F FALL RISK ASSESSMENT DOCD: CPT | Mod: CPTII,S$GLB,, | Performed by: NURSE PRACTITIONER

## 2024-04-16 PROCEDURE — 3074F SYST BP LT 130 MM HG: CPT | Mod: CPTII,S$GLB,, | Performed by: NURSE PRACTITIONER

## 2024-04-16 PROCEDURE — 1101F PT FALLS ASSESS-DOCD LE1/YR: CPT | Mod: CPTII,S$GLB,, | Performed by: NURSE PRACTITIONER

## 2024-04-16 RX ORDER — LORAZEPAM 1 MG/1
TABLET ORAL
Qty: 15 TABLET | Refills: 2 | Status: SHIPPED | OUTPATIENT
Start: 2024-04-16 | End: 2024-06-05

## 2024-04-16 RX ORDER — OXYCODONE HYDROCHLORIDE 30 MG/1
5 TABLET ORAL EVERY 6 HOURS PRN
COMMUNITY
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
## 2024-05-05 ENCOUNTER — HOSPITAL ENCOUNTER (EMERGENCY)
Facility: HOSPITAL | Age: 64
Discharge: HOME | End: 2024-05-05
Attending: EMERGENCY MEDICINE
Payer: MEDICARE

## 2024-05-05 VITALS
SYSTOLIC BLOOD PRESSURE: 175 MMHG | HEART RATE: 96 BPM | TEMPERATURE: 98 F | DIASTOLIC BLOOD PRESSURE: 83 MMHG | HEIGHT: 70 IN | BODY MASS INDEX: 21.19 KG/M2 | WEIGHT: 148 LBS | OXYGEN SATURATION: 99 % | RESPIRATION RATE: 20 BRPM

## 2024-05-05 DIAGNOSIS — R04.2 COUGH WITH HEMOPTYSIS: ICD-10-CM

## 2024-05-05 DIAGNOSIS — R07.9 CHEST PAIN: ICD-10-CM

## 2024-05-05 DIAGNOSIS — M94.0 COSTOCHONDRITIS, ACUTE: Primary | ICD-10-CM

## 2024-05-05 LAB
ALBUMIN SERPL-MCNC: 3.9 G/DL (ref 3.4–4.8)
ALBUMIN/GLOB SERPL: 1.1 RATIO (ref 1.1–2)
ALP SERPL-CCNC: 115 UNIT/L (ref 40–150)
ALT SERPL-CCNC: 10 UNIT/L (ref 0–55)
AST SERPL-CCNC: 17 UNIT/L (ref 5–34)
BASOPHILS # BLD AUTO: 0.03 X10(3)/MCL
BASOPHILS NFR BLD AUTO: 0.4 %
BILIRUB SERPL-MCNC: 0.5 MG/DL
BUN SERPL-MCNC: 18.1 MG/DL (ref 8.4–25.7)
CALCIUM SERPL-MCNC: 9.6 MG/DL (ref 8.8–10)
CHLORIDE SERPL-SCNC: 111 MMOL/L (ref 98–107)
CO2 SERPL-SCNC: 23 MMOL/L (ref 23–31)
CREAT SERPL-MCNC: 1.19 MG/DL (ref 0.73–1.18)
EOSINOPHIL # BLD AUTO: 0.18 X10(3)/MCL (ref 0–0.9)
EOSINOPHIL NFR BLD AUTO: 2.2 %
ERYTHROCYTE [DISTWIDTH] IN BLOOD BY AUTOMATED COUNT: 12.9 % (ref 11.5–17)
GFR SERPLBLD CREATININE-BSD FMLA CKD-EPI: >60 MLS/MIN/1.73/M2
GLOBULIN SER-MCNC: 3.5 GM/DL (ref 2.4–3.5)
GLUCOSE SERPL-MCNC: 104 MG/DL (ref 82–115)
HCT VFR BLD AUTO: 41.6 % (ref 42–52)
HGB BLD-MCNC: 14 G/DL (ref 14–18)
IMM GRANULOCYTES # BLD AUTO: 0.01 X10(3)/MCL (ref 0–0.04)
IMM GRANULOCYTES NFR BLD AUTO: 0.1 %
LYMPHOCYTES # BLD AUTO: 2.44 X10(3)/MCL (ref 0.6–4.6)
LYMPHOCYTES NFR BLD AUTO: 29.7 %
MCH RBC QN AUTO: 33.1 PG (ref 27–31)
MCHC RBC AUTO-ENTMCNC: 33.7 G/DL (ref 33–36)
MCV RBC AUTO: 98.3 FL (ref 80–94)
MONOCYTES # BLD AUTO: 0.68 X10(3)/MCL (ref 0.1–1.3)
MONOCYTES NFR BLD AUTO: 8.3 %
NEUTROPHILS # BLD AUTO: 4.88 X10(3)/MCL (ref 2.1–9.2)
NEUTROPHILS NFR BLD AUTO: 59.3 %
PLATELET # BLD AUTO: 312 X10(3)/MCL (ref 130–400)
PMV BLD AUTO: 9.1 FL (ref 7.4–10.4)
POTASSIUM SERPL-SCNC: 4.4 MMOL/L (ref 3.5–5.1)
PROT SERPL-MCNC: 7.4 GM/DL (ref 5.8–7.6)
RBC # BLD AUTO: 4.23 X10(6)/MCL (ref 4.7–6.1)
SODIUM SERPL-SCNC: 144 MMOL/L (ref 136–145)
WBC # SPEC AUTO: 8.22 X10(3)/MCL (ref 4.5–11.5)

## 2024-05-05 PROCEDURE — 93010 ELECTROCARDIOGRAM REPORT: CPT | Mod: ,,, | Performed by: INTERNAL MEDICINE

## 2024-05-05 PROCEDURE — 96374 THER/PROPH/DIAG INJ IV PUSH: CPT

## 2024-05-05 PROCEDURE — 25000003 PHARM REV CODE 250: Performed by: EMERGENCY MEDICINE

## 2024-05-05 PROCEDURE — 63600175 PHARM REV CODE 636 W HCPCS: Performed by: EMERGENCY MEDICINE

## 2024-05-05 PROCEDURE — 93005 ELECTROCARDIOGRAM TRACING: CPT

## 2024-05-05 PROCEDURE — 99285 EMERGENCY DEPT VISIT HI MDM: CPT | Mod: 25

## 2024-05-05 PROCEDURE — 80053 COMPREHEN METABOLIC PANEL: CPT | Performed by: PHYSICIAN ASSISTANT

## 2024-05-05 PROCEDURE — 85025 COMPLETE CBC W/AUTO DIFF WBC: CPT | Performed by: PHYSICIAN ASSISTANT

## 2024-05-05 RX ORDER — KETOROLAC TROMETHAMINE 10 MG/1
10 TABLET, FILM COATED ORAL EVERY 6 HOURS
Qty: 20 TABLET | Refills: 0 | Status: SHIPPED | OUTPATIENT
Start: 2024-05-05 | End: 2024-05-10

## 2024-05-05 RX ORDER — KETOROLAC TROMETHAMINE 30 MG/ML
30 INJECTION, SOLUTION INTRAMUSCULAR; INTRAVENOUS
Status: COMPLETED | OUTPATIENT
Start: 2024-05-05 | End: 2024-05-05

## 2024-05-05 RX ORDER — OXYCODONE AND ACETAMINOPHEN 10; 325 MG/1; MG/1
1 TABLET ORAL
Status: COMPLETED | OUTPATIENT
Start: 2024-05-05 | End: 2024-05-05

## 2024-05-05 RX ORDER — DOXYCYCLINE 100 MG/1
100 CAPSULE ORAL 2 TIMES DAILY
Qty: 20 CAPSULE | Refills: 0 | Status: SHIPPED | OUTPATIENT
Start: 2024-05-05 | End: 2024-05-15

## 2024-05-05 RX ADMIN — OXYCODONE AND ACETAMINOPHEN 1 TABLET: 10; 325 TABLET ORAL at 01:05

## 2024-05-05 RX ADMIN — KETOROLAC TROMETHAMINE 30 MG: 30 INJECTION, SOLUTION INTRAMUSCULAR at 02:05

## 2024-05-05 NOTE — ED PROVIDER NOTES
Encounter Date: 5/5/2024       History     Chief Complaint   Patient presents with    Chest Pain     Pt complains of left sided chest pain for about a week. It is worse when he lays down.  He states he started coughing up blood last night.  He is on chronic o2 at 2l.  He states he had a collapsed lung a few months ago requiring chest tube.     Hemoptysis     This 64-year-old man with history of COPD on chronic O2 at 2 L presents with complaints of left-sided chest pain for the past week.  Pain is worse with movements and worse with lying on his left side.  He has had increased cough and last night he had hemoptysis.  He has a history of chronic pain and states he takes oxycodone 30 and requests pain medication here for his chest pain.  He reports a history of a collapsed lung and chest tube that occurred in September of last year.  They are concerned that this may have reoccurred.       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     Past Surgical History:   Procedure Laterality Date    CERVICAL LAMINECTOMY WITH SPINAL FUSION      CHOLECYSTECTOMY      COLONOSCOPY      ESOPHAGOGASTRODUODENOSCOPY      gastrointestinal biopsy      graft to nose      inguinal herniotomy      LEFT HEART CATHETERIZATION Left 2/1/2023    Procedure: CATHETERIZATION, HEART, LEFT;  Surgeon: Dulce Orozco MD;  Location: [facility] CATH LAB;  Service: Cardiology;  Laterality: Left;  via radial approach    mandible operation      POLYPECTOMY        Family History   Problem Relation Name Age of Onset    Cancer Mother      Heart disease Father      Hypertension Father       Social History     Tobacco Use    Smoking status: Former     Current packs/day: 0.00     Average packs/day: 2.0 packs/day for 40.0 years (80.0 ttl pk-yrs)     Types: Cigarettes     Start date: 1977     Quit date: 2017     Years since quittin.7    Smokeless tobacco: Never   Substance Use Topics    Alcohol use: Never    Drug use: Yes     Types: Oxycodone     Review of Systems   Constitutional:  Negative for fever.   HENT:  Negative for sore throat.    Respiratory:  Positive for cough. Negative for shortness of breath.    Cardiovascular:  Positive for chest pain.   Gastrointestinal:  Negative for nausea.   Genitourinary:  Negative for dysuria.   Musculoskeletal:  Negative for back pain.   Skin:  Negative for rash.   Neurological:  Positive for tremors. Negative for weakness.   Hematological:  Does not bruise/bleed easily.       Physical Exam     Initial Vitals [24 1322]   BP Pulse Resp Temp SpO2   (!) 175/83 96 18 97.9 °F (36.6 °C) 99 %      MAP       --         Physical Exam    Nursing note and vitals reviewed.  Constitutional: He appears well-developed and well-nourished.   HENT:   Head: Normocephalic and atraumatic.   Mouth/Throat: Mucous membranes are normal.   Eyes: EOM are normal. Pupils are equal, round, and reactive to light.   Neck: Neck supple.   Normal range of motion.  Cardiovascular:  Normal rate, regular rhythm, normal heart sounds and intact distal pulses.           Pulmonary/Chest: Breath sounds normal. He has no wheezes. He exhibits tenderness (Tenderness over left costochondral joints).   Abdominal: Abdomen is soft. Bowel sounds are normal.   Musculoskeletal:         General: Normal range of motion.      Cervical back: Normal range of motion and neck supple.     Neurological: He is alert and oriented to person, place, and time. He has normal strength.    Skin: Skin is warm and dry. Capillary refill takes less than 2 seconds.   Psychiatric: He has a normal mood and affect. His behavior is normal. Judgment and thought content normal.         ED Course   Procedures  Labs Reviewed   COMPREHENSIVE METABOLIC PANEL - Abnormal; Notable for the following components:       Result Value    Chloride 111 (*)     Creatinine 1.19 (*)     All other components within normal limits   CBC WITH DIFFERENTIAL - Abnormal; Notable for the following components:    RBC 4.23 (*)     Hct 41.6 (*)     MCV 98.3 (*)     MCH 33.1 (*)     All other components within normal limits   CBC W/ AUTO DIFFERENTIAL    Narrative:     The following orders were created for panel order CBC auto differential.  Procedure                               Abnormality         Status                     ---------                               -----------         ------                     CBC with Differential[[id number]]       Abnormal            Final result                 Please view results for these tests on the individual orders.          Imaging Results              X-Ray Chest PA And Lateral (Preliminary result)  Result time 05/05/24 14:00:23      Wet Read by Yash Shah MD (05/05/24 14:00:23, [facility] Dept, Emergency Medicine)    COPD but no infiltrates and no pneumo.                                     Medications   ketorolac injection 30 mg (has no administration in time range)   oxyCODONE-acetaminophen  mg per tablet 1 tablet (1 tablet Oral Given 5/5/24 2852)     Medical Decision Making  Amount and/or Complexity of Data Reviewed  Radiology: independent interpretation performed.    Risk  Prescription drug management.                                      Clinical Impression:  Final diagnoses:  [R07.9] Chest pain  [M94.0] Costochondritis, acute (Primary)  [R04.2] Cough with hemoptysis          ED Disposition Condition    Discharge Stable          ED Prescriptions        Medication Sig Dispense Start Date End Date Auth. Provider    ketorolac (TORADOL) 10 mg tablet Take 1 tablet (10 mg total) by mouth every 6 (six) hours. for 5 days 20 tablet 5/5/2024 5/10/2024 Yash Shah MD    doxycycline (VIBRAMYCIN) 100 MG Cap Take 1 capsule (100 mg total) by mouth 2 (two) times daily. for 10 days 20 capsule 5/5/2024 5/15/2024 Yash Shah MD          Follow-up Information       Follow up With Specialties Details Why Contact Info    Jose Johnson Jr., MD Family Medicine Schedule an appointment as soon as possible for a visit  As needed [address] [place] [address] A  [facility] [address]  [phone]               Yash Shah MD  05/05/24 0618

## 2024-05-06 LAB
OHS QRS DURATION: 76 MS
OHS QTC CALCULATION: 440 MS

## 2024-05-23 NOTE — PLAN OF CARE
[facility]  Discharge Final Note    Primary Care Provider: Jose Johnson Jr., MD    Expected Discharge Date: 4/11/2024    Final Discharge Note (most recent)       Final Note - 05/22/24 2001          Final Note    Assessment Type Final Discharge Note     Anticipated Discharge Disposition Home or Self Care     What phone number can be called within the next 1-3 days to see how you are doing after discharge? [phone]     Hospital Resources/Appts/Education Provided Provided patient/caregiver with written discharge plan information        Post-Acute Status    Discharge Delays None known at this time                     Important Message from Medicare             Contact Info       [facility]    [address]  [place] [address]   Phone: [phone]       Next Steps: Follow up    Jose Johnson Jr., MD   Specialty: Family Medicine   Relationship: PCP - General    [address] [place] [address] A  [place] [address]   Phone: [phone]       Next Steps: Follow up

## 2024-06-05 RX ORDER — LORAZEPAM 1 MG/1
TABLET ORAL
Qty: 15 TABLET | Refills: 2 | Status: SHIPPED | OUTPATIENT
Start: 2024-06-05

## 2024-07-29 ENCOUNTER — HOSPITAL ENCOUNTER (INPATIENT)
Facility: HOSPITAL | Age: 64
LOS: 2 days | Discharge: HOME | DRG: 190 | End: 2024-08-01
Attending: SPECIALIST | Admitting: INTERNAL MEDICINE
Payer: MEDICARE

## 2024-07-29 DIAGNOSIS — J44.1 COPD EXACERBATION: Primary | ICD-10-CM

## 2024-07-29 DIAGNOSIS — R06.02 SHORTNESS OF BREATH: ICD-10-CM

## 2024-07-29 LAB
ALBUMIN SERPL-MCNC: 4.1 G/DL (ref 3.4–4.8)
ALBUMIN/GLOB SERPL: 1.2 RATIO (ref 1.1–2)
ALP SERPL-CCNC: 112 UNIT/L (ref 40–150)
ALT SERPL-CCNC: 17 UNIT/L (ref 0–55)
ANION GAP SERPL CALC-SCNC: 10 MEQ/L
AST SERPL-CCNC: 16 UNIT/L (ref 5–34)
BACTERIA #/AREA URNS AUTO: ABNORMAL /HPF
BASOPHILS # BLD AUTO: 0.02 X10(3)/MCL
BASOPHILS NFR BLD AUTO: 0.3 %
BILIRUB SERPL-MCNC: 0.5 MG/DL
BILIRUB UR QL STRIP.AUTO: NEGATIVE
BNP BLD-MCNC: 25.6 PG/ML
BUN SERPL-MCNC: 18.1 MG/DL (ref 8.4–25.7)
CALCIUM SERPL-MCNC: 9.9 MG/DL (ref 8.8–10)
CHLORIDE SERPL-SCNC: 108 MMOL/L (ref 98–107)
CLARITY UR: CLEAR
CO2 SERPL-SCNC: 22 MMOL/L (ref 23–31)
COLOR UR AUTO: YELLOW
CREAT SERPL-MCNC: 0.84 MG/DL (ref 0.73–1.18)
CREAT/UREA NIT SERPL: 22
EOSINOPHIL # BLD AUTO: 0 X10(3)/MCL (ref 0–0.9)
EOSINOPHIL NFR BLD AUTO: 0 %
ERYTHROCYTE [DISTWIDTH] IN BLOOD BY AUTOMATED COUNT: 12.7 % (ref 11.5–17)
FLUAV AG UPPER RESP QL IA.RAPID: NOT DETECTED
FLUBV AG UPPER RESP QL IA.RAPID: NOT DETECTED
GFR SERPLBLD CREATININE-BSD FMLA CKD-EPI: >60 ML/MIN/1.73/M2
GLOBULIN SER-MCNC: 3.4 GM/DL (ref 2.4–3.5)
GLUCOSE SERPL-MCNC: 123 MG/DL (ref 82–115)
GLUCOSE UR QL STRIP: NEGATIVE
HCO3 UR-SCNC: 20.3 MMOL/L (ref 24–28)
HCT VFR BLD AUTO: 44.3 % (ref 42–52)
HGB BLD-MCNC: 14.6 G/DL (ref 14–18)
HGB UR QL STRIP: NEGATIVE
IMM GRANULOCYTES # BLD AUTO: 0.02 X10(3)/MCL (ref 0–0.04)
IMM GRANULOCYTES NFR BLD AUTO: 0.3 %
KETONES UR QL STRIP: NEGATIVE
LEUKOCYTE ESTERASE UR QL STRIP: NEGATIVE
LYMPHOCYTES # BLD AUTO: 0.79 X10(3)/MCL (ref 0.6–4.6)
LYMPHOCYTES NFR BLD AUTO: 12.3 %
MCH RBC QN AUTO: 32.1 PG (ref 27–31)
MCHC RBC AUTO-ENTMCNC: 33 G/DL (ref 33–36)
MCV RBC AUTO: 97.4 FL (ref 80–94)
MONOCYTES # BLD AUTO: 0.25 X10(3)/MCL (ref 0.1–1.3)
MONOCYTES NFR BLD AUTO: 3.9 %
MUCOUS THREADS URNS QL MICRO: ABNORMAL /LPF
NEUTROPHILS # BLD AUTO: 5.36 X10(3)/MCL (ref 2.1–9.2)
NEUTROPHILS NFR BLD AUTO: 83.2 %
NITRITE UR QL STRIP: NEGATIVE
PCO2 BLDA: 31.6 MMHG (ref 35–45)
PH SMN: 7.42 [PH] (ref 7.35–7.45)
PH UR STRIP: 6 [PH]
PLATELET # BLD AUTO: 343 X10(3)/MCL (ref 130–400)
PMV BLD AUTO: 9.3 FL (ref 7.4–10.4)
PO2 BLDA: 80 MMHG (ref 80–100)
POC BE: -4 MMOL/L
POC CARDIAC TROPONIN I: 0 NG/ML (ref 0–0.08)
POC SATURATED O2: 96 % (ref 95–100)
POC TCO2: 21 MMOL/L (ref 23–27)
POTASSIUM SERPL-SCNC: 4.6 MMOL/L (ref 3.5–5.1)
PROT SERPL-MCNC: 7.5 GM/DL (ref 5.8–7.6)
PROT UR QL STRIP: 30
RBC # BLD AUTO: 4.55 X10(6)/MCL (ref 4.7–6.1)
RBC #/AREA URNS AUTO: ABNORMAL /HPF
SAMPLE: ABNORMAL
SAMPLE: NORMAL
SARS-COV-2 RNA RESP QL NAA+PROBE: NOT DETECTED
SODIUM SERPL-SCNC: 140 MMOL/L (ref 136–145)
SP GR UR STRIP.AUTO: >=1.03 (ref 1–1.03)
SQUAMOUS #/AREA URNS AUTO: ABNORMAL /HPF
UROBILINOGEN UR STRIP-ACNC: 0.2
WBC # BLD AUTO: 6.44 X10(3)/MCL (ref 4.5–11.5)
WBC #/AREA URNS AUTO: ABNORMAL /HPF

## 2024-07-29 PROCEDURE — 96372 THER/PROPH/DIAG INJ SC/IM: CPT | Performed by: INTERNAL MEDICINE

## 2024-07-29 PROCEDURE — G0378 HOSPITAL OBSERVATION PER HR: HCPCS

## 2024-07-29 PROCEDURE — 36600 WITHDRAWAL OF ARTERIAL BLOOD: CPT

## 2024-07-29 PROCEDURE — 85025 COMPLETE CBC W/AUTO DIFF WBC: CPT | Performed by: SPECIALIST

## 2024-07-29 PROCEDURE — 25000242 PHARM REV CODE 250 ALT 637 W/ HCPCS: Performed by: SPECIALIST

## 2024-07-29 PROCEDURE — 83880 ASSAY OF NATRIURETIC PEPTIDE: CPT | Performed by: SPECIALIST

## 2024-07-29 PROCEDURE — 81003 URINALYSIS AUTO W/O SCOPE: CPT | Performed by: INTERNAL MEDICINE

## 2024-07-29 PROCEDURE — 82803 BLOOD GASES ANY COMBINATION: CPT

## 2024-07-29 PROCEDURE — 63700000 PHARM REV CODE 250 ALT 637 W/O HCPCS: Performed by: INTERNAL MEDICINE

## 2024-07-29 PROCEDURE — 63600175 PHARM REV CODE 636 W HCPCS: Performed by: EMERGENCY MEDICINE

## 2024-07-29 PROCEDURE — 25000242 PHARM REV CODE 250 ALT 637 W/ HCPCS: Performed by: INTERNAL MEDICINE

## 2024-07-29 PROCEDURE — 0240U COVID/FLU A&B PCR: CPT | Performed by: EMERGENCY MEDICINE

## 2024-07-29 PROCEDURE — 25000242 PHARM REV CODE 250 ALT 637 W/ HCPCS: Performed by: EMERGENCY MEDICINE

## 2024-07-29 PROCEDURE — 63600175 PHARM REV CODE 636 W HCPCS: Performed by: INTERNAL MEDICINE

## 2024-07-29 PROCEDURE — 63600175 PHARM REV CODE 636 W HCPCS: Performed by: SPECIALIST

## 2024-07-29 PROCEDURE — 81001 URINALYSIS AUTO W/SCOPE: CPT | Performed by: INTERNAL MEDICINE

## 2024-07-29 PROCEDURE — 93005 ELECTROCARDIOGRAM TRACING: CPT

## 2024-07-29 PROCEDURE — 94640 AIRWAY INHALATION TREATMENT: CPT

## 2024-07-29 PROCEDURE — 99285 EMERGENCY DEPT VISIT HI MDM: CPT | Mod: 25

## 2024-07-29 PROCEDURE — 94760 N-INVAS EAR/PLS OXIMETRY 1: CPT | Mod: XB

## 2024-07-29 PROCEDURE — 25000003 PHARM REV CODE 250: Performed by: INTERNAL MEDICINE

## 2024-07-29 PROCEDURE — 80053 COMPREHEN METABOLIC PANEL: CPT | Performed by: SPECIALIST

## 2024-07-29 PROCEDURE — 27000221 HC OXYGEN, UP TO 24 HOURS

## 2024-07-29 PROCEDURE — 99900035 HC TECH TIME PER 15 MIN (STAT)

## 2024-07-29 PROCEDURE — 94640 AIRWAY INHALATION TREATMENT: CPT | Mod: XB

## 2024-07-29 PROCEDURE — 93010 ELECTROCARDIOGRAM REPORT: CPT | Mod: ,,, | Performed by: INTERNAL MEDICINE

## 2024-07-29 RX ORDER — FORMOTEROL FUMARATE DIHYDRATE 20 UG/2ML
20 SOLUTION RESPIRATORY (INHALATION)
Status: DISCONTINUED | OUTPATIENT
Start: 2024-07-29 | End: 2024-07-29

## 2024-07-29 RX ORDER — ENOXAPARIN SODIUM 100 MG/ML
40 INJECTION SUBCUTANEOUS EVERY 24 HOURS
Status: DISCONTINUED | OUTPATIENT
Start: 2024-07-29 | End: 2024-08-01 | Stop reason: HOSPADM

## 2024-07-29 RX ORDER — TALC
6 POWDER (GRAM) TOPICAL NIGHTLY PRN
Status: DISCONTINUED | OUTPATIENT
Start: 2024-07-29 | End: 2024-07-29 | Stop reason: SDUPTHER

## 2024-07-29 RX ORDER — LORAZEPAM 0.5 MG/1
2 TABLET ORAL EVERY 4 HOURS PRN
Status: DISCONTINUED | OUTPATIENT
Start: 2024-07-29 | End: 2024-08-01 | Stop reason: HOSPADM

## 2024-07-29 RX ORDER — CALCIUM CARBONATE 200(500)MG
1000 TABLET,CHEWABLE ORAL 3 TIMES DAILY PRN
Status: DISCONTINUED | OUTPATIENT
Start: 2024-07-29 | End: 2024-08-01 | Stop reason: HOSPADM

## 2024-07-29 RX ORDER — ACETAMINOPHEN 325 MG/1
650 TABLET ORAL EVERY 4 HOURS PRN
Status: DISCONTINUED | OUTPATIENT
Start: 2024-07-29 | End: 2024-08-01 | Stop reason: HOSPADM

## 2024-07-29 RX ORDER — DEXAMETHASONE SODIUM PHOSPHATE 4 MG/ML
4 INJECTION, SOLUTION INTRA-ARTICULAR; INTRALESIONAL; INTRAMUSCULAR; INTRAVENOUS; SOFT TISSUE EVERY 12 HOURS
Status: DISCONTINUED | OUTPATIENT
Start: 2024-07-29 | End: 2024-08-01 | Stop reason: HOSPADM

## 2024-07-29 RX ORDER — SIMETHICONE 80 MG
1 TABLET,CHEWABLE ORAL 4 TIMES DAILY PRN
Status: DISCONTINUED | OUTPATIENT
Start: 2024-07-29 | End: 2024-08-01 | Stop reason: HOSPADM

## 2024-07-29 RX ORDER — TALC
6 POWDER (GRAM) TOPICAL NIGHTLY PRN
Status: DISCONTINUED | OUTPATIENT
Start: 2024-07-29 | End: 2024-08-01 | Stop reason: HOSPADM

## 2024-07-29 RX ORDER — HYDRALAZINE HYDROCHLORIDE 20 MG/ML
10 INJECTION INTRAMUSCULAR; INTRAVENOUS EVERY 4 HOURS PRN
Status: DISCONTINUED | OUTPATIENT
Start: 2024-07-29 | End: 2024-08-01 | Stop reason: HOSPADM

## 2024-07-29 RX ORDER — CLONIDINE HYDROCHLORIDE 0.1 MG/1
0.2 TABLET ORAL EVERY 4 HOURS PRN
Status: DISCONTINUED | OUTPATIENT
Start: 2024-07-29 | End: 2024-08-01

## 2024-07-29 RX ORDER — TRAMADOL HYDROCHLORIDE 50 MG/1
50 TABLET ORAL EVERY 6 HOURS PRN
Status: DISCONTINUED | OUTPATIENT
Start: 2024-07-29 | End: 2024-08-01 | Stop reason: HOSPADM

## 2024-07-29 RX ORDER — ALBUTEROL SULFATE 0.83 MG/ML
10 SOLUTION RESPIRATORY (INHALATION) CONTINUOUS
Status: DISCONTINUED | OUTPATIENT
Start: 2024-07-29 | End: 2024-07-30

## 2024-07-29 RX ORDER — OXYCODONE HYDROCHLORIDE 5 MG/1
5 TABLET ORAL EVERY 6 HOURS PRN
Status: DISCONTINUED | OUTPATIENT
Start: 2024-07-29 | End: 2024-07-29

## 2024-07-29 RX ORDER — PREDNISONE 20 MG/1
60 TABLET ORAL
Status: COMPLETED | OUTPATIENT
Start: 2024-07-29 | End: 2024-07-29

## 2024-07-29 RX ORDER — SODIUM CHLORIDE 0.9 % (FLUSH) 0.9 %
10 SYRINGE (ML) INJECTION
Status: DISCONTINUED | OUTPATIENT
Start: 2024-07-29 | End: 2024-08-01 | Stop reason: HOSPADM

## 2024-07-29 RX ORDER — FLUCONAZOLE 100 MG/1
100 TABLET ORAL DAILY
Status: DISCONTINUED | OUTPATIENT
Start: 2024-07-29 | End: 2024-08-01

## 2024-07-29 RX ORDER — KETOROLAC TROMETHAMINE 30 MG/ML
15 INJECTION, SOLUTION INTRAMUSCULAR; INTRAVENOUS
Status: COMPLETED | OUTPATIENT
Start: 2024-07-29 | End: 2024-07-29

## 2024-07-29 RX ORDER — PREDNISONE 20 MG/1
20 TABLET ORAL DAILY
Status: DISCONTINUED | OUTPATIENT
Start: 2024-07-29 | End: 2024-07-29

## 2024-07-29 RX ORDER — MORPHINE SULFATE 4 MG/ML
2 INJECTION, SOLUTION INTRAMUSCULAR; INTRAVENOUS
Status: COMPLETED | OUTPATIENT
Start: 2024-07-29 | End: 2024-07-29

## 2024-07-29 RX ORDER — BISACODYL 5 MG
5 TABLET, DELAYED RELEASE (ENTERIC COATED) ORAL DAILY PRN
Status: DISCONTINUED | OUTPATIENT
Start: 2024-07-29 | End: 2024-08-01 | Stop reason: HOSPADM

## 2024-07-29 RX ORDER — LEVOFLOXACIN 5 MG/ML
750 INJECTION, SOLUTION INTRAVENOUS
Status: DISCONTINUED | OUTPATIENT
Start: 2024-07-29 | End: 2024-08-01

## 2024-07-29 RX ORDER — IPRATROPIUM BROMIDE AND ALBUTEROL SULFATE 2.5; .5 MG/3ML; MG/3ML
3 SOLUTION RESPIRATORY (INHALATION) EVERY 4 HOURS
Status: DISCONTINUED | OUTPATIENT
Start: 2024-07-29 | End: 2024-07-30

## 2024-07-29 RX ORDER — ONDANSETRON HYDROCHLORIDE 2 MG/ML
4 INJECTION, SOLUTION INTRAVENOUS EVERY 6 HOURS PRN
Status: DISCONTINUED | OUTPATIENT
Start: 2024-07-29 | End: 2024-08-01 | Stop reason: HOSPADM

## 2024-07-29 RX ORDER — LABETALOL HCL 20 MG/4 ML
10 SYRINGE (ML) INTRAVENOUS 4 TIMES DAILY PRN
Status: DISCONTINUED | OUTPATIENT
Start: 2024-07-29 | End: 2024-08-01 | Stop reason: HOSPADM

## 2024-07-29 RX ORDER — FORMOTEROL FUMARATE DIHYDRATE 20 UG/2ML
20 SOLUTION RESPIRATORY (INHALATION) 2 TIMES DAILY
Status: DISCONTINUED | OUTPATIENT
Start: 2024-07-29 | End: 2024-08-01 | Stop reason: HOSPADM

## 2024-07-29 RX ORDER — LOPERAMIDE HYDROCHLORIDE 2 MG/1
2 CAPSULE ORAL
Status: DISCONTINUED | OUTPATIENT
Start: 2024-07-29 | End: 2024-08-01 | Stop reason: HOSPADM

## 2024-07-29 RX ORDER — PANTOPRAZOLE SODIUM 40 MG/1
40 TABLET, DELAYED RELEASE ORAL DAILY
Status: DISCONTINUED | OUTPATIENT
Start: 2024-07-29 | End: 2024-08-01 | Stop reason: HOSPADM

## 2024-07-29 RX ORDER — IPRATROPIUM BROMIDE AND ALBUTEROL SULFATE 2.5; .5 MG/3ML; MG/3ML
3 SOLUTION RESPIRATORY (INHALATION)
Status: COMPLETED | OUTPATIENT
Start: 2024-07-29 | End: 2024-07-29

## 2024-07-29 RX ORDER — MORPHINE SULFATE 4 MG/ML
4 INJECTION, SOLUTION INTRAMUSCULAR; INTRAVENOUS EVERY 4 HOURS PRN
Status: DISCONTINUED | OUTPATIENT
Start: 2024-07-29 | End: 2024-08-01

## 2024-07-29 RX ORDER — OXYCODONE HYDROCHLORIDE 10 MG/1
30 TABLET ORAL EVERY 6 HOURS PRN
Status: DISCONTINUED | OUTPATIENT
Start: 2024-07-29 | End: 2024-07-30

## 2024-07-29 RX ORDER — BENZONATATE 100 MG/1
100 CAPSULE ORAL 3 TIMES DAILY PRN
Status: DISCONTINUED | OUTPATIENT
Start: 2024-07-29 | End: 2024-08-01 | Stop reason: HOSPADM

## 2024-07-29 RX ADMIN — KETOROLAC TROMETHAMINE 15 MG: 30 INJECTION, SOLUTION INTRAMUSCULAR at 06:07

## 2024-07-29 RX ADMIN — DEXAMETHASONE SODIUM PHOSPHATE 4 MG: 4 INJECTION, SOLUTION INTRA-ARTICULAR; INTRALESIONAL; INTRAMUSCULAR; INTRAVENOUS; SOFT TISSUE at 08:07

## 2024-07-29 RX ADMIN — OXYCODONE HYDROCHLORIDE 30 MG: 10 TABLET ORAL at 12:07

## 2024-07-29 RX ADMIN — MORPHINE SULFATE 4 MG: 4 INJECTION, SOLUTION INTRAMUSCULAR; INTRAVENOUS at 11:07

## 2024-07-29 RX ADMIN — DEXAMETHASONE SODIUM PHOSPHATE 4 MG: 4 INJECTION, SOLUTION INTRA-ARTICULAR; INTRALESIONAL; INTRAMUSCULAR; INTRAVENOUS; SOFT TISSUE at 09:07

## 2024-07-29 RX ADMIN — IPRATROPIUM BROMIDE AND ALBUTEROL SULFATE 3 ML: .5; 3 SOLUTION RESPIRATORY (INHALATION) at 07:07

## 2024-07-29 RX ADMIN — OXYCODONE HYDROCHLORIDE 30 MG: 10 TABLET ORAL at 06:07

## 2024-07-29 RX ADMIN — ENOXAPARIN SODIUM 40 MG: 40 INJECTION SUBCUTANEOUS at 04:07

## 2024-07-29 RX ADMIN — ALBUTEROL SULFATE 10 MG/HR: 2.5 SOLUTION RESPIRATORY (INHALATION) at 07:07

## 2024-07-29 RX ADMIN — IPRATROPIUM BROMIDE AND ALBUTEROL SULFATE 3 ML: .5; 3 SOLUTION RESPIRATORY (INHALATION) at 11:07

## 2024-07-29 RX ADMIN — LEVOFLOXACIN 750 MG: 750 INJECTION, SOLUTION INTRAVENOUS at 07:07

## 2024-07-29 RX ADMIN — FORMOTEROL FUMARATE DIHYDRATE 20 MCG: 20 SOLUTION RESPIRATORY (INHALATION) at 08:07

## 2024-07-29 RX ADMIN — MORPHINE SULFATE 4 MG: 4 INJECTION, SOLUTION INTRAMUSCULAR; INTRAVENOUS at 07:07

## 2024-07-29 RX ADMIN — FLUCONAZOLE 100 MG: 100 TABLET ORAL at 10:07

## 2024-07-29 RX ADMIN — MORPHINE SULFATE 2 MG: 4 INJECTION INTRAVENOUS at 08:07

## 2024-07-29 RX ADMIN — LORAZEPAM 2 MG: 0.5 TABLET ORAL at 09:07

## 2024-07-29 RX ADMIN — MORPHINE SULFATE 4 MG: 4 INJECTION, SOLUTION INTRAMUSCULAR; INTRAVENOUS at 03:07

## 2024-07-29 RX ADMIN — MORPHINE SULFATE 4 MG: 4 INJECTION, SOLUTION INTRAMUSCULAR; INTRAVENOUS at 10:07

## 2024-07-29 RX ADMIN — PANTOPRAZOLE SODIUM 40 MG: 40 TABLET, DELAYED RELEASE ORAL at 10:07

## 2024-07-29 RX ADMIN — IPRATROPIUM BROMIDE AND ALBUTEROL SULFATE 3 ML: .5; 3 SOLUTION RESPIRATORY (INHALATION) at 05:07

## 2024-07-29 RX ADMIN — IPRATROPIUM BROMIDE AND ALBUTEROL SULFATE 3 ML: .5; 3 SOLUTION RESPIRATORY (INHALATION) at 03:07

## 2024-07-29 RX ADMIN — MORPHINE SULFATE 2 MG: 4 INJECTION INTRAVENOUS at 07:07

## 2024-07-29 RX ADMIN — PREDNISONE 60 MG: 20 TABLET ORAL at 07:07

## 2024-07-30 LAB
ALBUMIN SERPL-MCNC: 4 G/DL (ref 3.4–4.8)
ALBUMIN/GLOB SERPL: 1.5 RATIO (ref 1.1–2)
ALP SERPL-CCNC: 99 UNIT/L (ref 40–150)
ALT SERPL-CCNC: 19 UNIT/L (ref 0–55)
ANION GAP SERPL CALC-SCNC: 11 MEQ/L
AST SERPL-CCNC: 15 UNIT/L (ref 5–34)
BASOPHILS # BLD AUTO: 0.01 X10(3)/MCL
BASOPHILS NFR BLD AUTO: 0.1 %
BILIRUB SERPL-MCNC: 0.3 MG/DL
BUN SERPL-MCNC: 17.3 MG/DL (ref 8.4–25.7)
CALCIUM SERPL-MCNC: 10 MG/DL (ref 8.8–10)
CHLORIDE SERPL-SCNC: 107 MMOL/L (ref 98–107)
CO2 SERPL-SCNC: 25 MMOL/L (ref 23–31)
CREAT SERPL-MCNC: 0.91 MG/DL (ref 0.73–1.18)
CREAT/UREA NIT SERPL: 19
EOSINOPHIL # BLD AUTO: 0 X10(3)/MCL (ref 0–0.9)
EOSINOPHIL NFR BLD AUTO: 0 %
ERYTHROCYTE [DISTWIDTH] IN BLOOD BY AUTOMATED COUNT: 12.8 % (ref 11.5–17)
GFR SERPLBLD CREATININE-BSD FMLA CKD-EPI: >60 ML/MIN/1.73/M2
GLOBULIN SER-MCNC: 2.6 GM/DL (ref 2.4–3.5)
GLUCOSE SERPL-MCNC: 135 MG/DL (ref 82–115)
HCT VFR BLD AUTO: 41.6 % (ref 42–52)
HGB BLD-MCNC: 13.6 G/DL (ref 14–18)
IMM GRANULOCYTES # BLD AUTO: 0.04 X10(3)/MCL (ref 0–0.04)
IMM GRANULOCYTES NFR BLD AUTO: 0.4 %
LYMPHOCYTES # BLD AUTO: 1.19 X10(3)/MCL (ref 0.6–4.6)
LYMPHOCYTES NFR BLD AUTO: 12.5 %
MAGNESIUM SERPL-MCNC: 2.3 MG/DL (ref 1.6–2.6)
MCH RBC QN AUTO: 32.8 PG (ref 27–31)
MCHC RBC AUTO-ENTMCNC: 32.7 G/DL (ref 33–36)
MCV RBC AUTO: 100.2 FL (ref 80–94)
MONOCYTES # BLD AUTO: 0.85 X10(3)/MCL (ref 0.1–1.3)
MONOCYTES NFR BLD AUTO: 8.9 %
NEUTROPHILS # BLD AUTO: 7.46 X10(3)/MCL (ref 2.1–9.2)
NEUTROPHILS NFR BLD AUTO: 78.1 %
OHS QRS DURATION: 78 MS
OHS QTC CALCULATION: 438 MS
PHOSPHATE SERPL-MCNC: 3.9 MG/DL (ref 2.3–4.7)
PLATELET # BLD AUTO: 336 X10(3)/MCL (ref 130–400)
PMV BLD AUTO: 9.9 FL (ref 7.4–10.4)
POTASSIUM SERPL-SCNC: 5.1 MMOL/L (ref 3.5–5.1)
PROT SERPL-MCNC: 6.6 GM/DL (ref 5.8–7.6)
RBC # BLD AUTO: 4.15 X10(6)/MCL (ref 4.7–6.1)
SODIUM SERPL-SCNC: 143 MMOL/L (ref 136–145)
WBC # BLD AUTO: 9.55 X10(3)/MCL (ref 4.5–11.5)

## 2024-07-30 PROCEDURE — 85025 COMPLETE CBC W/AUTO DIFF WBC: CPT | Performed by: INTERNAL MEDICINE

## 2024-07-30 PROCEDURE — 94761 N-INVAS EAR/PLS OXIMETRY MLT: CPT

## 2024-07-30 PROCEDURE — 25000242 PHARM REV CODE 250 ALT 637 W/ HCPCS: Performed by: INTERNAL MEDICINE

## 2024-07-30 PROCEDURE — 11000001 HC ACUTE MED/SURG PRIVATE ROOM

## 2024-07-30 PROCEDURE — 80053 COMPREHEN METABOLIC PANEL: CPT | Performed by: INTERNAL MEDICINE

## 2024-07-30 PROCEDURE — 83735 ASSAY OF MAGNESIUM: CPT | Performed by: INTERNAL MEDICINE

## 2024-07-30 PROCEDURE — 94760 N-INVAS EAR/PLS OXIMETRY 1: CPT

## 2024-07-30 PROCEDURE — 99900035 HC TECH TIME PER 15 MIN (STAT)

## 2024-07-30 PROCEDURE — 84100 ASSAY OF PHOSPHORUS: CPT | Performed by: INTERNAL MEDICINE

## 2024-07-30 PROCEDURE — 96361 HYDRATE IV INFUSION ADD-ON: CPT

## 2024-07-30 PROCEDURE — 63600175 PHARM REV CODE 636 W HCPCS: Performed by: INTERNAL MEDICINE

## 2024-07-30 PROCEDURE — 36415 COLL VENOUS BLD VENIPUNCTURE: CPT | Performed by: INTERNAL MEDICINE

## 2024-07-30 PROCEDURE — 94640 AIRWAY INHALATION TREATMENT: CPT

## 2024-07-30 PROCEDURE — 63700000 PHARM REV CODE 250 ALT 637 W/O HCPCS: Performed by: INTERNAL MEDICINE

## 2024-07-30 PROCEDURE — 27000221 HC OXYGEN, UP TO 24 HOURS

## 2024-07-30 PROCEDURE — 63600175 PHARM REV CODE 636 W HCPCS: Performed by: EMERGENCY MEDICINE

## 2024-07-30 PROCEDURE — 25000003 PHARM REV CODE 250: Performed by: INTERNAL MEDICINE

## 2024-07-30 RX ORDER — DOCUSATE SODIUM 100 MG/1
200 CAPSULE, LIQUID FILLED ORAL 2 TIMES DAILY
Status: DISCONTINUED | OUTPATIENT
Start: 2024-07-30 | End: 2024-08-01 | Stop reason: HOSPADM

## 2024-07-30 RX ORDER — OXYCODONE HYDROCHLORIDE 10 MG/1
30 TABLET ORAL EVERY 4 HOURS PRN
Status: DISCONTINUED | OUTPATIENT
Start: 2024-07-30 | End: 2024-08-01 | Stop reason: HOSPADM

## 2024-07-30 RX ORDER — SODIUM CHLORIDE 9 MG/ML
INJECTION, SOLUTION INTRAVENOUS
Status: DISCONTINUED | OUTPATIENT
Start: 2024-07-30 | End: 2024-08-01 | Stop reason: HOSPADM

## 2024-07-30 RX ORDER — IPRATROPIUM BROMIDE AND ALBUTEROL SULFATE 2.5; .5 MG/3ML; MG/3ML
3 SOLUTION RESPIRATORY (INHALATION) EVERY 12 HOURS
Status: DISCONTINUED | OUTPATIENT
Start: 2024-07-30 | End: 2024-08-01 | Stop reason: HOSPADM

## 2024-07-30 RX ORDER — GUAIFENESIN AND DEXTROMETHORPHAN HYDROBROMIDE 10; 100 MG/5ML; MG/5ML
10 SYRUP ORAL EVERY 4 HOURS PRN
Status: DISCONTINUED | OUTPATIENT
Start: 2024-07-30 | End: 2024-08-01 | Stop reason: HOSPADM

## 2024-07-30 RX ORDER — IPRATROPIUM BROMIDE AND ALBUTEROL SULFATE 2.5; .5 MG/3ML; MG/3ML
3 SOLUTION RESPIRATORY (INHALATION) EVERY 4 HOURS PRN
Status: DISCONTINUED | OUTPATIENT
Start: 2024-07-30 | End: 2024-08-01 | Stop reason: HOSPADM

## 2024-07-30 RX ADMIN — PANTOPRAZOLE SODIUM 40 MG: 40 TABLET, DELAYED RELEASE ORAL at 07:07

## 2024-07-30 RX ADMIN — OXYCODONE HYDROCHLORIDE 30 MG: 10 TABLET ORAL at 12:07

## 2024-07-30 RX ADMIN — MORPHINE SULFATE 4 MG: 4 INJECTION, SOLUTION INTRAMUSCULAR; INTRAVENOUS at 07:07

## 2024-07-30 RX ADMIN — OXYCODONE HYDROCHLORIDE 30 MG: 10 TABLET ORAL at 09:07

## 2024-07-30 RX ADMIN — IPRATROPIUM BROMIDE AND ALBUTEROL SULFATE 3 ML: .5; 3 SOLUTION RESPIRATORY (INHALATION) at 11:07

## 2024-07-30 RX ADMIN — MORPHINE SULFATE 4 MG: 4 INJECTION, SOLUTION INTRAMUSCULAR; INTRAVENOUS at 08:07

## 2024-07-30 RX ADMIN — SODIUM CHLORIDE: 9 INJECTION, SOLUTION INTRAVENOUS at 06:07

## 2024-07-30 RX ADMIN — OXYCODONE HYDROCHLORIDE 30 MG: 10 TABLET ORAL at 06:07

## 2024-07-30 RX ADMIN — MORPHINE SULFATE 4 MG: 4 INJECTION, SOLUTION INTRAMUSCULAR; INTRAVENOUS at 04:07

## 2024-07-30 RX ADMIN — FORMOTEROL FUMARATE DIHYDRATE 20 MCG: 20 SOLUTION RESPIRATORY (INHALATION) at 07:07

## 2024-07-30 RX ADMIN — ENOXAPARIN SODIUM 40 MG: 40 INJECTION SUBCUTANEOUS at 04:07

## 2024-07-30 RX ADMIN — DOCUSATE SODIUM 200 MG: 100 CAPSULE, LIQUID FILLED ORAL at 12:07

## 2024-07-30 RX ADMIN — IPRATROPIUM BROMIDE AND ALBUTEROL SULFATE 3 ML: .5; 3 SOLUTION RESPIRATORY (INHALATION) at 07:07

## 2024-07-30 RX ADMIN — IPRATROPIUM BROMIDE AND ALBUTEROL SULFATE 3 ML: .5; 3 SOLUTION RESPIRATORY (INHALATION) at 03:07

## 2024-07-30 RX ADMIN — DEXAMETHASONE SODIUM PHOSPHATE 4 MG: 4 INJECTION, SOLUTION INTRA-ARTICULAR; INTRALESIONAL; INTRAMUSCULAR; INTRAVENOUS; SOFT TISSUE at 08:07

## 2024-07-30 RX ADMIN — CALCIUM CARBONATE (ANTACID) CHEW TAB 500 MG 1000 MG: 500 CHEW TAB at 06:07

## 2024-07-30 RX ADMIN — OXYCODONE HYDROCHLORIDE 30 MG: 10 TABLET ORAL at 02:07

## 2024-07-30 RX ADMIN — PREDNISONE 50 MG: 20 TABLET ORAL at 07:07

## 2024-07-30 RX ADMIN — MORPHINE SULFATE 4 MG: 4 INJECTION, SOLUTION INTRAMUSCULAR; INTRAVENOUS at 12:07

## 2024-07-30 RX ADMIN — TRAMADOL HYDROCHLORIDE 50 MG: 50 TABLET, COATED ORAL at 05:07

## 2024-07-30 RX ADMIN — FLUCONAZOLE 100 MG: 100 TABLET ORAL at 07:07

## 2024-07-30 RX ADMIN — MORPHINE SULFATE 4 MG: 4 INJECTION, SOLUTION INTRAMUSCULAR; INTRAVENOUS at 03:07

## 2024-07-30 RX ADMIN — DEXAMETHASONE SODIUM PHOSPHATE 4 MG: 4 INJECTION, SOLUTION INTRA-ARTICULAR; INTRALESIONAL; INTRAMUSCULAR; INTRAVENOUS; SOFT TISSUE at 07:07

## 2024-07-30 RX ADMIN — LEVOFLOXACIN 750 MG: 750 INJECTION, SOLUTION INTRAVENOUS at 07:07

## 2024-07-31 PROCEDURE — 63600175 PHARM REV CODE 636 W HCPCS: Performed by: EMERGENCY MEDICINE

## 2024-07-31 PROCEDURE — 97165 OT EVAL LOW COMPLEX 30 MIN: CPT

## 2024-07-31 PROCEDURE — 99900035 HC TECH TIME PER 15 MIN (STAT)

## 2024-07-31 PROCEDURE — 94640 AIRWAY INHALATION TREATMENT: CPT

## 2024-07-31 PROCEDURE — 11000001 HC ACUTE MED/SURG PRIVATE ROOM

## 2024-07-31 PROCEDURE — 27000221 HC OXYGEN, UP TO 24 HOURS

## 2024-07-31 PROCEDURE — 63600175 PHARM REV CODE 636 W HCPCS: Performed by: INTERNAL MEDICINE

## 2024-07-31 PROCEDURE — 97161 PT EVAL LOW COMPLEX 20 MIN: CPT

## 2024-07-31 PROCEDURE — 94760 N-INVAS EAR/PLS OXIMETRY 1: CPT

## 2024-07-31 PROCEDURE — 25000003 PHARM REV CODE 250: Performed by: INTERNAL MEDICINE

## 2024-07-31 PROCEDURE — 63700000 PHARM REV CODE 250 ALT 637 W/O HCPCS: Performed by: INTERNAL MEDICINE

## 2024-07-31 PROCEDURE — 25000242 PHARM REV CODE 250 ALT 637 W/ HCPCS: Performed by: INTERNAL MEDICINE

## 2024-07-31 PROCEDURE — 25000003 PHARM REV CODE 250: Performed by: EMERGENCY MEDICINE

## 2024-07-31 PROCEDURE — A4216 STERILE WATER/SALINE, 10 ML: HCPCS | Performed by: EMERGENCY MEDICINE

## 2024-07-31 RX ADMIN — Medication 10 ML: at 08:07

## 2024-07-31 RX ADMIN — OXYCODONE HYDROCHLORIDE 30 MG: 10 TABLET ORAL at 12:07

## 2024-07-31 RX ADMIN — MORPHINE SULFATE 4 MG: 4 INJECTION, SOLUTION INTRAMUSCULAR; INTRAVENOUS at 12:07

## 2024-07-31 RX ADMIN — OXYCODONE HYDROCHLORIDE 30 MG: 10 TABLET ORAL at 08:07

## 2024-07-31 RX ADMIN — FORMOTEROL FUMARATE DIHYDRATE 20 MCG: 20 SOLUTION RESPIRATORY (INHALATION) at 09:07

## 2024-07-31 RX ADMIN — MORPHINE SULFATE 4 MG: 4 INJECTION, SOLUTION INTRAMUSCULAR; INTRAVENOUS at 09:07

## 2024-07-31 RX ADMIN — ENOXAPARIN SODIUM 40 MG: 40 INJECTION SUBCUTANEOUS at 04:07

## 2024-07-31 RX ADMIN — DOCUSATE SODIUM 200 MG: 100 CAPSULE, LIQUID FILLED ORAL at 08:07

## 2024-07-31 RX ADMIN — Medication 10 ML: at 05:07

## 2024-07-31 RX ADMIN — MORPHINE SULFATE 4 MG: 4 INJECTION, SOLUTION INTRAMUSCULAR; INTRAVENOUS at 01:07

## 2024-07-31 RX ADMIN — PREDNISONE 50 MG: 20 TABLET ORAL at 08:07

## 2024-07-31 RX ADMIN — Medication 6 MG: at 12:07

## 2024-07-31 RX ADMIN — OXYCODONE HYDROCHLORIDE 30 MG: 10 TABLET ORAL at 04:07

## 2024-07-31 RX ADMIN — MORPHINE SULFATE 4 MG: 4 INJECTION, SOLUTION INTRAMUSCULAR; INTRAVENOUS at 05:07

## 2024-07-31 RX ADMIN — IPRATROPIUM BROMIDE AND ALBUTEROL SULFATE 3 ML: .5; 3 SOLUTION RESPIRATORY (INHALATION) at 07:07

## 2024-07-31 RX ADMIN — Medication 6 MG: at 10:07

## 2024-07-31 RX ADMIN — DEXAMETHASONE SODIUM PHOSPHATE 4 MG: 4 INJECTION, SOLUTION INTRA-ARTICULAR; INTRALESIONAL; INTRAMUSCULAR; INTRAVENOUS; SOFT TISSUE at 07:07

## 2024-07-31 RX ADMIN — LEVOFLOXACIN 750 MG: 750 INJECTION, SOLUTION INTRAVENOUS at 08:07

## 2024-07-31 RX ADMIN — FLUCONAZOLE 100 MG: 100 TABLET ORAL at 08:07

## 2024-07-31 RX ADMIN — FORMOTEROL FUMARATE DIHYDRATE 20 MCG: 20 SOLUTION RESPIRATORY (INHALATION) at 07:07

## 2024-07-31 RX ADMIN — PANTOPRAZOLE SODIUM 40 MG: 40 TABLET, DELAYED RELEASE ORAL at 08:07

## 2024-08-01 VITALS
SYSTOLIC BLOOD PRESSURE: 147 MMHG | HEART RATE: 85 BPM | HEIGHT: 71 IN | RESPIRATION RATE: 18 BRPM | OXYGEN SATURATION: 92 % | DIASTOLIC BLOOD PRESSURE: 82 MMHG | WEIGHT: 143.31 LBS | TEMPERATURE: 98 F | BODY MASS INDEX: 20.06 KG/M2

## 2024-08-01 PROCEDURE — 25000242 PHARM REV CODE 250 ALT 637 W/ HCPCS: Performed by: INTERNAL MEDICINE

## 2024-08-01 PROCEDURE — A4216 STERILE WATER/SALINE, 10 ML: HCPCS | Performed by: EMERGENCY MEDICINE

## 2024-08-01 PROCEDURE — 94760 N-INVAS EAR/PLS OXIMETRY 1: CPT

## 2024-08-01 PROCEDURE — 94640 AIRWAY INHALATION TREATMENT: CPT

## 2024-08-01 PROCEDURE — 63600175 PHARM REV CODE 636 W HCPCS: Performed by: INTERNAL MEDICINE

## 2024-08-01 PROCEDURE — 63600175 PHARM REV CODE 636 W HCPCS: Performed by: STUDENT IN AN ORGANIZED HEALTH CARE EDUCATION/TRAINING PROGRAM

## 2024-08-01 PROCEDURE — 25000003 PHARM REV CODE 250: Performed by: INTERNAL MEDICINE

## 2024-08-01 PROCEDURE — 25000003 PHARM REV CODE 250: Performed by: STUDENT IN AN ORGANIZED HEALTH CARE EDUCATION/TRAINING PROGRAM

## 2024-08-01 PROCEDURE — 25000003 PHARM REV CODE 250: Performed by: EMERGENCY MEDICINE

## 2024-08-01 PROCEDURE — 63700000 PHARM REV CODE 250 ALT 637 W/O HCPCS: Performed by: INTERNAL MEDICINE

## 2024-08-01 PROCEDURE — 63600175 PHARM REV CODE 636 W HCPCS: Performed by: EMERGENCY MEDICINE

## 2024-08-01 PROCEDURE — 5A09357 ASSISTANCE WITH RESPIRATORY VENTILATION, LESS THAN 24 CONSECUTIVE HOURS, CONTINUOUS POSITIVE AIRWAY PRESSURE: ICD-10-PCS | Performed by: INTERNAL MEDICINE

## 2024-08-01 PROCEDURE — 27000221 HC OXYGEN, UP TO 24 HOURS

## 2024-08-01 RX ORDER — PREDNISONE 50 MG/1
50 TABLET ORAL DAILY
Qty: 2 TABLET | Refills: 0 | Status: SHIPPED | OUTPATIENT
Start: 2024-08-02 | End: 2024-08-04

## 2024-08-01 RX ORDER — MORPHINE SULFATE 4 MG/ML
3 INJECTION, SOLUTION INTRAMUSCULAR; INTRAVENOUS EVERY 6 HOURS PRN
Status: DISCONTINUED | OUTPATIENT
Start: 2024-08-01 | End: 2024-08-01 | Stop reason: HOSPADM

## 2024-08-01 RX ORDER — LEVOFLOXACIN 750 MG/1
750 TABLET ORAL DAILY
Status: DISCONTINUED | OUTPATIENT
Start: 2024-08-01 | End: 2024-08-01 | Stop reason: HOSPADM

## 2024-08-01 RX ORDER — LEVOFLOXACIN 750 MG/1
750 TABLET ORAL DAILY
Qty: 2 TABLET | Refills: 0 | Status: SHIPPED | OUTPATIENT
Start: 2024-08-02 | End: 2024-08-04

## 2024-08-01 RX ADMIN — PREDNISONE 50 MG: 20 TABLET ORAL at 08:08

## 2024-08-01 RX ADMIN — FLUCONAZOLE 100 MG: 100 TABLET ORAL at 08:08

## 2024-08-01 RX ADMIN — OXYCODONE HYDROCHLORIDE 30 MG: 10 TABLET ORAL at 06:08

## 2024-08-01 RX ADMIN — Medication 10 ML: at 07:08

## 2024-08-01 RX ADMIN — MORPHINE SULFATE 4 MG: 4 INJECTION, SOLUTION INTRAMUSCULAR; INTRAVENOUS at 04:08

## 2024-08-01 RX ADMIN — Medication 10 ML: at 02:08

## 2024-08-01 RX ADMIN — PANTOPRAZOLE SODIUM 40 MG: 40 TABLET, DELAYED RELEASE ORAL at 08:08

## 2024-08-01 RX ADMIN — OXYCODONE HYDROCHLORIDE 30 MG: 10 TABLET ORAL at 10:08

## 2024-08-01 RX ADMIN — OXYCODONE HYDROCHLORIDE 30 MG: 10 TABLET ORAL at 12:08

## 2024-08-01 RX ADMIN — DOCUSATE SODIUM 200 MG: 100 CAPSULE, LIQUID FILLED ORAL at 08:08

## 2024-08-01 RX ADMIN — FORMOTEROL FUMARATE DIHYDRATE 20 MCG: 20 SOLUTION RESPIRATORY (INHALATION) at 07:08

## 2024-08-01 RX ADMIN — MORPHINE SULFATE 3 MG: 4 INJECTION INTRAVENOUS at 02:08

## 2024-08-01 RX ADMIN — LEVOFLOXACIN 750 MG: 750 INJECTION, SOLUTION INTRAVENOUS at 07:08

## 2024-08-01 RX ADMIN — DEXAMETHASONE SODIUM PHOSPHATE 4 MG: 4 INJECTION, SOLUTION INTRA-ARTICULAR; INTRALESIONAL; INTRAMUSCULAR; INTRAVENOUS; SOFT TISSUE at 07:08

## 2024-08-01 RX ADMIN — LEVOFLOXACIN 750 MG: 750 TABLET, FILM COATED ORAL at 12:08

## 2024-08-01 RX ADMIN — MORPHINE SULFATE 4 MG: 4 INJECTION, SOLUTION INTRAMUSCULAR; INTRAVENOUS at 08:08

## 2024-08-01 RX ADMIN — IPRATROPIUM BROMIDE AND ALBUTEROL SULFATE 3 ML: .5; 3 SOLUTION RESPIRATORY (INHALATION) at 07:08

## 2024-08-02 ENCOUNTER — PATIENT OUTREACH (OUTPATIENT)
Dept: ADMINISTRATIVE | Facility: CLINIC | Age: 64
End: 2024-08-02
Payer: MEDICARE

## 2024-08-02 NOTE — PROGRESS NOTES
2nd attempt-C3 nurse attempted to contact Balbir Rogers for a TCC post hospital discharge follow up call. The patient answered but is unable to review his medications and asked that I call his wife but stated she is at work right now.  The patient has a scheduled [facility] appointment with Jose Johnson Jr., MD on 8/12/24 @ 3:45.

## 2024-08-02 NOTE — PROGRESS NOTES
C3 nurse attempted to contact Balbir Rogers for a TCC post hospital discharge follow up call. The patient answered but is unable to review his medications and asked that I call his wife but stated she is at work right now.  The patient has a scheduled [facility] appointment with Jose Johnson Jr., MD on 8/12/24 @ 3:45.

## 2024-09-03 RX ORDER — LORAZEPAM 1 MG/1
TABLET ORAL
Qty: 15 TABLET | Refills: 2 | Status: SHIPPED | OUTPATIENT
Start: 2024-09-03

## 2024-10-23 ENCOUNTER — HOSPITAL ENCOUNTER (EMERGENCY)
Facility: HOSPITAL | Age: 64
Discharge: HOME | End: 2024-10-23
Attending: SPECIALIST
Payer: MEDICARE

## 2024-10-23 VITALS
OXYGEN SATURATION: 99 % | BODY MASS INDEX: 21.47 KG/M2 | TEMPERATURE: 99 F | DIASTOLIC BLOOD PRESSURE: 88 MMHG | WEIGHT: 150 LBS | RESPIRATION RATE: 16 BRPM | SYSTOLIC BLOOD PRESSURE: 131 MMHG | HEIGHT: 70 IN | HEART RATE: 72 BPM

## 2024-10-23 DIAGNOSIS — J44.9 CHRONIC OBSTRUCTIVE PULMONARY DISEASE, UNSPECIFIED COPD TYPE: ICD-10-CM

## 2024-10-23 DIAGNOSIS — G25.3 MYOCLONUS: Primary | ICD-10-CM

## 2024-10-23 LAB
ALBUMIN SERPL-MCNC: 3.5 G/DL (ref 3.4–4.8)
ALBUMIN/GLOB SERPL: 1.2 RATIO (ref 1.1–2)
ALP SERPL-CCNC: 85 UNIT/L (ref 40–150)
ALT SERPL-CCNC: 11 UNIT/L (ref 0–55)
ANION GAP SERPL CALC-SCNC: 8 MEQ/L
AST SERPL-CCNC: 12 UNIT/L (ref 5–34)
BASOPHILS # BLD AUTO: 0.04 X10(3)/MCL
BASOPHILS NFR BLD AUTO: 0.4 %
BILIRUB SERPL-MCNC: 0.5 MG/DL
BUN SERPL-MCNC: 25.6 MG/DL (ref 8.4–25.7)
CALCIUM SERPL-MCNC: 9.2 MG/DL (ref 8.8–10)
CHLORIDE SERPL-SCNC: 112 MMOL/L (ref 98–107)
CO2 SERPL-SCNC: 21 MMOL/L (ref 23–31)
CREAT SERPL-MCNC: 0.8 MG/DL (ref 0.72–1.25)
CREAT/UREA NIT SERPL: 32
EOSINOPHIL # BLD AUTO: 0.15 X10(3)/MCL (ref 0–0.9)
EOSINOPHIL NFR BLD AUTO: 1.4 %
ERYTHROCYTE [DISTWIDTH] IN BLOOD BY AUTOMATED COUNT: 13.7 % (ref 11.5–17)
GFR SERPLBLD CREATININE-BSD FMLA CKD-EPI: >60 ML/MIN/1.73/M2
GLOBULIN SER-MCNC: 3 GM/DL (ref 2.4–3.5)
GLUCOSE SERPL-MCNC: 81 MG/DL (ref 82–115)
HCT VFR BLD AUTO: 37.3 % (ref 42–52)
HGB BLD-MCNC: 12 G/DL (ref 14–18)
IMM GRANULOCYTES # BLD AUTO: 0.03 X10(3)/MCL (ref 0–0.04)
IMM GRANULOCYTES NFR BLD AUTO: 0.3 %
LYMPHOCYTES # BLD AUTO: 2.13 X10(3)/MCL (ref 0.6–4.6)
LYMPHOCYTES NFR BLD AUTO: 20.2 %
MCH RBC QN AUTO: 31.3 PG (ref 27–31)
MCHC RBC AUTO-ENTMCNC: 32.2 G/DL (ref 33–36)
MCV RBC AUTO: 97.4 FL (ref 80–94)
MONOCYTES # BLD AUTO: 0.73 X10(3)/MCL (ref 0.1–1.3)
MONOCYTES NFR BLD AUTO: 6.9 %
NEUTROPHILS # BLD AUTO: 7.49 X10(3)/MCL (ref 2.1–9.2)
NEUTROPHILS NFR BLD AUTO: 70.8 %
PLATELET # BLD AUTO: 342 X10(3)/MCL (ref 130–400)
PMV BLD AUTO: 9.3 FL (ref 7.4–10.4)
POTASSIUM SERPL-SCNC: 4.3 MMOL/L (ref 3.5–5.1)
PROT SERPL-MCNC: 6.5 GM/DL (ref 5.8–7.6)
RBC # BLD AUTO: 3.83 X10(6)/MCL (ref 4.7–6.1)
SODIUM SERPL-SCNC: 141 MMOL/L (ref 136–145)
WBC # BLD AUTO: 10.57 X10(3)/MCL (ref 4.5–11.5)

## 2024-10-23 PROCEDURE — 63600175 PHARM REV CODE 636 W HCPCS: Performed by: STUDENT IN AN ORGANIZED HEALTH CARE EDUCATION/TRAINING PROGRAM

## 2024-10-23 PROCEDURE — 63600175 PHARM REV CODE 636 W HCPCS: Performed by: SPECIALIST

## 2024-10-23 PROCEDURE — 96374 THER/PROPH/DIAG INJ IV PUSH: CPT

## 2024-10-23 PROCEDURE — 99284 EMERGENCY DEPT VISIT MOD MDM: CPT | Mod: 25

## 2024-10-23 PROCEDURE — 25000242 PHARM REV CODE 250 ALT 637 W/ HCPCS: Performed by: STUDENT IN AN ORGANIZED HEALTH CARE EDUCATION/TRAINING PROGRAM

## 2024-10-23 PROCEDURE — 85025 COMPLETE CBC W/AUTO DIFF WBC: CPT | Performed by: SPECIALIST

## 2024-10-23 PROCEDURE — 94640 AIRWAY INHALATION TREATMENT: CPT

## 2024-10-23 PROCEDURE — 80053 COMPREHEN METABOLIC PANEL: CPT | Performed by: SPECIALIST

## 2024-10-23 PROCEDURE — 96375 TX/PRO/DX INJ NEW DRUG ADDON: CPT

## 2024-10-23 RX ORDER — DIPHENHYDRAMINE HYDROCHLORIDE 50 MG/ML
25 INJECTION INTRAMUSCULAR; INTRAVENOUS
Status: COMPLETED | OUTPATIENT
Start: 2024-10-23 | End: 2024-10-23

## 2024-10-23 RX ORDER — AZITHROMYCIN 250 MG/1
TABLET, FILM COATED ORAL
Qty: 6 TABLET | Refills: 0 | Status: SHIPPED | OUTPATIENT
Start: 2024-10-23 | End: 2024-10-28

## 2024-10-23 RX ORDER — LORAZEPAM 1 MG/1
TABLET ORAL
Qty: 15 TABLET | Refills: 0 | Status: SHIPPED | OUTPATIENT
Start: 2024-10-23

## 2024-10-23 RX ORDER — LORAZEPAM 2 MG/ML
1 INJECTION INTRAMUSCULAR
Status: COMPLETED | OUTPATIENT
Start: 2024-10-23 | End: 2024-10-23

## 2024-10-23 RX ORDER — IPRATROPIUM BROMIDE AND ALBUTEROL SULFATE 2.5; .5 MG/3ML; MG/3ML
3 SOLUTION RESPIRATORY (INHALATION)
Status: COMPLETED | OUTPATIENT
Start: 2024-10-23 | End: 2024-10-23

## 2024-10-23 RX ORDER — DEXAMETHASONE SODIUM PHOSPHATE 4 MG/ML
8 INJECTION, SOLUTION INTRA-ARTICULAR; INTRALESIONAL; INTRAMUSCULAR; INTRAVENOUS; SOFT TISSUE
Status: COMPLETED | OUTPATIENT
Start: 2024-10-23 | End: 2024-10-23

## 2024-10-23 RX ADMIN — IPRATROPIUM BROMIDE AND ALBUTEROL SULFATE 3 ML: .5; 3 SOLUTION RESPIRATORY (INHALATION) at 06:10

## 2024-10-23 RX ADMIN — DEXAMETHASONE SODIUM PHOSPHATE 8 MG: 4 INJECTION INTRA-ARTICULAR; INTRALESIONAL; INTRAMUSCULAR; INTRAVENOUS; SOFT TISSUE at 06:10

## 2024-10-23 RX ADMIN — DIPHENHYDRAMINE HYDROCHLORIDE 25 MG: 50 INJECTION, SOLUTION INTRAMUSCULAR; INTRAVENOUS at 05:10

## 2024-10-23 RX ADMIN — LORAZEPAM 1 MG: 2 INJECTION INTRAMUSCULAR; INTRAVENOUS at 05:10

## 2024-12-19 DIAGNOSIS — F41.9 ANXIETY: Primary | ICD-10-CM

## 2024-12-19 RX ORDER — LORAZEPAM 1 MG/1
TABLET ORAL
Qty: 15 TABLET | Refills: 0 | Status: SHIPPED | OUTPATIENT
Start: 2024-12-19

## 2025-01-15 DIAGNOSIS — F41.9 ANXIETY: ICD-10-CM

## 2025-01-15 RX ORDER — LORAZEPAM 1 MG/1
TABLET ORAL
Qty: 15 TABLET | Refills: 0 | Status: SHIPPED | OUTPATIENT
Start: 2025-01-15

## 2025-01-15 NOTE — TELEPHONE ENCOUNTER
Requesting Lorazepam 1 mg. Last office visit was 4/16/2024 and last fill on medications was 12/19/2024.

## 2025-04-29 NOTE — PLAN OF CARE
Problem: Adult Inpatient Plan of Care  Goal: Plan of Care Review  Outcome: Ongoing, Progressing  Flowsheets (Taken 1/2/2023   Plan of Care Reviewed With:   patient   spouse  Goal: Patient-Specific Goal (Individualized)  Outcome: Ongoing, Progressing  Flowsheets (Taken 1/2/2023   Anxieties, Fears or Concerns: not being able to breathe  Individualized Care Needs: improve gas exchange  Patient-Specific Goals (Include Timeframe): patient will remain free of falls on this admission  Goal: Absence of Hospital-Acquired Illness or Injury  Outcome: Ongoing, Progressing  Intervention: Identify and Manage Fall Risk  Flowsheets (Taken 1/2/2023   Safety Promotion/Fall Prevention:   assistive device/personal item within reach   bed alarm set   nonskid shoes/socks when out of bed   family to remain at bedside   instructed to call staff for mobility  Intervention: Prevent Skin Injury  Flowsheets (Taken 1/2/2023   Body Position: position changed independently  Skin Protection:   adhesive use limited   incontinence pads utilized   transparent dressing maintained  Intervention: Prevent and Manage VTE (Venous Thromboembolism) Risk  Flowsheets (Taken 1/2/2023  Activity Management: Rolling - L1  VTE Prevention/Management:   bleeding risk assessed   bleeding precations maintained  Range of Motion: active ROM (range of motion) encouraged  Intervention: Prevent Infection  Flowsheets (Taken 1/2/2023   Infection Prevention:   hand hygiene promoted   equipment surfaces disinfected   personal protective equipment utilized  Goal: Optimal Comfort and Wellbeing  Outcome: Ongoing, Progressing  Intervention: Monitor Pain and Promote Comfort  Flowsheets (Taken 1/2/2023   Pain Management Interventions:   quiet environment facilitated   care clustered  Intervention: Provide Person-Centered Care  Flowsheets (Taken 1/2/2023   Trust Relationship/Rapport:   care explained   choices provided   questions answered   thoughts/feelings acknowledged  Goal:  Readiness for Transition of Care  Outcome: Ongoing, Progressing  Intervention: Mutually Develop Transition Plan  Flowsheets (Taken 1/2/2023   Communicated JACY with patient/caregiver: Date not available/Unable to determine  Do you expect to return to your current living situation?: Yes  Do you have help at home or someone to help you manage your care at home?: Yes  Readmission within 30 days?: No  Do you currently have service(s) that help you manage your care at home?: No     Problem: Fluid Imbalance (Pneumonia)  Goal: Fluid Balance  Outcome: Ongoing, Progressing  Intervention: Monitor and Manage Fluid Balance  Flowsheets (Taken 1/2/2023   Fluid/Electrolyte Management: fluids restricted     Problem: Infection (Pneumonia)  Goal: Resolution of Infection Signs and Symptoms  Outcome: Ongoing, Progressing  Intervention: Prevent Infection Progression  Flowsheets (Taken 1/2/2023   Fever Reduction/Comfort Measures:   lightweight bedding   lightweight clothing  Infection Management: aseptic technique maintained  Isolation Precautions:   precautions initiated   airborne   contact   droplet     Problem: Respiratory Compromise (Pneumonia)  Goal: Effective Oxygenation and Ventilation  Outcome: Ongoing, Progressing  Intervention: Promote Airway Secretion Clearance  Flowsheets (Taken 1/2/2023   Cough And Deep Breathing: done with encouragement  Intervention: Optimize Oxygenation and Ventilation  Flowsheets (Taken 1/2/2023   Airway/Ventilation Management:   airway patency maintained   calming measures promoted  Head of Bed (HOB) Positioning: HOB at 30 degrees     Problem: Infection  Goal: Absence of Infection Signs and Symptoms  Outcome: Ongoing, Progressing  Intervention: Prevent or Manage Infection  Flowsheets (Taken 1/2/2023   Fever Reduction/Comfort Measures:   lightweight bedding   lightweight clothing  Infection Management: aseptic technique maintained  Isolation Precautions:   precautions initiated   airborne   contact    droplet      General Sunscreen Counseling: I recommended a broad spectrum sunscreen with a SPF of 30 or higher.  I explained that SPF 30 sunscreens block approximately 97 percent of the sun's harmful rays.  Sunscreens should be applied at least 15 minutes prior to expected sun exposure and then every 2 hours after that as long as sun exposure continues. If swimming or exercising sunscreen should be reapplied every 45 minutes to an hour after getting wet or sweating.  One ounce, or the equivalent of a shot glass full of sunscreen, is adequate to protect the skin not covered by a bathing suit. I also recommended a lip balm with a sunscreen as well. Sun protective clothing can be used in lieu of sunscreen but must be worn the entire time you are exposed to the sun's rays. Detail Level: Generalized Products Recommended: Colorescience Brush

## (undated) DEVICE — GLOVE PROTEXIS BLUE LATEX 7.5

## (undated) DEVICE — BAND TR COMP DEVICE REG 24CM

## (undated) DEVICE — KIT GLIDESHEATH SLEND 6FR 10CM

## (undated) DEVICE — INQWIRE 210 CM

## (undated) DEVICE — CATH JACKY RADIAL 5FR 100CM

## (undated) DEVICE — SEE MEDLINE ITEM 157187

## (undated) DEVICE — GLOVE 7.5 PROTEXIS PI MICRO

## (undated) DEVICE — COVER PROBE US 5.5X58L NON LTX

## (undated) DEVICE — CATH EMPULSE ANGLED 5FR PIGTAI

## (undated) DEVICE — PACK OR CLEAN UP COMBO SIZE 2

## (undated) DEVICE — Device

## (undated) DEVICE — PAD DEFIB CADENCE ADULT R2

## (undated) DEVICE — KIT HAND CONTROL HIGH PRESSUR

## (undated) DEVICE — CONTRAST ISOVUE 370 500ML MULT

## (undated) DEVICE — CANNULA DUAL CO2/O2 NASAL 7FT

## (undated) DEVICE — KIT MANIFOLD LOW PRESS TUBING